# Patient Record
Sex: FEMALE | Race: WHITE | Employment: OTHER | URBAN - METROPOLITAN AREA
[De-identification: names, ages, dates, MRNs, and addresses within clinical notes are randomized per-mention and may not be internally consistent; named-entity substitution may affect disease eponyms.]

---

## 2018-08-03 PROBLEM — K92.2 UPPER GI BLEED: Status: ACTIVE | Noted: 2018-08-03

## 2018-08-04 ENCOUNTER — APPOINTMENT (OUTPATIENT)
Dept: ULTRASOUND IMAGING | Age: 83
DRG: 987 | End: 2018-08-04
Attending: INTERNAL MEDICINE
Payer: MEDICARE

## 2018-08-04 ENCOUNTER — HOSPITAL ENCOUNTER (INPATIENT)
Age: 83
LOS: 12 days | Discharge: NURSING, REHAB OR LONG-TERM CARE | DRG: 987 | End: 2018-08-16
Attending: INTERNAL MEDICINE | Admitting: INTERNAL MEDICINE
Payer: MEDICARE

## 2018-08-04 ENCOUNTER — APPOINTMENT (OUTPATIENT)
Dept: GENERAL RADIOLOGY | Age: 83
DRG: 987 | End: 2018-08-04
Attending: INTERNAL MEDICINE
Payer: MEDICARE
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
## 2018-08-04 PROBLEM — E66.9 OBESITY: Status: ACTIVE | Noted: 2018-08-04

## 2018-08-04 LAB
ABO/RH: NORMAL
AMORPHOUS: ABNORMAL /HPF
ANION GAP SERPL CALCULATED.3IONS-SCNC: 11 MMOL/L (ref 3–16)
ANION GAP SERPL CALCULATED.3IONS-SCNC: 11 MMOL/L (ref 3–16)
ANTIBODY SCREEN: NORMAL
BACTERIA: ABNORMAL /HPF
BASOPHILS ABSOLUTE: 0 K/UL (ref 0–0.2)
BASOPHILS RELATIVE PERCENT: 0.2 %
BILIRUBIN URINE: NEGATIVE
BLOOD, URINE: ABNORMAL
BUN BLDV-MCNC: 79 MG/DL (ref 7–20)
BUN BLDV-MCNC: 80 MG/DL (ref 7–20)
CALCIUM SERPL-MCNC: 9 MG/DL (ref 8.3–10.6)
CALCIUM SERPL-MCNC: 9.1 MG/DL (ref 8.3–10.6)
CHLORIDE BLD-SCNC: 102 MMOL/L (ref 99–110)
CHLORIDE BLD-SCNC: 103 MMOL/L (ref 99–110)
CLARITY: CLEAR
CO2: 29 MMOL/L (ref 21–32)
CO2: 29 MMOL/L (ref 21–32)
COLOR: YELLOW
CREAT SERPL-MCNC: 2.1 MG/DL (ref 0.6–1.2)
CREAT SERPL-MCNC: 2.1 MG/DL (ref 0.6–1.2)
CREATININE URINE: 93.3 MG/DL (ref 28–259)
EKG ATRIAL RATE: 51 BPM
EKG DIAGNOSIS: NORMAL
EKG Q-T INTERVAL: 468 MS
EKG QRS DURATION: 78 MS
EKG QTC CALCULATION (BAZETT): 431 MS
EKG R AXIS: 86 DEGREES
EKG T AXIS: 51 DEGREES
EKG VENTRICULAR RATE: 51 BPM
EOSINOPHILS ABSOLUTE: 0 K/UL (ref 0–0.6)
EOSINOPHILS RELATIVE PERCENT: 0.3 %
EPITHELIAL CELLS, UA: ABNORMAL /HPF
GFR AFRICAN AMERICAN: 27
GFR AFRICAN AMERICAN: 27
GFR NON-AFRICAN AMERICAN: 23
GFR NON-AFRICAN AMERICAN: 23
GLUCOSE BLD-MCNC: 105 MG/DL (ref 70–99)
GLUCOSE BLD-MCNC: 105 MG/DL (ref 70–99)
GLUCOSE BLD-MCNC: 110 MG/DL (ref 70–99)
GLUCOSE BLD-MCNC: 110 MG/DL (ref 70–99)
GLUCOSE BLD-MCNC: 115 MG/DL (ref 70–99)
GLUCOSE BLD-MCNC: 164 MG/DL (ref 70–99)
GLUCOSE BLD-MCNC: 172 MG/DL (ref 70–99)
GLUCOSE URINE: NEGATIVE MG/DL
HCT VFR BLD CALC: 31.8 % (ref 36–48)
HCT VFR BLD CALC: 33.2 % (ref 36–48)
HEMOGLOBIN: 10.7 G/DL (ref 12–16)
HEMOGLOBIN: 11 G/DL (ref 12–16)
KETONES, URINE: NEGATIVE MG/DL
LEUKOCYTE ESTERASE, URINE: ABNORMAL
LYMPHOCYTES ABSOLUTE: 0.7 K/UL (ref 1–5.1)
LYMPHOCYTES RELATIVE PERCENT: 6.7 %
MCH RBC QN AUTO: 30 PG (ref 26–34)
MCH RBC QN AUTO: 30.6 PG (ref 26–34)
MCHC RBC AUTO-ENTMCNC: 33.1 G/DL (ref 31–36)
MCHC RBC AUTO-ENTMCNC: 33.6 G/DL (ref 31–36)
MCV RBC AUTO: 90.7 FL (ref 80–100)
MCV RBC AUTO: 91 FL (ref 80–100)
MICROSCOPIC EXAMINATION: YES
MONOCYTES ABSOLUTE: 1.4 K/UL (ref 0–1.3)
MONOCYTES RELATIVE PERCENT: 14.3 %
NEUTROPHILS ABSOLUTE: 7.9 K/UL (ref 1.7–7.7)
NEUTROPHILS RELATIVE PERCENT: 78.5 %
NITRITE, URINE: NEGATIVE
PDW BLD-RTO: 14.6 % (ref 12.4–15.4)
PDW BLD-RTO: 15 % (ref 12.4–15.4)
PERFORMED ON: ABNORMAL
PH UA: 5.5
PLATELET # BLD: 197 K/UL (ref 135–450)
PLATELET # BLD: 226 K/UL (ref 135–450)
PMV BLD AUTO: 8.6 FL (ref 5–10.5)
PMV BLD AUTO: 8.6 FL (ref 5–10.5)
POTASSIUM REFLEX MAGNESIUM: 4.6 MMOL/L (ref 3.5–5.1)
POTASSIUM SERPL-SCNC: 5 MMOL/L (ref 3.5–5.1)
PROTEIN UA: NEGATIVE MG/DL
RBC # BLD: 3.49 M/UL (ref 4–5.2)
RBC # BLD: 3.66 M/UL (ref 4–5.2)
RBC UA: ABNORMAL /HPF (ref 0–2)
SODIUM BLD-SCNC: 142 MMOL/L (ref 136–145)
SODIUM BLD-SCNC: 143 MMOL/L (ref 136–145)
SODIUM URINE: <20 MMOL/L
SPECIFIC GRAVITY UA: 1.01
UROBILINOGEN, URINE: 0.2 E.U./DL
WBC # BLD: 10.1 K/UL (ref 4–11)
WBC # BLD: 10.4 K/UL (ref 4–11)
WBC UA: ABNORMAL /HPF (ref 0–5)

## 2018-08-04 PROCEDURE — 2709999900 HC NON-CHARGEABLE SUPPLY: Performed by: INTERNAL MEDICINE

## 2018-08-04 PROCEDURE — 99152 MOD SED SAME PHYS/QHP 5/>YRS: CPT | Performed by: INTERNAL MEDICINE

## 2018-08-04 PROCEDURE — 80048 BASIC METABOLIC PNL TOTAL CA: CPT

## 2018-08-04 PROCEDURE — 6370000000 HC RX 637 (ALT 250 FOR IP): Performed by: NURSE PRACTITIONER

## 2018-08-04 PROCEDURE — 6360000002 HC RX W HCPCS: Performed by: NURSE PRACTITIONER

## 2018-08-04 PROCEDURE — 0DJ08ZZ INSPECTION OF UPPER INTESTINAL TRACT, VIA NATURAL OR ARTIFICIAL OPENING ENDOSCOPIC: ICD-10-PCS | Performed by: INTERNAL MEDICINE

## 2018-08-04 PROCEDURE — C9113 INJ PANTOPRAZOLE SODIUM, VIA: HCPCS | Performed by: NURSE PRACTITIONER

## 2018-08-04 PROCEDURE — 86901 BLOOD TYPING SEROLOGIC RH(D): CPT

## 2018-08-04 PROCEDURE — 85025 COMPLETE CBC W/AUTO DIFF WBC: CPT

## 2018-08-04 PROCEDURE — 83036 HEMOGLOBIN GLYCOSYLATED A1C: CPT

## 2018-08-04 PROCEDURE — 86850 RBC ANTIBODY SCREEN: CPT

## 2018-08-04 PROCEDURE — 3609017100 HC EGD: Performed by: INTERNAL MEDICINE

## 2018-08-04 PROCEDURE — 86900 BLOOD TYPING SEROLOGIC ABO: CPT

## 2018-08-04 PROCEDURE — 2580000003 HC RX 258: Performed by: NURSE PRACTITIONER

## 2018-08-04 PROCEDURE — 36415 COLL VENOUS BLD VENIPUNCTURE: CPT

## 2018-08-04 PROCEDURE — 84300 ASSAY OF URINE SODIUM: CPT

## 2018-08-04 PROCEDURE — 93005 ELECTROCARDIOGRAM TRACING: CPT | Performed by: NURSE PRACTITIONER

## 2018-08-04 PROCEDURE — 6360000002 HC RX W HCPCS: Performed by: INTERNAL MEDICINE

## 2018-08-04 PROCEDURE — 81001 URINALYSIS AUTO W/SCOPE: CPT

## 2018-08-04 PROCEDURE — 2060000000 HC ICU INTERMEDIATE R&B

## 2018-08-04 PROCEDURE — 7100000011 HC PHASE II RECOVERY - ADDTL 15 MIN: Performed by: INTERNAL MEDICINE

## 2018-08-04 PROCEDURE — 76770 US EXAM ABDO BACK WALL COMP: CPT

## 2018-08-04 PROCEDURE — 93010 ELECTROCARDIOGRAM REPORT: CPT | Performed by: INTERNAL MEDICINE

## 2018-08-04 PROCEDURE — 94761 N-INVAS EAR/PLS OXIMETRY MLT: CPT

## 2018-08-04 PROCEDURE — 2700000000 HC OXYGEN THERAPY PER DAY

## 2018-08-04 PROCEDURE — 82570 ASSAY OF URINE CREATININE: CPT

## 2018-08-04 PROCEDURE — 2580000003 HC RX 258: Performed by: INTERNAL MEDICINE

## 2018-08-04 PROCEDURE — 7100000010 HC PHASE II RECOVERY - FIRST 15 MIN: Performed by: INTERNAL MEDICINE

## 2018-08-04 PROCEDURE — 71045 X-RAY EXAM CHEST 1 VIEW: CPT

## 2018-08-04 PROCEDURE — 85027 COMPLETE CBC AUTOMATED: CPT

## 2018-08-04 RX ORDER — MEMANTINE HYDROCHLORIDE 5 MG/1
10 TABLET ORAL 2 TIMES DAILY
Status: DISCONTINUED | OUTPATIENT
Start: 2018-08-04 | End: 2018-08-16 | Stop reason: HOSPADM

## 2018-08-04 RX ORDER — LEVOTHYROXINE SODIUM 0.1 MG/1
100 TABLET ORAL DAILY
Status: DISCONTINUED | OUTPATIENT
Start: 2018-08-04 | End: 2018-08-07

## 2018-08-04 RX ORDER — DIAPER,BRIEF,INFANT-TODD,DISP
EACH MISCELLANEOUS PRN
Status: ON HOLD | COMMUNITY
End: 2018-08-14 | Stop reason: HOSPADM

## 2018-08-04 RX ORDER — MEMANTINE HYDROCHLORIDE 28 MG/1
CAPSULE, EXTENDED RELEASE ORAL DAILY
COMMUNITY

## 2018-08-04 RX ORDER — ACETAMINOPHEN 500 MG
500 TABLET ORAL EVERY 6 HOURS PRN
COMMUNITY

## 2018-08-04 RX ORDER — OXCARBAZEPINE 150 MG/1
150 TABLET, FILM COATED ORAL 2 TIMES DAILY
COMMUNITY

## 2018-08-04 RX ORDER — SODIUM CHLORIDE 9 MG/ML
INJECTION, SOLUTION INTRAVENOUS CONTINUOUS
Status: DISCONTINUED | OUTPATIENT
Start: 2018-08-04 | End: 2018-08-04

## 2018-08-04 RX ORDER — SODIUM CHLORIDE 0.9 % (FLUSH) 0.9 %
10 SYRINGE (ML) INJECTION EVERY 12 HOURS SCHEDULED
Status: DISCONTINUED | OUTPATIENT
Start: 2018-08-04 | End: 2018-08-16 | Stop reason: HOSPADM

## 2018-08-04 RX ORDER — PANTOPRAZOLE SODIUM 40 MG/10ML
40 INJECTION, POWDER, LYOPHILIZED, FOR SOLUTION INTRAVENOUS 2 TIMES DAILY
Status: DISCONTINUED | OUTPATIENT
Start: 2018-08-05 | End: 2018-08-05

## 2018-08-04 RX ORDER — ACETAMINOPHEN 325 MG/1
650 TABLET ORAL EVERY 4 HOURS PRN
Status: DISCONTINUED | OUTPATIENT
Start: 2018-08-04 | End: 2018-08-16 | Stop reason: HOSPADM

## 2018-08-04 RX ORDER — M-VIT,TX,IRON,MINS/CALC/FOLIC 27MG-0.4MG
1 TABLET ORAL DAILY
COMMUNITY

## 2018-08-04 RX ORDER — DILTIAZEM HYDROCHLORIDE 240 MG/1
240 CAPSULE, EXTENDED RELEASE ORAL DAILY
Status: ON HOLD | COMMUNITY
End: 2018-08-16 | Stop reason: HOSPADM

## 2018-08-04 RX ORDER — SENNA PLUS 8.6 MG/1
2 TABLET ORAL DAILY
COMMUNITY

## 2018-08-04 RX ORDER — MONTELUKAST SODIUM 10 MG/1
10 TABLET ORAL DAILY
Status: DISCONTINUED | OUTPATIENT
Start: 2018-08-04 | End: 2018-08-16 | Stop reason: HOSPADM

## 2018-08-04 RX ORDER — SPIRONOLACTONE 25 MG/1
25 TABLET ORAL 2 TIMES DAILY
Status: ON HOLD | COMMUNITY
End: 2018-08-14 | Stop reason: HOSPADM

## 2018-08-04 RX ORDER — MIDAZOLAM HYDROCHLORIDE 5 MG/ML
INJECTION INTRAMUSCULAR; INTRAVENOUS PRN
Status: DISCONTINUED | OUTPATIENT
Start: 2018-08-04 | End: 2018-08-04 | Stop reason: HOSPADM

## 2018-08-04 RX ORDER — DILTIAZEM HYDROCHLORIDE EXTENDED-RELEASE TABLETS 240 MG/1
TABLET, EXTENDED RELEASE ORAL
Status: ON HOLD | COMMUNITY
End: 2018-08-14 | Stop reason: HOSPADM

## 2018-08-04 RX ORDER — SPIRONOLACTONE 25 MG/1
25 TABLET ORAL 2 TIMES DAILY
Status: DISCONTINUED | OUTPATIENT
Start: 2018-08-04 | End: 2018-08-04

## 2018-08-04 RX ORDER — OXYBUTYNIN CHLORIDE 5 MG/1
5 TABLET, EXTENDED RELEASE ORAL DAILY
Status: DISCONTINUED | OUTPATIENT
Start: 2018-08-04 | End: 2018-08-16 | Stop reason: HOSPADM

## 2018-08-04 RX ORDER — FAMOTIDINE 20 MG/1
20 TABLET, FILM COATED ORAL DAILY
COMMUNITY

## 2018-08-04 RX ORDER — SODIUM CHLORIDE 0.9 % (FLUSH) 0.9 %
10 SYRINGE (ML) INJECTION PRN
Status: DISCONTINUED | OUTPATIENT
Start: 2018-08-04 | End: 2018-08-16 | Stop reason: HOSPADM

## 2018-08-04 RX ORDER — ACETAMINOPHEN 160 MG
1 TABLET,DISINTEGRATING ORAL DAILY
COMMUNITY

## 2018-08-04 RX ORDER — FENTANYL CITRATE 50 UG/ML
INJECTION, SOLUTION INTRAMUSCULAR; INTRAVENOUS PRN
Status: DISCONTINUED | OUTPATIENT
Start: 2018-08-04 | End: 2018-08-04 | Stop reason: HOSPADM

## 2018-08-04 RX ORDER — GUAIFENESIN AND DEXTROMETHORPHAN HYDROBROMIDE 100; 10 MG/5ML; MG/5ML
5 SOLUTION ORAL EVERY 4 HOURS PRN
Status: ON HOLD | COMMUNITY
End: 2018-08-14 | Stop reason: HOSPADM

## 2018-08-04 RX ORDER — OXYBUTYNIN CHLORIDE 5 MG/1
5 TABLET, EXTENDED RELEASE ORAL DAILY
COMMUNITY

## 2018-08-04 RX ORDER — FUROSEMIDE 20 MG/1
20 TABLET ORAL 2 TIMES DAILY
Status: ON HOLD | COMMUNITY
End: 2018-08-16 | Stop reason: HOSPADM

## 2018-08-04 RX ORDER — ALBUTEROL SULFATE 2.5 MG/3ML
2.5 SOLUTION RESPIRATORY (INHALATION) EVERY 6 HOURS PRN
COMMUNITY

## 2018-08-04 RX ORDER — SIMVASTATIN 10 MG
10 TABLET ORAL NIGHTLY
COMMUNITY

## 2018-08-04 RX ORDER — ALBUTEROL SULFATE 2.5 MG/3ML
2.5 SOLUTION RESPIRATORY (INHALATION) EVERY 6 HOURS PRN
Status: DISCONTINUED | OUTPATIENT
Start: 2018-08-04 | End: 2018-08-06

## 2018-08-04 RX ORDER — DEXTROSE MONOHYDRATE 25 G/50ML
12.5 INJECTION, SOLUTION INTRAVENOUS PRN
Status: DISCONTINUED | OUTPATIENT
Start: 2018-08-04 | End: 2018-08-16 | Stop reason: HOSPADM

## 2018-08-04 RX ORDER — FUROSEMIDE 20 MG/1
20 TABLET ORAL 2 TIMES DAILY
Status: DISCONTINUED | OUTPATIENT
Start: 2018-08-04 | End: 2018-08-04

## 2018-08-04 RX ORDER — DEXTROSE MONOHYDRATE 50 MG/ML
100 INJECTION, SOLUTION INTRAVENOUS PRN
Status: DISCONTINUED | OUTPATIENT
Start: 2018-08-04 | End: 2018-08-16 | Stop reason: HOSPADM

## 2018-08-04 RX ORDER — OXCARBAZEPINE 150 MG/1
150 TABLET, FILM COATED ORAL 2 TIMES DAILY
Status: DISCONTINUED | OUTPATIENT
Start: 2018-08-04 | End: 2018-08-16 | Stop reason: HOSPADM

## 2018-08-04 RX ORDER — SIMVASTATIN 10 MG
10 TABLET ORAL NIGHTLY
Status: DISCONTINUED | OUTPATIENT
Start: 2018-08-04 | End: 2018-08-16 | Stop reason: HOSPADM

## 2018-08-04 RX ORDER — NICOTINE POLACRILEX 4 MG
15 LOZENGE BUCCAL PRN
Status: DISCONTINUED | OUTPATIENT
Start: 2018-08-04 | End: 2018-08-16 | Stop reason: HOSPADM

## 2018-08-04 RX ORDER — FAMOTIDINE 20 MG/1
20 TABLET, FILM COATED ORAL DAILY
Status: DISCONTINUED | OUTPATIENT
Start: 2018-08-04 | End: 2018-08-04

## 2018-08-04 RX ORDER — SODIUM CHLORIDE, SODIUM GLUCONATE, SODIUM ACETATE, POTASSIUM CHLORIDE AND MAGNESIUM CHLORIDE 526; 502; 368; 37; 30 MG/100ML; MG/100ML; MG/100ML; MG/100ML; MG/100ML
1000 INJECTION, SOLUTION INTRAVENOUS CONTINUOUS
Status: DISCONTINUED | OUTPATIENT
Start: 2018-08-04 | End: 2018-08-07

## 2018-08-04 RX ORDER — LEVOTHYROXINE SODIUM 0.1 MG/1
100 TABLET ORAL DAILY
COMMUNITY

## 2018-08-04 RX ORDER — ONDANSETRON 2 MG/ML
4 INJECTION INTRAMUSCULAR; INTRAVENOUS EVERY 6 HOURS PRN
Status: DISCONTINUED | OUTPATIENT
Start: 2018-08-04 | End: 2018-08-16 | Stop reason: HOSPADM

## 2018-08-04 RX ORDER — POTASSIUM CHLORIDE 20 MEQ/1
20 TABLET, EXTENDED RELEASE ORAL DAILY
Status: ON HOLD | COMMUNITY
End: 2018-08-14 | Stop reason: HOSPADM

## 2018-08-04 RX ORDER — DILTIAZEM HYDROCHLORIDE 240 MG/1
240 CAPSULE, COATED, EXTENDED RELEASE ORAL DAILY
Status: DISCONTINUED | OUTPATIENT
Start: 2018-08-04 | End: 2018-08-04

## 2018-08-04 RX ORDER — MAGNESIUM OXIDE 400 MG/1
400 TABLET ORAL 2 TIMES DAILY
COMMUNITY

## 2018-08-04 RX ORDER — M-VIT,TX,IRON,MINS/CALC/FOLIC 27MG-0.4MG
1 TABLET ORAL DAILY
Status: DISCONTINUED | OUTPATIENT
Start: 2018-08-04 | End: 2018-08-16 | Stop reason: HOSPADM

## 2018-08-04 RX ORDER — MONTELUKAST SODIUM 10 MG/1
10 TABLET ORAL DAILY
COMMUNITY

## 2018-08-04 RX ORDER — SENNA PLUS 8.6 MG/1
2 TABLET ORAL DAILY
Status: DISCONTINUED | OUTPATIENT
Start: 2018-08-04 | End: 2018-08-16 | Stop reason: HOSPADM

## 2018-08-04 RX ORDER — PANTOPRAZOLE SODIUM 40 MG/10ML
40 INJECTION, POWDER, LYOPHILIZED, FOR SOLUTION INTRAVENOUS DAILY
Status: DISCONTINUED | OUTPATIENT
Start: 2018-08-05 | End: 2018-08-04

## 2018-08-04 RX ADMIN — Medication 1 TABLET: at 12:05

## 2018-08-04 RX ADMIN — MEMANTINE 10 MG: 5 TABLET ORAL at 21:33

## 2018-08-04 RX ADMIN — Medication 400 MG: at 21:33

## 2018-08-04 RX ADMIN — SPIRONOLACTONE 25 MG: 25 TABLET ORAL at 12:05

## 2018-08-04 RX ADMIN — SODIUM CHLORIDE: 9 INJECTION, SOLUTION INTRAVENOUS at 03:16

## 2018-08-04 RX ADMIN — MEMANTINE 10 MG: 5 TABLET ORAL at 12:05

## 2018-08-04 RX ADMIN — INSULIN LISPRO 1 UNITS: 100 INJECTION, SOLUTION INTRAVENOUS; SUBCUTANEOUS at 21:34

## 2018-08-04 RX ADMIN — CEFTRIAXONE SODIUM 1 G: 1 INJECTION, POWDER, FOR SOLUTION INTRAMUSCULAR; INTRAVENOUS at 17:18

## 2018-08-04 RX ADMIN — OXYBUTYNIN CHLORIDE 5 MG: 5 TABLET, FILM COATED, EXTENDED RELEASE ORAL at 12:04

## 2018-08-04 RX ADMIN — OXCARBAZEPINE 150 MG: 150 TABLET ORAL at 12:05

## 2018-08-04 RX ADMIN — VITAMIN D, TAB 1000IU (100/BT) 2000 UNITS: 25 TAB at 12:06

## 2018-08-04 RX ADMIN — ACETAMINOPHEN 650 MG: 325 TABLET, FILM COATED ORAL at 17:13

## 2018-08-04 RX ADMIN — Medication 10 ML: at 21:35

## 2018-08-04 RX ADMIN — INSULIN LISPRO 1 UNITS: 100 INJECTION, SOLUTION INTRAVENOUS; SUBCUTANEOUS at 17:18

## 2018-08-04 RX ADMIN — Medication 400 MG: at 12:04

## 2018-08-04 RX ADMIN — LEVOTHYROXINE SODIUM 100 MCG: 100 TABLET ORAL at 12:07

## 2018-08-04 RX ADMIN — ONDANSETRON 4 MG: 2 INJECTION INTRAMUSCULAR; INTRAVENOUS at 19:03

## 2018-08-04 RX ADMIN — OXCARBAZEPINE 150 MG: 150 TABLET ORAL at 21:33

## 2018-08-04 RX ADMIN — SODIUM CHLORIDE, SODIUM GLUCONATE, SODIUM ACETATE, POTASSIUM CHLORIDE AND MAGNESIUM CHLORIDE 1000 ML: 526; 502; 368; 37; 30 INJECTION, SOLUTION INTRAVENOUS at 14:11

## 2018-08-04 RX ADMIN — MONTELUKAST SODIUM 10 MG: 10 TABLET, COATED ORAL at 12:05

## 2018-08-04 RX ADMIN — SODIUM CHLORIDE 80 MG: 9 INJECTION, SOLUTION INTRAVENOUS at 03:15

## 2018-08-04 RX ADMIN — SIMVASTATIN 10 MG: 10 TABLET, FILM COATED ORAL at 21:33

## 2018-08-04 RX ADMIN — SENNOSIDES 17.2 MG: 8.6 TABLET, FILM COATED ORAL at 12:06

## 2018-08-04 RX ADMIN — SODIUM CHLORIDE 8 MG/HR: 9 INJECTION, SOLUTION INTRAVENOUS at 04:45

## 2018-08-04 RX ADMIN — Medication 10 ML: at 12:06

## 2018-08-04 ASSESSMENT — PAIN SCALES - PAIN ASSESSMENT IN ADVANCED DEMENTIA (PAINAD)
NEGVOCALIZATION: 0
TOTALSCORE: 0
BREATHING: 0
CONSOLABILITY: 0
FACIALEXPRESSION: 0
BODYLANGUAGE: 0

## 2018-08-04 ASSESSMENT — PAIN SCALES - GENERAL
PAINLEVEL_OUTOF10: 6
PAINLEVEL_OUTOF10: 3

## 2018-08-04 NOTE — PROGRESS NOTES
Spoke with Dr. Rosy Reno, Nephrologist, read all lab results, No new order rec'd, per on call MD, Dr. Harjinder Zee will see Pt in am. [facility], [facility].

## 2018-08-04 NOTE — PROGRESS NOTES
Patient's EF (Ejection Fraction) is Unknown. Patient has a past medical history of Alzheimer's dementia; Asthma; Atrial fibrillation ([facility] [address] Blood clot in vein; CHF (congestive heart failure) ([facility] [address] Dementia; GERD (gastroesophageal reflux disease); Hypertension; Kidney failure; and Osteoporosis. Comorbidities reviewed and education provided. Patient and family's stated goal of care: increase activity tolerance prior to discharge    Patient's current functional capacity:  Slight limitation of physical activity. Comfortable at rest. Ordinary physical activity results in fatigue, palpitation, dyspnea. Pt resting in bed at this time on 2 L O2. Pt denies shortness of breath. Pt with nonpitting lower extremity edema. Patient's weights and intake/output reviewed:    Patient Vitals for the past 96 hrs (Last 3 readings):   Weight   08/04/18 0053 211 lb 10.3 oz (96 kg)       Intake/Output Summary (Last 24 hours) at 08/04/18 1437  Last data filed at 08/04/18 1422   Gross per 24 hour   Intake            307.5 ml   Output              700 ml   Net           -392.5 ml       Patient provided with education on CHF signs/symptoms, causes, discharge medications, daily weights, low sodium diet, activity, and follow-up. Notified patient to call the doctor post discharge if patient experiences shortness of breath, chest pain, swelling, cough, or weight gain of three pounds in a day/five pounds in a week. Also notified patient to call the doctor with dizziness, increased fatigue, decreased or difficulty urinating. Pt education needs reinforcement. No additional questions at this time.     Education Time: 12 Minutes

## 2018-08-04 NOTE — OP NOTE
Manuel Oneil   8/4/2018  Esophagogastroduodenoscopy  A pre-procedure re-evaluation was performed immediately prior to the procedure. Preprocedure Dx: hematemesis  Postprocedure Dx: mild gastritis, old blood in oropharynx and upper esophagus  Medications: Procedural sedation with Versed & Fentanyl  Complications: None  Estimated Blood Loss: <5cc  Specimens: were not obtained  Recommendation  1. Continue supportive care  2. Switch ppi to bid  3. Bleeding likely from oral or nasopharyngeal source  4. No source of bleeding in UGI tract  5. Monitor Hgb and observe for signs of bleeding  6.  Will follow    Magali Herrera
sedation. The cardiopulmonary status was continuously  monitored throughout the procedure. The patient was placed in left lateral  disposition. Once the patient was deemed to be adequately sedated, a  standard upper gastroscope was inserted in the mouth and advanced under  direct visualization to the second portion of the duodenum. Entire mucosa,  esophagus, stomach and (retroflexed and views), duodenum (bulb, sweep and  second portion) were examined carefully during withdrawal.  The patient  tolerated well without any difficulties. FINDINGS:  ESOPHAGUS:  There was old blood in the upper esophagus as well as old blood  was visualized in the oral and oropharyngeal region. This is highly  suspicious for a oral or nasopharyngeal bleed. There was no active  bleeding or ulcers in the entire esophagus. There was mildly irregular  Z-line likely short segment Fitzpatrick's. STOMACH:  The entire stomach appeared normal except for minimal gastropathy  without any evidence of bleeding or ulcers. DUODENUM:  Examined portion of the duodenum appeared normal.  No rectal  bleeding or ulcers identified. No source of bleeding identified in the  upper GI tract. SUMMARY:  1. No bleeding identified in the upper GI tract. 2.  Mild gastritis. 3.  Irregular Z-line likely possible Fitzpatrick's. 4.  Old blood visible in the oral and oropharyngeal region and the upper  esophagus likely was secondary to oral or nasopharyngeal bleed. RECOMMENDATIONS:  1. Return the patient to floor for continuous medical care. 2.  Monitor hemoglobin. 3.  Observe for any further signs of bleeding. 4.  Switch PPI drip to b.i.d. orally. 5.  We will follow the patient with you.         March Lesches, MD    D: 08/04/2018 18:46:21       T: 08/04/2018 18:48:57     [id number]  Job#: [id number]     Doc#: [id number]    CC:  MD Leighann Hernandez MD

## 2018-08-04 NOTE — PLAN OF CARE
Problem: Discharge Planning:  Goal: Discharged to appropriate level of care  Discharged to appropriate level of care   Outcome: Ongoing      Problem: HEMODYNAMIC STATUS  Goal: Patient has stable vital signs and fluid balance  Outcome: Met This Shift  Patient's EF , No recent or previous Echo on file    Patient has a past medical history of Alzheimer's dementia; Asthma; Atrial fibrillation ([facility] [address] Blood clot in vein; CHF (congestive heart failure) ([facility] [address] Dementia; GERD (gastroesophageal reflux disease); Hypertension; Kidney failure; and Osteoporosis. Comorbidities reviewed and education provided. Patient and family's stated goal of care: be more comfortable prior to discharge    Patient's current functional capacity:  Marked limitation of physical activity. Comfortable at rest. Less than ordinary activity causes fatigue, palpitation, or dyspnea. Pt resting in bed at this time on 2 L O2. Pt with complaints of shortness of breath. Pt with nonpitting lower extremity edema. Patient's weights and intake/output reviewed:    Patient Vitals for the past 96 hrs (Last 3 readings):   Weight   08/04/18 0053 211 lb 10.3 oz (96 kg)       Intake/Output Summary (Last 24 hours) at 08/04/18 0656  Last data filed at 08/04/18 2188   Gross per 24 hour   Intake            177.5 ml   Output              250 ml   Net            -72.5 ml       Patient provided with education on CHF signs/symptoms, causes, discharge medications, daily weights, low sodium diet, activity, and follow-up. Notified patient to call the doctor post discharge if patient experiences shortness of breath, chest pain, swelling, cough, or weight gain of three pounds in a day/five pounds in a week. Also notified patient to call the doctor with dizziness, increased fatigue, decreased or difficulty urinating. Pt education needs reinforcement. No additional questions at this time.     Education Time: 10 Minutes

## 2018-08-04 NOTE — PROGRESS NOTES
Pt arrived came from  [facility]. Pt appears sleepy, confused. MD paged Notified on Pt arrival to room, needs Admission orders.  GEORGINAN

## 2018-08-04 NOTE — PLAN OF CARE
Problem: Discharge Planning:  Goal: Discharged to appropriate level of care  Discharged to appropriate level of care  Outcome: Ongoing

## 2018-08-04 NOTE — PROGRESS NOTES
Vitals:    08/04/18 0755   BP: 135/73   Pulse: (!) 44   Resp: 18   Temp: 98.006 °F (36.7 °C)   SpO2: 100%     Patient resting quietly in bed, alert to voice and oriented to self. Patient with no s/s of distress at this time. Daughter at bedside. Bed locked in lowest position, call light within reach, bedside table within reach. SCDs in place bilaterally. Protonix gtt @ 10 ml/hr. Will continue to monitor.

## 2018-08-04 NOTE — CONSULTS
History Narrative    None       Physical Exam     Blood pressure (!) 96/29, pulse 58, temperature 98.006 °F (36.7 °C), temperature source Axillary, resp. rate 16, height 5' 2\" (1.575 m), weight 211 lb 10.3 oz (96 kg), SpO2 96 %. General:  NAD, ill-appearing  HEENT:  PERRL, EOMI  Neck:  Supple, normal range of movement  Chest:  CTAB, good respiratory effort, good air movement  CV:  Regular, no rub   Abdomen:  NTND, soft, +BS, no hepatosplenomegaly  Extremities:  No peripheral edema  Neurological:  Moving all four extremities, CN II-XII grossly intact  Lymphatics:  No palpable lymph nodes  Skin:  No rash, no jaundice  Psychiatric:  Somnolent, confused     Data     Recent Labs      08/04/18 0223 08/04/18   0448   WBC  10.4  10.1   HGB  11.0*  10.7*   HCT  33.2*  31.8*   MCV  90.7  91.0   PLT  226  197     Recent Labs      08/04/18 0223 08/04/18   0448   NA  142  143   K  5.0  4.6   CL  102  103   CO2  29  29   GLUCOSE  110*  115*   BUN  80*  79*   CREATININE  2.1*  2.1*   LABGLOM  23*  23*   GFRAA  27*  27*       Assessment:    Renal Failure:  Unknown baseline but daughter does not think she has had CKD in the past.  - Lua placed. Looks dry. Ultrasound normal echogenicity and no hydronephrosis. GI Bleed:  GI following. Hemodynamically stable     Dementia:      Anemia:  Due to acute blood loss.       Plan:  · Lua  · IV fluids  · Will review prior records for baseline Scr  · UA and urine indices  · Follow labs and monitor volume status with IV fluids    Thank you for asking us to participate in the management of your patient, please do not hesitate to contact me for any concerns regarding my recommendations as outlined above.    -----------------------------  Silvana Dalal M.D.   [facility]

## 2018-08-04 NOTE — PROGRESS NOTES
Ms. [facility] CNP @ bedside, assessing Pt aware about multiple bruising, and abrasions, Pt has pre existing DNR [place] DNR order form. After CNP verified with Pt daughter Rosy Silverio) \" Mom wants Chest compressions, Medications and Shock\" to be done. Per CNP verbal order with read back Stat Portable Chest x-ray.  NATANAELRN

## 2018-08-04 NOTE — PROGRESS NOTES
Dr. Reginald Otero notified of new low heart rate of 34 bpm.  HR continues to be bradycardic. He does not want to consult cardiology at this time. No new orders given at this time. Will monitor patient closely.

## 2018-08-04 NOTE — PLAN OF CARE
Problem: Nutrition  Goal: Optimal nutrition therapy  Outcome: Ongoing  Nutrition Problem: Inadequate oral intake  Intervention: Food and/or Nutrient Delivery: Start ONS, Continue current diet  Nutritional Goals: PO diet with intakes of 50% or more this admission

## 2018-08-04 NOTE — CONSULTS
History and Physical / Pre-Sedation Assessment    Patient: Hang Landry   :   1935     Intended Procedure:  EGD    HPI: 82year old female with history of HTN, CHF, GERD, A fib, asthma, Dementia, falls admitted with hematemesis    Nurses notes reviewed and agreed. Medications reviewed  Allergies: Allergies   Allergen Reactions    Codeine Itching    Sulfa Antibiotics Other (See Comments)     Per Pt daughter Gustavo Temple) \" Not know her reaction to medication\"       Physical Exam:  Vital Signs: BP (!) 127/50   Pulse 50   Temp 98.006 °F (36.7 °C) (Axillary)   Resp 16   Ht 5' 2\" (1.575 m)   Wt 211 lb 10.3 oz (96 kg)   SpO2 97%   BMI 38.71 kg/m²    Airway: Mallampati: II (soft palate, uvula, fauces visible)  Pulmonary:Normal  Cardiac:Normal  Abdomen:Normal    Pre-Procedure Assessment / Plan:  ASA: Class 3 - A patient with severe systemic disease that limits activity but is not incapacitating  Level of Sedation Plan: Moderate sedation  Post Procedure plan: Return to same level of care    I assessed the patient and find that the patient is in satisfactory condition to proceed with the planned procedure and sedation plan. I have explained the risk, benefits, and alternatives to the procedure; the patient understands and agrees to proceed.        Luis Moore  2018

## 2018-08-04 NOTE — PROGRESS NOTES
RESPIRATORY THERAPY ASSESSMENT    Name:  [facility] [address] Record Number:  [id number]  Age: 82 y.o. Gender: female  : 1935  Today's Date:  2018  Room:  [facility]8/0428-01    Assessment     Is the patient being admitted for a COPD or Asthma exacerbation? No   (If yes the patient will be seen every 4 hours for the first 24 hours and then reassessed)    Patient Admission Diagnosis      Allergies  Allergies   Allergen Reactions    Codeine Itching    Sulfa Antibiotics Other (See Comments)     Per Pt daughter Johanna Florez) \" Not know her reaction to medication\"       Minimum Predicted Vital Capacity:    750       Actual Vital Capacity:      Pt unable          Pulmonary History:No history  Home Oxygen Therapy:  room air  Home Respiratory Therapy: Albuterol HHN PRN   Current Respiratory Therapy:  Albuterol HHN PRN          Respiratory Severity Index(RSI)   Patients with orders for inhalation medications, oxygen, or any therapeutic treatment modality will be placed on Respiratory Protocol. They will be assessed with the first treatment and at least every 72 hours thereafter. The following severity scale will be used to determine frequency of treatment intervention. Smoking History: No Smoking History = 0    Social History  Social History   Substance Use Topics    Smoking status: Not on file    Smokeless tobacco: Not on file    Alcohol use Not on file       Recent Surgical History: None = 0  Past Surgical History  No past surgical history on file.     Level of Consciousness: Alert, Follows Commands but Disoriented = 1    Level of Activity: Mostly sedentary, minimal walking = 2    Respiratory Pattern: Regular Pattern; RR 8-20 = 0    Breath Sounds: Diminshed bilaterally and/or crackles = 2    Sputum   ,  ,    Cough: Strong, spontaneous, non-productive = 0    Vital Signs   BP (!) 148/90   Pulse 93   Temp 97.6 °F (36.4 °C) (Oral)   Resp 20   Ht 5' 2\" (1.575 m)   Wt 211 lb 10.3 oz (96 kg)   SpO2 5. Bronchodilators will be delivered via Metered Dose Inhaler (MDI), HHN, Aerogen to intubated patients on mechanical ventilation. 6. Inhalation medication orders will be delivered and/or substituted as outlined below. Aerosolized Medications Ordering and Administration Guidelines:    1. All Medications will be ordered by a physician, and their frequency and/or modality will be adjusted as defined by the patients Respiratory Severity Index (RSI) score. 2. If the patient does not have documented COPD, consider discontinuing anticholinergics when RSI is less than 9.  3. If the bronchospasm worsens (increased RSI), then the bronchodilator frequency can be increased to a maximum of every 4 hours. If greater than every 4 hours is required, the physician will be contacted. 4. If the bronchospasm improves, the frequency of the bronchodilator can be decreased, based on the patient's RSI, but not less than home treatment regimen frequency. 5. Bronchodilator(s) will be discontinued if patient has a RSI less than 9 and has received no scheduled or as needed treatment for 72  Hrs. Patients Ordered on a Mucolytic Agent:    1. Must always be administered with a bronchodilator. 2. Discontinue if patient experiences worsened bronchospasm, or secretions have lessened to the point that the patient is able to clear them with a cough. Anti-inflammatory and Combination Medications:    1. If the patient lacks prior history of lung disease, is not using inhaled anti-inflammatory medication at home, and lacks wheezing by examination or by history for at least 24 hours, contact physician for possible discontinuation.

## 2018-08-04 NOTE — CONSULTS
50% or more this admission    · Monitoring: Diet Progression, NPO Status, Meal Intake, Pertinent Labs, Weight    See Adult Nutrition Doc Flowsheet for more detail.      Electronically signed by Jacinto Mahmood RD, ANEL on 8/4/18 at 1:37 PM    Contact Number: [phone]

## 2018-08-04 NOTE — PROGRESS NOTES
MD paged regarding heart rate dipping into the 30s. Patient resting quietly, vitals signs stable otherwise.

## 2018-08-05 LAB
ALBUMIN SERPL-MCNC: 3.5 G/DL (ref 3.4–5)
ANION GAP SERPL CALCULATED.3IONS-SCNC: 10 MMOL/L (ref 3–16)
BASOPHILS ABSOLUTE: 0.1 K/UL (ref 0–0.2)
BASOPHILS RELATIVE PERCENT: 0.7 %
BUN BLDV-MCNC: 72 MG/DL (ref 7–20)
CALCIUM SERPL-MCNC: 8.9 MG/DL (ref 8.3–10.6)
CHLORIDE BLD-SCNC: 101 MMOL/L (ref 99–110)
CO2: 30 MMOL/L (ref 21–32)
CREAT SERPL-MCNC: 1.7 MG/DL (ref 0.6–1.2)
EOSINOPHILS ABSOLUTE: 0.3 K/UL (ref 0–0.6)
EOSINOPHILS RELATIVE PERCENT: 3.4 %
ESTIMATED AVERAGE GLUCOSE: 119.8 MG/DL
GFR AFRICAN AMERICAN: 35
GFR NON-AFRICAN AMERICAN: 29
GLUCOSE BLD-MCNC: 102 MG/DL (ref 70–99)
GLUCOSE BLD-MCNC: 104 MG/DL (ref 70–99)
GLUCOSE BLD-MCNC: 105 MG/DL (ref 70–99)
GLUCOSE BLD-MCNC: 126 MG/DL (ref 70–99)
GLUCOSE BLD-MCNC: 98 MG/DL (ref 70–99)
HBA1C MFR BLD: 5.8 %
HCT VFR BLD CALC: 32.7 % (ref 36–48)
HEMOGLOBIN: 10.6 G/DL (ref 12–16)
LYMPHOCYTES ABSOLUTE: 0.7 K/UL (ref 1–5.1)
LYMPHOCYTES RELATIVE PERCENT: 6.9 %
MAGNESIUM: 3.3 MG/DL (ref 1.8–2.4)
MCH RBC QN AUTO: 29.6 PG (ref 26–34)
MCHC RBC AUTO-ENTMCNC: 32.3 G/DL (ref 31–36)
MCV RBC AUTO: 91.6 FL (ref 80–100)
MONOCYTES ABSOLUTE: 1.3 K/UL (ref 0–1.3)
MONOCYTES RELATIVE PERCENT: 13 %
NEUTROPHILS ABSOLUTE: 7.6 K/UL (ref 1.7–7.7)
NEUTROPHILS RELATIVE PERCENT: 76 %
PDW BLD-RTO: 15.7 % (ref 12.4–15.4)
PERFORMED ON: ABNORMAL
PERFORMED ON: NORMAL
PHOSPHORUS: 3.6 MG/DL (ref 2.5–4.9)
PLATELET # BLD: 199 K/UL (ref 135–450)
PMV BLD AUTO: 8.8 FL (ref 5–10.5)
POTASSIUM SERPL-SCNC: 4 MMOL/L (ref 3.5–5.1)
RBC # BLD: 3.57 M/UL (ref 4–5.2)
SODIUM BLD-SCNC: 141 MMOL/L (ref 136–145)
TOTAL CK: 345 U/L (ref 26–192)
URIC ACID, SERUM: 10.4 MG/DL (ref 2.6–6)
WBC # BLD: 10 K/UL (ref 4–11)

## 2018-08-05 PROCEDURE — 1200000000 HC SEMI PRIVATE

## 2018-08-05 PROCEDURE — 2060000000 HC ICU INTERMEDIATE R&B

## 2018-08-05 PROCEDURE — 6360000002 HC RX W HCPCS: Performed by: INTERNAL MEDICINE

## 2018-08-05 PROCEDURE — 2580000003 HC RX 258: Performed by: INTERNAL MEDICINE

## 2018-08-05 PROCEDURE — C9113 INJ PANTOPRAZOLE SODIUM, VIA: HCPCS | Performed by: INTERNAL MEDICINE

## 2018-08-05 PROCEDURE — 2700000000 HC OXYGEN THERAPY PER DAY

## 2018-08-05 PROCEDURE — 94761 N-INVAS EAR/PLS OXIMETRY MLT: CPT

## 2018-08-05 PROCEDURE — 80069 RENAL FUNCTION PANEL: CPT

## 2018-08-05 PROCEDURE — 94640 AIRWAY INHALATION TREATMENT: CPT

## 2018-08-05 PROCEDURE — 36415 COLL VENOUS BLD VENIPUNCTURE: CPT

## 2018-08-05 PROCEDURE — 94664 DEMO&/EVAL PT USE INHALER: CPT

## 2018-08-05 PROCEDURE — 2580000003 HC RX 258: Performed by: NURSE PRACTITIONER

## 2018-08-05 PROCEDURE — 82550 ASSAY OF CK (CPK): CPT

## 2018-08-05 PROCEDURE — 6360000002 HC RX W HCPCS: Performed by: NURSE PRACTITIONER

## 2018-08-05 PROCEDURE — 85025 COMPLETE CBC W/AUTO DIFF WBC: CPT

## 2018-08-05 PROCEDURE — 83735 ASSAY OF MAGNESIUM: CPT

## 2018-08-05 PROCEDURE — 84550 ASSAY OF BLOOD/URIC ACID: CPT

## 2018-08-05 PROCEDURE — 6370000000 HC RX 637 (ALT 250 FOR IP): Performed by: NURSE PRACTITIONER

## 2018-08-05 RX ORDER — ATROPINE SULFATE 0.1 MG/ML
1 INJECTION INTRAVENOUS ONCE
Status: DISCONTINUED | OUTPATIENT
Start: 2018-08-05 | End: 2018-08-16 | Stop reason: HOSPADM

## 2018-08-05 RX ORDER — ATROPINE SULFATE 0.1 MG/ML
1 INJECTION INTRAVENOUS
Status: ACTIVE | OUTPATIENT
Start: 2018-08-05 | End: 2018-08-05

## 2018-08-05 RX ORDER — PANTOPRAZOLE SODIUM 40 MG/1
40 TABLET, DELAYED RELEASE ORAL
Status: DISCONTINUED | OUTPATIENT
Start: 2018-08-06 | End: 2018-08-16 | Stop reason: HOSPADM

## 2018-08-05 RX ADMIN — SODIUM CHLORIDE, SODIUM GLUCONATE, SODIUM ACETATE, POTASSIUM CHLORIDE AND MAGNESIUM CHLORIDE 1000 ML: 526; 502; 368; 37; 30 INJECTION, SOLUTION INTRAVENOUS at 14:54

## 2018-08-05 RX ADMIN — Medication 10 ML: at 21:40

## 2018-08-05 RX ADMIN — ACETAMINOPHEN 650 MG: 325 TABLET, FILM COATED ORAL at 04:19

## 2018-08-05 RX ADMIN — OXCARBAZEPINE 150 MG: 150 TABLET ORAL at 21:38

## 2018-08-05 RX ADMIN — VITAMIN D, TAB 1000IU (100/BT) 2000 UNITS: 25 TAB at 09:23

## 2018-08-05 RX ADMIN — LEVOTHYROXINE SODIUM 100 MCG: 100 TABLET ORAL at 06:41

## 2018-08-05 RX ADMIN — ACETAMINOPHEN 650 MG: 325 TABLET, FILM COATED ORAL at 14:27

## 2018-08-05 RX ADMIN — MEMANTINE 10 MG: 5 TABLET ORAL at 21:38

## 2018-08-05 RX ADMIN — MONTELUKAST SODIUM 10 MG: 10 TABLET, COATED ORAL at 09:23

## 2018-08-05 RX ADMIN — SENNOSIDES 17.2 MG: 8.6 TABLET, FILM COATED ORAL at 09:23

## 2018-08-05 RX ADMIN — OXCARBAZEPINE 150 MG: 150 TABLET ORAL at 09:23

## 2018-08-05 RX ADMIN — CEFTRIAXONE SODIUM 1 G: 1 INJECTION, POWDER, FOR SOLUTION INTRAMUSCULAR; INTRAVENOUS at 11:52

## 2018-08-05 RX ADMIN — SIMVASTATIN 10 MG: 10 TABLET, FILM COATED ORAL at 21:38

## 2018-08-05 RX ADMIN — Medication 10 ML: at 09:24

## 2018-08-05 RX ADMIN — SODIUM CHLORIDE, SODIUM GLUCONATE, SODIUM ACETATE, POTASSIUM CHLORIDE AND MAGNESIUM CHLORIDE 1000 ML: 526; 502; 368; 37; 30 INJECTION, SOLUTION INTRAVENOUS at 02:00

## 2018-08-05 RX ADMIN — PANTOPRAZOLE SODIUM 40 MG: 40 INJECTION, POWDER, FOR SOLUTION INTRAVENOUS at 09:23

## 2018-08-05 RX ADMIN — Medication 1 TABLET: at 09:23

## 2018-08-05 RX ADMIN — OXYBUTYNIN CHLORIDE 5 MG: 5 TABLET, FILM COATED, EXTENDED RELEASE ORAL at 09:23

## 2018-08-05 RX ADMIN — MEMANTINE 10 MG: 5 TABLET ORAL at 09:23

## 2018-08-05 RX ADMIN — Medication 400 MG: at 09:23

## 2018-08-05 RX ADMIN — ALBUTEROL SULFATE 2.5 MG: 2.5 SOLUTION RESPIRATORY (INHALATION) at 22:31

## 2018-08-05 ASSESSMENT — PAIN SCALES - PAIN ASSESSMENT IN ADVANCED DEMENTIA (PAINAD)
BODYLANGUAGE: 0
BODYLANGUAGE: 0
BREATHING: 0
FACIALEXPRESSION: 0
NEGVOCALIZATION: 0
BREATHING: 0
BREATHING: 0
NEGVOCALIZATION: 0
CONSOLABILITY: 0
BREATHING: 0
CONSOLABILITY: 0
CONSOLABILITY: 0
TOTALSCORE: 0
FACIALEXPRESSION: 0
NEGVOCALIZATION: 0
BODYLANGUAGE: 0
NEGVOCALIZATION: 0
NEGVOCALIZATION: 0
FACIALEXPRESSION: 0
TOTALSCORE: 0
CONSOLABILITY: 0
NEGVOCALIZATION: 0
CONSOLABILITY: 0
BODYLANGUAGE: 0
TOTALSCORE: 0
BODYLANGUAGE: 0
CONSOLABILITY: 0
CONSOLABILITY: 0
NEGVOCALIZATION: 0
FACIALEXPRESSION: 0
BREATHING: 0
TOTALSCORE: 0
TOTALSCORE: 0
FACIALEXPRESSION: 0
BODYLANGUAGE: 0
NEGVOCALIZATION: 0
BODYLANGUAGE: 0
CONSOLABILITY: 0
TOTALSCORE: 0
FACIALEXPRESSION: 0
BREATHING: 0
BREATHING: 0
FACIALEXPRESSION: 0
BODYLANGUAGE: 0
TOTALSCORE: 0
CONSOLABILITY: 0
NEGVOCALIZATION: 0
FACIALEXPRESSION: 0
TOTALSCORE: 0
CONSOLABILITY: 0
NEGVOCALIZATION: 0
FACIALEXPRESSION: 0
BREATHING: 0
BREATHING: 0
TOTALSCORE: 0
BREATHING: 0
FACIALEXPRESSION: 0
TOTALSCORE: 0

## 2018-08-05 ASSESSMENT — PAIN SCALES - WONG BAKER
WONGBAKER_NUMERICALRESPONSE: 4
WONGBAKER_NUMERICALRESPONSE: 4
WONGBAKER_NUMERICALRESPONSE: 0
WONGBAKER_NUMERICALRESPONSE: 0
WONGBAKER_NUMERICALRESPONSE: 4

## 2018-08-05 ASSESSMENT — PAIN DESCRIPTION - LOCATION: LOCATION: GENERALIZED

## 2018-08-05 ASSESSMENT — PAIN DESCRIPTION - DESCRIPTORS: DESCRIPTORS: HEADACHE

## 2018-08-05 ASSESSMENT — PAIN SCALES - GENERAL
PAINLEVEL_OUTOF10: 4
PAINLEVEL_OUTOF10: 4

## 2018-08-05 NOTE — PROGRESS NOTES
sounds. Musculoskeletal: No clubbing, cyanosis or edema bilaterally. Full range of motion without deformity. Skin: Skin color, texture, turgor normal.  No rashes or lesions. Neurologic:  Neurovascularly intact without any focal sensory/motor deficits. Cranial nerves: II-XII intact, grossly non-focal.  Psychiatric: Alert and oriented, thought content appropriate, normal insight  Capillary Refill: Brisk,< 3 seconds   Peripheral Pulses: +2 palpable, equal bilaterally       Labs:   Recent Labs      08/04/18 0223 08/04/18 0448   WBC  10.4  10.1   HGB  11.0*  10.7*   HCT  33.2*  31.8*   PLT  226  197     Recent Labs      08/04/18 0223 08/04/18 0448 08/05/18 0448   NA  142  143  141   K  5.0  4.6  4.0   CL  102  103  101   CO2  29  29  30   BUN  80*  79*  72*   CREATININE  2.1*  2.1*  1.7*   CALCIUM  9.1  9.0  8.9   PHOS   --    --   3.6     No results for input(s): AST, ALT, BILIDIR, BILITOT, ALKPHOS in the last 72 hours. No results for input(s): INR in the last 72 hours. Recent Labs      08/05/18 0448   CKTOTAL  345*       Urinalysis:    Lab Results   Component Value Date    NITRU Negative 08/04/2018    WBCUA  08/04/2018    BACTERIA 1+ 08/04/2018    RBCUA 0-2 08/04/2018    BLOODU SMALL 08/04/2018    SPECGRAV 1.015 08/04/2018    GLUCOSEU Negative 08/04/2018       Radiology:  US RENAL COMPLETE   Final Result   No hydronephrosis. XR CHEST PORTABLE   Final Result   Bilateral pneumonia. Assessment/Plan:    Active Hospital Problems    Diagnosis Date Noted    Obesity [E66.9] 08/04/2018    Upper GI bleed [K92.2] 08/03/2018       GI bleed - upper GI bleed clinically suspected on admission but likely Oropharyngeal source after EGD 4 August w/out GI souce identified, in the setting of chronic anticoagulation w/ Eliquis - held. Hematemesis in setting of suspected upper GI bleed. Protonix gtt initially, changed to PO PPI.      Anemia - acute oropharyngeal blood loss anemia w/out evidence of active bleeding/hemolysis. Asymptomatic w/out indication for transfusion. Will continue to follow serial labs. Reviewed and documented as above.      Atrial fibrillation - currently in NSR. Rate controlled on Cardizem - continued. Anticoagulated at baseline on Eliquis, held 2nd to above.     UTI - Acute cystitis present prior to admission. Rocephin initiated at [facility] and continued, changed to DAILY dosing. ARF - w/ elevated BUN/Cr ratio c/w pre-renal azotemia. Will give gentle IVF hydration and follow serial labs. Reviewed and documented as above. Nephrology consulted and appreciated. Renal U/S w/out evidence of hydronephrosis. HyperKalemia - 2nd to above. Will continue to follow serial labs - resolved w/ IVF. Reviewed and documented as above.     DM2 -  Controlled on home meds. Held. Follow FSBS/SSI.      CHF - chronic systolic failure by hx. Currently stable and controlled on home Lasix/Alcadtone - continued.      HyperLipidemia - controlled on home Statin. Continue, w/ f/u and med adjustment w/ PCP    HypoThyroid - clinically euthyroid on oral replacement therapy. Continue, w/ outpt monitoring as previously arranged. Obesity -  With Body mass index is 38.65 kg/m². Complicating assessment and treatment. Placing patient at risk for multiple co-morbidities as well as early death and contributing to the patient's presentation. Counseled on weight loss. Dementia - controlled on home Namenda - continued.       Multiple bruising in the setting of anticoagulation and frequent falls. Holding Eliquis for reasons above       DVT Prophylaxis: IPC  Diet: DIET GENERAL;  Dietary Nutrition Supplements: Frozen Oral Supplement  Code Status: Limited      PT/OT Eval Status: ordered and pending for Monday AM     Dispo - Likely Monday/Tues 6/7 August pending PT/OT eval and subspecialty recs.      Pancho Mccarty MD

## 2018-08-05 NOTE — PLAN OF CARE
Problem: Falls - Risk of:  Goal: Will remain free from falls  Will remain free from falls   Outcome: Met This Shift  Safety/fall prevention maintained. Bed locked on lowest position, bed alarm on, side rails up, avasys monitor in room for safety. Personal belongings and call light within reach. Pt free from falls. MKRN    Problem: Risk for Impaired Skin Integrity  Goal: Tissue integrity - skin and mucous membranes  Structural intactness and normal physiological function of skin and  mucous membranes. Outcome: Ongoing  Pt turned Q 2 hrs with wedge pillow support. Skin kept clean and dry. Mepilex dressing applied to sacral area for preventative measure, CDI. No nwe open/pressure areas noted. Will continue to monitor. MKRN    Problem: Discharge Planning:  Goal: Discharged to appropriate level of care  Discharged to appropriate level of care   Outcome: Ongoing      Problem: HEMODYNAMIC STATUS  Goal: Patient has stable vital signs and fluid balance  Outcome: Met This Shift  Patient's EF - No recent ECHO on file    Patient has a past medical history of Alzheimer's dementia; Asthma; Atrial fibrillation ([address] Blood clot in vein; CHF (congestive heart failure) ([address] Dementia; GERD (gastroesophageal reflux disease); Hypertension; Kidney failure; and Osteoporosis. Comorbidities reviewed and education provided. Patient and family's stated goal of care: be more comfortable prior to discharge    Patient's current functional capacity:  Marked limitation of physical activity. Comfortable at rest. Less than ordinary activity causes fatigue, palpitation, or dyspnea. Pt resting in bed at this time on 2 L O2. Pt denies shortness of breath. Pt with nonpitting lower extremity edema.  Patient's weights and intake/output reviewed:    Patient Vitals for the past 96 hrs (Last 3 readings):   Weight   08/05/18 0527 211 lb 4.8 oz (95.8 kg)   08/04/18 0053 211 lb 10.3 oz (96 kg)       Intake/Output Summary (Last 24 hours) at 08/05/18

## 2018-08-05 NOTE — PLAN OF CARE
Problem: Falls - Risk of:  Goal: Absence of physical injury  Absence of physical injury   Pt. Has bed alarm in place and family at side. Discussed calling for assistance to get patient out of bed.

## 2018-08-05 NOTE — PROGRESS NOTES
Progress Note    HISTORY     CC: GI bleed         Subjective/   HPI:  Remains fairly somnolent. On IV fluids. Kidney function is improving. She is not eating. She does wake up but then falls back to sleep.     ROS:  Constitutional:  No fevers, No Chills, very somnolent but does wake up  Cardiovascular:  No palpations  Respiratory:  No wheezing, no cough  Skin:  No rash, no itching  :  No hematuria, no dysuria     Social Hx:    - Family at bedside     Past Medical and Surgical History:  - Reviewed, no changes     EXAM       Objective/     Vitals:    08/05/18 0449 08/05/18 0527 08/05/18 0928 08/05/18 1139   BP:   137/66 134/82   Pulse:   61 53   Resp: 18 18 16   Temp:   97.4 °F (36.3 °C) 97.6 °F (36.4 °C)   TempSrc:   Oral Axillary   SpO2:   96% 98%   Weight:  211 lb 4.8 oz (95.8 kg)     Height:         24HR INTAKE/OUTPUT:    Intake/Output Summary (Last 24 hours) at 08/05/18 1236  Last data filed at 08/05/18 0640   Gross per 24 hour   Intake          1936.25 ml   Output             1100 ml   Net           836.25 ml     General:  NAD, ill-appearing  Neck:  Supple, no mass   Chest:  CTAB, good respiratory effort, good air movement  CV:  Regular, no rub   Abdomen:  NTND, soft, +BS, no hepatosplenomegaly  Extremities:  No peripheral edema  Neurological:  Moving all four extremities, CN II-XII grossly intact  Lymphatics:  No palpable lymph nodes  Skin:  No rash, no jaundice  Psychiatric:  Somnolent, confused  :  Lua in place        MEDICAL DECISION MAKING       Data/  Recent Labs      08/04/18 0223 08/04/18 0448 08/05/18 0448   WBC  10.4  10.1  10.0   HGB  11.0*  10.7*  10.6*   HCT  33.2*  31.8*  32.7*   MCV  90.7  91.0  91.6   PLT  226  197  199     Recent Labs      08/04/18 0223 08/04/18 0448 08/05/18 0448   NA  142  143  141   K  5.0  4.6  4.0   CL  102  103  101   CO2  29  29  30   GLUCOSE  110*  115*  102*   PHOS   --    --   3.6   MG   --    --   3.30*   BUN  80*  79*  72*

## 2018-08-05 NOTE — FLOWSHEET NOTE
Telemetry Audible Yes   Telemetry Alarms Set Yes   Telemetry Box Number 93   Gastrointestinal   Abdomen Inspection Non-distended;Rounded; Soft   Bowel Sounds (All Quadrants) Active   Tenderness Soft;Nontender   Passing Flatus Y   Peripheral Vascular   RUE Edema Trace   LUE Edema Trace   RLE Edema Trace   LLE Edema Trace   Facial Edema +1   Skin Color/Condition   Skin Color Ecchymosis   Skin Condition/Temp Dry;Poor turgor; Warm   Skin Integrity   Skin Integrity (WDL) (Abrasion, bruised areas top of head and back of head)   Skin Integrity Bruising   Location generalized   Preventative Dressing Yes   Dressing Site Sacrum  (skin pink, intact)   Skin Integrity Site 2   Skin Integrity Location 2 Abrasion;Bruising   Location 2 Forehead   Skin Integrity Site 3   Skin Integrity Location 3 Abrasion;Bruising   Location 3 Face, eyes , chest, sides, BLE   Musculoskeletal   RUE Weakness   LUE Weakness   RL Extremity Weakness   LL Extremity Weakness   Genitourinary   Genitourinary (WDL) (Pt has campoverde cath)   Flank Tenderness No   Suprapubic Tenderness No   Dysuria VLAD   [REMOVED] Urethral Catheter Straight-tip 16 fr   Removal Date/Time: 08/04/18 1449  Placement Date/Time: 08/03/18 1630   Urethral Catheter Timeout: Patient  Inserted by: (c)   Catheter Type: Straight-tip  Tube Size (fr): 16 fr  Catheter Balloon Size: 10 mL  Securement Method: Leg strap  Urine Returne. .. Catheter Indications Need for fluid management in critically ill patients in a critical care setting not able to be managed by other means such as BSC with hat, bedpan, urinal, condom catheter, or short term intermittent urethral catherization   Site Assessment No urethral drainage   Urine Color Yellow   Urine Appearance Hazy   Anus/Rectum   Anus/Rectum (WDL) WDL   Psychosocial   Patient Behaviors Withdrawn   Handoff   Communication Given Shift Handoff   Oncoming Nurse/Offgoing Nurse William/Bella Giordano.   Handoff Communication Face to Face; At bedside   Pt awake, with

## 2018-08-06 PROBLEM — I44.30 AV BLOCK: Status: ACTIVE | Noted: 2018-08-06

## 2018-08-06 PROBLEM — I48.19 PERSISTENT ATRIAL FIBRILLATION (HCC): Status: ACTIVE | Noted: 2018-08-06

## 2018-08-06 LAB
ALBUMIN SERPL-MCNC: 3.3 G/DL (ref 3.4–5)
ANION GAP SERPL CALCULATED.3IONS-SCNC: 11 MMOL/L (ref 3–16)
BUN BLDV-MCNC: 59 MG/DL (ref 7–20)
CALCIUM SERPL-MCNC: 8.5 MG/DL (ref 8.3–10.6)
CHLORIDE BLD-SCNC: 99 MMOL/L (ref 99–110)
CO2: 28 MMOL/L (ref 21–32)
CREAT SERPL-MCNC: 1.3 MG/DL (ref 0.6–1.2)
EKG ATRIAL RATE: 30 BPM
EKG DIAGNOSIS: NORMAL
EKG Q-T INTERVAL: 488 MS
EKG QRS DURATION: 78 MS
EKG QTC CALCULATION (BAZETT): 402 MS
EKG R AXIS: 90 DEGREES
EKG T AXIS: 45 DEGREES
EKG VENTRICULAR RATE: 41 BPM
GFR AFRICAN AMERICAN: 48
GFR NON-AFRICAN AMERICAN: 39
GLUCOSE BLD-MCNC: 100 MG/DL (ref 70–99)
GLUCOSE BLD-MCNC: 104 MG/DL (ref 70–99)
GLUCOSE BLD-MCNC: 105 MG/DL (ref 70–99)
GLUCOSE BLD-MCNC: 115 MG/DL (ref 70–99)
GLUCOSE BLD-MCNC: 120 MG/DL (ref 70–99)
HCT VFR BLD CALC: 31.5 % (ref 36–48)
HEMOGLOBIN: 10.4 G/DL (ref 12–16)
LV EF: 58 %
LVEF MODALITY: NORMAL
MCH RBC QN AUTO: 30.2 PG (ref 26–34)
MCHC RBC AUTO-ENTMCNC: 33 G/DL (ref 31–36)
MCV RBC AUTO: 91.6 FL (ref 80–100)
PDW BLD-RTO: 15.4 % (ref 12.4–15.4)
PERFORMED ON: ABNORMAL
PHOSPHORUS: 3.7 MG/DL (ref 2.5–4.9)
PLATELET # BLD: 196 K/UL (ref 135–450)
PMV BLD AUTO: 8.6 FL (ref 5–10.5)
POTASSIUM SERPL-SCNC: 3.9 MMOL/L (ref 3.5–5.1)
RBC # BLD: 3.44 M/UL (ref 4–5.2)
SODIUM BLD-SCNC: 138 MMOL/L (ref 136–145)
T4 FREE: 0.5 NG/DL (ref 0.9–1.8)
TSH REFLEX: 11.92 UIU/ML (ref 0.27–4.2)
WBC # BLD: 9.2 K/UL (ref 4–11)

## 2018-08-06 PROCEDURE — 93005 ELECTROCARDIOGRAM TRACING: CPT | Performed by: NURSE PRACTITIONER

## 2018-08-06 PROCEDURE — 84439 ASSAY OF FREE THYROXINE: CPT

## 2018-08-06 PROCEDURE — 85027 COMPLETE CBC AUTOMATED: CPT

## 2018-08-06 PROCEDURE — 36415 COLL VENOUS BLD VENIPUNCTURE: CPT

## 2018-08-06 PROCEDURE — 2580000003 HC RX 258: Performed by: INTERNAL MEDICINE

## 2018-08-06 PROCEDURE — 84443 ASSAY THYROID STIM HORMONE: CPT

## 2018-08-06 PROCEDURE — 94761 N-INVAS EAR/PLS OXIMETRY MLT: CPT

## 2018-08-06 PROCEDURE — 94150 VITAL CAPACITY TEST: CPT

## 2018-08-06 PROCEDURE — G8988 SELF CARE GOAL STATUS: HCPCS

## 2018-08-06 PROCEDURE — G8996 SWALLOW CURRENT STATUS: HCPCS

## 2018-08-06 PROCEDURE — G8978 MOBILITY CURRENT STATUS: HCPCS

## 2018-08-06 PROCEDURE — 93306 TTE W/DOPPLER COMPLETE: CPT | Performed by: INTERNAL MEDICINE

## 2018-08-06 PROCEDURE — G8997 SWALLOW GOAL STATUS: HCPCS

## 2018-08-06 PROCEDURE — 6360000002 HC RX W HCPCS: Performed by: NURSE PRACTITIONER

## 2018-08-06 PROCEDURE — 99223 1ST HOSP IP/OBS HIGH 75: CPT | Performed by: INTERNAL MEDICINE

## 2018-08-06 PROCEDURE — 1200000000 HC SEMI PRIVATE

## 2018-08-06 PROCEDURE — 6360000002 HC RX W HCPCS: Performed by: INTERNAL MEDICINE

## 2018-08-06 PROCEDURE — 97161 PT EVAL LOW COMPLEX 20 MIN: CPT

## 2018-08-06 PROCEDURE — 6370000000 HC RX 637 (ALT 250 FOR IP): Performed by: NURSE PRACTITIONER

## 2018-08-06 PROCEDURE — 94640 AIRWAY INHALATION TREATMENT: CPT

## 2018-08-06 PROCEDURE — 93010 ELECTROCARDIOGRAM REPORT: CPT | Performed by: INTERNAL MEDICINE

## 2018-08-06 PROCEDURE — 97166 OT EVAL MOD COMPLEX 45 MIN: CPT

## 2018-08-06 PROCEDURE — 2580000003 HC RX 258: Performed by: NURSE PRACTITIONER

## 2018-08-06 PROCEDURE — 92526 ORAL FUNCTION THERAPY: CPT

## 2018-08-06 PROCEDURE — 80069 RENAL FUNCTION PANEL: CPT

## 2018-08-06 PROCEDURE — 6370000000 HC RX 637 (ALT 250 FOR IP): Performed by: INTERNAL MEDICINE

## 2018-08-06 PROCEDURE — 97530 THERAPEUTIC ACTIVITIES: CPT

## 2018-08-06 PROCEDURE — G8979 MOBILITY GOAL STATUS: HCPCS

## 2018-08-06 PROCEDURE — 2700000000 HC OXYGEN THERAPY PER DAY

## 2018-08-06 PROCEDURE — G8987 SELF CARE CURRENT STATUS: HCPCS

## 2018-08-06 PROCEDURE — 92610 EVALUATE SWALLOWING FUNCTION: CPT

## 2018-08-06 PROCEDURE — 97110 THERAPEUTIC EXERCISES: CPT

## 2018-08-06 RX ORDER — ALBUTEROL SULFATE 2.5 MG/3ML
2.5 SOLUTION RESPIRATORY (INHALATION)
Status: DISCONTINUED | OUTPATIENT
Start: 2018-08-06 | End: 2018-08-16 | Stop reason: HOSPADM

## 2018-08-06 RX ADMIN — Medication 10 ML: at 22:14

## 2018-08-06 RX ADMIN — OXYBUTYNIN CHLORIDE 5 MG: 5 TABLET, FILM COATED, EXTENDED RELEASE ORAL at 08:06

## 2018-08-06 RX ADMIN — ACETAMINOPHEN 650 MG: 325 TABLET, FILM COATED ORAL at 17:26

## 2018-08-06 RX ADMIN — SIMVASTATIN 10 MG: 10 TABLET, FILM COATED ORAL at 22:12

## 2018-08-06 RX ADMIN — ALBUTEROL SULFATE 2.5 MG: 2.5 SOLUTION RESPIRATORY (INHALATION) at 05:38

## 2018-08-06 RX ADMIN — MONTELUKAST SODIUM 10 MG: 10 TABLET, COATED ORAL at 08:07

## 2018-08-06 RX ADMIN — ACETAMINOPHEN 650 MG: 325 TABLET, FILM COATED ORAL at 05:20

## 2018-08-06 RX ADMIN — Medication 400 MG: at 22:13

## 2018-08-06 RX ADMIN — SENNOSIDES 17.2 MG: 8.6 TABLET, FILM COATED ORAL at 08:07

## 2018-08-06 RX ADMIN — PIPERACILLIN SODIUM AND TAZOBACTAM SODIUM 3.38 G: 3; .375 INJECTION, POWDER, LYOPHILIZED, FOR SOLUTION INTRAVENOUS at 14:13

## 2018-08-06 RX ADMIN — PIPERACILLIN SODIUM AND TAZOBACTAM SODIUM 3.38 G: 3; .375 INJECTION, POWDER, LYOPHILIZED, FOR SOLUTION INTRAVENOUS at 22:31

## 2018-08-06 RX ADMIN — Medication 10 ML: at 08:14

## 2018-08-06 RX ADMIN — Medication 400 MG: at 08:07

## 2018-08-06 RX ADMIN — PANTOPRAZOLE SODIUM 40 MG: 40 TABLET, DELAYED RELEASE ORAL at 05:14

## 2018-08-06 RX ADMIN — ACETAMINOPHEN 650 MG: 325 TABLET, FILM COATED ORAL at 22:12

## 2018-08-06 RX ADMIN — MEMANTINE 10 MG: 5 TABLET ORAL at 22:13

## 2018-08-06 RX ADMIN — ALBUTEROL SULFATE 2.5 MG: 2.5 SOLUTION RESPIRATORY (INHALATION) at 11:49

## 2018-08-06 RX ADMIN — SODIUM CHLORIDE, SODIUM GLUCONATE, SODIUM ACETATE, POTASSIUM CHLORIDE AND MAGNESIUM CHLORIDE 1000 ML: 526; 502; 368; 37; 30 INJECTION, SOLUTION INTRAVENOUS at 05:14

## 2018-08-06 RX ADMIN — LEVOTHYROXINE SODIUM 100 MCG: 100 TABLET ORAL at 05:09

## 2018-08-06 RX ADMIN — ALBUTEROL SULFATE 2.5 MG: 2.5 SOLUTION RESPIRATORY (INHALATION) at 08:32

## 2018-08-06 RX ADMIN — Medication 1 TABLET: at 08:12

## 2018-08-06 RX ADMIN — MEMANTINE 10 MG: 5 TABLET ORAL at 08:07

## 2018-08-06 RX ADMIN — VITAMIN D, TAB 1000IU (100/BT) 2000 UNITS: 25 TAB at 08:06

## 2018-08-06 RX ADMIN — ALBUTEROL SULFATE 2.5 MG: 2.5 SOLUTION RESPIRATORY (INHALATION) at 19:39

## 2018-08-06 RX ADMIN — OXCARBAZEPINE 150 MG: 150 TABLET ORAL at 08:11

## 2018-08-06 RX ADMIN — CEFTRIAXONE SODIUM 1 G: 1 INJECTION, POWDER, FOR SOLUTION INTRAMUSCULAR; INTRAVENOUS at 08:06

## 2018-08-06 RX ADMIN — ALBUTEROL SULFATE 2.5 MG: 2.5 SOLUTION RESPIRATORY (INHALATION) at 16:11

## 2018-08-06 RX ADMIN — OXCARBAZEPINE 150 MG: 150 TABLET ORAL at 22:12

## 2018-08-06 ASSESSMENT — PAIN SCALES - PAIN ASSESSMENT IN ADVANCED DEMENTIA (PAINAD)
FACIALEXPRESSION: 0
BODYLANGUAGE: 0
NEGVOCALIZATION: 0
FACIALEXPRESSION: 0
BODYLANGUAGE: 0
BODYLANGUAGE: 0
TOTALSCORE: 0
CONSOLABILITY: 0
FACIALEXPRESSION: 0
BREATHING: 0
FACIALEXPRESSION: 0
TOTALSCORE: 0
TOTALSCORE: 0
NEGVOCALIZATION: 0
FACIALEXPRESSION: 0
BODYLANGUAGE: 0
CONSOLABILITY: 0
BREATHING: 0
TOTALSCORE: 0
FACIALEXPRESSION: 0
CONSOLABILITY: 0
FACIALEXPRESSION: 0
BREATHING: 0
NEGVOCALIZATION: 0
FACIALEXPRESSION: 0
BODYLANGUAGE: 0
FACIALEXPRESSION: 0
BREATHING: 0
CONSOLABILITY: 0
NEGVOCALIZATION: 0
CONSOLABILITY: 0
FACIALEXPRESSION: 0
BREATHING: 0
TOTALSCORE: 0
CONSOLABILITY: 0
FACIALEXPRESSION: 0
BODYLANGUAGE: 0
BREATHING: 0
TOTALSCORE: 0
BREATHING: 0
BREATHING: 0
TOTALSCORE: 0
BREATHING: 0
BREATHING: 0
NEGVOCALIZATION: 0
TOTALSCORE: 0
FACIALEXPRESSION: 0
BREATHING: 0
NEGVOCALIZATION: 0
CONSOLABILITY: 0
TOTALSCORE: 0
CONSOLABILITY: 0
BODYLANGUAGE: 0
CONSOLABILITY: 0
TOTALSCORE: 0
BODYLANGUAGE: 0
TOTALSCORE: 0
FACIALEXPRESSION: 0
BODYLANGUAGE: 0
FACIALEXPRESSION: 0
BREATHING: 0
NEGVOCALIZATION: 0
NEGVOCALIZATION: 0
BODYLANGUAGE: 0
NEGVOCALIZATION: 0
CONSOLABILITY: 0
BODYLANGUAGE: 0
BODYLANGUAGE: 0
BREATHING: 0
TOTALSCORE: 0
CONSOLABILITY: 0
TOTALSCORE: 0
BREATHING: 0
CONSOLABILITY: 0
NEGVOCALIZATION: 0
BODYLANGUAGE: 0
CONSOLABILITY: 0
NEGVOCALIZATION: 0
NEGVOCALIZATION: 0
CONSOLABILITY: 0
NEGVOCALIZATION: 0
NEGVOCALIZATION: 0
TOTALSCORE: 0
BODYLANGUAGE: 0

## 2018-08-06 ASSESSMENT — PAIN SCALES - WONG BAKER

## 2018-08-06 ASSESSMENT — PAIN SCALES - GENERAL
PAINLEVEL_OUTOF10: 0
PAINLEVEL_OUTOF10: 3
PAINLEVEL_OUTOF10: 5

## 2018-08-06 NOTE — PLAN OF CARE
Problem: Musculor/Skeletal Functional Status  Intervention: PT Evaluation/treatment  Restore functional mobility to baseline.

## 2018-08-06 NOTE — PROGRESS NOTES
Hospitalist Progress Note      PCP: No primary care provider on file. Date of Admission: 8/4/2018    Chief Complaint:  Bloody emesis    Subjective: Had 4-5 sec pauses on monitor overnight which was asymptomatic. 02 use increased from 2L to 5L today. Pt reported SOB with non productive cough        Medications:  Reviewed    Infusion Medications    dextrose      electrolyte-A 1,000 mL (08/06/18 0514)     Scheduled Medications    albuterol  2.5 mg Nebulization Q4H WA    cefTRIAXone (ROCEPHIN) IV  1 g Intravenous Daily    pantoprazole  40 mg Oral QAM AC    atropine  1 mg Intravenous Once    sodium chloride flush  10 mL Intravenous 2 times per day    vitamin D  2,000 Units Oral Daily    levothyroxine  100 mcg Oral Daily    magnesium oxide  400 mg Oral BID    memantine  10 mg Oral BID    montelukast  10 mg Oral Daily    therapeutic multivitamin-minerals  1 tablet Oral Daily    OXcarbazepine  150 mg Oral BID    oxybutynin  5 mg Oral Daily    senna  2 tablet Oral Daily    simvastatin  10 mg Oral Nightly    insulin lispro  0-6 Units Subcutaneous TID WC    insulin lispro  0-3 Units Subcutaneous Nightly     PRN Meds: sodium chloride flush, acetaminophen, ondansetron, glucose, dextrose, glucagon (rDNA), dextrose      Intake/Output Summary (Last 24 hours) at 08/06/18 1227  Last data filed at 08/06/18 0523   Gross per 24 hour   Intake           1430.4 ml   Output              925 ml   Net            505.4 ml       Physical Exam Performed:    BP (!) 102/46   Pulse 52   Temp 98 °F (36.7 °C) (Oral)   Resp 18   Ht 5' 2\" (1.575 m)   Wt 224 lb 3.3 oz (101.7 kg) Comment: 1 pillow, held up tele box, pump off bed, one blanket  SpO2 93%   BMI 41.01 kg/m²     General appearance: No apparent distress, appears stated age and cooperative. HEENT: Pupils equal, round. Conjunctivae/corneas clear. Neck: Supple, with full range of motion. No jugular venous distention. Trachea midline.   Respiratory:  Normal respiratory effort. Cardiovascular: Regular rate and rhythm with normal S1/S2 without murmurs, rubs or gallops. Abdomen: Soft, non-tender, non-distended with normal bowel sounds. Musculoskeletal: No clubbing, cyanosis or edema bilaterally. Full range of motion without deformity. Skin: scattered ecchymosis   Neurologic:  Neurovascularly intact without any focal sensory/motor deficits. Cranial nerves: II-XII intact, grossly non-focal.  Psychiatric: Alert and oriented, thought content in appropriate, limited insight due to dementia t  Capillary Refill: Brisk,< 3 seconds   Peripheral Pulses: +2 palpable, equal bilaterally       Labs:   Recent Labs      08/04/18 0448 08/05/18 0448 08/06/18   0513   WBC  10.1  10.0  9.2   HGB  10.7*  10.6*  10.4*   HCT  31.8*  32.7*  31.5*   PLT  197  199  196     Recent Labs      08/04/18 0448 08/05/18 0448 08/06/18   0513   NA  143  141  138   K  4.6  4.0  3.9   CL  103  101  99   CO2  29  30  28   BUN  79*  72*  59*   CREATININE  2.1*  1.7*  1.3*   CALCIUM  9.0  8.9  8.5   PHOS   --   3.6  3.7     No results for input(s): AST, ALT, BILIDIR, BILITOT, ALKPHOS in the last 72 hours. No results for input(s): INR in the last 72 hours. Recent Labs      08/05/18 0448   CKTOTAL  345*       Urinalysis:      Lab Results   Component Value Date    NITRU Negative 08/04/2018    WBCUA  08/04/2018    BACTERIA 1+ 08/04/2018    RBCUA 0-2 08/04/2018    BLOODU SMALL 08/04/2018    SPECGRAV 1.015 08/04/2018    GLUCOSEU Negative 08/04/2018       Radiology:  US RENAL COMPLETE   Final Result   No hydronephrosis. XR CHEST PORTABLE   Final Result   Bilateral pneumonia.                  Assessment/Plan:    Active Hospital Problems    Diagnosis Date Noted    Obesity [E66.9] 08/04/2018    Upper GI bleed [K92.2] 08/03/2018       GI bleed - upper GI bleed clinically suspected on admission but likely Oropharyngeal source after EGD 4 August w/out GI souce identified, in the setting setting of anticoagulation and frequent falls. Holding Eliquis for reasons above     No imaging report from outside hospital. CD in chart- will see if radiology can review. RN notified    DVT Prophylaxis: IPC  Diet: DIET DYSPHAGIA I PUREED; Carb Control: 5 carb choices (75 gms)/meal; Dysphagia I Pureed;  Honey Thick  Code Status: Limited      PT/OT Eval Status: ordered    Dispo -  2-3 days pending clinical course       Reina Agee MD

## 2018-08-06 NOTE — PROGRESS NOTES
(HHN) to patients when ANY of the following criteria are met  a. Incognizant or uncooperative          b. Patients treated with HHN at Home        c. Unable to demonstrate proper use of MDI with spacer     d. RR > 30 bpm   5. Bronchodilators will be delivered via Metered Dose Inhaler (MDI), HHN, Aerogen to intubated patients on mechanical ventilation. 6. Inhalation medication orders will be delivered and/or substituted as outlined below. Aerosolized Medications Ordering and Administration Guidelines:    1. All Medications will be ordered by a physician, and their frequency and/or modality will be adjusted as defined by the patients Respiratory Severity Index (RSI) score. 2. If the patient does not have documented COPD, consider discontinuing anticholinergics when RSI is less than 9.  3. If the bronchospasm worsens (increased RSI), then the bronchodilator frequency can be increased to a maximum of every 4 hours. If greater than every 4 hours is required, the physician will be contacted. 4. If the bronchospasm improves, the frequency of the bronchodilator can be decreased, based on the patient's RSI, but not less than home treatment regimen frequency. 5. Bronchodilator(s) will be discontinued if patient has a RSI less than 9 and has received no scheduled or as needed treatment for 72  Hrs. Patients Ordered on a Mucolytic Agent:    1. Must always be administered with a bronchodilator. 2. Discontinue if patient experiences worsened bronchospasm, or secretions have lessened to the point that the patient is able to clear them with a cough. Anti-inflammatory and Combination Medications:    1. If the patient lacks prior history of lung disease, is not using inhaled anti-inflammatory medication at home, and lacks wheezing by examination or by history for at least 24 hours, contact physician for possible discontinuation.

## 2018-08-06 NOTE — PLAN OF CARE
Problem: Falls - Risk of:  Goal: Will remain free from falls  Will remain free from falls   Safety alarm in place and active. Visual monitor in place and active. Family at bedside.  No falls, here, to date

## 2018-08-06 NOTE — PROGRESS NOTES
Pt. Lying in bed with eyes closed, easily aroused. Lungs are diminished throughout. VSS. A&Ox1 to person. Reoriented to place, time, and situation. Lua catheter in place. Pt. On 5l nc. Family present.

## 2018-08-06 NOTE — PLAN OF CARE
Problem: Tissue Perfusion - Cardiopulmonary, Altered:  Intervention: Assess signs and symptoms of altered tissue perfusion  Pt has been have pauses in her heart rhythm of atrial fibrillation, unsustained, but equal to or greater than 5 seconds. Electrophysiology/cardiology consulted. Pt is unaware. She was sleeping during her pauses this evening.  bp wnl

## 2018-08-06 NOTE — PROGRESS NOTES
History  Social/Functional History  Type of Home: Facility (LTC at [facility])  [employer]: Grab bars in shower, Grab bars around toilet  Home Equipment: Rolling walker  ADL Assistance: Needs assistance  Ambulation Assistance: Independent (Pt states she is indepent with ambulating from bed to bathroom. Daughter states she can ambulate short distances with furniture walking. Pt independent with w/c propulsion using BLE's)  Transfer Assistance: Independent  Active : No  Additional Comments: Usually 2L O2 prn, two unwitnessed falls in her room last week. [de-identified] of social/functional obtained from daughter as pt is disoriented     Objective  Observation/Palpation  Observation: Pt with extensive brusing on chest, L knee, and around eyes from falls last week. AROM RLE (degrees)  RLE AROM: WFL  AROM LLE (degrees)  LLE AROM : WFL  Strength RLE  Comment: Grossly 4-/5  Strength LLE  Comment: Grossly 4-/5        Bed mobility  Rolling to Left: Moderate assistance  Rolling to Right: Moderate assistance  Supine to Sit: Dependent/Total (To R with HOB elevated)  Sit to Supine: Dependent/Total  Scooting:  (Toward HOB and EOB with draw sheet)  Comment: mod-maxA x2 for bed mobility, HOB elevated, use of bed rail, max cues for encouragement  Pt sat EOB for ~ 12 minutes practicing dynamic sitting balance tasks including reaching, scooting, weight shifting, and seated LE therapeutic exercise    Transfers  Sit to Stand: Dependent/Total (From EOB to half-stand with RW for ~ 10s. Pt limited by anxiety, requiring encouragement from therapists and daughter)  Stand to sit: Minimal Assistance (To EOB)  Ambulation  Ambulation?: No     Balance  Posture: Fair  Sitting - Static: Fair;+  Sitting - Dynamic: Fair  Standing - Static: Poor      Assessment   Body structures, Functions, Activity limitations: Decreased functional mobility ; Decreased strength;Decreased balance  Assessment: The pt is an 82year old female admitted with upper GI bleed and acute renal failure. She resides at a long-term care facility and is independent with bed mobility, transfers, and modified independent with ambulation of short household distances at baseline, although she did have two unwitnessed falls last week. She currently requires total assist for bed mobility and transfers, and is unsafe/unwilling to participate in gait assessment/training this date. The pt will benefit from continued PT services for increased safety and independence with functional mobility and facilitation of return to baseline level of function. Treatment Diagnosis: Generalized weakness R53.1, difficulty walking R26.2  Prognosis: Good  Decision Making: Low Complexity  Patient Education: Role of PT, importance of mobility and upright positioning, bed mobility techniques, safety with transfers and standing, POC  Barriers to Learning: Disoriented, confusion  REQUIRES PT FOLLOW UP: Yes  Activity Tolerance  Activity Tolerance:  (Pt limited by anxiety)  Activity Tolerance: SpO2 90-92% on 4.5L O2 nc throughout treatment session     Plan   Plan  Times per week: 3-5x/wk  Times per day: Daily  Current Treatment Recommendations: Strengthening, Balance Training, Functional Mobility Training, Transfer Training, Endurance Training, Gait Training, Stair training, Home Exercise Program, Safety Education & Training, Patient/Caregiver Education & Training, Positioning  Safety Devices  Type of devices: Bed alarm in place, Call light within reach, Nurse notified, Gait belt, Patient at risk for falls, Left in bed    G-Code  PT G-Codes  Functional Assessment Tool Used: [facility] without stairs  Score: 9  Functional Limitation: Mobility: Walking and moving around  Mobility: Walking and Moving Around Current Status (): At least 60 percent but less than 80 percent impaired, limited or restricted  Mobility: Walking and Moving Around Goal Status ():  At least 40 percent but less than 60 percent impaired, limited or restricted    AM-PAC Score     AM-PAC Inpatient Mobility without Stair Climbing Raw Score : 9  AM-PAC Inpatient without Stair Climbing T-Scale Score : 32.44  Mobility Inpatient CMS 0-100% Score: 76.07  Mobility Inpatient without Stair CMS G-Code Modifier : CL     Goals  Short term goals  Time Frame for Short term goals: 8/13/18  Short term goal 1: Pt will complete bed mobility with min assist  Short term goal 2: Pt will safely transfer sit<>stand with RW and mod assist  Short term goal 3: Pt will safely transfer bed<>chair with min assist and RW  Short term goal 4: Pt will safely ambulate 20' with RW and min assist  Short term goal 5: Pt will be independent with supine and seated LE ther ex for LE strenghtening       Therapy Time   Individual Concurrent Group Co-treatment   Time In 1352         Time Out 1439         Minutes 47         Timed Code Treatment Minutes: [address]       Tu Harper, PT

## 2018-08-06 NOTE — PROGRESS NOTES
Daughter states she can ambulate short distances with furniture walking. Pt independent with w/c propulsion using BLE's)  Transfer Assistance: Independent  Active : No  Additional Comments: Usually 2L O2 prn, two unwitnessed falls in her room last week. [de-identified] of social/functional obtained from daughter as pt is disoriented       Objective   Hearing: Exceptions to [facility]  Hearing Exceptions: Hard of hearing/hearing concerns    Orientation  Overall Orientation Status: Impaired  Orientation Level: Oriented to place;Oriented to person;Disoriented to situation;Disoriented to time  Observation/Palpation  Observation: Pt with extensive brusing on chest, L knee, and around eyes from falls last week. Balance  Sitting Balance: Minimal assistance  Standing Balance: Dependent/Total (maxA x2)  Standing Balance  Time: 30 seconds x1  Activity: sit to stand from EOB  Sit to stand: Dependent/Total  Stand to sit: Dependent/Total  Comment: declines all further attempts  Functional Mobility  Functional Mobility Comments: unable to progress due to pain and anxiety  Toilet Transfers  Toilet Transfer: Unable to assess  ADL  Feeding: Setup; Beverage management  Toileting: Dependent/Total (campoverde)  Additional Comments: Per PCA report, just recieved bed level bath totalA. Bed mobility  Supine to Sit: Dependent/Total  Sit to Supine: Dependent/Total  Scooting: Dependent/Total  Comment: mod-maxA x2 for bed mobility, HOB elevated, use of bed rail, max cues for encouragement  Transfers  Sit to stand: Dependent/Total  Stand to sit: Dependent/Total  Transfer Comments: maxA x2 with RW and increased time, max cues to complete with minimal carryover     Cognition  Overall Cognitive Status: Exceptions  Arousal/Alertness: Delayed responses to stimuli  Following Commands: Follows one step commands with repetition; Follows one step commands with increased time  Attention Span: Attends with cues to redirect; Difficulty attending to directions  Memory: Decreased short term memory;Decreased long term memory;Decreased recall of precautions;Decreased recall of recent events  Safety Judgement: Decreased awareness of need for assistance  Problem Solving: Assistance required to generate solutions;Assistance required to implement solutions  Insights: Decreased awareness of deficits  Initiation: Requires cues for some  Sequencing: Requires cues for some  Type of ROM/Therapeutic Exercise  Type of ROM/Therapeutic Exercise: AROM  Exercises  Scapular Protraction: x10  Scapular Retraction: x10  Elbow Flexion: x10  Elbow Extension: x10  Supination: x10  Pronation: x10  Other: x10 chest press with hand over hand     LUE AROM (degrees)  LUE AROM : WFL  RUE AROM (degrees)  RUE AROM : WFL         Assessment   Performance deficits / Impairments: Decreased balance;Decreased safe awareness;Decreased functional mobility ; Decreased ADL status; Decreased endurance;Decreased strength;Decreased cognition  Assessment: Pt presents to hospital s/p fall and bloody emesis. Per patients dtr report pt was transferring to/from [facility] and self propeling in nursing home as well as completing toileting/transfers without assistance. Pt was needing assistance for all IADLs and most ADLs. Pt was attending dining room meals. Currently pt is total A for ADLs per PCA report, depA (modA-maxA x2) for bed mobility and sit to stand attempts. Pt is limited by anxiety/cognition, pain, and endurance at this time. Pt would benefit from going back to ECF with OT post DC. Pt would benefit from OT skilled services in the acute care setting to address above deficits and f/u with patient/caregiver education and progress towards goals.   Prognosis: Good  Decision Making: Medium Complexity  Patient Education: role of OT, OT POC, safety, body mechanics, sit to stand transfers  REQUIRES OT FOLLOW UP: Yes  Activity Tolerance  Activity Tolerance: Patient limited by fatigue;Treatment limited secondary to

## 2018-08-06 NOTE — PROGRESS NOTES
Pt's nurse, Ariane Ruiz, stated she spoke with Romana Bumps', nurse with [facility], @ 0046 8/6/18 re: cardiac consult. -[phone]

## 2018-08-06 NOTE — PROGRESS NOTES
cup sips Nectar water, mechanical soft trial. No overt s/s of aspiration noted with puree and honey thick liquids trials. Pt generally wears dentures to eat, however pt's daughter stated that these were left at the nursing home. Likely due to being edentulous during eval, pt demonstrated effortful and incomplete mastication of mechanical soft solid trials, and cough after the swallow. Coughing was congested, with baseline O2 sats dropping from 96 to 93% during trials. These returned to 96% after the eval was complete. Respiratory followed SLP into the room. Recommend Pureed food (dysphagia 1) /  Honey Thick liquids with meds crushed in puree. Pt may benefit from MBSS to correlate findings at the bedside. Treatment Plan  Requires SLP Intervention: Yes  Duration/Frequency of Treatment: 3-5x/wk for LOS  D/C Recommendations: 24 hour supervision/assistance     Recommended Diet and Intervention  Diet Solids Recommendation: Dysphagia I Pureed  Liquid Consistency Recommendation: Honey  Recommended Form of Meds: Crushed in puree as able  Therapeutic Interventions: Diet tolerance monitoring;Patient/Family education    Compensatory Swallowing Strategies  Compensatory Swallowing Strategies: Assist feed;Eat/Feed slowly; Small bites/sips;Upright as possible for all oral intake;Remain upright for 30-45 minutes after meals    Treatment/Goals  Short-term Goals  Timeframe for Short-term Goals: 3-5 sessions  Goal 1: Pt will tolerate recommended diet without observed clinical signs of aspiration in 10/10 trials. Goal 2: Pt/caregiver will demonstrate understanding of aspiration precautions and compensatory strategies 80% of time with min cues. Goal 3: Pt will undergo instrumental evaluation of the swallow if clinically indicated. Long-term Goals  Timeframe for Long-term Goals: 1 week  Goal 1: Pt will tolerate least restrictive diet w/o clinical s/s of aspiration.      General  Chart Reviewed: Yes  Comments: Pt with hx of dementia, hx of GERD with recs for PPI daily; recent bloody emesis with subsequent EGD indicating no GI bleed. Daughter reports hx of recurrent PNA, currently with bilateral PNA. Subjective  Subjective: lethargic but alert and interactive enough to safely accept PO, follow directions for oral motor exam  Behavior/Cognition: Alert;Confused; Lethargic  Respiratory Status: O2 via nasual cannula  O2 Device: Nasal cannula  Liters of Oxygen: 2 L  Communication Observation: Functional  Follows Directions: Simple  Dentition: Edentulous (Pt's dentures are at NH, regularly wears them to eat)  Patient Positioning: Upright in bed  Baseline Vocal Quality: Weak  Volitional Cough: Congested;Weak  Prior Dysphagia History: Hx x1 per daughter, with pt exhibiting coughing with food/liquid intake only noted over the past week. Consistencies Administered: Mechanical soft; Thin - teaspoon; Ice Chips; Nectar - teaspoon;Nectar - cup;Honey - cup;Honey - teaspoon    Vision/Hearing  Hearing  Hearing: Exceptions to [facility]  Hearing Exceptions: Hard of hearing/hearing concerns    Oral Motor Deficits  Oral/Motor  Oral Motor: Within functional limits    Oral Phase Dysfunction  Oral Phase  Oral Phase: Exceptions  Oral Phase Dysfunction  Impaired Mastication: Mechanical soft (incomplete mastication with mech soft solid trials)  Suspected Premature Bolus Loss: Mechanical soft  Lingual/Palatal Residue: Mechanical soft     Indicators of Pharyngeal Phase Dysfunction   Pharyngeal Phase  Pharyngeal Phase: Exceptions  Indicators of Pharyngeal Phase Dysfunction  Decreased Laryngeal Elevation: All  Cough - Immediate: Mechanical soft; Thin - teaspoon; Thin - cup;Nectar - teaspoon;Nectar - cup  Cough - Delayed: Mechanical soft; Nectar - cup;Nectar - teaspoon; Thin - teaspoon; Thin - cup  Change in Vital Signs: Thin - cup; Thin - teaspoon;Nectar - teaspoon;Nectar - cup;Mechanical soft (drop in 02 from 96 to 93%)    Prognosis  Prognosis  Prognosis for safe diet

## 2018-08-06 NOTE — PROGRESS NOTES
Date / Time   Requested Search   Contacted Search   Method Search   8/5 9:06 [facility] TextDetails   Source: [facility]  Department: C4 Telemetry    TimeToRetrieve: N/A  Number: [phone]    Requested: [facility]  Contacted: Carlos Blackburn    Method: Secure Text  Date / Time: 8/5 9:06 PM    Details: [address] Provider    Message: [phone] FROM: JOSE LEMONS A C4 TELEMETRY 5 SECOND PAUSE, PT SLEEPING, PT RUNNING IV ELECTROLYTES, ORDERED MAG OX, MAG LEVEL IS 3.3, PT CHF, LUNGS VERY DIMINISHED, PLEASE CALL RN, OR COME ASSESS PT. Harpal Deshpande, called back, she is aware, she is reviewing and to come and see patient  Junior Mccray assessed pt. See orders.

## 2018-08-07 ENCOUNTER — APPOINTMENT (OUTPATIENT)
Dept: GENERAL RADIOLOGY | Age: 83
DRG: 987 | End: 2018-08-07
Attending: INTERNAL MEDICINE
Payer: MEDICARE

## 2018-08-07 ENCOUNTER — APPOINTMENT (OUTPATIENT)
Dept: CT IMAGING | Age: 83
DRG: 987 | End: 2018-08-07
Attending: INTERNAL MEDICINE
Payer: MEDICARE

## 2018-08-07 PROBLEM — J98.11 ATELECTASIS: Status: ACTIVE | Noted: 2018-08-07

## 2018-08-07 PROBLEM — K92.0 HEMATEMESIS: Status: ACTIVE | Noted: 2018-08-03

## 2018-08-07 PROBLEM — J90 PLEURAL EFFUSION, BILATERAL: Status: ACTIVE | Noted: 2018-08-07

## 2018-08-07 PROBLEM — J96.01 ACUTE RESPIRATORY FAILURE WITH HYPOXIA (HCC): Status: ACTIVE | Noted: 2018-08-07
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
## 2018-08-07 PROBLEM — M62.89 MYONECROSIS (HCC): Status: ACTIVE | Noted: 2018-08-07

## 2018-08-07 PROBLEM — R91.8 PULMONARY INFILTRATES: Status: ACTIVE | Noted: 2018-08-07

## 2018-08-07 PROBLEM — E66.01 MORBID OBESITY WITH BMI OF 40.0-44.9, ADULT (HCC): Status: ACTIVE | Noted: 2018-08-07

## 2018-08-07 PROBLEM — R06.89 INEFFECTIVE AIRWAY CLEARANCE: Status: ACTIVE | Noted: 2018-08-07

## 2018-08-07 PROBLEM — I96 MYONECROSIS (HCC): Status: ACTIVE | Noted: 2018-08-07

## 2018-08-07 PROBLEM — I51.89 DIASTOLIC DYSFUNCTION: Status: ACTIVE | Noted: 2018-08-07

## 2018-08-07 PROBLEM — K92.2 UPPER GI BLEED: Status: RESOLVED | Noted: 2018-08-03 | Resolved: 2018-08-07

## 2018-08-07 LAB
ANION GAP SERPL CALCULATED.3IONS-SCNC: 11 MMOL/L (ref 3–16)
BASOPHILS ABSOLUTE: 0.1 K/UL (ref 0–0.2)
BASOPHILS RELATIVE PERCENT: 0.6 %
BUN BLDV-MCNC: 47 MG/DL (ref 7–20)
CALCIUM SERPL-MCNC: 8.3 MG/DL (ref 8.3–10.6)
CHLORIDE BLD-SCNC: 101 MMOL/L (ref 99–110)
CO2: 32 MMOL/L (ref 21–32)
CREAT SERPL-MCNC: 1.1 MG/DL (ref 0.6–1.2)
EOSINOPHILS ABSOLUTE: 0.3 K/UL (ref 0–0.6)
EOSINOPHILS RELATIVE PERCENT: 2.8 %
GFR AFRICAN AMERICAN: 58
GFR NON-AFRICAN AMERICAN: 48
GLUCOSE BLD-MCNC: 105 MG/DL (ref 70–99)
GLUCOSE BLD-MCNC: 111 MG/DL (ref 70–99)
GLUCOSE BLD-MCNC: 116 MG/DL (ref 70–99)
GLUCOSE BLD-MCNC: 119 MG/DL (ref 70–99)
GLUCOSE BLD-MCNC: 120 MG/DL (ref 70–99)
GLUCOSE BLD-MCNC: 122 MG/DL (ref 70–99)
HCT VFR BLD CALC: 31.8 % (ref 36–48)
HEMOGLOBIN: 10.4 G/DL (ref 12–16)
LYMPHOCYTES ABSOLUTE: 0.5 K/UL (ref 1–5.1)
LYMPHOCYTES RELATIVE PERCENT: 4.7 %
MCH RBC QN AUTO: 29.9 PG (ref 26–34)
MCHC RBC AUTO-ENTMCNC: 32.7 G/DL (ref 31–36)
MCV RBC AUTO: 91.5 FL (ref 80–100)
MONOCYTES ABSOLUTE: 0.8 K/UL (ref 0–1.3)
MONOCYTES RELATIVE PERCENT: 8.2 %
NEUTROPHILS ABSOLUTE: 8.4 K/UL (ref 1.7–7.7)
NEUTROPHILS RELATIVE PERCENT: 83.7 %
PDW BLD-RTO: 15.4 % (ref 12.4–15.4)
PERFORMED ON: ABNORMAL
PLATELET # BLD: 210 K/UL (ref 135–450)
PMV BLD AUTO: 8.2 FL (ref 5–10.5)
POTASSIUM REFLEX MAGNESIUM: 4.1 MMOL/L (ref 3.5–5.1)
RBC # BLD: 3.48 M/UL (ref 4–5.2)
SODIUM BLD-SCNC: 144 MMOL/L (ref 136–145)
WBC # BLD: 10.1 K/UL (ref 4–11)

## 2018-08-07 PROCEDURE — 2580000003 HC RX 258: Performed by: INTERNAL MEDICINE

## 2018-08-07 PROCEDURE — 92526 ORAL FUNCTION THERAPY: CPT

## 2018-08-07 PROCEDURE — 94640 AIRWAY INHALATION TREATMENT: CPT

## 2018-08-07 PROCEDURE — 1200000000 HC SEMI PRIVATE

## 2018-08-07 PROCEDURE — 70450 CT HEAD/BRAIN W/O DYE: CPT

## 2018-08-07 PROCEDURE — 6360000002 HC RX W HCPCS: Performed by: INTERNAL MEDICINE

## 2018-08-07 PROCEDURE — 94761 N-INVAS EAR/PLS OXIMETRY MLT: CPT

## 2018-08-07 PROCEDURE — 2700000000 HC OXYGEN THERAPY PER DAY

## 2018-08-07 PROCEDURE — 2580000003 HC RX 258

## 2018-08-07 PROCEDURE — 6360000002 HC RX W HCPCS: Performed by: NURSE PRACTITIONER

## 2018-08-07 PROCEDURE — 6370000000 HC RX 637 (ALT 250 FOR IP): Performed by: NURSE PRACTITIONER

## 2018-08-07 PROCEDURE — 85025 COMPLETE CBC W/AUTO DIFF WBC: CPT

## 2018-08-07 PROCEDURE — 6370000000 HC RX 637 (ALT 250 FOR IP): Performed by: INTERNAL MEDICINE

## 2018-08-07 PROCEDURE — 99223 1ST HOSP IP/OBS HIGH 75: CPT | Performed by: INTERNAL MEDICINE

## 2018-08-07 PROCEDURE — 2580000003 HC RX 258: Performed by: NURSE PRACTITIONER

## 2018-08-07 PROCEDURE — 36415 COLL VENOUS BLD VENIPUNCTURE: CPT

## 2018-08-07 PROCEDURE — 80048 BASIC METABOLIC PNL TOTAL CA: CPT

## 2018-08-07 PROCEDURE — 71046 X-RAY EXAM CHEST 2 VIEWS: CPT

## 2018-08-07 PROCEDURE — 97530 THERAPEUTIC ACTIVITIES: CPT

## 2018-08-07 PROCEDURE — 97535 SELF CARE MNGMENT TRAINING: CPT

## 2018-08-07 PROCEDURE — 99233 SBSQ HOSP IP/OBS HIGH 50: CPT | Performed by: NURSE PRACTITIONER

## 2018-08-07 RX ORDER — FUROSEMIDE 10 MG/ML
20 INJECTION INTRAMUSCULAR; INTRAVENOUS ONCE
Status: COMPLETED | OUTPATIENT
Start: 2018-08-07 | End: 2018-08-07

## 2018-08-07 RX ORDER — TRAMADOL HYDROCHLORIDE 50 MG/1
50 TABLET ORAL EVERY 6 HOURS PRN
Status: DISCONTINUED | OUTPATIENT
Start: 2018-08-07 | End: 2018-08-16 | Stop reason: HOSPADM

## 2018-08-07 RX ORDER — SODIUM CHLORIDE 9 MG/ML
INJECTION, SOLUTION INTRAVENOUS
Status: COMPLETED
Start: 2018-08-07 | End: 2018-08-07

## 2018-08-07 RX ORDER — LEVOTHYROXINE SODIUM 0.12 MG/1
125 TABLET ORAL DAILY
Status: DISCONTINUED | OUTPATIENT
Start: 2018-08-08 | End: 2018-08-16 | Stop reason: HOSPADM

## 2018-08-07 RX ADMIN — PIPERACILLIN SODIUM AND TAZOBACTAM SODIUM 3.38 G: 3; .375 INJECTION, POWDER, LYOPHILIZED, FOR SOLUTION INTRAVENOUS at 22:06

## 2018-08-07 RX ADMIN — MONTELUKAST SODIUM 10 MG: 10 TABLET, COATED ORAL at 12:07

## 2018-08-07 RX ADMIN — SENNOSIDES 17.2 MG: 8.6 TABLET, FILM COATED ORAL at 12:09

## 2018-08-07 RX ADMIN — ALBUTEROL SULFATE 2.5 MG: 2.5 SOLUTION RESPIRATORY (INHALATION) at 16:04

## 2018-08-07 RX ADMIN — SODIUM CHLORIDE, SODIUM GLUCONATE, SODIUM ACETATE, POTASSIUM CHLORIDE AND MAGNESIUM CHLORIDE 1000 ML: 526; 502; 368; 37; 30 INJECTION, SOLUTION INTRAVENOUS at 05:40

## 2018-08-07 RX ADMIN — OXCARBAZEPINE 150 MG: 150 TABLET ORAL at 12:09

## 2018-08-07 RX ADMIN — FUROSEMIDE 20 MG: 10 INJECTION, SOLUTION INTRAVENOUS at 02:44

## 2018-08-07 RX ADMIN — ALBUTEROL SULFATE 2.5 MG: 2.5 SOLUTION RESPIRATORY (INHALATION) at 02:40

## 2018-08-07 RX ADMIN — VITAMIN D, TAB 1000IU (100/BT) 2000 UNITS: 25 TAB at 12:09

## 2018-08-07 RX ADMIN — SIMVASTATIN 10 MG: 10 TABLET, FILM COATED ORAL at 22:07

## 2018-08-07 RX ADMIN — PIPERACILLIN SODIUM AND TAZOBACTAM SODIUM 3.38 G: 3; .375 INJECTION, POWDER, LYOPHILIZED, FOR SOLUTION INTRAVENOUS at 05:40

## 2018-08-07 RX ADMIN — MEMANTINE 10 MG: 5 TABLET ORAL at 12:07

## 2018-08-07 RX ADMIN — Medication 1 TABLET: at 12:09

## 2018-08-07 RX ADMIN — TRAMADOL HYDROCHLORIDE 50 MG: 50 TABLET, FILM COATED ORAL at 22:06

## 2018-08-07 RX ADMIN — TRAMADOL HYDROCHLORIDE 50 MG: 50 TABLET, FILM COATED ORAL at 12:08

## 2018-08-07 RX ADMIN — PANTOPRAZOLE SODIUM 40 MG: 40 TABLET, DELAYED RELEASE ORAL at 06:00

## 2018-08-07 RX ADMIN — SODIUM CHLORIDE 250 ML: 9 INJECTION, SOLUTION INTRAVENOUS at 22:08

## 2018-08-07 RX ADMIN — Medication 400 MG: at 22:07

## 2018-08-07 RX ADMIN — Medication 10 ML: at 22:07

## 2018-08-07 RX ADMIN — LEVOTHYROXINE SODIUM 100 MCG: 100 TABLET ORAL at 06:00

## 2018-08-07 RX ADMIN — ALBUTEROL SULFATE 2.5 MG: 2.5 SOLUTION RESPIRATORY (INHALATION) at 12:00

## 2018-08-07 RX ADMIN — OXYBUTYNIN CHLORIDE 5 MG: 5 TABLET, FILM COATED, EXTENDED RELEASE ORAL at 12:07

## 2018-08-07 RX ADMIN — MEMANTINE 10 MG: 5 TABLET ORAL at 22:06

## 2018-08-07 RX ADMIN — ALBUTEROL SULFATE 2.5 MG: 2.5 SOLUTION RESPIRATORY (INHALATION) at 08:21

## 2018-08-07 RX ADMIN — TRAMADOL HYDROCHLORIDE 50 MG: 50 TABLET, FILM COATED ORAL at 02:10

## 2018-08-07 RX ADMIN — OXCARBAZEPINE 150 MG: 150 TABLET ORAL at 22:06

## 2018-08-07 RX ADMIN — PIPERACILLIN SODIUM AND TAZOBACTAM SODIUM 3.38 G: 3; .375 INJECTION, POWDER, LYOPHILIZED, FOR SOLUTION INTRAVENOUS at 12:16

## 2018-08-07 RX ADMIN — ALBUTEROL SULFATE 2.5 MG: 2.5 SOLUTION RESPIRATORY (INHALATION) at 20:21

## 2018-08-07 RX ADMIN — Medication 400 MG: at 12:09

## 2018-08-07 ASSESSMENT — PAIN SCALES - WONG BAKER
WONGBAKER_NUMERICALRESPONSE: 0

## 2018-08-07 ASSESSMENT — PAIN DESCRIPTION - PAIN TYPE
TYPE: ACUTE PAIN
TYPE: ACUTE PAIN

## 2018-08-07 ASSESSMENT — PAIN SCALES - PAIN ASSESSMENT IN ADVANCED DEMENTIA (PAINAD)
CONSOLABILITY: 0
BREATHING: 0
NEGVOCALIZATION: 0
FACIALEXPRESSION: 0
FACIALEXPRESSION: 0
NEGVOCALIZATION: 0
BODYLANGUAGE: 0
TOTALSCORE: 0
FACIALEXPRESSION: 0
CONSOLABILITY: 0
CONSOLABILITY: 0
BODYLANGUAGE: 0
CONSOLABILITY: 0
BODYLANGUAGE: 0
TOTALSCORE: 0
TOTALSCORE: 0
BREATHING: 0
BREATHING: 0
BODYLANGUAGE: 0
NEGVOCALIZATION: 0
NEGVOCALIZATION: 0
CONSOLABILITY: 0
FACIALEXPRESSION: 0
TOTALSCORE: 0
BODYLANGUAGE: 0
TOTALSCORE: 0
FACIALEXPRESSION: 0
TOTALSCORE: 0
NEGVOCALIZATION: 0
BREATHING: 0
CONSOLABILITY: 0
FACIALEXPRESSION: 0
BREATHING: 0
NEGVOCALIZATION: 0
BREATHING: 0
BODYLANGUAGE: 0

## 2018-08-07 ASSESSMENT — PAIN DESCRIPTION - LOCATION
LOCATION: GENERALIZED
LOCATION: GENERALIZED

## 2018-08-07 ASSESSMENT — PAIN SCALES - GENERAL
PAINLEVEL_OUTOF10: 5
PAINLEVEL_OUTOF10: 0
PAINLEVEL_OUTOF10: 5

## 2018-08-07 NOTE — PROGRESS NOTES
left ventricular hypertrophy.   Evidence for type III diastolic dysfunction.   Mild mitral annular calcification.   Mild biatrial enlargement.   The right ventricle is mildly enlarged.   Mild mitral, aortic, and tricuspid regurgitation.   Systolic pulmonic artery pressure (SPAP) is normal estimated at 32mmHg   (Right atrial pressure of 3mmHg). Assessment/Plan:    Active Hospital Problems    Diagnosis Date Noted    Persistent atrial fibrillation ([address] [I48.1] 08/06/2018     Priority: High    AV block [I44.30] 08/06/2018     Priority: High    Obesity [E66.9] 08/04/2018    Upper GI bleed [K92.2] 08/03/2018       Hematemesis  - upper GI bleed clinically suspected on admission but likely Oropharyngeal source after EGD 4 August w/out GI souce identified, in the setting of chronic anticoagulation w/ Eliquis - held. Protonix gtt initially, changed to PO PPI. Anemia - acute oropharyngeal blood loss anemia w/out evidence of active bleeding/hemolysis. Asymptomatic w/out indication for transfusion. Will continue to follow serial labs. Reviewed and documented as above.      Atrial fibrillation - having pauses on monitor. cardizem has been held. Cardio consulted. Cardiac echo reviewed-  normal EF , GIIIDD. Anticoagulated at baseline on Eliquis, held 2nd to above. continue tele     Pneumonia: noted on CXR on 8/4 with increased 02 use  Possibly due to aspiration given hx of dysphagia. On rocephin for UTI- switch to zosyn on 8/6  , aspiration precautions. Repeat CXR with bilat effusion with worsening bibasilar disease. Had received a dose of lasix. pulm consulted. Continue 02 and wean as tolerated  . repeat swallow eval per family request     CHF - chronic diastolic  Failure, EF 76-51%. Having new bilat effusions. Stop IVF. May consider repeating IV lasix pending labs today to assess renal function. Continue home Lasix/Alcadtone - continued.            UTI - Acute cystitis present prior to admission. Rocephin initiated at [facility] which was continued. Switched to zosyn on 8/6 as stated above          ARF - w/ elevated BUN/Cr ratio c/w pre-renal azotemia. improving on gentle IVF. today's lab pending. Nephrology consulted and appreciated. Renal U/S w/out evidence of hydronephrosis. HyperKalemia - 2nd to above. Will continue to follow serial labs - resolved w/ IVF. Reviewed and documented as above.     DM2 -  Controlled on home meds. Held. Follow FSBS/SSI.      HyperLipidemia - controlled on home Statin. Continue, w/ f/u and med adjustment w/ PCP      Hypothyroidism : TSH 11.92, FT4 0.5. Increased synthroid from 100 to 125mcg daily. Obesity -  With Body mass index is 41.77 kg/m². Complicating assessment and treatment. Placing patient at risk for multiple co-morbidities as well as early death and contributing to the patient's presentation. Would benefit from weight loss. Acute encephalopathy/Dementia:  Worsened from baseline. Multifactorial - KAIT, UTI, peumonia. CT scan of head at outside hospital ( report reviewed)  did not show any acute intracranial abn. Will repeat CT head as more lethargic today. Dementia - controlled on home Namenda - continued.       Multiple bruising in the setting of anticoagulation and frequent falls. Holding Eliquis for reasons above     Left knee pain : s/p fall. X ray at outside hospital showed moderate bicompartmental osteoarthritis changes with no acute abn/ fracture. pain control as needed. DVT Prophylaxis: SCDs      Diet: DIET DYSPHAGIA I PUREED; Carb Control: 5 carb choices (75 gms)/meal; Dysphagia I Pureed;  Honey Thick  Code Status: Limited      PT/OT Eval Status: ordered    Dispo -  3-5 days  pending clinical course       Savanna Bar MD

## 2018-08-07 NOTE — PROGRESS NOTES
Occupational Therapy  Facility/Department: [address] [place] [address] PCU  Daily Treatment Note  NAME: Wandy Felix  : 1935  MRN: [id number]    Date of Service: 2018    Discharge Recommendations:  Subacute/Skilled Nursing Facility       Patient Diagnosis(es): There were no encounter diagnoses. has a past medical history of Alzheimer's dementia; Asthma; Atrial fibrillation ([facility] [address] Blood clot in vein; CHF (congestive heart failure) ([facility] [address] Dementia; GERD (gastroesophageal reflux disease); Hypertension; Kidney failure; and Osteoporosis. has a past surgical history that includes Cholecystectomy; eye surgery; Hysterectomy; Thyroidectomy; and pr esophagogastroduodenoscopy transoral diagnostic (N/A, 2018). Restrictions  Restrictions/Precautions  Restrictions/Precautions: Fall Risk, General Precautions  Position Activity Restriction  Other position/activity restrictions: Up as tolerated, 4.5L O2 nc, campoverde, IV  Subjective   General  Chart Reviewed: Yes  Patient assessed for rehabilitation services?: Yes  Family / Caregiver Present: Yes (daughter and son-in-law)  Referring Practitioner: Arleene Osler, MD  Diagnosis: Fall, Bloody Emesis  Subjective  Subjective: Pt. recieved in bed and required much encouragement to participate in OT/PT co-tx session. General Comment  Comments: Nsg cleared pt for therapy session. Orientation  Orientation  Overall Orientation Status: Impaired  Orientation Level: Oriented to place; Disoriented to situation;Disoriented to time;Disoriented to place  Objective    ADL  UE Dressing: Maximum assistance (for hospital gown)  Toileting: Dependent/Total (max assist x 2 using stedy and BSC)        Balance  Sitting Balance: Stand by assistance  Standing Balance: Dependent/Total (max assist x 2 using stedy)  Standing Balance  Sit to stand: Dependent/Total (max assist x 2 using Stedy)  Stand to sit: Dependent/Total (max assist x 2 using Stedy)  Functional Mobility  Functional - Mobility Device: (stedy)  Assist Level: Dependent/Total (max assist x 2)  Functional Mobility Comments: BSC to chair  Bed mobility  Supine to Sit: Dependent/Total (max assist x 2)  Scooting:  (max assist using draw sheet)  Transfers  Sit to stand: Dependent/Total (max assist x 2 using Stedy)  Stand to sit: Dependent/Total (max assist x 2 using Stedy)                       Cognition  Overall Cognitive Status: Exceptions  Arousal/Alertness: Delayed responses to stimuli  Following Commands: Follows one step commands with repetition; Follows one step commands with increased time  Attention Span: Attends with cues to redirect; Difficulty attending to directions  Memory: Decreased short term memory;Decreased long term memory;Decreased recall of precautions;Decreased recall of recent events  Safety Judgement: Decreased awareness of need for assistance  Problem Solving: Assistance required to generate solutions;Assistance required to implement solutions;Decreased awareness of errors;Assistance required to identify errors made;Assistance required to correct errors made;Good awareness of errors made  Insights: Decreased awareness of deficits  Initiation: Requires cues for all  Sequencing: Requires cues for all        Assessment   Performance deficits / Impairments: Decreased balance;Decreased safe awareness;Decreased functional mobility ; Decreased ADL status; Decreased endurance;Decreased strength;Decreased cognition  Assessment: Pt. participated in co-tx with OT/PT for bed mobility, tsfer training, and ADL retraining. Pt. required max assist x 2 using stedy for tsfers. Pt. limited by decreased cognition and fear of falling. Pt's daughter provided encouragement to patient with pt demo'ing better participation. Continue OT per POC.   Prognosis: Good  Patient Education: role of OT, bed mobility, safety, ADL retraining, functional transfers  REQUIRES OT FOLLOW UP: Yes  Activity Tolerance  Activity Tolerance: Patient limited by fatigue;Treatment

## 2018-08-07 NOTE — CONSULTS
INPATIENT PULMONARY CRITICAL CARE CONSULT NOTE      Chief Complaint/Referring Provider:  Patient is being seen at the request of Dr. Karissa Sandoval for a consultation for Pleural effusions with worsening bibasilar opacities      Presenting HPI: Patient brought to the hospital with bloody emesis     As per admitting physician -82 y.o. female, with PMH of atrial fibrillation, DM, obesity, CKD and HLD, who was a direct admit from [facility] to [facility] with bloody emesis. The patient is a poor historian d/t dementia, history obtained from the patients daughter and review of EMR. The patients daughter stated the patient fell on Monday and Tuesday at the nursing home acquiring a large amount of hematomas, contusions and areas of ecchymosis. She stated the patient is on eliquis for atrial fibrillation. She stated she got a call today from the nursing home stating the patient seemed more lethargic today and she had a couple episodes of bloody emesis so they were sending the patient to [facility]. The patients daughter stated when she arrived at the hospital, she was told the patient had a GI bleed and she was being transferred to [facility] for a GI consult. She stated the patient has felt nauseated a few times, but has not had any bloody emesis since she has been with her. The patient denied any other associated symptoms as well as any aggravating and/or alleviating factors. At the time of this assessment, the patient was resting comfortably in bed. She currently denies any chest pain, back pain, abdominal pain, shortness of breath, numbness, tingling, N/V/C/D, fever and/or chills. Patient.   Decompensated and was having increased shortness of breath last night and was given a dose of Lasix with some improvement but patient continues to have increased cough and chest congestion along with gurgling but is not able to expectorate anything significant, patient also has been having slight hypoxemia, patient was also more lethargic this morning when seen by the internist, patient was also refusing honey thick liquids    Patient and evaluated this morning was in the family members stated that they got a call from nursing home that patient fell down, patient has been black and blue in a lot of areas of the body, patient is having increased chest congestion when seen, patient was afebrile, patient was on 4 L of nasal cannula oxygen with saturation of 96% patient and maintain hemodynamics and glycemic control, which she was initial fibrillation with controlled ventricular rate, patient was not able to give any other objective review of system because of baseline dementia as per the family           Patient Active Problem List    Diagnosis Date Noted    Persistent atrial fibrillation ([address] 08/06/2018     Priority: High    AV block 08/06/2018     Priority: High    Acute respiratory failure with hypoxia ([address] 08/07/2018    Pulmonary infiltrates 01/42/3728    Diastolic dysfunction 81/02/3030    Pleural effusion, bilateral 08/07/2018    Morbid obesity with BMI of 40.0-44.9, adult ([address] 08/07/2018    Fall 08/07/2018    Myonecrosis 08/07/2018    Atelectasis 08/07/2018    Ineffective airway clearance 08/07/2018    Essential hypertension     Obesity 08/04/2018    Hematemesis 08/03/2018       Past Medical History:   Diagnosis Date    Alzheimer's dementia     Asthma     Atrial fibrillation ([address]     Blood clot in vein     Removal of Blood clot in Left Groin    CHF (congestive heart failure) ([address]     Dementia     GERD (gastroesophageal reflux disease)     Hypertension     Kidney failure     Osteoporosis         Past Surgical History:   Procedure Laterality Date    CHOLECYSTECTOMY      EYE SURGERY      HYSTERECTOMY      [place] ESOPHAGOGASTRODUODENOSCOPY TRANSORAL DIAGNOSTIC N/A 8/4/2018    EGD ESOPHAGOGASTRODUODENOSCOPY performed by Carlos Seals MD at [facility] THYROIDECTOMY          History reviewed. No pertinent family history. Social History   Substance Use Topics    Smoking status: Never Smoker    Smokeless tobacco: Never Used    Alcohol use Not on file        Allergies   Allergen Reactions    Codeine Itching    Sulfa Antibiotics Other (See Comments)     Per Pt daughter Daily Galicia) \" Not know her reaction to medication\"               Physical Exam:  Blood pressure (!) 154/86, pulse 74, temperature 98.5 °F (36.9 °C), temperature source Oral, resp. rate 20, height 5' 2\" (1.575 m), weight 228 lb 6.3 oz (103.6 kg), SpO2 96 %.'   Constitutional:  Mild asthma distress when seen with increased chest congestion and rattling  HENT:  Oropharynx is clear and moist. No thyromegaly. Eyes:  Conjunctivae are normal. Pupils equal, round, and reactive to light. No scleral icterus. Neck: . No tracheal deviation present. No obvious thyroid mass. Diameter is increased  Cardiovascular: S1-S2 irregularly irregular; questionable left lower sternal border murmur. No right ventricular heave. plus lower extremity edema. Pulmonary/Chest: B/l occ  wheezes. No diffuse rales. Increased chest congestion and rattling  Chest wall is not dull to percussion. No accessory muscle usage or stridor. Abdominal: Soft. Bowel sounds present. No distension or hernia. No tenderness. Morbidly obese   Musculoskeletal: No cyanosis. No clubbing. No obvious joint deformity. Lymphadenopathy: No cervical or supraclavicular adenopathy. Skin: Patient had extensive ecchymosis in the periorbital region, right anterior chest wall with hardening of the subcu tissue, also in the left side of the abdomen and also left knee area  Psychiatric: Has baseline dementia  Neurologic: Alert, awake and oriented.   No focal motor deficits        Results:  CBC:   Recent Labs      08/05/18   0448  08/06/18   0513  08/07/18   1148   WBC  10.0  9.2  10.1   HGB  10.6*  10.4*  10.4*   HCT  32.7*  31.5*  31.8*   MCV hydronephrosis or intrarenal stones. Bladder: Collapsed and not well visualized. No hydronephrosis. Xr Chest Portable    Result Date: 8/4/2018  EXAMINATION: SINGLE XRAY VIEW OF THE CHEST 8/4/2018 2:42 am COMPARISON: None. HISTORY: ORDERING SYSTEM PROVIDED HISTORY: Hx. Of Pnem[place] TECHNOLOGIST PROVIDED HISTORY: Reason for exam:->Hx. Of Pnemonia Ordering Physician Provided Reason for Exam: sob FINDINGS: Evaluation is limited by the patient's body habitus and the portable technique. There is airspace disease throughout the right lung, particularly in the right base. There is also lingular airspace disease. The heart size is mildly to moderately enlarged. There is no discernible pneumothorax. Bilateral pneumonia. Results for Darryl Stallings (MRN [id number]) as of 8/7/2018 23:00   Ref. Range 8/6/2018 05:13 8/6/2018 07:50 8/6/2018 11:36 8/6/2018 16:26 8/6/2018 20:18 8/7/2018 01:43 8/7/2018 05:31 8/7/2018 11:00 8/7/2018 11:48 8/7/2018 16:09 8/7/2018 19:28   Sodium Latest Ref Range: 136 - 145 mmol/L 138        144     Potassium Latest Ref Range: 3.5 - 5.1 mmol/L 3.9        4.1     Chloride Latest Ref Range: 99 - 110 mmol/L 99        101     CO2 Latest Ref Range: 21 - 32 mmol/L 28        32     BUN Latest Ref Range: 7 - 20 mg/dL 59 (H)        47 (H)     Creatinine Latest Ref Range: 0.6 - 1.2 mg/dL 1.3 (H)        1.1     Anion Gap Latest Ref Range: 3 - 16  11        11     GFR Non- Latest Ref Range: >60  39 (A)        48 (A)     GFR  Latest Ref Range: >60  48 (A)        58 (A)     Glucose Latest Ref Range: 70 - 99 mg/dL 105 (H)        120 (H)     POC Glucose Latest Ref Range: 70 - 99 mg/dl  104 (H) 100 (H) 115 (H) 120 (H) 111 (H) 105 (H) 119 (H)  116 (H) 122 (H)   Calcium Latest Ref Range: 8.3 - 10.6 mg/dL 8.5        8.3     Phosphorus Latest Ref Range: 2.5 - 4.9 mg/dL 3.7               Results for Darryl Stallings (MRN [id number]) as of 8/7/2018 23:00   Ref.  Range *          Plan:   · Oxygen supplementation to keep saturation between 55-24% only  · Public toilet  · Patient's clinical status as well as CT chest was discussed with patient's family in detail and patient has further course of decompensation and respiratory failure-needs more diuretics patient needs to be decided upon by nephrology and also patient needs to have a bronchoscopy both for diagnostic and therapeutic purposes  · Monitor input and output closely  · Patient has been started on Zosyn by the internal medicine team-to be titrated as per clinical status and cultures  · Bronchodilators  · Patient has some wheezing on auscultation but it may be because of fluid overload and not because of bronchospasm will hold off on any steroids  · Patient's hypothyroidism was not completely corrected and the dose has been increased her Synthroid to 125 mcg once a day  · Clinically patient has ARAMIS and patient's family were told about the pathophysiology along with sequelae but given patient's dementia she may not be able to wear the CPAP mask and will hold off on any evaluation and management for the same  · We will get an ABG in the morning to make sure that patient doesn't have any hypoventilation  · Avoid narcotics and sedatives  · Blood glucose monitoring with slight scale insulin as per IM  · Eliquis on hold  · PUD prophylaxis as per IM    Case discussed with family and nursing            Electronically signed by:  Hugo Morgan MD    8/7/2018    11:08 PM.

## 2018-08-07 NOTE — CONSULTS
several ecchymoses and hematomas. This was related presumably to a  fall from her wheelchair. The injuries appeared to be more extensive than  one might expect for a fall from a wheelchair. The patient has dementia  and is unable to provide much cohesive history about this event. Certainly, in the presence of pauses of 4 to 5 seconds, the possibility of  syncope is a consequence of transient ventricular asystole should be  entertained. She does have thyroid disease. We will obtain the TSH to  evaluate the status of thyroid disease. We will continue to monitor  cardiac rhythm without the diltiazem. If she has recurring episodes of AV  block and significant ventricular asystole in the absence of diltiazem and  with normal TSH, some consideration for ventricular pacing would be  reasonable. RECOMMENDATIONS:  1. Check TSH. 2.  Continue to monitor cardiac rhythm. 3.  We would consider permanent ventricular pacing if significant  ventricular pauses are noted in the presence of normal thyroid function. Thanks for the opportunity to assist in the care of the patient. Please  contact me if you have any questions regarding her evaluation.         Brandon Robison MD    D: 08/06/2018 17:59:36       T: 08/06/2018 20:08:37     [email or web address]  Job#: [id number]     Doc#: [id number]    CC:

## 2018-08-07 NOTE — PLAN OF CARE
Problem: OXYGENATION/RESPIRATORY FUNCTION  Goal: Patient will maintain patent airway  Had some sob about 0145 that improved after lasix and after coughing up some mucus after breathing tx.

## 2018-08-07 NOTE — PROGRESS NOTES
Speech Language Pathology  Facility/Department: [facility] C4 PCU  Dysphagia Daily Treatment Note    NAME: Harris Lua  : 1935  MRN: [id number]    Patient Diagnosis(es):   Patient Active Problem List    Diagnosis Date Noted    Persistent atrial fibrillation ([facility] [address] 2018     Priority: High    AV block 2018     Priority: High    Obesity 2018    Hematemesis 2018     Allergies: Allergies   Allergen Reactions    Codeine Itching    Sulfa Antibiotics Other (See Comments)     Per Pt daughter Kenyetta Lee) \" Not know her reaction to medication\"     Onset Date: Pt admitted to [facility] on 18  Current Diet Level: Dysphagia I (puree) diet with honey-thick liquids    Pain:  Pain Assessment  Patient Currently in Pain: Pt moaning when repositioned by SLP and PCA    Diet Tolerance:  Patient tolerating current diet level without signs/symptoms of penetration/aspiration, however, per pt, pt's family, and RN she has been adamantly refusing honey-thick liquids despite encouragement. P.O. Trials: Thin   x x4 cup sips Diet Sprite (assisted by SLP)   [place]    NT   Honey   x x1 cup sip HTL water (assisted by SLP); pt adamantly refused further thickened liquid trials   Puree   x x3 bites of applesauce   Solid   x Minimal trials accepted by pt; x2 bites of softened tom cracker in applesauce     Impressions:  Pt seen upright in bed, alert and agreeable to therapy, but pleasantly confused, RN OK'd SLP entry and therapy, pt's family members at bedside. Noted pt with worsening CXR (see findings below). Per pt's family and RN, pt has adamantly been refusing thickened liquids. Noted audible congested at baseline with unproductive, weak cough. Pt currently on 4.5 L O2 NC. Pt accepted minimal PO trials with SLP despite encouragement.   Pt had x1 trial of HTL via cup sip (assisted by SLP) with grossly timely swallow initiation observed, no overt s/s of aspiration/penetration -no coughing, throat swallow if clinically indicated. 08/07: ongoing. MBSS recommended this date, requested order from MD.  Long-term Goals  Timeframe for Long-term Goals: 1 week  Goal 1: Pt will tolerate least restrictive diet w/o clinical s/s of aspiration. 08/07: ongoing, not progressing. Pt accepted minimal PO trials with SLP this date; overt s/s of aspiration/penetration noted with TL trials (see above). MBSS recommended to instrumentally assess the swallow-- order requested. Patient/Family/Caregiver Education:  SLP re: diet tolerance monitoring, aspiration precautions, rationale for completion of MBSS. Pt's family verbalized understanding. Pt needs reinforcement. Compensatory Strategies:  Assist feed;Eat/Feed slowly; Small bites/sips;Upright as possible for all oral intake;Remain upright for 30-45 minutes after meals    Discharge Recommendations: TBD; pt will likely benefit from continued dysphagia tx after discharge    Plan:  Continued daily Dysphagia treatment with goals per established plan of care. If pt discharges from hospital prior to Speech/Swallowing discharge, this note serves as tx and discharge summary.      Treatment time:  7301-7402 (21 minutes); dysphagia tx    Sami Barnard M.S. [id number] [facility]  Speech-language pathologist  [phone]

## 2018-08-07 NOTE — PLAN OF CARE
Problem: FLUID AND ELECTROLYTE IMBALANCE  Goal: Fluid and electrolyte balance are achieved/maintained    Intervention: ASSESS/MONITOR RESPIRATORY STATUS  See resp assesssment

## 2018-08-07 NOTE — PROGRESS NOTES
[address]     Electrophysiology                                     Progress Note    Admission date:  2018    Reason for follow up visit: afib and bradycardia    HPI/CC: Junior Baptiste was admitted on 2018 from [facility] due to hematemesis and syncope versus fall. EGD showed no active bleeding and likely oropharyngeal source of bleeding. Cardiology was consulted for afib, bradycardia, and pauses up to 5.6 seconds in duration. Rhythm has been afib with an average HR of 75. Few pauses noted overnight, max duration was 4.3 seconds. Subjective: She does not answer most questions appropriately. States \"my leg hurts\" and \"I hurt all over\". Vitals:  Blood pressure (!) 141/75, pulse 78, temperature 97.6 °F (36.4 °C), temperature source Oral, resp. rate 24, height 5' 2\" (1.575 m), weight 228 lb 6.3 oz (103.6 kg), SpO2 94 %.   Temp  Av.5 °F (36.4 °C)  Min: 97.4 °F (36.3 °C)  Max: 97.6 °F (36.4 °C)  Pulse  Av.8  Min: 43  Max: 78  BP  Min: 121/52  Max: 161/68  SpO2  Av.8 %  Min: 94 %  Max: 100 %  FiO2   Av %  Min: 5 %  Max: 5 %    24 hour I/O    Intake/Output Summary (Last 24 hours) at 18 1320  Last data filed at 18 1254   Gross per 24 hour   Intake             1493 ml   Output             1375 ml   Net              118 ml     Current Facility-Administered Medications   Medication Dose Route Frequency Provider Last Rate Last Dose    traMADol (ULTRAM) tablet 50 mg  50 mg Oral Q6H PRN KANDI Stevenson - CNP   50 mg at 18 1208    [START ON 2018] levothyroxine (SYNTHROID) tablet 125 mcg  125 mcg Oral Daily Heladio Argueta MD        albuterol (PROVENTIL) nebulizer solution 2.5 mg  2.5 mg Nebulization Q4H ROBERTO Smith MD   2.5 mg at 18 1200    piperacillin-tazobactam (ZOSYN) 3.375 g in dextrose 5 % 50 mL IVPB extended infusion (mini-bag)  3.375 g Intravenous Q8H Heladio Argueta MD 12.5 mL/hr at 18 1216 3.375 g at 18 1216    pantoprazole (PROTONIX) tablet 40 mg  40 mg Oral QAM AC Lv Lagos MD   40 mg at 08/07/18 0600    atropine injection 1 mg  1 mg Intravenous Once KANDI Chapin CNP        sodium chloride flush 0.9 % injection 10 mL  10 mL Intravenous 2 times per day KANDI Chapin CNP   10 mL at 08/06/18 2214    sodium chloride flush 0.9 % injection 10 mL  10 mL Intravenous PRN KANDI Chapin CNP        acetaminophen (TYLENOL) tablet 650 mg  650 mg Oral Q4H PRN Jay Rogel APRN - CNP   650 mg at 08/06/18 2212    ondansetron (ZOFRAN) injection 4 mg  4 mg Intravenous Q6H PRN KANDI Chapin CNP   4 mg at 08/04/18 1903    vitamin D (CHOLECALCIFEROL) tablet 2,000 Units  2,000 Units Oral Daily KANDI Chapin CNP   2,000 Units at 08/07/18 1209    magnesium oxide (MAG-OX) tablet 400 mg  400 mg Oral BID KANDI Chapin CNP   400 mg at 08/07/18 1209    memantine (NAMENDA) tablet 10 mg  10 mg Oral BID KANDI Chapin - CNP   10 mg at 08/07/18 1207    montelukast (SINGULAIR) tablet 10 mg  10 mg Oral Daily KANDI Chapin - CNP   10 mg at 08/07/18 1207    therapeutic multivitamin-minerals 1 tablet  1 tablet Oral Daily KANDI Chapin CNP   1 tablet at 08/07/18 1209    OXcarbazepine (TRILEPTAL) tablet 150 mg  150 mg Oral BID KANDI Chapin - CNP   150 mg at 08/07/18 1209    oxybutynin (DITROPAN-XL) extended release tablet 5 mg  5 mg Oral Daily KANDI Chapin CNP   5 mg at 08/07/18 1207    senna (SENOKOT) tablet 17.2 mg  2 tablet Oral Daily KANDI Chapin - CNP   17.2 mg at 08/07/18 1209    simvastatin (ZOCOR) tablet 10 mg  10 mg Oral Nightly KANDI Chapin - CNP   10 mg at 08/06/18 2212    insulin lispro (HUMALOG) injection vial 0-6 Units  0-6 Units Subcutaneous TID WC KANDI Chapin CNP   1 Units at 08/04/18 1718    insulin lispro (HUMALOG) injection vial 0-3 Units  0-3 Units Subcutaneous Nightly KANDI Chapin CNP   1 Units at 08/04/18 7655   

## 2018-08-08 LAB
ALBUMIN SERPL-MCNC: 3.7 G/DL (ref 3.4–5)
ANION GAP SERPL CALCULATED.3IONS-SCNC: 9 MMOL/L (ref 3–16)
BASE EXCESS ARTERIAL: 9.8 MMOL/L (ref -3–3)
BUN BLDV-MCNC: 27 MG/DL (ref 7–20)
CALCIUM SERPL-MCNC: 8.7 MG/DL (ref 8.3–10.6)
CARBOXYHEMOGLOBIN ARTERIAL: 1.2 % (ref 0–1.5)
CHLORIDE BLD-SCNC: 102 MMOL/L (ref 99–110)
CO2: 35 MMOL/L (ref 21–32)
CREAT SERPL-MCNC: 0.8 MG/DL (ref 0.6–1.2)
GFR AFRICAN AMERICAN: >60
GFR NON-AFRICAN AMERICAN: >60
GLUCOSE BLD-MCNC: 104 MG/DL (ref 70–99)
GLUCOSE BLD-MCNC: 106 MG/DL (ref 70–99)
GLUCOSE BLD-MCNC: 107 MG/DL (ref 70–99)
GLUCOSE BLD-MCNC: 108 MG/DL (ref 70–99)
GLUCOSE BLD-MCNC: 129 MG/DL (ref 70–99)
HCO3 ARTERIAL: 37.5 MMOL/L (ref 21–29)
HEMOGLOBIN, ART, EXTENDED: 11.2 G/DL (ref 12–16)
METHEMOGLOBIN ARTERIAL: 0.4 %
O2 CONTENT ARTERIAL: 15 ML/DL
O2 SAT, ARTERIAL: 95.5 %
O2 THERAPY: ABNORMAL
PCO2 ARTERIAL: 69.4 MMHG (ref 35–45)
PERFORMED ON: ABNORMAL
PH ARTERIAL: 7.35 (ref 7.35–7.45)
PHOSPHORUS: 2.5 MG/DL (ref 2.5–4.9)
PO2 ARTERIAL: 80.4 MMHG (ref 75–108)
POTASSIUM SERPL-SCNC: 4.1 MMOL/L (ref 3.5–5.1)
SODIUM BLD-SCNC: 146 MMOL/L (ref 136–145)
TCO2 ARTERIAL: 39.7 MMOL/L

## 2018-08-08 PROCEDURE — 87205 SMEAR GRAM STAIN: CPT

## 2018-08-08 PROCEDURE — 6360000002 HC RX W HCPCS: Performed by: INTERNAL MEDICINE

## 2018-08-08 PROCEDURE — 99233 SBSQ HOSP IP/OBS HIGH 50: CPT | Performed by: NURSE PRACTITIONER

## 2018-08-08 PROCEDURE — 7100000011 HC PHASE II RECOVERY - ADDTL 15 MIN: Performed by: INTERNAL MEDICINE

## 2018-08-08 PROCEDURE — 87015 SPECIMEN INFECT AGNT CONCNTJ: CPT

## 2018-08-08 PROCEDURE — 6370000000 HC RX 637 (ALT 250 FOR IP): Performed by: INTERNAL MEDICINE

## 2018-08-08 PROCEDURE — 88112 CYTOPATH CELL ENHANCE TECH: CPT

## 2018-08-08 PROCEDURE — 2580000003 HC RX 258: Performed by: INTERNAL MEDICINE

## 2018-08-08 PROCEDURE — 2709999900 HC NON-CHARGEABLE SUPPLY: Performed by: INTERNAL MEDICINE

## 2018-08-08 PROCEDURE — 99233 SBSQ HOSP IP/OBS HIGH 50: CPT | Performed by: INTERNAL MEDICINE

## 2018-08-08 PROCEDURE — 87252 VIRUS INOCULATION TISSUE: CPT

## 2018-08-08 PROCEDURE — 94761 N-INVAS EAR/PLS OXIMETRY MLT: CPT

## 2018-08-08 PROCEDURE — 87102 FUNGUS ISOLATION CULTURE: CPT

## 2018-08-08 PROCEDURE — 87116 MYCOBACTERIA CULTURE: CPT

## 2018-08-08 PROCEDURE — 80069 RENAL FUNCTION PANEL: CPT

## 2018-08-08 PROCEDURE — 7100000010 HC PHASE II RECOVERY - FIRST 15 MIN: Performed by: INTERNAL MEDICINE

## 2018-08-08 PROCEDURE — 92526 ORAL FUNCTION THERAPY: CPT

## 2018-08-08 PROCEDURE — 87077 CULTURE AEROBIC IDENTIFY: CPT

## 2018-08-08 PROCEDURE — 2580000003 HC RX 258: Performed by: NURSE PRACTITIONER

## 2018-08-08 PROCEDURE — 94640 AIRWAY INHALATION TREATMENT: CPT

## 2018-08-08 PROCEDURE — 87070 CULTURE OTHR SPECIMN AEROBIC: CPT

## 2018-08-08 PROCEDURE — 6360000002 HC RX W HCPCS: Performed by: NURSE PRACTITIONER

## 2018-08-08 PROCEDURE — 31624 DX BRONCHOSCOPE/LAVAGE: CPT | Performed by: INTERNAL MEDICINE

## 2018-08-08 PROCEDURE — 88305 TISSUE EXAM BY PATHOLOGIST: CPT

## 2018-08-08 PROCEDURE — 97110 THERAPEUTIC EXERCISES: CPT

## 2018-08-08 PROCEDURE — 1200000000 HC SEMI PRIVATE

## 2018-08-08 PROCEDURE — 87206 SMEAR FLUORESCENT/ACID STAI: CPT

## 2018-08-08 PROCEDURE — 6370000000 HC RX 637 (ALT 250 FOR IP): Performed by: NURSE PRACTITIONER

## 2018-08-08 PROCEDURE — 97530 THERAPEUTIC ACTIVITIES: CPT

## 2018-08-08 PROCEDURE — 94664 DEMO&/EVAL PT USE INHALER: CPT

## 2018-08-08 PROCEDURE — 3609010800 HC BRONCHOSCOPY ALVEOLAR LAVAGE: Performed by: INTERNAL MEDICINE

## 2018-08-08 PROCEDURE — 0B9J8ZX DRAINAGE OF LEFT LOWER LUNG LOBE, VIA NATURAL OR ARTIFICIAL OPENING ENDOSCOPIC, DIAGNOSTIC: ICD-10-PCS | Performed by: INTERNAL MEDICINE

## 2018-08-08 PROCEDURE — 99152 MOD SED SAME PHYS/QHP 5/>YRS: CPT | Performed by: INTERNAL MEDICINE

## 2018-08-08 PROCEDURE — 36600 WITHDRAWAL OF ARTERIAL BLOOD: CPT

## 2018-08-08 PROCEDURE — 31645 BRNCHSC W/THER ASPIR 1ST: CPT | Performed by: INTERNAL MEDICINE

## 2018-08-08 PROCEDURE — 2500000003 HC RX 250 WO HCPCS: Performed by: INTERNAL MEDICINE

## 2018-08-08 PROCEDURE — 82803 BLOOD GASES ANY COMBINATION: CPT

## 2018-08-08 PROCEDURE — 86403 PARTICLE AGGLUT ANTBDY SCRN: CPT

## 2018-08-08 PROCEDURE — 36415 COLL VENOUS BLD VENIPUNCTURE: CPT

## 2018-08-08 PROCEDURE — 87186 SC STD MICRODIL/AGAR DIL: CPT

## 2018-08-08 PROCEDURE — 2700000000 HC OXYGEN THERAPY PER DAY

## 2018-08-08 RX ORDER — MAGNESIUM HYDROXIDE 1200 MG/15ML
LIQUID ORAL CONTINUOUS PRN
Status: COMPLETED | OUTPATIENT
Start: 2018-08-08 | End: 2018-08-08

## 2018-08-08 RX ORDER — LIDOCAINE HYDROCHLORIDE 20 MG/ML
INJECTION, SOLUTION INFILTRATION; PERINEURAL PRN
Status: DISCONTINUED | OUTPATIENT
Start: 2018-08-08 | End: 2018-08-08 | Stop reason: HOSPADM

## 2018-08-08 RX ORDER — FENTANYL CITRATE 50 UG/ML
INJECTION, SOLUTION INTRAMUSCULAR; INTRAVENOUS PRN
Status: DISCONTINUED | OUTPATIENT
Start: 2018-08-08 | End: 2018-08-08 | Stop reason: HOSPADM

## 2018-08-08 RX ORDER — FUROSEMIDE 10 MG/ML
20 INJECTION INTRAMUSCULAR; INTRAVENOUS ONCE
Status: COMPLETED | OUTPATIENT
Start: 2018-08-09 | End: 2018-08-09

## 2018-08-08 RX ORDER — MIDAZOLAM HYDROCHLORIDE 5 MG/ML
INJECTION INTRAMUSCULAR; INTRAVENOUS PRN
Status: DISCONTINUED | OUTPATIENT
Start: 2018-08-08 | End: 2018-08-08 | Stop reason: HOSPADM

## 2018-08-08 RX ORDER — ACETYLCYSTEINE 200 MG/ML
SOLUTION ORAL; RESPIRATORY (INHALATION) PRN
Status: DISCONTINUED | OUTPATIENT
Start: 2018-08-08 | End: 2018-08-08 | Stop reason: HOSPADM

## 2018-08-08 RX ADMIN — TRAMADOL HYDROCHLORIDE 50 MG: 50 TABLET, FILM COATED ORAL at 21:52

## 2018-08-08 RX ADMIN — PIPERACILLIN SODIUM AND TAZOBACTAM SODIUM 3.38 G: 3; .375 INJECTION, POWDER, LYOPHILIZED, FOR SOLUTION INTRAVENOUS at 14:06

## 2018-08-08 RX ADMIN — Medication 1 TABLET: at 14:10

## 2018-08-08 RX ADMIN — SIMVASTATIN 10 MG: 10 TABLET, FILM COATED ORAL at 20:40

## 2018-08-08 RX ADMIN — ENOXAPARIN SODIUM 100 MG: 100 INJECTION SUBCUTANEOUS at 20:39

## 2018-08-08 RX ADMIN — ALBUTEROL SULFATE 2.5 MG: 2.5 SOLUTION RESPIRATORY (INHALATION) at 15:55

## 2018-08-08 RX ADMIN — PIPERACILLIN SODIUM AND TAZOBACTAM SODIUM 3.38 G: 3; .375 INJECTION, POWDER, LYOPHILIZED, FOR SOLUTION INTRAVENOUS at 21:09

## 2018-08-08 RX ADMIN — ALBUTEROL SULFATE 2.5 MG: 2.5 SOLUTION RESPIRATORY (INHALATION) at 11:45

## 2018-08-08 RX ADMIN — MEMANTINE 10 MG: 5 TABLET ORAL at 20:40

## 2018-08-08 RX ADMIN — PIPERACILLIN SODIUM AND TAZOBACTAM SODIUM 3.38 G: 3; .375 INJECTION, POWDER, LYOPHILIZED, FOR SOLUTION INTRAVENOUS at 05:52

## 2018-08-08 RX ADMIN — OXCARBAZEPINE 150 MG: 150 TABLET ORAL at 20:40

## 2018-08-08 RX ADMIN — MONTELUKAST SODIUM 10 MG: 10 TABLET, COATED ORAL at 14:10

## 2018-08-08 RX ADMIN — Medication 400 MG: at 20:40

## 2018-08-08 RX ADMIN — OXYBUTYNIN CHLORIDE 5 MG: 5 TABLET, FILM COATED, EXTENDED RELEASE ORAL at 14:11

## 2018-08-08 RX ADMIN — Medication 10 ML: at 20:39

## 2018-08-08 RX ADMIN — VITAMIN D, TAB 1000IU (100/BT) 2000 UNITS: 25 TAB at 14:10

## 2018-08-08 RX ADMIN — ALBUTEROL SULFATE 2.5 MG: 2.5 SOLUTION RESPIRATORY (INHALATION) at 19:25

## 2018-08-08 RX ADMIN — ALBUTEROL SULFATE 2.5 MG: 2.5 SOLUTION RESPIRATORY (INHALATION) at 08:37

## 2018-08-08 RX ADMIN — SENNOSIDES 17.2 MG: 8.6 TABLET, FILM COATED ORAL at 14:11

## 2018-08-08 RX ADMIN — ENOXAPARIN SODIUM 100 MG: 100 INJECTION SUBCUTANEOUS at 14:12

## 2018-08-08 ASSESSMENT — PAIN SCALES - GENERAL
PAINLEVEL_OUTOF10: 7
PAINLEVEL_OUTOF10: 7
PAINLEVEL_OUTOF10: 10
PAINLEVEL_OUTOF10: 0

## 2018-08-08 ASSESSMENT — PAIN DESCRIPTION - LOCATION
LOCATION: GENERALIZED;BUTTOCKS
LOCATION: HIP;HEAD

## 2018-08-08 ASSESSMENT — PAIN DESCRIPTION - PAIN TYPE: TYPE: ACUTE PAIN

## 2018-08-08 NOTE — PROGRESS NOTES
Sprite. \" Pt able to follow basic commands for oral motor exam. Prior to any PO intake, pt appeared SOB and demonstrated congested cough prior to any PO trials. O2 sats were between 92-94% prior to any trials. Pt given 1 ice chip and 1 half tsp of thin liquids. Immediate, congested cough observed with ice chip and half tsp of thin liquids. O2 sats dropped from 93%-88% following half tsp of thin liquids. No further trials given as pt appeared SOB and is at a risk of aspiration. Discussed recommendation of continuing current diet recommended at prior SLP session and re-educated pt and pt's daughter on risk and s/s of aspiration. Both verbalized understanding, however pt continued to request water despite education. Pt endorsed repeated pneumonia. RN aware of recommendations. RECOMMEND CONTINUE DYSPHAGIA 1/HONEY THICK LIQUIDS (per previous SLP session). SLP to continue to follow. Dysphagia goals:  Short-term Goals  Timeframe for Short-term Goals: 3-5 sessions  Goal 1: Pt will tolerate recommended diet without observed clinical signs of aspiration in 10/10 trials. --Goal addressed. Minimal trial of thin liquid given with immediate s/s of aspiration. Goal 2: Pt/caregiver will demonstrate understanding of aspiration precautions and compensatory strategies 80% of time with min cues. --Goal addressed. Education provided re: risk of aspiration and current diet recommendation. Pt verbalized understanding, however continued to request SLP to give her water. Goal 3: Pt will undergo instrumental evaluation of the swallow if clinically indicated. --Goal not directly addressed. Per SLP note from 8/7, MBSS order had been requested. Long-term Goals  Timeframe for Long-term Goals: 1 week  Goal 1: Pt will tolerate least restrictive diet w/o clinical s/s of aspiration. --Goal addressed, see above. Recommendations: Continue current diet, MBSS recommended for further assessment.  Per previous SLP note, SLP had perfect served MD to request order. Patient/Family/Caregiver Education:  Pt and pt's family educated on diet recommendation and risks associated with aspiration. Pt verbalized understanding, but needs ongoing education. Compensatory Strategies:  · Assist feed  · Eat/Feed slowly  · Small bites/sips  · Upright as possible for all oral intake  · Remain upright for 30-45 minutes after meals    Discharge Recommendations: TBD; pt will likely benefit from continued dysphagia tx after discharge    Plan:  Continued daily Dysphagia treatment with goals per established plan of care. If pt discharges from hospital prior to Speech/Swallowing discharge, this note serves as tx and discharge summary.      Treatment time:  Dysphagia Tx 34\" (13:33-14:07)    Beulah Tyler, [address] [facility] [place], [address] [facility]  Speech-Language Pathologist

## 2018-08-08 NOTE — PROGRESS NOTES
hardening of the subcu tissue, also in the left side of the abdomen and also left knee area  Psychiatric: Has baseline dementia  Neurologic: Alert, awake and oriented. No focal motor deficits         Results:  CBC:   Recent Labs      08/06/18   0513  08/07/18   1148   WBC  9.2  10.1   HGB  10.4*  10.4*   HCT  31.5*  31.8*   MCV  91.6  91.5   PLT  196  210     BMP:   Recent Labs      08/06/18   0513  08/07/18   1148  08/08/18   0850   NA  138  144  146*   K  3.9  4.1  4.1   CL  99  101  102   CO2  28  32  35*   PHOS  3.7   --   2.5   BUN  59*  47*  27*   CREATININE  1.3*  1.1  0.8       Imaging:  I have reviewed radiology images personally. CT HEAD WO CONTRAST   Final Result   No acute intracranial abnormality. XR CHEST STANDARD (2 VW)   Final Result   Pleural effusions which appear new, worse on the left. Worsening bibasilar   opacities. US RENAL COMPLETE   Final Result   No hydronephrosis. XR CHEST PORTABLE   Final Result   Bilateral pneumonia. Xr Chest Standard (2 Vw)    Result Date: 8/7/2018  EXAMINATION: TWO VIEWS OF THE CHEST 8/7/2018 7:34 am COMPARISON: August 4, 2018 HISTORY: ORDERING SYSTEM PROVIDED HISTORY: pneumonia on CXR TECHNOLOGIST PROVIDED HISTORY: Reason for exam:->pneumonia on CXR FINDINGS: Cardiac silhouette is enlarged. No pneumothorax. Small bilateral pleural effusions, greater on the left, increased. Worsening bibasilar opacities. Pleural effusions which appear new, worse on the left. Worsening bibasilar opacities. Ct Head Wo Contrast    Result Date: 8/7/2018  EXAMINATION: CT OF THE HEAD WITHOUT CONTRAST  8/7/2018 1:32 pm TECHNIQUE: CT of the head was performed without the administration of intravenous contrast. Dose modulation, iterative reconstruction, and/or weight based adjustment of the mA/kV was utilized to reduce the radiation dose to as low as reasonably achievable.  COMPARISON: 07/27/2007 HISTORY: ORDERING SYSTEM PROVIDED HISTORY: Reason for Exam: sob FINDINGS: Evaluation is limited by the patient's body habitus and the portable technique. There is airspace disease throughout the right lung, particularly in the right base. There is also lingular airspace disease. The heart size is mildly to moderately enlarged. There is no discernible pneumothorax. Bilateral pneumonia. Results for Bear Cotter (MRN [id number]) as of 8/8/2018 19:57   Ref. Range 8/7/2018 11:48 8/7/2018 16:09 8/7/2018 19:28 8/8/2018 07:28 8/8/2018 08:50   Sodium Latest Ref Range: 136 - 145 mmol/L 144    146 (H)   Potassium Latest Ref Range: 3.5 - 5.1 mmol/L 4.1    4.1   Chloride Latest Ref Range: 99 - 110 mmol/L 101    102   CO2 Latest Ref Range: 21 - 32 mmol/L 32    35 (H)   BUN Latest Ref Range: 7 - 20 mg/dL 47 (H)    27 (H)   Creatinine Latest Ref Range: 0.6 - 1.2 mg/dL 1.1    0.8   Anion Gap Latest Ref Range: 3 - 16  11    9   GFR Non- Latest Ref Range: >60  48 (A)    >60   GFR  Latest Ref Range: >60  58 (A)    >60   Glucose Latest Ref Range: 70 - 99 mg/dL 120 (H)    107 (H)   POC Glucose Latest Ref Range: 70 - 99 mg/dl  116 (H) 122 (H) 104 (H)    Calcium Latest Ref Range: 8.3 - 10.6 mg/dL 8.3    8.7   Phosphorus Latest Ref Range: 2.5 - 4.9 mg/dL     2.5       Results for Bear Cotter (MRN [id number]) as of 8/8/2018 19:57   Ref.  Range 8/4/2018 04:48 8/5/2018 04:48 8/6/2018 05:13 8/7/2018 11:48   WBC Latest Ref Range: 4.0 - 11.0 K/uL 10.1 10.0 9.2 10.1   RBC Latest Ref Range: 4.00 - 5.20 M/uL 3.49 (L) 3.57 (L) 3.44 (L) 3.48 (L)   Hemoglobin Quant Latest Ref Range: 12.0 - 16.0 g/dL 10.7 (L) 10.6 (L) 10.4 (L) 10.4 (L)   Hematocrit Latest Ref Range: 36.0 - 48.0 % 31.8 (L) 32.7 (L) 31.5 (L) 31.8 (L)   MCV Latest Ref Range: 80.0 - 100.0 fL 91.0 91.6 91.6 91.5   MCH Latest Ref Range: 26.0 - 34.0 pg 30.6 29.6 30.2 29.9   MCHC Latest Ref Range: 31.0 - 36.0 g/dL 33.6 32.3 33.0 32.7   MPV Latest Ref Range: 5.0 - 10.5 fL 8.6 8.8 8.6 overload and not because of bronchospasm will hold off on any steroids  · Patient's hypothyroidism was not completely corrected and the dose has been increased her Synthroid to 125 mcg once a day  · Clinically patient has ARAMIS and patient's family were told about the pathophysiology along with sequelae but given patient's dementia she may not be able to wear the CPAP mask and will hold off on any evaluation and management for the same  · ABG reviewed   · Avoid narcotics and sedatives  · Blood glucose monitoring with slight scale insulin as per IM  · Eliquis on hold-cardiology contemplating starting lovenox -please monitor for bleeding   · If patient has falls -??  Anticoagulation   · Will give one dose of lasix in AM -to be reassessed by IM and cardiology   · PUD prophylaxis as per IM     Case discussed with family and nursing            Electronically signed by:  Jimbo Ybarra MD    8/8/2018    7:54 PM.

## 2018-08-08 NOTE — PROGRESS NOTES
Hospitalist Progress Note      PCP: No primary care provider on file. Date of Admission: 8/4/2018    Chief Complaint:  Bloody emesis    Subjective:    S/p bronch today. Feels SOB. Denied ahy fever or chills       Medications:  Reviewed    Infusion Medications    dextrose       Scheduled Medications    enoxaparin  1 mg/kg Subcutaneous BID    levothyroxine  125 mcg Oral Daily    albuterol  2.5 mg Nebulization Q4H WA    piperacillin-tazobactam  3.375 g Intravenous Q8H    pantoprazole  40 mg Oral QAM AC    atropine  1 mg Intravenous Once    sodium chloride flush  10 mL Intravenous 2 times per day    vitamin D  2,000 Units Oral Daily    magnesium oxide  400 mg Oral BID    memantine  10 mg Oral BID    montelukast  10 mg Oral Daily    therapeutic multivitamin-minerals  1 tablet Oral Daily    OXcarbazepine  150 mg Oral BID    oxybutynin  5 mg Oral Daily    senna  2 tablet Oral Daily    simvastatin  10 mg Oral Nightly    insulin lispro  0-6 Units Subcutaneous TID WC    insulin lispro  0-3 Units Subcutaneous Nightly     PRN Meds: traMADol, sodium chloride flush, acetaminophen, ondansetron, glucose, dextrose, glucagon (rDNA), dextrose      Intake/Output Summary (Last 24 hours) at 08/08/18 1735  Last data filed at 08/08/18 1730   Gross per 24 hour   Intake              510 ml   Output              700 ml   Net             -190 ml       Physical Exam Performed:    /72   Pulse 91   Temp 98.8 °F (37.1 °C) (Oral)   Resp 17   Ht 5' 2\" (1.575 m)   Wt 229 lb (103.9 kg)   SpO2 94%   BMI 41.88 kg/m²     General appearance: alert . No apparent distress, appears stated age and cooperative. HEENT: Pupils equal, round. Conjunctivae/corneas clear. Neck: Supple, with full range of motion. No jugular venous distention. Trachea midline. Respiratory:  Normal respiratory effort.  Bibasilar crackles and rales   Cardiovascular: Regular rate and rhythm with normal S1/S2 without murmurs, rubs or gallops. Abdomen: Soft, non-tender, non-distended with normal bowel sounds. Musculoskeletal: No clubbing, cyanosis  . Left knee edematous with limited ROM due to pain   Skin: scattered ecchymosis   Neurologic:  Neurovascularly intact without any focal sensory/motor deficits. Cranial nerves: II-XII intact, grossly non-focal.  Psychiatric: Alert and oriented, thought content inappropriate, limited insight due to dementia   Capillary Refill: Brisk,< 3 seconds   Peripheral Pulses: +2 palpable, equal bilaterally       Labs:   Recent Labs      08/06/18   0513  08/07/18   1148   WBC  9.2  10.1   HGB  10.4*  10.4*   HCT  31.5*  31.8*   PLT  196  210     Recent Labs      08/06/18   0513  08/07/18   1148  08/08/18   0850   NA  138  144  146*   K  3.9  4.1  4.1   CL  99  101  102   CO2  28  32  35*   BUN  59*  47*  27*   CREATININE  1.3*  1.1  0.8   CALCIUM  8.5  8.3  8.7   PHOS  3.7   --   2.5     No results for input(s): AST, ALT, BILIDIR, BILITOT, ALKPHOS in the last 72 hours. No results for input(s): INR in the last 72 hours. No results for input(s): Michelle Golds in the last 72 hours. Urinalysis:      Lab Results   Component Value Date    NITRU Negative 08/04/2018    WBCUA  08/04/2018    BACTERIA 1+ 08/04/2018    RBCUA 0-2 08/04/2018    BLOODU SMALL 08/04/2018    SPECGRAV 1.015 08/04/2018    GLUCOSEU Negative 08/04/2018       Radiology:  CT HEAD WO CONTRAST   Final Result   No acute intracranial abnormality. XR CHEST STANDARD (2 VW)   Final Result   Pleural effusions which appear new, worse on the left. Worsening bibasilar   opacities. US RENAL COMPLETE   Final Result   No hydronephrosis. XR CHEST PORTABLE   Final Result   Bilateral pneumonia. Pleural effusions which appear new, worse on the left.  Worsening bibasilar   opacities. Cardiac echo :   Normal LV systolic function with EF of 55-60%.    Asynchronous septal motion noted.   D-shaped septum consistent with RV for UTI- switch to zosyn on 8/6  , aspiration precautions. Pulm recs noted and appreciated. S/p bronch on 8/8- follow cultures. Acute on chronic diastolic CHF - was on IVF for KAIT. recieved a dose of lasix on 8/7. Monitoring vol status closely with diuresis as needed per nephro.                 UTI - Acute cystitis present prior to admission. Rocephin initiated at [facility] which was continued. Switched to zosyn on 8/6 as stated above          ARF - w/ elevated BUN/Cr ratio c/w pre-renal azotemia. Improved on gentle IVF. Nephrology assisting . Renal U/S w/out evidence of hydronephrosis. HyperKalemia - 2nd to above. Will continue to follow serial labs - resolved w/ IVF. Reviewed and documented as above.     DM2 -  Controlled on home meds. Held. Follow FSBS/SSI.      HyperLipidemia - controlled on home Statin. Continue, w/ f/u and med adjustment w/ PCP      Hypothyroidism : TSH 11.92, FT4 0.5. Increased synthroid from 100 to 125mcg daily. Obesity -  With Body mass index is 41.88 kg/m². Complicating assessment and treatment. Placing patient at risk for multiple co-morbidities as well as early death and contributing to the patient's presentation. Would benefit from weight loss. Acute encephalopathy/Dementia:  Worsened from baseline. Multifactorial - KAIT, UTI, pneumonia. CT scan of head at outside hospital ( report reviewed)  did not show any acute intracranial abn. Repeat CT head was non acute     Dementia - controlled on home Namenda - continued.       Multiple bruising in the setting of anticoagulation and frequent falls. Holding Eliquis for reasons above. Close monitoring with lovenox started     Left knee pain : s/p fall. X ray at outside hospital showed moderate bicompartmental osteoarthritis changes with no acute abn/ fracture. pain control as needed. DVT Prophylaxis: lovenox       Diet: DIET DYSPHAGIA I PUREED; Dysphagia I Pureed;  Honey Thick  Code Status: Limited      PT/OT Eval Status: rec SNF     Dispo -  2-5 days  pending clinical course       Ian Officer, MD

## 2018-08-08 NOTE — PROGRESS NOTES
assistance  Standing Balance: Dependent/Total (maxA of 2 with chirag nugent during toileting hygiene)    Standing Balance  Sit to stand: Dependent/Total (maxA of 2 from EOB to chirag stedy, Elmer of from [address] stedy seat to stand)  Stand to sit: Dependent/Total (maxA of 2 for controlled descent into chair)    Bed mobility  Supine to Sit: Dependent/Total (max A of 2 to R with HOB elevated and use of bed rail)  Sit to Supine: Unable to assess (up to chair at end of session)     Transfers  Sit to stand: Dependent/Total (maxA of 2 from EOB to chirag stedy, Elmer of from [address] stedy seat to stand)  Stand to sit: Dependent/Total (maxA of 2 for controlled descent into chair)  Transfer Comments: Bed to chair transfers Ax2 with chirag nugent      Cognition  Overall Cognitive Status: Exceptions  Arousal/Alertness: Delayed responses to stimuli  Following Commands: Follows one step commands with repetition; Follows one step commands with increased time  Attention Span: Attends with cues to redirect; Difficulty attending to directions  Memory: Decreased short term memory;Decreased long term memory;Decreased recall of precautions;Decreased recall of recent events  Safety Judgement: Decreased awareness of need for assistance  Problem Solving: Assistance required to generate solutions;Assistance required to implement solutions;Decreased awareness of errors;Assistance required to identify errors made;Assistance required to correct errors made;Good awareness of errors made  Insights: Decreased awareness of deficits  Initiation: Requires cues for all  Sequencing: Requires cues for all       Assessment   Performance deficits / Impairments: Decreased balance;Decreased safe awareness;Decreased functional mobility ; Decreased ADL status; Decreased endurance;Decreased strength;Decreased cognition  Assessment: Patient continues to require skilled assistance of 2 for bed mobility and standing up to [address] of 2 for transfer to chair.  Patient

## 2018-08-08 NOTE — PROGRESS NOTES
[address]     Electrophysiology                                     Progress Note    Admission date:  2018    Reason for follow up visit: afib and bradycardia    HPI/CC: Sarah Nguyễn was admitted on 2018 from [facility] due to hematemesis and syncope versus fall. EGD showed no active bleeding and likely oropharyngeal source of bleeding. Cardiology was consulted for afib, bradycardia, and pauses up to 5.6 seconds in duration. Rhythm has been afib with an average HR of 75. No significant pauses in over 24 hours. Subjective: She was seen after her bronchoscopy. Is disoriented. Denies chest pain. Vitals:  Blood pressure (!) 151/77, pulse 104, temperature 97.6 °F (36.4 °C), resp. rate 17, height 5' 2\" (1.575 m), weight 229 lb (103.9 kg), SpO2 94 %.   Temp  Av.9 °F (36.6 °C)  Min: 97.1 °F (36.2 °C)  Max: 98.7 °F (37.1 °C)  Pulse  Av.5  Min: 71  Max: 107  BP  Min: 136/71  Max: 165/72  SpO2  Av.5 %  Min: 94 %  Max: 97 %    24 hour I/O    Intake/Output Summary (Last 24 hours) at 18 1136  Last data filed at 18 1037   Gross per 24 hour   Intake              270 ml   Output             1025 ml   Net             -755 ml     Current Facility-Administered Medications   Medication Dose Route Frequency Provider Last Rate Last Dose    fentaNYL (SUBLIMAZE) injection    PRN Jaylin Pearson MD   25 mcg at 18 1024    midazolam (VERSED) injection    PRN Jaylin Pearson MD   1 mg at 18 1024    sodium chloride 0.9 % irrigation    Continuous PRN Jaylin Pearson MD   60 mL at 18 1034    acetylcysteine (MUCOMYST) 20 % solution    PRN Jaylin Pearson MD   4 mL at 18 1034    lidocaine 2 % injection    PRN Jaylin Pearson MD   5 mL at 18 1036    butamben-tetracaine-benzocaine (CETACAINE) spray    PRN Jaylin Pearson MD   1 spray at 18 1022    traMADol (ULTRAM) tablet 50 mg  50 mg Oral Q6H PRN KANDI Macdonald CNP   50 mg at 18 2484   

## 2018-08-08 NOTE — PROGRESS NOTES
Report bedside C-4 pt transferred to B-3 via bed, CMU notified. Pt daughter bedside all questions answered.

## 2018-08-08 NOTE — PROGRESS NOTES
Physical Therapy  Facility/Department: [facility] B3 - MED SURG  Daily Treatment Note  NAME: Stevie Knutson  : 1935  MRN: [id number]    Date of Service: 2018    Discharge Recommendations:  Subacute/Skilled Nursing Facility   PT Equipment Recommendations  Equipment Needed: No    Patient Diagnosis(es): There were no encounter diagnoses. has a past medical history of Alzheimer's dementia; Asthma; Atrial fibrillation ([facility] [address] Blood clot in vein; CHF (congestive heart failure) ([facility] [address] Dementia; GERD (gastroesophageal reflux disease); Hypertension; Kidney failure; and Osteoporosis. has a past surgical history that includes Cholecystectomy; eye surgery; Hysterectomy; Thyroidectomy; and pr esophagogastroduodenoscopy transoral diagnostic (N/A, 2018). Restrictions  Restrictions/Precautions  Restrictions/Precautions: Fall Risk, General Precautions  Position Activity Restriction  Other position/activity restrictions: Up as tolerated, 4.5L O2 nc, campoverde, IV     Subjective   General  Chart Reviewed: Yes  Response To Previous Treatment: Patient with no complaints from previous session. Family / Caregiver Present: Yes (2)  Referring Practitioner: Sirena Murray MD  Subjective  Subjective: Patient resting supine in bed upon therapy arrival.  Patient required moderate encouragement from therapists' and family to participate with therapy today. General Comment  Comments: RN cleared pt for therapy session. Pain Screening  Patient Currently in Pain: Yes  Pain Assessment  Pain Assessment: 0-10  Pain Level: 10  Pain Type: Acute pain  Pain Location: Generalized; Buttocks (generalized pain with the worst being 10/10 on her bottom)  Vital Signs  Patient Currently in Pain: Yes       Objective   Bed mobility  Supine to Sit: Dependent/Total (max of 2)  Sit to Supine: Unable to assess  Transfers  Sit to Stand: Dependent/Total (max of 2)  Stand to sit: Dependent/Total (grzegorz f 2)  Bed to Chair: Dependent/Total (SAIMA

## 2018-08-08 NOTE — PROGRESS NOTES
Several attempts by multiple RN's to gain IV access. ED does not have anyone available to try using ultrasound.

## 2018-08-09 ENCOUNTER — APPOINTMENT (OUTPATIENT)
Dept: GENERAL RADIOLOGY | Age: 83
DRG: 987 | End: 2018-08-09
Attending: INTERNAL MEDICINE
Payer: MEDICARE

## 2018-08-09 LAB
ANION GAP SERPL CALCULATED.3IONS-SCNC: 11 MMOL/L (ref 3–16)
BASOPHILS ABSOLUTE: 0 K/UL (ref 0–0.2)
BASOPHILS RELATIVE PERCENT: 0.3 %
BUN BLDV-MCNC: 16 MG/DL (ref 7–20)
CALCIUM SERPL-MCNC: 8.8 MG/DL (ref 8.3–10.6)
CHLORIDE BLD-SCNC: 97 MMOL/L (ref 99–110)
CO2: 36 MMOL/L (ref 21–32)
CREAT SERPL-MCNC: 0.7 MG/DL (ref 0.6–1.2)
EOSINOPHILS ABSOLUTE: 0.3 K/UL (ref 0–0.6)
EOSINOPHILS RELATIVE PERCENT: 2.9 %
GFR AFRICAN AMERICAN: >60
GFR NON-AFRICAN AMERICAN: >60
GLUCOSE BLD-MCNC: 100 MG/DL (ref 70–99)
GLUCOSE BLD-MCNC: 110 MG/DL (ref 70–99)
GLUCOSE BLD-MCNC: 117 MG/DL (ref 70–99)
GLUCOSE BLD-MCNC: 123 MG/DL (ref 70–99)
GLUCOSE BLD-MCNC: 128 MG/DL (ref 70–99)
HCT VFR BLD CALC: 32.9 % (ref 36–48)
HEMOGLOBIN: 10.8 G/DL (ref 12–16)
LYMPHOCYTES ABSOLUTE: 0.5 K/UL (ref 1–5.1)
LYMPHOCYTES RELATIVE PERCENT: 5.5 %
MCH RBC QN AUTO: 30.1 PG (ref 26–34)
MCHC RBC AUTO-ENTMCNC: 32.9 G/DL (ref 31–36)
MCV RBC AUTO: 91.4 FL (ref 80–100)
MONOCYTES ABSOLUTE: 0.9 K/UL (ref 0–1.3)
MONOCYTES RELATIVE PERCENT: 9.7 %
NEUTROPHILS ABSOLUTE: 8 K/UL (ref 1.7–7.7)
NEUTROPHILS RELATIVE PERCENT: 81.6 %
PDW BLD-RTO: 15.2 % (ref 12.4–15.4)
PERFORMED ON: ABNORMAL
PLATELET # BLD: 229 K/UL (ref 135–450)
PMV BLD AUTO: 8.2 FL (ref 5–10.5)
POTASSIUM SERPL-SCNC: 3.6 MMOL/L (ref 3.5–5.1)
RBC # BLD: 3.6 M/UL (ref 4–5.2)
SODIUM BLD-SCNC: 144 MMOL/L (ref 136–145)
WBC # BLD: 9.8 K/UL (ref 4–11)

## 2018-08-09 PROCEDURE — 92610 EVALUATE SWALLOWING FUNCTION: CPT

## 2018-08-09 PROCEDURE — 74230 X-RAY XM SWLNG FUNCJ C+: CPT

## 2018-08-09 PROCEDURE — 94640 AIRWAY INHALATION TREATMENT: CPT

## 2018-08-09 PROCEDURE — 6370000000 HC RX 637 (ALT 250 FOR IP): Performed by: INTERNAL MEDICINE

## 2018-08-09 PROCEDURE — 94761 N-INVAS EAR/PLS OXIMETRY MLT: CPT

## 2018-08-09 PROCEDURE — 92611 MOTION FLUOROSCOPY/SWALLOW: CPT

## 2018-08-09 PROCEDURE — 2700000000 HC OXYGEN THERAPY PER DAY

## 2018-08-09 PROCEDURE — 2580000003 HC RX 258: Performed by: INTERNAL MEDICINE

## 2018-08-09 PROCEDURE — 6360000002 HC RX W HCPCS: Performed by: INTERNAL MEDICINE

## 2018-08-09 PROCEDURE — 2580000003 HC RX 258: Performed by: NURSE PRACTITIONER

## 2018-08-09 PROCEDURE — 92526 ORAL FUNCTION THERAPY: CPT

## 2018-08-09 PROCEDURE — 97530 THERAPEUTIC ACTIVITIES: CPT

## 2018-08-09 PROCEDURE — 94664 DEMO&/EVAL PT USE INHALER: CPT

## 2018-08-09 PROCEDURE — 6370000000 HC RX 637 (ALT 250 FOR IP): Performed by: NURSE PRACTITIONER

## 2018-08-09 PROCEDURE — 36415 COLL VENOUS BLD VENIPUNCTURE: CPT

## 2018-08-09 PROCEDURE — 85025 COMPLETE CBC W/AUTO DIFF WBC: CPT

## 2018-08-09 PROCEDURE — 97110 THERAPEUTIC EXERCISES: CPT

## 2018-08-09 PROCEDURE — 6360000002 HC RX W HCPCS: Performed by: NURSE PRACTITIONER

## 2018-08-09 PROCEDURE — 80048 BASIC METABOLIC PNL TOTAL CA: CPT

## 2018-08-09 PROCEDURE — 99233 SBSQ HOSP IP/OBS HIGH 50: CPT | Performed by: NURSE PRACTITIONER

## 2018-08-09 PROCEDURE — 1200000000 HC SEMI PRIVATE

## 2018-08-09 RX ORDER — FUROSEMIDE 40 MG/1
20 TABLET ORAL 2 TIMES DAILY
Status: DISCONTINUED | OUTPATIENT
Start: 2018-08-09 | End: 2018-08-12

## 2018-08-09 RX ADMIN — Medication 1 TABLET: at 08:52

## 2018-08-09 RX ADMIN — OXYBUTYNIN CHLORIDE 5 MG: 5 TABLET, FILM COATED, EXTENDED RELEASE ORAL at 08:53

## 2018-08-09 RX ADMIN — MONTELUKAST SODIUM 10 MG: 10 TABLET, COATED ORAL at 08:52

## 2018-08-09 RX ADMIN — Medication 10 ML: at 20:42

## 2018-08-09 RX ADMIN — ALBUTEROL SULFATE 2.5 MG: 2.5 SOLUTION RESPIRATORY (INHALATION) at 15:35

## 2018-08-09 RX ADMIN — SIMVASTATIN 10 MG: 10 TABLET, FILM COATED ORAL at 20:42

## 2018-08-09 RX ADMIN — VITAMIN D, TAB 1000IU (100/BT) 2000 UNITS: 25 TAB at 08:51

## 2018-08-09 RX ADMIN — ALBUTEROL SULFATE 2.5 MG: 2.5 SOLUTION RESPIRATORY (INHALATION) at 12:25

## 2018-08-09 RX ADMIN — OXCARBAZEPINE 150 MG: 150 TABLET ORAL at 08:52

## 2018-08-09 RX ADMIN — Medication 400 MG: at 20:42

## 2018-08-09 RX ADMIN — OXCARBAZEPINE 150 MG: 150 TABLET ORAL at 20:42

## 2018-08-09 RX ADMIN — ENOXAPARIN SODIUM 100 MG: 100 INJECTION SUBCUTANEOUS at 20:42

## 2018-08-09 RX ADMIN — MEMANTINE 10 MG: 5 TABLET ORAL at 20:42

## 2018-08-09 RX ADMIN — LEVOTHYROXINE SODIUM 125 MCG: 125 TABLET ORAL at 05:21

## 2018-08-09 RX ADMIN — ACETAMINOPHEN 650 MG: 325 TABLET, FILM COATED ORAL at 14:11

## 2018-08-09 RX ADMIN — MEMANTINE 10 MG: 5 TABLET ORAL at 08:53

## 2018-08-09 RX ADMIN — FUROSEMIDE 20 MG: 40 TABLET ORAL at 17:00

## 2018-08-09 RX ADMIN — ALBUTEROL SULFATE 2.5 MG: 2.5 SOLUTION RESPIRATORY (INHALATION) at 07:32

## 2018-08-09 RX ADMIN — PIPERACILLIN SODIUM AND TAZOBACTAM SODIUM 3.38 G: 3; .375 INJECTION, POWDER, LYOPHILIZED, FOR SOLUTION INTRAVENOUS at 14:12

## 2018-08-09 RX ADMIN — ENOXAPARIN SODIUM 100 MG: 100 INJECTION SUBCUTANEOUS at 08:55

## 2018-08-09 RX ADMIN — PANTOPRAZOLE SODIUM 40 MG: 40 TABLET, DELAYED RELEASE ORAL at 05:21

## 2018-08-09 RX ADMIN — PIPERACILLIN SODIUM AND TAZOBACTAM SODIUM 3.38 G: 3; .375 INJECTION, POWDER, LYOPHILIZED, FOR SOLUTION INTRAVENOUS at 05:21

## 2018-08-09 RX ADMIN — PIPERACILLIN SODIUM AND TAZOBACTAM SODIUM 3.38 G: 3; .375 INJECTION, POWDER, LYOPHILIZED, FOR SOLUTION INTRAVENOUS at 20:41

## 2018-08-09 RX ADMIN — SENNOSIDES 17.2 MG: 8.6 TABLET, FILM COATED ORAL at 08:53

## 2018-08-09 RX ADMIN — Medication 400 MG: at 08:52

## 2018-08-09 RX ADMIN — FUROSEMIDE 20 MG: 10 INJECTION, SOLUTION INTRAMUSCULAR; INTRAVENOUS at 05:21

## 2018-08-09 ASSESSMENT — PAIN SCALES - GENERAL: PAINLEVEL_OUTOF10: 3

## 2018-08-09 NOTE — PROCEDURES
Bronchoscopy note    Patient with acute respiratory failure, hematemesis, pulmonary infiltrates, aspiration into the airways, atelectasis along with ineffective airway clearance was evaluated and patient's clinical status and radiology was discussed with patient and family, patient would benefit from a bronchoscopy for diagnostic and therapeutic purposes and patient's family wanted that to done, after informed consent, the patient was taken to the endoscopy suite, after timeout patient was given oropharyngeal analgesia with benzocaine, patient was given lidocaine for tracheal bronchial analgesia, patient was given conscious sedation in the form of 2 mg of Versed and 50 mg of fentanyl for the procedure, the bronchoscopy was includes the mouth with a bite block, patient was found to have thick mucous plugs in the posterior pharynx which were suctioned out, patient's vocal cords were normal without any anatomy defect or paralysis, patient's trachea was slightly tortuous, patient's entire tracheobronchial tree was full of thick mucous plugs, the bronchoscope was placed into the left lower lobe and BAL was done from that area which was sent for cultures and cytology, the rest of the tracheobronchial tree was therapeutically lavaged out/aspirated out using Mucomyst and saline; patient did not have any food particles in the tracheal bronchial tree  Patient tolerated the procedure well and did not have any apparent complications and patient was transferred to the recovery room without any incidence    Further management depending on patient's clinical status and endoscopy results  Patient's bronchoscopy findings were discussed with patient's family in detail    Jaison Núñez MD
manipulation and reduced bolus control with reduced A-P bolus transit and poor mastication with soft solid trials. Pt also demonstrated premature bolus loss to the pharynx with all consistencies trialed. · Pt's pharyngeal deficits characterized by delayed swallow initiation, reduced laryngeal elevation, and reduced airway / laryngeal closure with premature spillage to the pyriforms with thin liquid trials (tsp, cup) and episodes of deep and shallow penetration both before and during the swallow. No cough reflex noted. All episodes of penetration were completely self-clearing. With nectar-thick liquid trials via straw and cued consecutive cup sip, pt with episodes of deep (to the level of the vocal folds; pt with adequate cough reflex) and shallow penetration before the swallow. Again, all episodes of penetration were completely self-clearing. When cued to take small, individual cup sips with NTL, no penetration/aspiration observed. No penetration/aspiration noted with honey-thick liquid trials (tsp, cup). With thicker consistency trials (solid PO, honey, and nectar-thick liquid trials), pt had min residue along BOT and valleculae, which she successfully cleared with independent second swallow. · Pt will require cueing to take small, individual sips when drinking and to avoid straws. She would benefit from continued dysphagia tx after discharge. Oral Preparation / Oral Phase  Impaired  Oral Phase - Major Contributing Deficits  · Poor Mastication: Soft solid  · Weak Lingual Manipulation: All  · Reduced Posterior Propulsion: Soft solid  · Reduced Bolus Control: All  · Premature Bolus Loss to Pharynx: All    Pharyngeal Phase  Impaired  Pharyngeal Phase - Major Contributing Deficits  · Delayed Swallow Initiation: All  · Premature Spillage to Valleculae: All  · Premature Spillage to Pyriform: Nectar teaspoon; Thin cup; Thin teaspoon  · Reduced Laryngeal Elevation:  All  · Reduced Anterior Laryngeal Movement:

## 2018-08-09 NOTE — PROGRESS NOTES
zosyn on 8/6  , aspiration precautions. Pulm assisting. S/p bronch on 8/8- follow cultures. Speech eval noted- MBS ordered    Acute on chronic diastolic CHF - was on IVF for KAIT. recieved a dose of lasix on 8/7. Monitoring vol status closely with diuresis as needed per nephro.                 UTI - Acute cystitis present prior to admission. Rocephin initiated at [facility] which was continued. Switched to zosyn on 8/6 as stated above          ARF - w/ elevated BUN/Cr ratio c/w pre-renal azotemia. Improved on gentle IVF. Nephrology assisting . Renal U/S w/out evidence of hydronephrosis. HyperKalemia - 2nd to above. Will continue to follow serial labs - resolved w/ IVF. Reviewed and documented as above.     DM2 -  Controlled on home meds. Held. Follow FSBS/SSI.      HyperLipidemia - controlled on home Statin. Continue, w/ f/u and med adjustment w/ PCP      Hypothyroidism : TSH 11.92, FT4 0.5. Increased synthroid from 100 to 125mcg daily. Obesity -  With Body mass index is 41.81 kg/m². Complicating assessment and treatment. Placing patient at risk for multiple co-morbidities as well as early death and contributing to the patient's presentation. Would benefit from weight loss. Acute encephalopathy/Dementia:  Worsened from baseline. Multifactorial - KAIT, UTI, pneumonia. CT scan of head at outside hospital ( report reviewed)  did not show any acute intracranial abn. Repeat CT head was non acute     Dementia - controlled on home Namenda - continued.       Multiple bruising in the setting of anticoagulation and frequent falls. Holding Eliquis for reasons above. Close monitoring with lovenox started     Left knee pain : s/p fall. X ray at outside hospital showed moderate bicompartmental osteoarthritis changes with no acute abn/ fracture. pain control as needed. DVT Prophylaxis: lovenox       Diet: DIET DYSPHAGIA I PUREED; Dysphagia I Pureed;  Honey Thick  Code Status: Limited      PT/OT Eval

## 2018-08-09 NOTE — PROGRESS NOTES
Speech Language Pathology  MBSS    MBSS completed-- pt with episodes of shallow, largely self-clearing penetration with both thin and nectar-thick liquid trials. Recommend continuing pt's current Dysphagia I (puree) diet and upgrade to nectar-thick liquids / no straws, *small, single cup sips only. Full report to follow. RN notified of recs.     Thank you,  Brianne Pedro, M.S. [address] [facility]  Speech-language pathologist  [phone]

## 2018-08-09 NOTE — PROGRESS NOTES
Occupational Therapy  Facility/Department: [facility] B3 - MED SURG  Daily Treatment Note  NAME: Kerwin Lua  : 1935  MRN: [id number]    Date of Service: 2018    Discharge Recommendations:  [address]       Patient Diagnosis(es): There were no encounter diagnoses. has a past medical history of Alzheimer's dementia; Asthma; Atrial fibrillation ([facility] [address] Blood clot in vein; CHF (congestive heart failure) ([facility] [address] Dementia; GERD (gastroesophageal reflux disease); Hypertension; Kidney failure; and Osteoporosis. has a past surgical history that includes Cholecystectomy; eye surgery; Hysterectomy; Thyroidectomy; pr esophagogastroduodenoscopy transoral diagnostic (N/A, 2018); and pr brncc w/brncl alveolar lavage (N/A, 2018). Restrictions  Restrictions/Precautions  Restrictions/Precautions: Fall Risk, General Precautions  Position Activity Restriction  Other position/activity restrictions: Up as tolerated, 1.5L O2, Lua, IV lines, telemetry with continuous pulse ox  Subjective   General  Chart Reviewed: Yes  Patient assessed for rehabilitation services?: Yes  Family / Caregiver Present: Yes (daughter)  Referring Practitioner: Marylen Meissner, MD  Diagnosis: Fall, Bloody Emesis  Subjective  Subjective: Patient more chatty today, in good spirits. General Comment  Comments: Nsg cleared pt for therapy session. Pain Assessment  Patient Currently in Pain: Denies  Vital Signs  Patient Currently in Pain: Denies   Orientation  Orientation  Overall Orientation Status: Impaired  Orientation Level: Oriented to person;Disoriented to place; Disoriented to time;Disoriented to situation  Objective    ADL  Feeding: Setup; Beverage management; Thickened liquids  LE Dressing: Dependent/Total  Toileting: Dependent/Total        Balance  Sitting Balance: Stand by assistance  Standing Balance  Sit to stand: Dependent/Total (modA of 2 from EOB to RW, cues for safe hand placement)  Stand to sit: Dependent/Total (modA of 2 for safe descent into chair)     Bed mobility  Supine to Sit: Dependent/Total (modA of 2 progressing to modA of 1 )  Sit to Supine: Unable to assess (up to chair at end of session)     Transfers  Stand Step Transfers: Dependent/Total (modA of 2 to R with RW, cues for maintaining  on RW)  Sit to stand: Dependent/Total (modA of 2 from EOB to RW, cues for safe hand placement)  Stand to sit: Dependent/Total (modA of 2 for safe descent into chair)      Cognition  Overall Cognitive Status: Exceptions  Arousal/Alertness: Delayed responses to stimuli  Following Commands: Follows one step commands with repetition; Follows one step commands with increased time  Attention Span: Attends with cues to redirect; Difficulty attending to directions  Memory: Decreased short term memory;Decreased long term memory;Decreased recall of precautions;Decreased recall of recent events  Problem Solving: Assistance required to generate solutions;Assistance required to implement solutions;Decreased awareness of errors;Assistance required to identify errors made;Assistance required to correct errors made;Good awareness of errors made  Insights: Decreased awareness of deficits  Initiation: Requires cues for some  Sequencing: Requires cues for some      Type of ROM/Therapeutic Exercise  Type of ROM/Therapeutic Exercise: AAROM  Exercises  Shoulder Flexion: x 10 reps  Elbow Flexion: x 10 reps  Elbow Extension: x 10 reps  Supination: x 10 reps  Pronation: x 10 reps  Finger Flexion: x 10 reps  Finger Extension: x 10 reps      Assessment   Performance deficits / Impairments: Decreased balance;Decreased safe awareness;Decreased functional mobility ; Decreased ADL status; Decreased endurance;Decreased strength;Decreased cognition  Assessment: Patient progressing with transfers this date modA of 2 with RW bed to chair. Cues for pursed lip breathing when SOB.   Patient Education: role of OT, bed mobility, safety, ADL retraining, functional transfers  REQUIRES OT FOLLOW UP: Yes  Activity Tolerance  Activity Tolerance: Patient Tolerated treatment well;Patient limited by fatigue  Activity Tolerance: Vitals: O2 sat 94-95% at rest on 1.5L O2, decreasing to 88% after bed to chair transfer with RW. TORRES noted after bed to chair transfer, cues for pursed lip breathing. Safety Devices  Safety Devices in place: Yes  Type of devices: Left in chair;Nurse notified;Call light within reach; Chair alarm in place;Gait belt          Plan   Plan  Times per week: 3-5x a week  Times per day: Daily  Current Treatment Recommendations: Strengthening, Endurance Training, Patient/Caregiver Education & Training, Equipment Evaluation, Education, & procurement, Self-Care / ADL, Balance Training, Home Management Training, Safety Education & Training, Functional Mobility Training, Cognitive/Perceptual Training, Wheelchair Mobility Training, Pain Management    AM-PAC Score        AM-PAC Inpatient Daily Activity Raw Score: 10  AM-PAC Inpatient ADL T-Scale Score : 27.31  ADL Inpatient CMS 0-100% Score: 74.7  ADL Inpatient CMS G-Code Modifier : CL    Goals  Short term goals  Time Frame for Short term goals: 1 week  Short term goal 1: Pt will tolerate x15 minutes EOB to complete 1-2 grooming tasks with CGA by 8/9-not met continue till 8/13  Short term goal 2: Pt will tolerate BUE ex 10-15 reps to increase strength and endurance for ADLs and transfers-progressing can tolerate 10 reps  Short term goal 3: Pt will complete functional transfers maxA with LRAD-currently mod of 2 with RW as of 8/9/18       Therapy Time   Individual Concurrent Group Co-treatment   Time In [id number]   Time Out 1415     1445   Minutes 10     30   Timed Code Treatment Minutes: 40 Minutes       Golden Jo OTR/L

## 2018-08-09 NOTE — PROGRESS NOTES
BNP:  No results found for: BNP  Fasting Lipid Panel:  No results found for: CHOL, HDL, TRIG  Cardiac Enzymes:  CK/MbTroponin  Lab Results   Component Value Date    CKTOTAL 345 08/05/2018     PT/ INR No results found for: INR, PROTIME  PTT No results found for: PTT   Lab Results   Component Value Date    MG 3.30 08/05/2018    No results found for: TSH    Assessment:  1. Atrial fibrillation of unknown duration: HR controlled   -[id number] score 5 (age, gender, HTN, CHF)  2. Bradycardia and intermittent pauses: resolved off Cardizem  3. Fall versus syncope: reported on admission  4. Chronic diastolic CHF: Lasix restarted today   5. HTN: control is varied  6. KAIT: nephrology following   7. Hypothyroidism: TSH 11.9 on 8/6/2018  8. Hematemesis: s/p EGD (likely oropharyngeal source)  9. Pneumonia: antibiotics per pulmonary; s/p bronch on 8/8  10. UTI  11. Dementia and acute encephalopathy    Plan:   1. Bradycardia/pauses have resolved (last dose of Cardizem was presumably on 8/3/2018)  2. TSH is grossly abnormal and likely contributed to bradycardia. Synthroid was increased on 8/7/2018. Recommend close monitoring as outpatient. 3. Continue Lovenox 1mg/kg SQ BID and restart Eliquis 5mg po BID if no procedures are planned  4. Start amlodipine 5mg po QD if BP is persistently elevated    EP will sign off but remains available if needed. Patient to follow up in office on 9/27/2018.     KANDI Melgar-CNP  [facility], [place], operated [facility]  [phone]

## 2018-08-09 NOTE — PROGRESS NOTES
RESPIRATORY THERAPY ASSESSMENT    Name:  [facility] [address] Record Number:  [id number]  Age: 82 y.o. Gender: female  : 1935  Today's Date:  2018  Room:  2833/6212-05    Assessment     Is the patient being admitted for a COPD or Asthma exacerbation? No   (If yes the patient will be seen every 4 hours for the first 24 hours and then reassessed)    Patient Admission Diagnosis      Allergies  Allergies   Allergen Reactions    Codeine Itching    Sulfa Antibiotics Other (See Comments)     Per Pt daughter Víctor Santizo) \" Not know her reaction to medication\"       Minimum Predicted Vital Capacity:     480          Actual Vital Capacity:    Pt. sleeping        Pulmonary History:Asthma and CHF/Pulmonary Edema  Home Oxygen Therapy:  2 liters/min via nasal cannula  Home Respiratory Therapy:Albuterol hhn prn  Current Respiratory Therapy:  Albuterol HHN Q4 wa  Treatment Type: HHN  Medications: Albuterol    Respiratory Severity Index(RSI)   Patients with orders for inhalation medications, oxygen, or any therapeutic treatment modality will be placed on Respiratory Protocol. They will be assessed with the first treatment and at least every 72 hours thereafter. The following severity scale will be used to determine frequency of treatment intervention.     Smoking History: No Smoking History = 0    Social History  Social History   Substance Use Topics    Smoking status: Never Smoker    Smokeless tobacco: Never Used    Alcohol use Not on file       Recent Surgical History: None = 0  Past Surgical History  Past Surgical History:   Procedure Laterality Date    CHOLECYSTECTOMY      EYE SURGERY      HYSTERECTOMY      NH ESOPHAGOGASTRODUODENOSCOPY TRANSORAL DIAGNOSTIC N/A 2018    EGD ESOPHAGOGASTRODUODENOSCOPY performed by Severa Chamberlain, MD at [address] [place] [address]         Level of Consciousness: Alert, Oriented, and Cooperative = 0    Level of Activity: Non weight bearing-

## 2018-08-09 NOTE — PROGRESS NOTES
Nutrition Assessment    Type and Reason for Visit: Reassess    Nutrition Recommendations:   · Continue diet free of therapeutic restrictions to promote PO intakes  · Diet texture/liquid level per SLP recommendations  · Add Magic Cups with meals  · Monitor po intakes, nutrition adequacy, weights, pertinent labs, BMs    Malnutrition Assessment:  · Malnutrition Status: At risk for malnutrition  · Context: Acute illness or injury  · Findings of the 6 clinical characteristics of malnutrition (Minimum of 2 out of 6 clinical characteristics is required to make the diagnosis of moderate or severe Protein Calorie Malnutrition based on AND/ASPEN Guidelines):  1. Energy Intake-Less than or equal to 75%, greater than or equal to 5 days    2. Weight Loss-No significant weight loss,    3. Fat Loss-Unable to assess,    4. Muscle Loss-Unable to assess,    5. Fluid Accumulation-Unable to assess,    6.  Strength-Not measured    Nutrition Diagnosis:   · Problem: Inadequate oral intake  · Etiology: related to Acute injury/trauma     Signs and symptoms:  as evidenced by Diet history of poor intake    Nutrition Assessment:  · Subjective Assessment: Follow up: Pt currently on a pureed diet with HTL. Intakes 0-50% per EMR. Spoke with pt dtr who was at [facility] as pt unable to provide much Hx. Dtr reports that pt appetite has been reduced and she is not favorable to thickened liquids. Plans for MBSS today per dtr. Pt was favorable to Sapna will reorder. Pt and dtr had no questions.   · Nutrition-Focused Physical Findings: scattered bruising  · Wound Type: None  · Current Nutrition Therapies:  · Oral Diet Orders: Dysphagia 1 (Pureed), Honey Thick   · Oral Diet intake: 1-25%, 26-50%  · Oral Nutrition Supplement (ONS) Orders: None  · ONS intake: Unable to assess  · Anthropometric Measures:  · Ht: 5' 2\" (157.5 cm)   · Current Body Wt: 228 lb 9.6 oz (103.7 kg)  · Ideal Body Wt: 110 lb (49.9 kg)   · BMI Classification: BMI > or equal to 40.0 Obese Class III  · Comparative Standards (Estimated Nutrition Needs):  · Estimated Daily Total Kcal: 3563-5493  · Estimated Daily Protein (g): 50-60    Estimated Intake vs Estimated Needs: Intake Less Than Needs    Nutrition Risk Level: Moderate    Nutrition Interventions:   Start ONS, Continue current diet  Continued Inpatient Monitoring    Nutrition Evaluation:   · Evaluation: No progress toward goals   · Goals: PO diet with intakes of 50% or more this admission    · Monitoring: Meal Intake, Supplement Intake, Diet Tolerance, Weight, Chewing/Swallowing    See Adult Nutrition Doc Flowsheet for more detail.      Electronically signed by Mulugeta Jacob RD, ANEL on 8/9/18 at 10:56 AM    Contact Number: [phone]

## 2018-08-09 NOTE — PLAN OF CARE
Problem: Nutrition  Intervention: Swallowing evaluation  Bedside swallow evaluation completed this date. Angela Khoury M.S. [address] [facility]  Speech-language pathologist  [phone]        Intervention: Aspiration precautions  Bedside swallow evaluation completed this date.     Angela Khoury M.S. [address] [facility]  Speech-language pathologist  [phone]

## 2018-08-09 NOTE — PROGRESS NOTES
Equal chest rise and expansion bilaterally   Abdominal: Soft. Bowel sounds are normal. She exhibits no distension. There is no tenderness. Musculoskeletal: Normal range of motion. She exhibits no edema. Lymphadenopathy:     She has no cervical adenopathy. Neurological: She is alert. No cranial nerve deficit. CN 2-12 grossly intact   Skin: Skin is dry. She is not diaphoretic.   echymossis           Data Reviewed:   LABS:  CBC:   Recent Labs      08/07/18   1148  08/09/18   0948   WBC  10.1  9.8   HGB  10.4*  10.8*   HCT  31.8*  32.9*   MCV  91.5  91.4   PLT  210  229     BMP:   Recent Labs      08/07/18   1148  08/08/18   0850  08/09/18   0948   NA  144  146*  144   K  4.1  4.1  3.6   CL  101  102  97*   CO2  32  35*  36*   PHOS   --   2.5   --    BUN  47*  27*  16   CREATININE  1.1  0.8  0.7     LIVER PROFILE: No results for input(s): AST, ALT, LIPASE, BILIDIR, BILITOT, ALKPHOS in the last 72 hours. Invalid input(s): AMYLASE,  ALB  PT/INR: No results for input(s): PROTIME, INR in the last 72 hours. APTT: No results for input(s): APTT in the last 72 hours. UA:No results for input(s): NITRITE, COLORU, PHUR, LABCAST, WBCUA, RBCUA, MUCUS, TRICHOMONAS, YEAST, BACTERIA, CLARITYU, SPECGRAV, LEUKOCYTESUR, UROBILINOGEN, BILIRUBINUR, BLOODU, GLUCOSEU, AMORPHOUS in the last 72 hours. Invalid input(s): KETONESU  Recent Labs      08/08/18   0835   PHART  7.351   YEU2HUY  69.4*   PO2ART  80.4       Vent Information  FiO2 : 100 %     CXR personally reviewed today, left greater than right basilar infiltrates/atleectasis          Assessment:     1. Acute resp failure, hypoxic  2. Aspiration pneumonia  3. Acute diastolic CHF  4. KAIT  5. Fall with bruising  6. Acute encephalopathy, baseline dementia    Plan:      -Aspiration precautions, MBS findings noted  -Wean FIO2 as tolerated  -Atb for UTI and aspiration pneumonia.  Can discontinue tomorrow from a pulm standpoint if resp cultures remain negative  -Repeat CXR in Cal Baca MD

## 2018-08-09 NOTE — PRE SEDATION
240 MG extended release capsule Take 240 mg by mouth daily    Historical Provider, MD   magnesium oxide (MAG-OX) 400 MG tablet Take 400 mg by mouth 2 times daily    Historical Provider, MD   montelukast (SINGULAIR) 10 MG tablet Take 10 mg by mouth daily    Historical Provider, MD   OXcarbazepine (TRILEPTAL) 150 MG tablet Take 150 mg by mouth 2 times daily    Historical Provider, MD   Dextromethorphan-guaiFENesin (ROBAFEN DM)  MG/5ML SYRP Take 5 mLs by mouth every 4 hours as needed for Cough    Historical Provider, MD   simvastatin (ZOCOR) 10 MG tablet Take 10 mg by mouth nightly    Historical Provider, MD         Pre-Sedation Documentation and Exam:   Vital signs have been reviewed (see flow sheet for vitals).     Mallampati Airway Assessment:  Mallampati Class  IV - (soft palate not visible)      ASA Classification:  Class 4 - A patient with an incapacitating systemic disease that is a constant threat to life    Sedation/ Anesthesia Plan:   intravenous sedation    Medications Planned:   midazolam (Versed) intravenously and fentanyl intravenously    Patient is an appropriate candidate for plan of sedation: yes    Electronically signed by Garth Jennings MD on 8/8/2018 at 8:02 PM

## 2018-08-10 ENCOUNTER — APPOINTMENT (OUTPATIENT)
Dept: GENERAL RADIOLOGY | Age: 83
DRG: 987 | End: 2018-08-10
Attending: INTERNAL MEDICINE
Payer: MEDICARE

## 2018-08-10 LAB
ANION GAP SERPL CALCULATED.3IONS-SCNC: 11 MMOL/L (ref 3–16)
BUN BLDV-MCNC: 12 MG/DL (ref 7–20)
CALCIUM SERPL-MCNC: 8.9 MG/DL (ref 8.3–10.6)
CHLORIDE BLD-SCNC: 97 MMOL/L (ref 99–110)
CO2: 36 MMOL/L (ref 21–32)
CREAT SERPL-MCNC: 0.7 MG/DL (ref 0.6–1.2)
GFR AFRICAN AMERICAN: >60
GFR NON-AFRICAN AMERICAN: >60
GLUCOSE BLD-MCNC: 102 MG/DL (ref 70–99)
GLUCOSE BLD-MCNC: 107 MG/DL (ref 70–99)
GLUCOSE BLD-MCNC: 121 MG/DL (ref 70–99)
GLUCOSE BLD-MCNC: 135 MG/DL (ref 70–99)
GLUCOSE BLD-MCNC: 97 MG/DL (ref 70–99)
PERFORMED ON: ABNORMAL
PERFORMED ON: NORMAL
POTASSIUM SERPL-SCNC: 3.6 MMOL/L (ref 3.5–5.1)
SODIUM BLD-SCNC: 144 MMOL/L (ref 136–145)

## 2018-08-10 PROCEDURE — 6370000000 HC RX 637 (ALT 250 FOR IP): Performed by: INTERNAL MEDICINE

## 2018-08-10 PROCEDURE — 99233 SBSQ HOSP IP/OBS HIGH 50: CPT | Performed by: INTERNAL MEDICINE

## 2018-08-10 PROCEDURE — 2700000000 HC OXYGEN THERAPY PER DAY

## 2018-08-10 PROCEDURE — 1200000000 HC SEMI PRIVATE

## 2018-08-10 PROCEDURE — 94761 N-INVAS EAR/PLS OXIMETRY MLT: CPT

## 2018-08-10 PROCEDURE — 2580000003 HC RX 258: Performed by: NURSE PRACTITIONER

## 2018-08-10 PROCEDURE — 36415 COLL VENOUS BLD VENIPUNCTURE: CPT

## 2018-08-10 PROCEDURE — 80048 BASIC METABOLIC PNL TOTAL CA: CPT

## 2018-08-10 PROCEDURE — 6360000002 HC RX W HCPCS: Performed by: NURSE PRACTITIONER

## 2018-08-10 PROCEDURE — 6360000002 HC RX W HCPCS: Performed by: INTERNAL MEDICINE

## 2018-08-10 PROCEDURE — 94640 AIRWAY INHALATION TREATMENT: CPT

## 2018-08-10 PROCEDURE — 71045 X-RAY EXAM CHEST 1 VIEW: CPT

## 2018-08-10 PROCEDURE — 2580000003 HC RX 258

## 2018-08-10 PROCEDURE — 6370000000 HC RX 637 (ALT 250 FOR IP): Performed by: NURSE PRACTITIONER

## 2018-08-10 PROCEDURE — 2580000003 HC RX 258: Performed by: INTERNAL MEDICINE

## 2018-08-10 PROCEDURE — 94664 DEMO&/EVAL PT USE INHALER: CPT

## 2018-08-10 RX ORDER — SODIUM CHLORIDE 9 MG/ML
INJECTION, SOLUTION INTRAVENOUS
Status: COMPLETED
Start: 2018-08-10 | End: 2018-08-10

## 2018-08-10 RX ADMIN — VANCOMYCIN HYDROCHLORIDE 1000 MG: 1 INJECTION, POWDER, LYOPHILIZED, FOR SOLUTION INTRAVENOUS at 23:32

## 2018-08-10 RX ADMIN — OXYBUTYNIN CHLORIDE 5 MG: 5 TABLET, FILM COATED, EXTENDED RELEASE ORAL at 08:53

## 2018-08-10 RX ADMIN — Medication 10 ML: at 21:01

## 2018-08-10 RX ADMIN — MEMANTINE 10 MG: 5 TABLET ORAL at 19:52

## 2018-08-10 RX ADMIN — Medication 10 ML: at 08:54

## 2018-08-10 RX ADMIN — SODIUM CHLORIDE: 9 INJECTION, SOLUTION INTRAVENOUS at 17:20

## 2018-08-10 RX ADMIN — VITAMIN D, TAB 1000IU (100/BT) 2000 UNITS: 25 TAB at 08:53

## 2018-08-10 RX ADMIN — Medication 1 TABLET: at 08:53

## 2018-08-10 RX ADMIN — FUROSEMIDE 20 MG: 40 TABLET ORAL at 08:53

## 2018-08-10 RX ADMIN — ALBUTEROL SULFATE 2.5 MG: 2.5 SOLUTION RESPIRATORY (INHALATION) at 16:50

## 2018-08-10 RX ADMIN — ENOXAPARIN SODIUM 100 MG: 100 INJECTION SUBCUTANEOUS at 19:53

## 2018-08-10 RX ADMIN — MEMANTINE 10 MG: 5 TABLET ORAL at 08:53

## 2018-08-10 RX ADMIN — MONTELUKAST SODIUM 10 MG: 10 TABLET, COATED ORAL at 08:53

## 2018-08-10 RX ADMIN — TRAMADOL HYDROCHLORIDE 50 MG: 50 TABLET, FILM COATED ORAL at 19:51

## 2018-08-10 RX ADMIN — ACETAMINOPHEN 650 MG: 325 TABLET, FILM COATED ORAL at 00:38

## 2018-08-10 RX ADMIN — TRAMADOL HYDROCHLORIDE 50 MG: 50 TABLET, FILM COATED ORAL at 12:25

## 2018-08-10 RX ADMIN — FUROSEMIDE 20 MG: 40 TABLET ORAL at 17:54

## 2018-08-10 RX ADMIN — OXCARBAZEPINE 150 MG: 150 TABLET ORAL at 19:52

## 2018-08-10 RX ADMIN — SIMVASTATIN 10 MG: 10 TABLET, FILM COATED ORAL at 19:52

## 2018-08-10 RX ADMIN — ENOXAPARIN SODIUM 100 MG: 100 INJECTION SUBCUTANEOUS at 08:53

## 2018-08-10 RX ADMIN — ALBUTEROL SULFATE 2.5 MG: 2.5 SOLUTION RESPIRATORY (INHALATION) at 19:27

## 2018-08-10 RX ADMIN — ALBUTEROL SULFATE 2.5 MG: 2.5 SOLUTION RESPIRATORY (INHALATION) at 07:59

## 2018-08-10 RX ADMIN — VANCOMYCIN HYDROCHLORIDE 1000 MG: 1 INJECTION, POWDER, LYOPHILIZED, FOR SOLUTION INTRAVENOUS at 13:08

## 2018-08-10 RX ADMIN — PIPERACILLIN SODIUM AND TAZOBACTAM SODIUM 3.38 G: 3; .375 INJECTION, POWDER, LYOPHILIZED, FOR SOLUTION INTRAVENOUS at 05:20

## 2018-08-10 RX ADMIN — LEVOTHYROXINE SODIUM 125 MCG: 125 TABLET ORAL at 05:20

## 2018-08-10 RX ADMIN — ALBUTEROL SULFATE 2.5 MG: 2.5 SOLUTION RESPIRATORY (INHALATION) at 12:12

## 2018-08-10 RX ADMIN — Medication 400 MG: at 19:53

## 2018-08-10 ASSESSMENT — PAIN SCALES - GENERAL
PAINLEVEL_OUTOF10: 7
PAINLEVEL_OUTOF10: 7
PAINLEVEL_OUTOF10: 4
PAINLEVEL_OUTOF10: 10

## 2018-08-10 NOTE — CONSULTS
Pharmacy to Dose Vancomycin    Dx: MRSA Bronch  Goal trough =  15-20 mcg/mL  ABW = 71.7  Estimated Creatinine Clearance: 71 mL/min (based on SCr of 0.7 mg/dL). History of kidney failure.     Vancomycin 1g IV q12 start @ 1200  Vancomycin trough prior to 3rd dose 8/11 @1130    Marvin Wiley, PharmD, [address] [employer] [phone]  [place] [phone]

## 2018-08-10 NOTE — PROGRESS NOTES
Occupational Therapy  OT follow up attempted, pt adamantly refusing even bed level treatment. Will continue to follow.    Lanny Flowers, [address] [facility],[address]

## 2018-08-10 NOTE — PROGRESS NOTES
without murmurs, rubs or gallops. Abdomen: Soft, non-tender, non-distended with normal bowel sounds. Musculoskeletal: No clubbing, cyanosis. Left knee edematous with limited ROM due to pain   Skin: scattered ecchymosis   Neurologic:  Neurovascularly intact without any focal sensory/motor deficits. Cranial nerves: II-XII intact, grossly non-focal.  Psychiatric: Alert and oriented, thought content inappropriate, limited insight due to dementia   Capillary Refill: Brisk,< 3 seconds   Peripheral Pulses: +2 palpable, equal bilaterally       Labs:   Recent Labs      08/07/18   1148  08/09/18   0948   WBC  10.1  9.8   HGB  10.4*  10.8*   HCT  31.8*  32.9*   PLT  210  229     Recent Labs      08/08/18   0850  08/09/18   0948  08/10/18   0832   NA  146*  144  144   K  4.1  3.6  3.6   CL  102  97*  97*   CO2  35*  36*  36*   BUN  27*  16  12   CREATININE  0.8  0.7  0.7   CALCIUM  8.7  8.8  8.9   PHOS  2.5   --    --      No results for input(s): AST, ALT, BILIDIR, BILITOT, ALKPHOS in the last 72 hours. No results for input(s): INR in the last 72 hours. No results for input(s): Elberon Albertina in the last 72 hours. Urinalysis:      Lab Results   Component Value Date    NITRU Negative 08/04/2018    WBCUA  08/04/2018    BACTERIA 1+ 08/04/2018    RBCUA 0-2 08/04/2018    BLOODU SMALL 08/04/2018    SPECGRAV 1.015 08/04/2018    GLUCOSEU Negative 08/04/2018       Radiology:  XR CHEST PORTABLE   Final Result   Worsening ground-glass opacification in the right mid and lower lung zone   with stable opacities at the left lung base. FL MODIFIED BARIUM SWALLOW W VIDEO   Final Result   Transient laryngeal penetration with nectar thick and thin liquids      Please see separate speech pathology report for full discussion of findings   and recommendations. CT HEAD WO CONTRAST   Final Result   No acute intracranial abnormality.          XR CHEST STANDARD (2 VW)   Final Result   Pleural effusions which appear new, worse on the left. Worsening bibasilar   opacities. US RENAL COMPLETE   Final Result   No hydronephrosis. XR CHEST PORTABLE   Final Result   Bilateral pneumonia. Pleural effusions which appear new, worse on the left.  Worsening bibasilar   opacities. Cardiac echo :   Normal LV systolic function with EF of 55-60%.  Asynchronous septal motion noted.   D-shaped septum consistent with RV pressure and volume overload.   Mild concentric left ventricular hypertrophy.   Evidence for type III diastolic dysfunction.   Mild mitral annular calcification.   Mild biatrial enlargement.   The right ventricle is mildly enlarged.   Mild mitral, aortic, and tricuspid regurgitation.   Systolic pulmonic artery pressure (SPAP) is normal estimated at 32mmHg   (Right atrial pressure of 3mmHg). Assessment/Plan:    Active Hospital Problems    Diagnosis Date Noted    Persistent atrial fibrillation ([facility] [address] [I48.1] 08/06/2018     Priority: High    AV block [I44.30] 08/06/2018     Priority: High    Acute respiratory failure with hypoxia (HCC) [J96.01] 08/07/2018    Pulmonary infiltrates [R91.8] 70/03/0867    Diastolic dysfunction [I38.0] 08/07/2018    Pleural effusion, bilateral [J90] 08/07/2018    Morbid obesity with BMI of 40.0-44.9, adult ([facility] [address] [E66.01, Z68.41] 08/07/2018    Fall [W19. XXXA] 08/07/2018    Myonecrosis [M62.89] 08/07/2018    Atelectasis [J98.11] 08/07/2018    Ineffective airway clearance [R06.89] 08/07/2018    Essential hypertension [I10]     Hematemesis [K92.0] 08/03/2018       Hematemesis  - upper GI bleed clinically suspected on admission but likely Oropharyngeal source after EGD 4 August w/out GI souce identified, in the setting of chronic anticoagulation w/ Eliquis - held. Protonix gtt initially, changed to PO PPI. No repeat hematemesis for days. Resolved     Anemia - acute oropharyngeal blood loss anemia w/out evidence of active bleeding/hemolysis.  Asymptomatic w/out

## 2018-08-10 NOTE — PROGRESS NOTES
Progress Note    HISTORY     CC: GI bleed         Subjective/   HPI:  Mental status slowly improving. Kidney function is improving. Appetite improving. Sob better.     ROS:  Constitutional:  No fevers, No Chills  Cardiovascular:  No palpations  Respiratory:  No wheezing, no cough  Skin:  No rash, no itching  :  No hematuria, no dysuria     Social Hx:    - Family at bedside     Past Medical and Surgical History:  - Reviewed, no changes     EXAM       Objective/     Vitals:    08/09/18 2030 08/10/18 0038 08/10/18 0409 08/10/18 0515   BP: (!) 151/62 136/72  (!) 146/91   Pulse: 96 110  103   Resp: 18 18 18   Temp: 98.6 °F (37 °C)   98.4 °F (36.9 °C)   TempSrc: Axillary   Axillary   SpO2: 94% 94%  93%   Weight:   229 lb 4.8 oz (104 kg)    Height:         24HR INTAKE/OUTPUT:      Intake/Output Summary (Last 24 hours) at 08/10/18 0743  Last data filed at 08/10/18 0963   Gross per 24 hour   Intake              840 ml   Output             2250 ml   Net            -1410 ml     General:  NAD, ill-appearing  Neck:  Supple, no mass   Chest:  Bilateral rhonchi, good respiratory effort, good air movement  CV:  Regular, no rub   Abdomen:  NTND, soft, +BS, no hepatosplenomegaly  Extremities:  No peripheral edema  Neurological:  Moving all four extremities, CN II-XII grossly intact  Lymphatics:  No palpable lymph nodes  Skin:  No rash, no jaundice  Psychiatric:  Somnolent, confused  :  Lua in place        MEDICAL DECISION MAKING       Data/  Recent Labs      08/07/18   1148  08/09/18   0948   WBC  10.1  9.8   HGB  10.4*  10.8*   HCT  31.8*  32.9*   MCV  91.5  91.4   PLT  210  229     Recent Labs      08/07/18   1148  08/08/18   0850  08/09/18   0948   NA  144  146*  144   K  4.1  4.1  3.6   CL  101  102  97*   CO2  32  35*  36*   GLUCOSE  120*  107*  117*   PHOS   --   2.5   --    BUN  47*  27*  16   CREATININE  1.1  0.8  0.7   LABGLOM  48*  >60  >60   GFRAA  58*  >60  >60       Assessment/     Renal Failure: Unknown baseline but daughter does not think she has had CKD in the past.  - Lua placed. U Na < 20. Ultrasound normal echogenicity and no hydronephrosis. UA with UTI  - Clinical:  Improved with volume      GI Bleed:  EGD did not show any bleeding. Likely oropharyngeal in etiology. Had been on eliquis      Dementia:  Worsened from baseline. UTI ? Per report had CT scan of head at outside hospital but did have head injury while on Eliquis. Also could be metabolic with renal failure.       Anemia:  Due to acute blood loss.       Acute respiratory failure: PNA +/- Pulmonary edema    Plan/     Monitor off of IVF's, resumed home dose of lasix 20 mg po bid  Follow labs

## 2018-08-11 LAB
ANION GAP SERPL CALCULATED.3IONS-SCNC: 10 MMOL/L (ref 3–16)
BUN BLDV-MCNC: 14 MG/DL (ref 7–20)
CALCIUM SERPL-MCNC: 8.3 MG/DL (ref 8.3–10.6)
CHLORIDE BLD-SCNC: 97 MMOL/L (ref 99–110)
CO2: 35 MMOL/L (ref 21–32)
CREAT SERPL-MCNC: 0.8 MG/DL (ref 0.6–1.2)
CULTURE, RESPIRATORY: ABNORMAL
CULTURE, RESPIRATORY: ABNORMAL
GFR AFRICAN AMERICAN: >60
GFR NON-AFRICAN AMERICAN: >60
GLUCOSE BLD-MCNC: 105 MG/DL (ref 70–99)
GLUCOSE BLD-MCNC: 106 MG/DL (ref 70–99)
GLUCOSE BLD-MCNC: 115 MG/DL (ref 70–99)
GLUCOSE BLD-MCNC: 131 MG/DL (ref 70–99)
GLUCOSE BLD-MCNC: 87 MG/DL (ref 70–99)
GLUCOSE BLD-MCNC: 88 MG/DL (ref 70–99)
GLUCOSE BLD-MCNC: 91 MG/DL (ref 70–99)
GRAM STAIN RESULT: ABNORMAL
ORGANISM: ABNORMAL
PERFORMED ON: ABNORMAL
PERFORMED ON: NORMAL
PERFORMED ON: NORMAL
POTASSIUM SERPL-SCNC: 3.2 MMOL/L (ref 3.5–5.1)
SODIUM BLD-SCNC: 142 MMOL/L (ref 136–145)
VANCOMYCIN TROUGH: 15.7 UG/ML (ref 10–20)

## 2018-08-11 PROCEDURE — 2580000003 HC RX 258: Performed by: NURSE PRACTITIONER

## 2018-08-11 PROCEDURE — 1200000000 HC SEMI PRIVATE

## 2018-08-11 PROCEDURE — 80202 ASSAY OF VANCOMYCIN: CPT

## 2018-08-11 PROCEDURE — 2700000000 HC OXYGEN THERAPY PER DAY

## 2018-08-11 PROCEDURE — 94761 N-INVAS EAR/PLS OXIMETRY MLT: CPT

## 2018-08-11 PROCEDURE — 6370000000 HC RX 637 (ALT 250 FOR IP): Performed by: INTERNAL MEDICINE

## 2018-08-11 PROCEDURE — 92526 ORAL FUNCTION THERAPY: CPT

## 2018-08-11 PROCEDURE — 36415 COLL VENOUS BLD VENIPUNCTURE: CPT

## 2018-08-11 PROCEDURE — 94664 DEMO&/EVAL PT USE INHALER: CPT

## 2018-08-11 PROCEDURE — 94640 AIRWAY INHALATION TREATMENT: CPT

## 2018-08-11 PROCEDURE — 80048 BASIC METABOLIC PNL TOTAL CA: CPT

## 2018-08-11 PROCEDURE — 6360000002 HC RX W HCPCS: Performed by: INTERNAL MEDICINE

## 2018-08-11 PROCEDURE — 99232 SBSQ HOSP IP/OBS MODERATE 35: CPT | Performed by: INTERNAL MEDICINE

## 2018-08-11 PROCEDURE — 6370000000 HC RX 637 (ALT 250 FOR IP): Performed by: NURSE PRACTITIONER

## 2018-08-11 PROCEDURE — 6360000002 HC RX W HCPCS: Performed by: NURSE PRACTITIONER

## 2018-08-11 RX ORDER — DOXYCYCLINE HYCLATE 100 MG
100 TABLET ORAL EVERY 12 HOURS SCHEDULED
Status: DISCONTINUED | OUTPATIENT
Start: 2018-08-11 | End: 2018-08-16 | Stop reason: HOSPADM

## 2018-08-11 RX ORDER — SPIRONOLACTONE 25 MG/1
25 TABLET ORAL DAILY
Status: DISCONTINUED | OUTPATIENT
Start: 2018-08-11 | End: 2018-08-12

## 2018-08-11 RX ORDER — POTASSIUM CHLORIDE 20 MEQ/1
40 TABLET, EXTENDED RELEASE ORAL ONCE
Status: COMPLETED | OUTPATIENT
Start: 2018-08-11 | End: 2018-08-11

## 2018-08-11 RX ORDER — ALBUTEROL SULFATE 2.5 MG/3ML
2.5 SOLUTION RESPIRATORY (INHALATION) EVERY 4 HOURS PRN
Status: DISCONTINUED | OUTPATIENT
Start: 2018-08-11 | End: 2018-08-16 | Stop reason: HOSPADM

## 2018-08-11 RX ADMIN — SIMVASTATIN 10 MG: 10 TABLET, FILM COATED ORAL at 21:05

## 2018-08-11 RX ADMIN — POTASSIUM CHLORIDE 40 MEQ: 1500 TABLET, EXTENDED RELEASE ORAL at 10:52

## 2018-08-11 RX ADMIN — SENNOSIDES 17.2 MG: 8.6 TABLET, FILM COATED ORAL at 10:52

## 2018-08-11 RX ADMIN — ALBUTEROL SULFATE 2.5 MG: 2.5 SOLUTION RESPIRATORY (INHALATION) at 12:01

## 2018-08-11 RX ADMIN — OXYBUTYNIN CHLORIDE 5 MG: 5 TABLET, FILM COATED, EXTENDED RELEASE ORAL at 10:52

## 2018-08-11 RX ADMIN — MEMANTINE 10 MG: 5 TABLET ORAL at 10:51

## 2018-08-11 RX ADMIN — DOXYCYCLINE HYCLATE 100 MG: 100 TABLET, COATED ORAL at 21:05

## 2018-08-11 RX ADMIN — SPIRONOLACTONE 25 MG: 25 TABLET ORAL at 14:03

## 2018-08-11 RX ADMIN — Medication 10 ML: at 10:53

## 2018-08-11 RX ADMIN — FUROSEMIDE 20 MG: 40 TABLET ORAL at 21:05

## 2018-08-11 RX ADMIN — Medication 400 MG: at 10:52

## 2018-08-11 RX ADMIN — PANTOPRAZOLE SODIUM 40 MG: 40 TABLET, DELAYED RELEASE ORAL at 05:58

## 2018-08-11 RX ADMIN — ALBUTEROL SULFATE 2.5 MG: 2.5 SOLUTION RESPIRATORY (INHALATION) at 20:03

## 2018-08-11 RX ADMIN — ENOXAPARIN SODIUM 100 MG: 100 INJECTION SUBCUTANEOUS at 21:06

## 2018-08-11 RX ADMIN — FUROSEMIDE 20 MG: 40 TABLET ORAL at 10:52

## 2018-08-11 RX ADMIN — LEVOTHYROXINE SODIUM 125 MCG: 125 TABLET ORAL at 05:58

## 2018-08-11 RX ADMIN — Medication 1 TABLET: at 10:52

## 2018-08-11 RX ADMIN — TRAMADOL HYDROCHLORIDE 50 MG: 50 TABLET, FILM COATED ORAL at 22:24

## 2018-08-11 RX ADMIN — OXCARBAZEPINE 150 MG: 150 TABLET ORAL at 10:52

## 2018-08-11 RX ADMIN — ALBUTEROL SULFATE 2.5 MG: 2.5 SOLUTION RESPIRATORY (INHALATION) at 09:06

## 2018-08-11 RX ADMIN — Medication 10 ML: at 21:09

## 2018-08-11 RX ADMIN — MEMANTINE 10 MG: 5 TABLET ORAL at 21:05

## 2018-08-11 RX ADMIN — ACETAMINOPHEN 650 MG: 325 TABLET, FILM COATED ORAL at 14:03

## 2018-08-11 RX ADMIN — Medication 400 MG: at 21:05

## 2018-08-11 RX ADMIN — ENOXAPARIN SODIUM 100 MG: 100 INJECTION SUBCUTANEOUS at 10:53

## 2018-08-11 RX ADMIN — OXCARBAZEPINE 150 MG: 150 TABLET ORAL at 21:05

## 2018-08-11 RX ADMIN — VITAMIN D, TAB 1000IU (100/BT) 2000 UNITS: 25 TAB at 10:52

## 2018-08-11 RX ADMIN — DOXYCYCLINE HYCLATE 100 MG: 100 TABLET, COATED ORAL at 10:52

## 2018-08-11 RX ADMIN — ALBUTEROL SULFATE 2.5 MG: 2.5 SOLUTION RESPIRATORY (INHALATION) at 15:58

## 2018-08-11 RX ADMIN — MONTELUKAST SODIUM 10 MG: 10 TABLET, COATED ORAL at 10:52

## 2018-08-11 ASSESSMENT — PAIN SCALES - GENERAL
PAINLEVEL_OUTOF10: 0
PAINLEVEL_OUTOF10: 7
PAINLEVEL_OUTOF10: 0
PAINLEVEL_OUTOF10: 3
PAINLEVEL_OUTOF10: 0
PAINLEVEL_OUTOF10: 0

## 2018-08-11 NOTE — PROGRESS NOTES
Pulmonary & Critical Care Inpatient Progress Note   Josué Moss MD     REASON FOR TODAY'S VISIT:  Acute resp failure    SUBJECTIVE:   Remains on supplemental oxygen, had to increase to 3 LPM today  Very weak cough    Scheduled Meds:   doxycycline hyclate  100 mg Oral 2 times per day    spironolactone  25 mg Oral Daily    furosemide  20 mg Oral BID    enoxaparin  1 mg/kg Subcutaneous BID    levothyroxine  125 mcg Oral Daily    albuterol  2.5 mg Nebulization Q4H WA    pantoprazole  40 mg Oral QAM AC    atropine  1 mg Intravenous Once    sodium chloride flush  10 mL Intravenous 2 times per day    vitamin D  2,000 Units Oral Daily    magnesium oxide  400 mg Oral BID    memantine  10 mg Oral BID    montelukast  10 mg Oral Daily    therapeutic multivitamin-minerals  1 tablet Oral Daily    OXcarbazepine  150 mg Oral BID    oxybutynin  5 mg Oral Daily    senna  2 tablet Oral Daily    simvastatin  10 mg Oral Nightly    insulin lispro  0-6 Units Subcutaneous TID WC    insulin lispro  0-3 Units Subcutaneous Nightly       Continuous Infusions:   dextrose         PRN Meds:  albuterol, traMADol, sodium chloride flush, acetaminophen, ondansetron, glucose, dextrose, glucagon (rDNA), dextrose    ALLERGIES:  Patient is allergic to codeine and sulfa antibiotics. Objective:   PHYSICAL EXAM:  BP (!) 156/68   Pulse 100   Temp 98 °F (36.7 °C) (Oral)   Resp (!) 38   Ht 5' 2\" (1.575 m)   Wt 220 lb 10.9 oz (100.1 kg)   SpO2 90%   BMI 40.36 kg/m²    Physical Exam   Constitutional: She appears well-developed and well-nourished. No distress. HENT:   Head: Normocephalic and atraumatic. Mouth/Throat: Oropharynx is clear and moist. No oropharyngeal exudate. Eyes: EOM are normal. Pupils are equal, round, and reactive to light. Neck: Neck supple. No JVD present. Cardiovascular: Normal heart sounds. Exam reveals no gallop and no friction rub. No murmur heard.   Pulmonary/Chest: Effort normal. She has no continue supplemental oxygen moving forward with gradual wean as pneumonia improves.      Stefania Mabry MD

## 2018-08-11 NOTE — PLAN OF CARE
Problem: Falls - Risk of:  Goal: Will remain free from falls  Will remain free from falls   Patient remains free from falls and injuries. Patient is compliant with calling out for staff assistance but uses a hoyler lift to get out of bed.   Michelle Stone

## 2018-08-11 NOTE — PROGRESS NOTES
Hospitalist Progress Note      PCP: No primary care provider on file. Date of Admission: 8/4/2018    Chief Complaint:  Bloody emesis    Subjective:    No acute issues overnight . Pt reports SOB and cough improving. Denied any fever or chills       Medications:  Reviewed    Infusion Medications    dextrose       Scheduled Medications    potassium chloride  40 mEq Oral Once    vancomycin  1,000 mg Intravenous Q12H    furosemide  20 mg Oral BID    enoxaparin  1 mg/kg Subcutaneous BID    levothyroxine  125 mcg Oral Daily    albuterol  2.5 mg Nebulization Q4H WA    pantoprazole  40 mg Oral QAM AC    atropine  1 mg Intravenous Once    sodium chloride flush  10 mL Intravenous 2 times per day    vitamin D  2,000 Units Oral Daily    magnesium oxide  400 mg Oral BID    memantine  10 mg Oral BID    montelukast  10 mg Oral Daily    therapeutic multivitamin-minerals  1 tablet Oral Daily    OXcarbazepine  150 mg Oral BID    oxybutynin  5 mg Oral Daily    senna  2 tablet Oral Daily    simvastatin  10 mg Oral Nightly    insulin lispro  0-6 Units Subcutaneous TID WC    insulin lispro  0-3 Units Subcutaneous Nightly     PRN Meds: traMADol, sodium chloride flush, acetaminophen, ondansetron, glucose, dextrose, glucagon (rDNA), dextrose      Intake/Output Summary (Last 24 hours) at 08/11/18 1016  Last data filed at 08/11/18 1011   Gross per 24 hour   Intake              720 ml   Output              825 ml   Net             -105 ml       Physical Exam Performed:    /72   Pulse 93   Temp 97.5 °F (36.4 °C) (Oral)   Resp 20   Ht 5' 2\" (1.575 m)   Wt 220 lb 10.9 oz (100.1 kg)   SpO2 95%   BMI 40.36 kg/m²     General appearance: alert . No apparent distress, appears stated age and cooperative. HEENT: Pupils equal, round. Conjunctivae/corneas clear. Neck: Supple, with full range of motion. No jugular venous distention. Trachea midline. Respiratory:  Normal respiratory effort.  Bibasilar  rales on the left. Worsening bibasilar   opacities. US RENAL COMPLETE   Final Result   No hydronephrosis. XR CHEST PORTABLE   Final Result   Bilateral pneumonia. Pleural effusions which appear new, worse on the left.  Worsening bibasilar   opacities. Cardiac echo :   Normal LV systolic function with EF of 55-60%.  Asynchronous septal motion noted.   D-shaped septum consistent with RV pressure and volume overload.   Mild concentric left ventricular hypertrophy.   Evidence for type III diastolic dysfunction.   Mild mitral annular calcification.   Mild biatrial enlargement.   The right ventricle is mildly enlarged.   Mild mitral, aortic, and tricuspid regurgitation.   Systolic pulmonic artery pressure (SPAP) is normal estimated at 32mmHg   (Right atrial pressure of 3mmHg). Assessment/Plan:    Active Hospital Problems    Diagnosis Date Noted    Persistent atrial fibrillation ([facility] [address] [I48.1] 08/06/2018     Priority: High    AV block [I44.30] 08/06/2018     Priority: High    Acute respiratory failure with hypoxia (HCC) [J96.01] 08/07/2018    Pulmonary infiltrates [R91.8] 28/63/2752    Diastolic dysfunction [N04.4] 08/07/2018    Pleural effusion, bilateral [J90] 08/07/2018    Morbid obesity with BMI of 40.0-44.9, adult ([facility] [address] [E66.01, Z68.41] 08/07/2018    Fall [W19. XXXA] 08/07/2018    Myonecrosis [M62.89] 08/07/2018    Atelectasis [J98.11] 08/07/2018    Ineffective airway clearance [R06.89] 08/07/2018    Essential hypertension [I10]     Hematemesis [K92.0] 08/03/2018       Hematemesis  - upper GI bleed clinically suspected on admission but likely Oropharyngeal source after EGD 4 August w/out GI souce identified, in the setting of chronic anticoagulation w/ Eliquis - held. Protonix gtt initially, changed to PO PPI. No repeat hematemesis for days. Resolved     Anemia - acute oropharyngeal blood loss anemia w/out evidence of active bleeding/hemolysis.  Asymptomatic w/out indication pneumonia. CT head was non acute. Mentation has improved with supportive care and seems at baseline      Dementia - controlled on home Namenda - continued.       Multiple bruising in the setting of anticoagulation and frequent falls. Holding Eliquis for reasons above. Close monitoring with lovenox started     Left knee pain : s/p fall. X ray at outside hospital showed moderate bicompartmental osteoarthritis changes with no acute abn/ fracture. pain control as needed. DVT Prophylaxis: lovenox       Diet: Dietary Nutrition Supplements: Frozen Oral Supplement  DIET DYSPHAGIA I PUREED; Dysphagia I Pureed; Nectar Thick; No Drinking Straw  Code Status: Limited      PT/OT Eval Status: rec SNF .  SW assisting      Dispo -  Medically stable for d/c per specialists when SNF arrangement completed by TERE Arroyo MD

## 2018-08-11 NOTE — PROGRESS NOTES
Progress Note    HISTORY     CC: GI bleed         Subjective/   HPI:  Mental status slowly improving. Kidney function is improving. Appetite improving. Sob better. ROS:  Constitutional:  No fevers, No Chills  Cardiovascular:  No palpations  Respiratory:  No wheezing, no cough  Skin:  No rash, no itching  :  No hematuria, no dysuria     Social Hx:    - Family at bedside     Past Medical and Surgical History:  - Reviewed, no changes     EXAM       Objective/     Vitals:    08/11/18 0005 08/11/18 0621 08/11/18 0908 08/11/18 1024   BP: 135/72   (!) 159/79   Pulse: 93   87   Resp: 20      Temp: 97.5 °F (36.4 °C)   97.4 °F (36.3 °C)   TempSrc: Oral   Oral   SpO2: 93%  95% 95%   Weight:  220 lb 10.9 oz (100.1 kg)     Height:         24HR INTAKE/OUTPUT:      Intake/Output Summary (Last 24 hours) at 08/11/18 1225  Last data filed at 08/11/18 1011   Gross per 24 hour   Intake              720 ml   Output              825 ml   Net             -105 ml     General:  NAD, ill-appearing  Neck:  Supple, no mass   Chest:  Bilateral rhonchi, good respiratory effort, good air movement  CV:  Regular, no rub   Abdomen:  NTND, soft, +BS, no hepatosplenomegaly  Extremities:  No peripheral edema  Neurological:  Moving all four extremities, CN II-XII grossly intact  Lymphatics:  No palpable lymph nodes  Skin:  No rash, no jaundice  Psychiatric:  Somnolent, confused  :  Lua in place        MEDICAL DECISION MAKING       Data/  Recent Labs      08/09/18   0948   WBC  9.8   HGB  10.8*   HCT  32.9*   MCV  91.4   PLT  229     Recent Labs      08/09/18   0948  08/10/18   0832  08/11/18   0655   NA  144  144  142   K  3.6  3.6  3.2*   CL  97*  97*  97*   CO2  36*  36*  35*   GLUCOSE  117*  107*  88   BUN  16  12  14   CREATININE  0.7  0.7  0.8   LABGLOM  >60  >60  >60   GFRAA  >60  >60  >60       Assessment/     Renal Failure:  Unknown baseline but daughter does not think she has had CKD in the past.  - Lua placed.   U Na < 20.  Ultrasound normal echogenicity and no hydronephrosis. UA with UTI  - Clinical:  Improved with volume      GI Bleed:  EGD did not show any bleeding. Likely oropharyngeal in etiology. Had been on eliquis      Dementia:  Worsened from baseline. UTI ? Per report had CT scan of head at outside hospital but did have head injury while on Eliquis. Also could be metabolic with renal failure.       Anemia:  Due to acute blood loss.       Acute respiratory failure: PNA +/- Pulmonary edema    Plan/     Resumed home dose of lasix 20 mg po bid, resume aldactone 25 mg a day (half of home dose)  Follow labs, prn K replacement

## 2018-08-11 NOTE — PROGRESS NOTES
Patient's EF (Ejection Fraction) is greater than 40%    Patient has a past medical history of Alzheimer's dementia; Asthma; Atrial fibrillation ([facility] [address] Blood clot in vein; CHF (congestive heart failure) ([facility] [address] Dementia; GERD (gastroesophageal reflux disease); Hypertension; Kidney failure; and Osteoporosis. Comorbidities reviewed and education provided. Patient and family's stated goal of care: reduce shortness of breath prior to discharge    Patient's current functional capacity:  Marked limitation of physical activity. Comfortable at rest. Less than ordinary activity causes fatigue, palpitation, or dyspnea. Pt resting in bed at this time on 3 L O2. Pt with complaints of shortness of breath. Pt with nonpitting lower extremity edema. Patient's weights and intake/output reviewed:    Patient Vitals for the past 96 hrs (Last 3 readings):   Weight   08/11/18 0621 220 lb 10.9 oz (100.1 kg)   08/10/18 0409 229 lb 4.8 oz (104 kg)   08/09/18 0335 228 lb 9.6 oz (103.7 kg)       Intake/Output Summary (Last 24 hours) at 08/11/18 1906  Last data filed at 08/11/18 1703   Gross per 24 hour   Intake              720 ml   Output              350 ml   Net              370 ml       Patient provided with education on CHF signs/symptoms, causes, discharge medications, daily weights, low sodium diet, activity, and follow-up. Notified patient to call the doctor post discharge if patient experiences shortness of breath, chest pain, swelling, cough, or weight gain of three pounds in a day/five pounds in a week. Also notified patient to call the doctor with dizziness, increased fatigue, decreased or difficulty urinating. Pt education needs reinforcement. No additional questions at this time.     Education Time: 10 Minutes

## 2018-08-11 NOTE — PROGRESS NOTES
Patient's EF (Ejection Fraction) is greater than 40%    Patient has a past medical history of Alzheimer's dementia; Asthma; Atrial fibrillation ([facility] [address] Blood clot in vein; CHF (congestive heart failure) ([facility] [address] Dementia; GERD (gastroesophageal reflux disease); Hypertension; Kidney failure; and Osteoporosis. Comorbidities reviewed and education provided. Patient and family's stated goal of care: reduce shortness of breath prior to discharge    Patient's current functional capacity:  Unable to carry on any physical activity without discomfort. Symptoms of heart failure at rest.    Pt resting in bed at this time on 2 L O2 . Pt without lower extremity edema. Patient's weights and intake/output reviewed:    Patient Vitals for the past 96 hrs (Last 3 readings):   Weight   08/10/18 0409 229 lb 4.8 oz (104 kg)   08/09/18 0335 228 lb 9.6 oz (103.7 kg)   08/08/18 0317 229 lb (103.9 kg)       Intake/Output Summary (Last 24 hours) at 08/11/18 0252  Last data filed at 08/10/18 2200   Gross per 24 hour   Intake              660 ml   Output              925 ml   Net             -265 ml       Patient provided with education on CHF signs/symptoms, causes, discharge medications, daily weights, low sodium diet, activity, and follow-up. Notified patient to call the doctor post discharge if patient experiences shortness of breath, chest pain, swelling, cough, or weight gain of three pounds in a day/five pounds in a week. Also notified patient to call the doctor with dizziness, increased fatigue, decreased or difficulty urinating. Pt education needs reinforcement. No additional questions at this time.     Education Time: 10 Minutes

## 2018-08-12 LAB
ANION GAP SERPL CALCULATED.3IONS-SCNC: 9 MMOL/L (ref 3–16)
BUN BLDV-MCNC: 21 MG/DL (ref 7–20)
CALCIUM SERPL-MCNC: 8.5 MG/DL (ref 8.3–10.6)
CHLORIDE BLD-SCNC: 98 MMOL/L (ref 99–110)
CO2: 34 MMOL/L (ref 21–32)
CREAT SERPL-MCNC: 1.7 MG/DL (ref 0.6–1.2)
GFR AFRICAN AMERICAN: 35
GFR NON-AFRICAN AMERICAN: 29
GLUCOSE BLD-MCNC: 103 MG/DL (ref 70–99)
GLUCOSE BLD-MCNC: 111 MG/DL (ref 70–99)
GLUCOSE BLD-MCNC: 123 MG/DL (ref 70–99)
GLUCOSE BLD-MCNC: 143 MG/DL (ref 70–99)
GLUCOSE BLD-MCNC: 95 MG/DL (ref 70–99)
GLUCOSE BLD-MCNC: 98 MG/DL (ref 70–99)
PERFORMED ON: ABNORMAL
PERFORMED ON: NORMAL
PERFORMED ON: NORMAL
POTASSIUM SERPL-SCNC: 3.6 MMOL/L (ref 3.5–5.1)
SODIUM BLD-SCNC: 141 MMOL/L (ref 136–145)

## 2018-08-12 PROCEDURE — 6370000000 HC RX 637 (ALT 250 FOR IP): Performed by: NURSE PRACTITIONER

## 2018-08-12 PROCEDURE — 94640 AIRWAY INHALATION TREATMENT: CPT

## 2018-08-12 PROCEDURE — 80048 BASIC METABOLIC PNL TOTAL CA: CPT

## 2018-08-12 PROCEDURE — 2580000003 HC RX 258: Performed by: NURSE PRACTITIONER

## 2018-08-12 PROCEDURE — 6370000000 HC RX 637 (ALT 250 FOR IP): Performed by: INTERNAL MEDICINE

## 2018-08-12 PROCEDURE — 36415 COLL VENOUS BLD VENIPUNCTURE: CPT

## 2018-08-12 PROCEDURE — 6370000000 HC RX 637 (ALT 250 FOR IP)

## 2018-08-12 PROCEDURE — 94761 N-INVAS EAR/PLS OXIMETRY MLT: CPT

## 2018-08-12 PROCEDURE — 2580000003 HC RX 258: Performed by: INTERNAL MEDICINE

## 2018-08-12 PROCEDURE — 1200000000 HC SEMI PRIVATE

## 2018-08-12 PROCEDURE — 99232 SBSQ HOSP IP/OBS MODERATE 35: CPT | Performed by: INTERNAL MEDICINE

## 2018-08-12 PROCEDURE — 94669 MECHANICAL CHEST WALL OSCILL: CPT

## 2018-08-12 PROCEDURE — 2700000000 HC OXYGEN THERAPY PER DAY

## 2018-08-12 PROCEDURE — 6360000002 HC RX W HCPCS: Performed by: INTERNAL MEDICINE

## 2018-08-12 RX ORDER — SODIUM CHLORIDE 9 MG/ML
INJECTION, SOLUTION INTRAVENOUS CONTINUOUS
Status: DISPENSED | OUTPATIENT
Start: 2018-08-12 | End: 2018-08-12

## 2018-08-12 RX ADMIN — ACETAMINOPHEN 650 MG: 325 TABLET, FILM COATED ORAL at 10:29

## 2018-08-12 RX ADMIN — SODIUM CHLORIDE: 9 INJECTION, SOLUTION INTRAVENOUS at 17:25

## 2018-08-12 RX ADMIN — OXCARBAZEPINE 150 MG: 150 TABLET ORAL at 10:27

## 2018-08-12 RX ADMIN — SENNOSIDES 17.2 MG: 8.6 TABLET, FILM COATED ORAL at 10:29

## 2018-08-12 RX ADMIN — INSULIN LISPRO 1 UNITS: 100 INJECTION, SOLUTION INTRAVENOUS; SUBCUTANEOUS at 22:30

## 2018-08-12 RX ADMIN — VITAMIN D, TAB 1000IU (100/BT) 2000 UNITS: 25 TAB at 10:31

## 2018-08-12 RX ADMIN — FUROSEMIDE 20 MG: 40 TABLET ORAL at 10:31

## 2018-08-12 RX ADMIN — Medication 400 MG: at 10:28

## 2018-08-12 RX ADMIN — MEMANTINE 10 MG: 5 TABLET ORAL at 10:30

## 2018-08-12 RX ADMIN — DOXYCYCLINE HYCLATE 100 MG: 100 TABLET, COATED ORAL at 10:29

## 2018-08-12 RX ADMIN — SIMVASTATIN 10 MG: 10 TABLET, FILM COATED ORAL at 22:22

## 2018-08-12 RX ADMIN — ALBUTEROL SULFATE 2.5 MG: 2.5 SOLUTION RESPIRATORY (INHALATION) at 08:12

## 2018-08-12 RX ADMIN — SPIRONOLACTONE 25 MG: 25 TABLET ORAL at 10:28

## 2018-08-12 RX ADMIN — ALBUTEROL SULFATE 2.5 MG: 2.5 SOLUTION RESPIRATORY (INHALATION) at 11:59

## 2018-08-12 RX ADMIN — Medication 400 MG: at 22:23

## 2018-08-12 RX ADMIN — APIXABAN 2.5 MG: 5 TABLET, FILM COATED ORAL at 10:31

## 2018-08-12 RX ADMIN — TRAMADOL HYDROCHLORIDE 50 MG: 50 TABLET, FILM COATED ORAL at 22:23

## 2018-08-12 RX ADMIN — OXYBUTYNIN CHLORIDE 5 MG: 5 TABLET, FILM COATED, EXTENDED RELEASE ORAL at 10:28

## 2018-08-12 RX ADMIN — Medication 10 ML: at 10:30

## 2018-08-12 RX ADMIN — LEVOTHYROXINE SODIUM 125 MCG: 125 TABLET ORAL at 06:58

## 2018-08-12 RX ADMIN — MEMANTINE 10 MG: 5 TABLET ORAL at 22:23

## 2018-08-12 RX ADMIN — OXCARBAZEPINE 150 MG: 150 TABLET ORAL at 22:23

## 2018-08-12 RX ADMIN — ALBUTEROL SULFATE 2.5 MG: 2.5 SOLUTION RESPIRATORY (INHALATION) at 19:57

## 2018-08-12 RX ADMIN — APIXABAN 2.5 MG: 5 TABLET, FILM COATED ORAL at 22:23

## 2018-08-12 RX ADMIN — PANTOPRAZOLE SODIUM 40 MG: 40 TABLET, DELAYED RELEASE ORAL at 06:58

## 2018-08-12 RX ADMIN — DOXYCYCLINE HYCLATE 100 MG: 100 TABLET, COATED ORAL at 22:24

## 2018-08-12 RX ADMIN — MONTELUKAST SODIUM 10 MG: 10 TABLET, COATED ORAL at 10:32

## 2018-08-12 RX ADMIN — Medication 1 TABLET: at 10:30

## 2018-08-12 RX ADMIN — ALBUTEROL SULFATE 2.5 MG: 2.5 SOLUTION RESPIRATORY (INHALATION) at 16:01

## 2018-08-12 ASSESSMENT — PAIN SCALES - GENERAL
PAINLEVEL_OUTOF10: 0
PAINLEVEL_OUTOF10: 8
PAINLEVEL_OUTOF10: 9

## 2018-08-12 ASSESSMENT — PAIN DESCRIPTION - LOCATION: LOCATION: HEAD

## 2018-08-12 ASSESSMENT — PAIN DESCRIPTION - DESCRIPTORS: DESCRIPTORS: HEADACHE

## 2018-08-12 NOTE — PROGRESS NOTES
rales.   Equal chest rise and expansion bilaterally   Abdominal: Soft. Bowel sounds are normal. She exhibits no distension. There is no tenderness. Musculoskeletal: Normal range of motion. She exhibits no edema. Lymphadenopathy:     She has no cervical adenopathy. Neurological: She is alert. No cranial nerve deficit. CN 2-12 grossly intact   Skin: Skin is dry. She is not diaphoretic.   echymossis           Data Reviewed:   LABS:  CBC:   No results for input(s): WBC, HGB, HCT, MCV, PLT in the last 72 hours. BMP:   Recent Labs      08/10/18   0832  08/11/18   0655  08/12/18   0719   NA  144  142  141   K  3.6  3.2*  3.6   CL  97*  97*  98*   CO2  36*  35*  34*   BUN  12  14  21*   CREATININE  0.7  0.8  1.7*     LIVER PROFILE: No results for input(s): AST, ALT, LIPASE, BILIDIR, BILITOT, ALKPHOS in the last 72 hours. Invalid input(s): AMYLASE,  ALB  PT/INR: No results for input(s): PROTIME, INR in the last 72 hours. APTT: No results for input(s): APTT in the last 72 hours. UA:No results for input(s): NITRITE, COLORU, PHUR, LABCAST, WBCUA, RBCUA, MUCUS, TRICHOMONAS, YEAST, BACTERIA, CLARITYU, SPECGRAV, LEUKOCYTESUR, UROBILINOGEN, BILIRUBINUR, BLOODU, GLUCOSEU, AMORPHOUS in the last 72 hours. Invalid input(s): KETONESU  No results for input(s): PHART, FSJ4QPF, PO2ART in the last 72 hours. Vent Information  FiO2 : 100 %     CXR personally reviewed today, left greater than right basilar infiltrates/atleectasis with interval worsening      Assessment:     1. Acute resp failure, hypoxic  2. Aspiration pneumonia  3. Acute diastolic CHF  4. KAIT  5. Fall with bruising  6.  Acute encephalopathy, baseline dementia    Plan:      -Agree with doxycycline for a full course, minimum 7 days  -Continue bronchodilators  -pulm toileting and incentive nay device as tolerated, needs frequent redirection and re-education on this  -Ok to leave the hospital from a pulm standpoint, continue supplemental oxygen moving forward with gradual wean as pneumonia improves.      Will sign off but please contact with questions or concerns    Omero Perry MD

## 2018-08-12 NOTE — PROGRESS NOTES
RESPIRATORY THERAPY ASSESSMENT    Name:  [facility] [address] Record Number:  [id number]  Age: 82 y.o. Gender: female  : 1935  Today's Date:  2018  Room:  69/0969-94    Assessment     Is the patient being admitted for a COPD or Asthma exacerbation? No   (If yes the patient will be seen every 4 hours for the first 24 hours and then reassessed)    Patient Admission Diagnosis      Allergies  Allergies   Allergen Reactions    Codeine Itching    Sulfa Antibiotics Other (See Comments)     Per Pt daughter Elma Kahn) \" Not know her reaction to medication\"       Minimum Predicted Vital Capacity:     752          Actual Vital Capacity:      N/A          Pulmonary History:Asthma and CHF/Pulmonary Edema  Home Oxygen Therapy:  2 liters/min via nasal cannula  Home Respiratory Therapy:Albuterol   Current Respiratory Therapy:  HHN Albuterol Q4WA, HHN Albuterol PRN  Treatment Type: HHN, IPPB  Medications: Albuterol    Respiratory Severity Index(RSI)   Patients with orders for inhalation medications, oxygen, or any therapeutic treatment modality will be placed on Respiratory Protocol. They will be assessed with the first treatment and at least every 72 hours thereafter. The following severity scale will be used to determine frequency of treatment intervention.     Smoking History: Pulmonary Disease or Smoking History, Greater than 15 pack year = 2    Social History  Social History   Substance Use Topics    Smoking status: Never Smoker    Smokeless tobacco: Never Used    Alcohol use Not on file       Recent Surgical History: None = 0  Past Surgical History  Past Surgical History:   Procedure Laterality Date    CHOLECYSTECTOMY      EYE SURGERY      HYSTERECTOMY      [place] [address] W/BRNCL ALVEOLAR LAVAGE N/A 2018    BRONCHOSCOPY ALVEOLAR LAVAGE performed by Ivan Brown MD at [facility] ESOPHAGOGASTRODUODENOSCOPY TRANSORAL DIAGNOSTIC N/A 2018    EGD ESOPHAGOGASTRODUODENOSCOPY patient is able to clear them with a cough. Anti-inflammatory and Combination Medications:    1. If the patient lacks prior history of lung disease, is not using inhaled anti-inflammatory medication at home, and lacks wheezing by examination or by history for at least 24 hours, contact physician for possible discontinuation.

## 2018-08-12 NOTE — PROGRESS NOTES
with normal S1/S2 without murmurs, rubs or gallops. Abdomen: Soft, non-tender, non-distended with normal bowel sounds. Musculoskeletal: No clubbing, cyanosis. Left knee edematous with limited ROM due to pain   Skin: scattered ecchymosis   Neurologic:  Neurovascularly intact without any focal sensory/motor deficits. Cranial nerves: II-XII intact, grossly non-focal.  Psychiatric: Alert and oriented, thought content inappropriate, limited insight due to dementia   Capillary Refill: Brisk,< 3 seconds   Peripheral Pulses: +2 palpable, equal bilaterally       Labs:   Recent Labs      08/09/18   0948   WBC  9.8   HGB  10.8*   HCT  32.9*   PLT  229     Recent Labs      08/10/18   0832  08/11/18   0655  08/12/18   0719   NA  144  142  141   K  3.6  3.2*  3.6   CL  97*  97*  98*   CO2  36*  35*  34*   BUN  12  14  21*   CREATININE  0.7  0.8  1.7*   CALCIUM  8.9  8.3  8.5     No results for input(s): AST, ALT, BILIDIR, BILITOT, ALKPHOS in the last 72 hours. No results for input(s): INR in the last 72 hours. No results for input(s): Michelle Golds in the last 72 hours. Urinalysis:      Lab Results   Component Value Date    NITRU Negative 08/04/2018    WBCUA  08/04/2018    BACTERIA 1+ 08/04/2018    RBCUA 0-2 08/04/2018    BLOODU SMALL 08/04/2018    SPECGRAV 1.015 08/04/2018    GLUCOSEU Negative 08/04/2018       Radiology:  XR CHEST PORTABLE   Final Result   Worsening ground-glass opacification in the right mid and lower lung zone   with stable opacities at the left lung base. FL MODIFIED BARIUM SWALLOW W VIDEO   Final Result   Transient laryngeal penetration with nectar thick and thin liquids      Please see separate speech pathology report for full discussion of findings   and recommendations. CT HEAD WO CONTRAST   Final Result   No acute intracranial abnormality. XR CHEST STANDARD (2 VW)   Final Result   Pleural effusions which appear new, worse on the left.   Worsening bibasilar opacities. US RENAL COMPLETE   Final Result   No hydronephrosis. XR CHEST PORTABLE   Final Result   Bilateral pneumonia. Pleural effusions which appear new, worse on the left.  Worsening bibasilar   opacities. Cardiac echo :   Normal LV systolic function with EF of 55-60%.  Asynchronous septal motion noted.   D-shaped septum consistent with RV pressure and volume overload.   Mild concentric left ventricular hypertrophy.   Evidence for type III diastolic dysfunction.   Mild mitral annular calcification.   Mild biatrial enlargement.   The right ventricle is mildly enlarged.   Mild mitral, aortic, and tricuspid regurgitation.   Systolic pulmonic artery pressure (SPAP) is normal estimated at 32mmHg   (Right atrial pressure of 3mmHg). Assessment/Plan:    Active Hospital Problems    Diagnosis Date Noted    Persistent [facility] fibrillation ([facility] [I48.1] 08/06/2018     Priority: High    AV block [I44.30] 08/06/2018     Priority: High    Acute respiratory failure with hypoxia (HCC) [J96.01] 08/07/2018    Pulmonary infiltrates [R91.8] 93/94/3779    Diastolic dysfunction [F52.9] 08/07/2018    Pleural effusion, bilateral [J90] 08/07/2018    Morbid obesity with BMI of 40.0-44.9, adult ([facility] [E66.01, Z68.41] 08/07/2018    Fall [W19. XXXA] 08/07/2018    Myonecrosis [M62.89] 08/07/2018    Atelectasis [J98.11] 08/07/2018    Ineffective airway clearance [R06.89] 08/07/2018    Essential hypertension [I10]     Hematemesis [K92.0] 08/03/2018       Hematemesis  - upper GI bleed clinically suspected on admission but likely Oropharyngeal source after EGD 4 August w/out GI souce identified, in the setting of chronic anticoagulation w/ Eliquis - held. Protonix gtt initially, changed to PO PPI. No repeat hematemesis for days. Resolved     Anemia - acute oropharyngeal blood loss anemia w/out evidence of active bleeding/hemolysis. Asymptomatic w/out indication for transfusion.  Will continue to follow serial labs. Reviewed and documented as above.      Atrial fibrillation - had  pauses on monitor. Cardio consulted. cardizem has been held. Cardiac echo reviewed-  normal EF , GIIIDD. Anticoagulated at baseline on Eliquis, held 2nd to above. Full dose lovenox started on 8/8 per cardio - will switch to eliquis- renally dosed. Stop lovenox. Continue tele     Pneumonia:  Likely due to aspiration given hx of dysphagia. On rocephin for UTI- switch to zosyn on 8/6   Pulm assisting. S/p bronch on 8/8- bronch culture grew MRSA. D/c zosyn and started IV vancomycin on 8/9. D/c vancomycin and switch to PO doxycyline X7 days. Continue aspiration precautions       Acute on chronic diastolic CHF - was on IVF for KAIT. recieved a dose of lasix on 8/7. Monitoring vol status closely with diuresis as needed per nephro.           Hypokalemia : likely due to lasix. Replaced. Monitor and replace as needed       UTI - Acute cystitis present prior to admission. Rocephin initiated at [facility] which was continued. Switched to zosyn on 8/6 as stated above. Has completed treatment for UTI as had  7 days of abx. ARF - w/ elevated BUN/Cr ratio c/w pre-renal azotemia. Nephrology assisting. Improved on gentle IVF, lasix was later resumed. Renal U/S w/out evidence of hydronephrosis. Cr 1.7 today-  Lasix and aldactone stopped. IVF ordered    HyperKalemia - 2nd to above. Will continue to follow serial labs - resolved w/ IVF.      DM2 -  Controlled on home meds. Held. Follow FSBS/SSI.      HyperLipidemia - controlled on home Statin. Continue, w/ f/u and med adjustment w/ PCP      Hypothyroidism : TSH 11.92, FT4 0.5. Increased synthroid from 100 to 125mcg daily. Repeat TFTs in 4-6 weeks. Obesity -  With Body mass index is 41.77 kg/m². Complicating assessment and treatment. Placing patient at risk for multiple co-morbidities as well as early death and contributing to the patient's presentation.  Would benefit

## 2018-08-13 LAB
ANION GAP SERPL CALCULATED.3IONS-SCNC: 9 MMOL/L (ref 3–16)
BUN BLDV-MCNC: 26 MG/DL (ref 7–20)
CALCIUM SERPL-MCNC: 8.3 MG/DL (ref 8.3–10.6)
CHLORIDE BLD-SCNC: 99 MMOL/L (ref 99–110)
CO2: 33 MMOL/L (ref 21–32)
CREAT SERPL-MCNC: 2 MG/DL (ref 0.6–1.2)
GFR AFRICAN AMERICAN: 29
GFR NON-AFRICAN AMERICAN: 24
GLUCOSE BLD-MCNC: 126 MG/DL (ref 70–99)
GLUCOSE BLD-MCNC: 132 MG/DL (ref 70–99)
GLUCOSE BLD-MCNC: 90 MG/DL (ref 70–99)
GLUCOSE BLD-MCNC: 92 MG/DL (ref 70–99)
GLUCOSE BLD-MCNC: 92 MG/DL (ref 70–99)
PERFORMED ON: ABNORMAL
PERFORMED ON: ABNORMAL
PERFORMED ON: NORMAL
PERFORMED ON: NORMAL
POTASSIUM SERPL-SCNC: 3.7 MMOL/L (ref 3.5–5.1)
SODIUM BLD-SCNC: 141 MMOL/L (ref 136–145)

## 2018-08-13 PROCEDURE — G8987 SELF CARE CURRENT STATUS: HCPCS

## 2018-08-13 PROCEDURE — G8988 SELF CARE GOAL STATUS: HCPCS

## 2018-08-13 PROCEDURE — 6370000000 HC RX 637 (ALT 250 FOR IP): Performed by: INTERNAL MEDICINE

## 2018-08-13 PROCEDURE — 6360000002 HC RX W HCPCS: Performed by: INTERNAL MEDICINE

## 2018-08-13 PROCEDURE — 2580000003 HC RX 258: Performed by: NURSE PRACTITIONER

## 2018-08-13 PROCEDURE — 92526 ORAL FUNCTION THERAPY: CPT

## 2018-08-13 PROCEDURE — 97535 SELF CARE MNGMENT TRAINING: CPT

## 2018-08-13 PROCEDURE — 97530 THERAPEUTIC ACTIVITIES: CPT

## 2018-08-13 PROCEDURE — 36415 COLL VENOUS BLD VENIPUNCTURE: CPT

## 2018-08-13 PROCEDURE — 94640 AIRWAY INHALATION TREATMENT: CPT

## 2018-08-13 PROCEDURE — 94761 N-INVAS EAR/PLS OXIMETRY MLT: CPT

## 2018-08-13 PROCEDURE — 80048 BASIC METABOLIC PNL TOTAL CA: CPT

## 2018-08-13 PROCEDURE — 1200000000 HC SEMI PRIVATE

## 2018-08-13 PROCEDURE — 6370000000 HC RX 637 (ALT 250 FOR IP): Performed by: NURSE PRACTITIONER

## 2018-08-13 RX ORDER — SODIUM CHLORIDE 9 MG/ML
INJECTION, SOLUTION INTRAVENOUS CONTINUOUS
Status: DISCONTINUED | OUTPATIENT
Start: 2018-08-13 | End: 2018-08-14

## 2018-08-13 RX ADMIN — OXCARBAZEPINE 150 MG: 150 TABLET ORAL at 21:50

## 2018-08-13 RX ADMIN — OXCARBAZEPINE 150 MG: 150 TABLET ORAL at 09:40

## 2018-08-13 RX ADMIN — Medication 10 ML: at 21:51

## 2018-08-13 RX ADMIN — MEMANTINE 10 MG: 5 TABLET ORAL at 21:50

## 2018-08-13 RX ADMIN — MONTELUKAST SODIUM 10 MG: 10 TABLET, COATED ORAL at 09:40

## 2018-08-13 RX ADMIN — MEMANTINE 10 MG: 5 TABLET ORAL at 09:40

## 2018-08-13 RX ADMIN — Medication 1 TABLET: at 09:40

## 2018-08-13 RX ADMIN — LEVOTHYROXINE SODIUM 125 MCG: 125 TABLET ORAL at 06:36

## 2018-08-13 RX ADMIN — APIXABAN 2.5 MG: 5 TABLET, FILM COATED ORAL at 09:32

## 2018-08-13 RX ADMIN — Medication 400 MG: at 21:50

## 2018-08-13 RX ADMIN — DOXYCYCLINE HYCLATE 100 MG: 100 TABLET, COATED ORAL at 21:50

## 2018-08-13 RX ADMIN — Medication 10 ML: at 10:53

## 2018-08-13 RX ADMIN — Medication 400 MG: at 09:40

## 2018-08-13 RX ADMIN — APIXABAN 2.5 MG: 5 TABLET, FILM COATED ORAL at 21:51

## 2018-08-13 RX ADMIN — ALBUTEROL SULFATE 2.5 MG: 2.5 SOLUTION RESPIRATORY (INHALATION) at 12:30

## 2018-08-13 RX ADMIN — VITAMIN D, TAB 1000IU (100/BT) 2000 UNITS: 25 TAB at 09:32

## 2018-08-13 RX ADMIN — PANTOPRAZOLE SODIUM 40 MG: 40 TABLET, DELAYED RELEASE ORAL at 06:36

## 2018-08-13 RX ADMIN — DOXYCYCLINE HYCLATE 100 MG: 100 TABLET, COATED ORAL at 09:32

## 2018-08-13 RX ADMIN — SIMVASTATIN 10 MG: 10 TABLET, FILM COATED ORAL at 21:51

## 2018-08-13 RX ADMIN — OXYBUTYNIN CHLORIDE 5 MG: 5 TABLET, FILM COATED, EXTENDED RELEASE ORAL at 09:40

## 2018-08-13 ASSESSMENT — PAIN SCALES - WONG BAKER: WONGBAKER_NUMERICALRESPONSE: 0

## 2018-08-13 NOTE — PLAN OF CARE
Problem: Falls - Risk of:  Goal: Will remain free from falls  Will remain free from falls   Outcome: Ongoing  Pt has bed alarm on for safety. Pt educated to use call light for assistance. Pt free from falls this shift. Problem: HEMODYNAMIC STATUS  Goal: Patient has stable vital signs and fluid balance  Outcome: Ongoing  Patient's EF (Ejection Fraction) is greater than 40%    Patient has a past medical history of Alzheimer's dementia; Asthma; Atrial fibrillation ([facility] Blood clot in vein; CHF (congestive heart failure) ([facility] Dementia; GERD (gastroesophageal reflux disease); Hypertension; Kidney failure; MRSA (methicillin resistant staph aureus) culture positive; and Osteoporosis. Comorbidities reviewed and education provided. Patient and family's stated goal of care: be more comfortable prior to discharge    Patient's current functional capacity:  Slight limitation of physical activity. Comfortable at rest. Ordinary physical activity results in fatigue, palpitation, dyspnea. Pt resting in bed at this time on 1.5 L O2. Pt denies shortness of breath. Pt with nonpitting lower extremity edema. Patient's weights and intake/output reviewed:    Patient Vitals for the past 96 hrs (Last 3 readings):   Weight   08/12/18 0700 228 lb 6.3 oz (103.6 kg)   08/11/18 0621 220 lb 10.9 oz (100.1 kg)   08/10/18 0409 229 lb 4.8 oz (104 kg)       Intake/Output Summary (Last 24 hours) at 08/13/18 0616  Last data filed at 08/12/18 1850   Gross per 24 hour   Intake              600 ml   Output              550 ml   Net               50 ml       Patient provided with education on CHF signs/symptoms, causes, discharge medications, daily weights, low sodium diet, activity, and follow-up. Notified patient to call the doctor post discharge if patient experiences shortness of breath, chest pain, swelling, cough, or weight gain of three pounds in a day/five pounds in a week.  Also notified patient to call the doctor with dizziness,

## 2018-08-13 NOTE — PROGRESS NOTES
Occupational Therapy  Facility/Department: [facility] B3 - MED SURG  Daily Treatment Note    NAME: Asa Rangel  : 1935  MRN: [id number]    Date of Service: 2018    Discharge Recommendations:  [address]       Patient Diagnosis(es): There were no encounter diagnoses. has a past medical history of Alzheimer's dementia; Asthma; Atrial fibrillation ([facility] Blood clot in vein; CHF (congestive heart failure) ([facility] Dementia; GERD (gastroesophageal reflux disease); Hypertension; Kidney failure; MRSA (methicillin resistant staph aureus) culture positive; and Osteoporosis. has a past surgical history that includes Cholecystectomy; eye surgery; Hysterectomy; Thyroidectomy; pr esophagogastroduodenoscopy transoral diagnostic (N/A, 2018); and pr brnchsc w/brncl alveolar lavage (N/A, 2018). Restrictions  Restrictions/Precautions  Restrictions/Precautions: Fall Risk, General Precautions  Position Activity Restriction  Other position/activity restrictions: Up as tolerated, 1.5L O2, Lua, IV lines, telemetry with continuous pulse ox  Subjective   General  Chart Reviewed: Yes  Patient assessed for rehabilitation services?: Yes  Response to previous treatment: Patient with no complaints from previous session  Family / Caregiver Present: Yes (daughter )  Referring Practitioner: Pancho Mccarty MD  Diagnosis: Fall, Bloody Emesis  Subjective  Subjective: Patient more chatty today, in good spirits. General Comment  Comments: Nsg cleared pt for therapy session. Pain Assessment  Patient Currently in Pain: Denies  Benitez-Burns Pain Rating: No hurt  Vital Signs  Patient Currently in Pain: Denies   Orientation  Orientation  Overall Orientation Status: Impaired  Orientation Level: Oriented to person;Disoriented to place; Disoriented to time;Disoriented to situation  Objective    ADL  Feeding:  Thickened liquids;Setup  Grooming: Setup (pt put in dentures prior to eating)        Balance  Sitting Therapy Time   Individual Concurrent Group Co-treatment   Time In 4711         Time Out 0904         Minutes 30         Timed Code Treatment Minutes: 30 Minutes     This note to serve as d/c summary should pt d/c prior to next session.      TEZ Antunez, OTR/L

## 2018-08-13 NOTE — PROGRESS NOTES
safely ambulate 20' with RW and min assist  Short term goal 5: By 8/11/18: Pt will be independent with supine and seated LE ther ex x 12-15 reps for LE strengthening  Patient Goals   Patient goals : \"To get stronger\"    Plan    Plan  Times per week: 3-5x/week in acute care  Times per day: Daily  Specific instructions for Next Treatment: Progress ther ex and mobility as tolerated  Current Treatment Recommendations: Strengthening, Balance Training, Functional Mobility Training, Transfer Training, Endurance Training, Gait Training, Stair training, Home Exercise Program, Safety Education & Training, Patient/Caregiver Education & Training, Positioning  Safety Devices  Type of devices:  All fall risk precautions in place, Call light within reach, Chair alarm in place, Gait belt, Left in chair, Nurse notified, Patient at risk for falls     Therapy Time   Individual Concurrent Group Co-treatment   Time In [address]         Time Out 0904         Minutes 30         Timed Code Treatment Minutes: 30 Minutes       Su Banuelos, PT

## 2018-08-13 NOTE — PROGRESS NOTES
Hospitalist Progress Note      PCP: No primary care provider on file. Date of Admission: 8/4/2018    Chief Complaint:  Bloody emesis    Subjective:  Patient is up in bed, comfortable, not in distress. Denies any chest pain or shortness of breath. Tolerating diet well. No new event overnight noted. Medications:  Reviewed    Infusion Medications    sodium chloride 60 mL/hr at 08/13/18 1222    dextrose       Scheduled Medications    apixaban  2.5 mg Oral BID    doxycycline hyclate  100 mg Oral 2 times per day    levothyroxine  125 mcg Oral Daily    albuterol  2.5 mg Nebulization Q4H WA    pantoprazole  40 mg Oral QAM AC    atropine  1 mg Intravenous Once    sodium chloride flush  10 mL Intravenous 2 times per day    vitamin D  2,000 Units Oral Daily    magnesium oxide  400 mg Oral BID    memantine  10 mg Oral BID    montelukast  10 mg Oral Daily    therapeutic multivitamin-minerals  1 tablet Oral Daily    OXcarbazepine  150 mg Oral BID    oxybutynin  5 mg Oral Daily    senna  2 tablet Oral Daily    simvastatin  10 mg Oral Nightly    insulin lispro  0-6 Units Subcutaneous TID WC    insulin lispro  0-3 Units Subcutaneous Nightly     PRN Meds: albuterol, traMADol, sodium chloride flush, acetaminophen, ondansetron, glucose, dextrose, glucagon (rDNA), dextrose      Intake/Output Summary (Last 24 hours) at 08/13/18 1445  Last data filed at 08/13/18 1258   Gross per 24 hour   Intake             1020 ml   Output             1300 ml   Net             -280 ml       Physical Exam Performed:    /78   Pulse 83   Temp 98 °F (36.7 °C) (Oral)   Resp 20   Ht 5' 2\" (1.575 m)   Wt 231 lb 0.7 oz (104.8 kg)   SpO2 94%   BMI 42.26 kg/m²     General appearance: alert . No apparent distress, appears stated age and cooperative. HEENT: Pupils equal, round. Conjunctivae/corneas clear. Neck: Supple, with full range of motion. No jugular venous distention. Trachea midline.   Respiratory:  Normal respiratory effort. Bibasilar  rales   Cardiovascular: Regular rate and rhythm with normal S1/S2 without murmurs, rubs or gallops. Abdomen: Soft, non-tender, non-distended with normal bowel sounds. Musculoskeletal: No clubbing, cyanosis. Left knee edematous with limited ROM due to pain   Skin: scattered ecchymosis   Neurologic:  Neurovascularly intact without any focal sensory/motor deficits. Cranial nerves: II-XII intact, grossly non-focal.  Psychiatric: Alert and oriented, thought content inappropriate, limited insight due to dementia   Capillary Refill: Brisk,< 3 seconds   Peripheral Pulses: +2 palpable, equal bilaterally       Labs:   No results for input(s): WBC, HGB, HCT, PLT in the last 72 hours. Recent Labs      08/11/18   0655  08/12/18   0719  08/13/18   0753   NA  142  141  141   K  3.2*  3.6  3.7   CL  97*  98*  99   CO2  35*  34*  33*   BUN  14  21*  26*   CREATININE  0.8  1.7*  2.0*   CALCIUM  8.3  8.5  8.3       Urinalysis:      Lab Results   Component Value Date    NITRU Negative 08/04/2018    WBCUA  08/04/2018    BACTERIA 1+ 08/04/2018    RBCUA 0-2 08/04/2018    BLOODU SMALL 08/04/2018    SPECGRAV 1.015 08/04/2018    GLUCOSEU Negative 08/04/2018       Radiology:  XR CHEST PORTABLE   Final Result   Worsening ground-glass opacification in the right mid and lower lung zone   with stable opacities at the left lung base. FL MODIFIED BARIUM SWALLOW W VIDEO   Final Result   Transient laryngeal penetration with nectar thick and thin liquids      Please see separate speech pathology report for full discussion of findings   and recommendations. CT HEAD WO CONTRAST   Final Result   No acute intracranial abnormality. XR CHEST STANDARD (2 VW)   Final Result   Pleural effusions which appear new, worse on the left. Worsening bibasilar   opacities. US RENAL COMPLETE   Final Result   No hydronephrosis. XR CHEST PORTABLE   Final Result   Bilateral pneumonia.

## 2018-08-13 NOTE — CARE COORDINATION
Spoke with Dr. Megan Brito regarding patient status. Will discharge likely in one to two days depending on renal function. Spoke with Katlyn Blevins at [facility] and updated her on patient status and possible discharge in one to two days.

## 2018-08-13 NOTE — PROGRESS NOTES
Progress Note          Subjective/     The patient was seen and examined; she feels well today with no CP, SOB, nausea or vomiting. ROS: No fever or chills. Social: No family at bedside. Objective/     Vitals:    08/13/18 0630 08/13/18 0702 08/13/18 0927 08/13/18 1039   BP: 128/80  127/79 128/78   Pulse: 94  108 83   Resp: 18  20 20   Temp: 98 °F (36.7 °C)  98 °F (36.7 °C)    TempSrc: Oral  Oral    SpO2: 96%  93% 93%   Weight:  231 lb 0.7 oz (104.8 kg)     Height:         24HR INTAKE/OUTPUT:      Intake/Output Summary (Last 24 hours) at 08/13/18 1151  Last data filed at 08/13/18 1030   Gross per 24 hour   Intake              300 ml   Output             1050 ml   Net             -750 ml     General:  NAD, ill-appearing  Neck:  Supple, no mass   Chest:  Bilateral rhonchi, good respiratory effort, good air movement  CV:  Regular, no rub   Abdomen:  NTND, soft, +BS, no hepatosplenomegaly  Extremities:  No peripheral edema    Data/  No results for input(s): WBC, HGB, HCT, MCV, PLT in the last 72 hours. Recent Labs      08/11/18   0655  08/12/18   0719  08/13/18   0753   NA  142  141  141   K  3.2*  3.6  3.7   CL  97*  98*  99   CO2  35*  34*  33*   GLUCOSE  88  103*  90   BUN  14  21*  26*   CREATININE  0.8  1.7*  2.0*   LABGLOM  >60  29*  24*   GFRAA  >60  35*  29*       Assessment/     Renal Failure:  Unknown baseline but daughter does not think she has had CKD in the past.  - Lua placed. U Na < 20. Ultrasound normal echogenicity and no hydronephrosis. UA with UTI  - Clinical:  Improved with volume      GI Bleed:  EGD did not show any bleeding. Likely oropharyngeal in etiology. Had been on eliquis      Dementia:  Worsened from baseline. UTI ? Per report had CT scan of head at outside hospital but did have head injury while on Eliquis. Also could be metabolic with renal failure.       Anemia:  Due to acute blood loss.       Acute respiratory failure: PNA +/- Pulmonary edema    Plan/     -

## 2018-08-14 LAB
ANION GAP SERPL CALCULATED.3IONS-SCNC: 8 MMOL/L (ref 3–16)
BUN BLDV-MCNC: 29 MG/DL (ref 7–20)
CALCIUM SERPL-MCNC: 8.8 MG/DL (ref 8.3–10.6)
CHLORIDE BLD-SCNC: 100 MMOL/L (ref 99–110)
CO2: 35 MMOL/L (ref 21–32)
CREAT SERPL-MCNC: 2 MG/DL (ref 0.6–1.2)
GFR AFRICAN AMERICAN: 29
GFR NON-AFRICAN AMERICAN: 24
GLUCOSE BLD-MCNC: 118 MG/DL (ref 70–99)
GLUCOSE BLD-MCNC: 127 MG/DL (ref 70–99)
GLUCOSE BLD-MCNC: 129 MG/DL (ref 70–99)
GLUCOSE BLD-MCNC: 89 MG/DL (ref 70–99)
GLUCOSE BLD-MCNC: 94 MG/DL (ref 70–99)
PERFORMED ON: ABNORMAL
PERFORMED ON: NORMAL
POTASSIUM SERPL-SCNC: 3.8 MMOL/L (ref 3.5–5.1)
SODIUM BLD-SCNC: 143 MMOL/L (ref 136–145)

## 2018-08-14 PROCEDURE — 94640 AIRWAY INHALATION TREATMENT: CPT

## 2018-08-14 PROCEDURE — 97110 THERAPEUTIC EXERCISES: CPT

## 2018-08-14 PROCEDURE — 2700000000 HC OXYGEN THERAPY PER DAY

## 2018-08-14 PROCEDURE — 2580000003 HC RX 258: Performed by: NURSE PRACTITIONER

## 2018-08-14 PROCEDURE — G8988 SELF CARE GOAL STATUS: HCPCS

## 2018-08-14 PROCEDURE — 6370000000 HC RX 637 (ALT 250 FOR IP): Performed by: INTERNAL MEDICINE

## 2018-08-14 PROCEDURE — 6360000002 HC RX W HCPCS: Performed by: INTERNAL MEDICINE

## 2018-08-14 PROCEDURE — 94761 N-INVAS EAR/PLS OXIMETRY MLT: CPT

## 2018-08-14 PROCEDURE — 1200000000 HC SEMI PRIVATE

## 2018-08-14 PROCEDURE — 97530 THERAPEUTIC ACTIVITIES: CPT

## 2018-08-14 PROCEDURE — G8987 SELF CARE CURRENT STATUS: HCPCS

## 2018-08-14 PROCEDURE — 97535 SELF CARE MNGMENT TRAINING: CPT

## 2018-08-14 PROCEDURE — 80048 BASIC METABOLIC PNL TOTAL CA: CPT

## 2018-08-14 PROCEDURE — 6370000000 HC RX 637 (ALT 250 FOR IP): Performed by: NURSE PRACTITIONER

## 2018-08-14 PROCEDURE — 36415 COLL VENOUS BLD VENIPUNCTURE: CPT

## 2018-08-14 PROCEDURE — 2580000003 HC RX 258: Performed by: INTERNAL MEDICINE

## 2018-08-14 RX ORDER — SODIUM CHLORIDE 450 MG/100ML
INJECTION, SOLUTION INTRAVENOUS CONTINUOUS
Status: DISCONTINUED | OUTPATIENT
Start: 2018-08-14 | End: 2018-08-15

## 2018-08-14 RX ORDER — TRAMADOL HYDROCHLORIDE 50 MG/1
50 TABLET ORAL EVERY 8 HOURS PRN
Qty: 15 TABLET | Refills: 0 | Status: SHIPPED | OUTPATIENT
Start: 2018-08-14 | End: 2018-08-19

## 2018-08-14 RX ADMIN — OXYBUTYNIN CHLORIDE 5 MG: 5 TABLET, FILM COATED, EXTENDED RELEASE ORAL at 10:14

## 2018-08-14 RX ADMIN — MEMANTINE 10 MG: 5 TABLET ORAL at 10:14

## 2018-08-14 RX ADMIN — MEMANTINE 10 MG: 5 TABLET ORAL at 20:37

## 2018-08-14 RX ADMIN — VITAMIN D, TAB 1000IU (100/BT) 2000 UNITS: 25 TAB at 10:14

## 2018-08-14 RX ADMIN — APIXABAN 2.5 MG: 5 TABLET, FILM COATED ORAL at 20:36

## 2018-08-14 RX ADMIN — Medication 10 ML: at 10:15

## 2018-08-14 RX ADMIN — ACETAMINOPHEN 650 MG: 325 TABLET, FILM COATED ORAL at 10:25

## 2018-08-14 RX ADMIN — ALBUTEROL SULFATE 2.5 MG: 2.5 SOLUTION RESPIRATORY (INHALATION) at 11:53

## 2018-08-14 RX ADMIN — DOXYCYCLINE HYCLATE 100 MG: 100 TABLET, COATED ORAL at 10:14

## 2018-08-14 RX ADMIN — PANTOPRAZOLE SODIUM 40 MG: 40 TABLET, DELAYED RELEASE ORAL at 06:21

## 2018-08-14 RX ADMIN — MONTELUKAST SODIUM 10 MG: 10 TABLET, COATED ORAL at 10:14

## 2018-08-14 RX ADMIN — SODIUM CHLORIDE: 4.5 INJECTION, SOLUTION INTRAVENOUS at 12:53

## 2018-08-14 RX ADMIN — Medication 1 TABLET: at 10:15

## 2018-08-14 RX ADMIN — ALBUTEROL SULFATE 2.5 MG: 2.5 SOLUTION RESPIRATORY (INHALATION) at 20:58

## 2018-08-14 RX ADMIN — SIMVASTATIN 10 MG: 10 TABLET, FILM COATED ORAL at 20:37

## 2018-08-14 RX ADMIN — APIXABAN 2.5 MG: 5 TABLET, FILM COATED ORAL at 10:14

## 2018-08-14 RX ADMIN — OXCARBAZEPINE 150 MG: 150 TABLET ORAL at 10:14

## 2018-08-14 RX ADMIN — Medication 400 MG: at 10:14

## 2018-08-14 RX ADMIN — LEVOTHYROXINE SODIUM 125 MCG: 125 TABLET ORAL at 06:21

## 2018-08-14 RX ADMIN — OXCARBAZEPINE 150 MG: 150 TABLET ORAL at 20:36

## 2018-08-14 RX ADMIN — ALBUTEROL SULFATE 2.5 MG: 2.5 SOLUTION RESPIRATORY (INHALATION) at 15:39

## 2018-08-14 RX ADMIN — DOXYCYCLINE HYCLATE 100 MG: 100 TABLET, COATED ORAL at 20:36

## 2018-08-14 ASSESSMENT — PAIN SCALES - GENERAL
PAINLEVEL_OUTOF10: 0
PAINLEVEL_OUTOF10: 3
PAINLEVEL_OUTOF10: 0
PAINLEVEL_OUTOF10: 0

## 2018-08-14 NOTE — PROGRESS NOTES
Lua cath removed per Dr Gresham Most order, pt tolerated well, instructed to notify when she has to void, will monitor.

## 2018-08-14 NOTE — PLAN OF CARE
Problem: Risk for Impaired Skin Integrity  Goal: Tissue integrity - skin and mucous membranes  Structural intactness and normal physiological function of skin and  mucous membranes. Intervention: SKIN ASSESSMENT  Kerrie care provided using protective wipes, zinc ointment applied. Pt turned to side using pillow support,heels elvated from bed.

## 2018-08-14 NOTE — PROGRESS NOTES
Bilateral pneumonia. Pleural effusions which appear new, worse on the left.  Worsening bibasilar   opacities. Cardiac echo :   Normal LV systolic function with EF of 55-60%.  Asynchronous septal motion noted.   D-shaped septum consistent with RV pressure and volume overload.   Mild concentric left ventricular hypertrophy.   Evidence for type III diastolic dysfunction.   Mild mitral annular calcification.   Mild biatrial enlargement.   The right ventricle is mildly enlarged.   Mild mitral, aortic, and tricuspid regurgitation.   Systolic pulmonic artery pressure (SPAP) is normal estimated at 32mmHg   (Right atrial pressure of 3mmHg). Assessment/Plan:    Active Hospital Problems    Diagnosis Date Noted    Persistent atrial fibrillation ([facility] [I48.1] 08/06/2018     Priority: High    AV block [I44.30] 08/06/2018     Priority: High    Acute respiratory failure with hypoxia (HCC) [J96.01] 08/07/2018    Pulmonary infiltrates [R91.8] 77/82/2216    Diastolic dysfunction [P80.1] 08/07/2018    Pleural effusion, bilateral [J90] 08/07/2018    Morbid obesity with BMI of 40.0-44.9, adult ([facility] [E66.01, Z68.41] 08/07/2018    Fall [W19. XXXA] 08/07/2018    Myonecrosis [M62.89] 08/07/2018    Atelectasis [J98.11] 08/07/2018    Ineffective airway clearance [R06.89] 08/07/2018    Essential hypertension [I10]     Hematemesis [K92.0] 08/03/2018       Hematemesis  - upper GI bleed clinically suspected on admission but likely Oropharyngeal source after EGD 4 August w/out GI souce identified, in the setting of chronic anticoagulation w/ Eliquis - held. Protonix gtt initially, changed to PO PPI. No repeat hematemesis for days. Resolved     Anemia - acute oropharyngeal blood loss anemia w/out evidence of active bleeding/hemolysis. Asymptomatic w/out indication for transfusion. Will continue to follow serial labs. Reviewed and documented as above.      Atrial fibrillation - had  pauses on monitor.  Cardio

## 2018-08-14 NOTE — PROGRESS NOTES
Occupational Therapy  Facility/Department: [facility] B3 - MED SURG  Daily Treatment Note  NAME: Krala Harris  : 1935  MRN: [id number]    Date of Service: 2018    Discharge Recommendations:  Subacute/Skilled Nursing Facility      This note to serve as d/c summary should pt d/c prior to next session. Patient Diagnosis(es): There were no encounter diagnoses. has a past medical history of Alzheimer's dementia; Asthma; Atrial fibrillation ([facility] [address] Blood clot in vein; CHF (congestive heart failure) ([facility] [address] Dementia; GERD (gastroesophageal reflux disease); Hypertension; Kidney failure; MRSA (methicillin resistant staph aureus) culture positive; and Osteoporosis. has a past surgical history that includes Cholecystectomy; eye surgery; Hysterectomy; Thyroidectomy; pr esophagogastroduodenoscopy transoral diagnostic (N/A, 2018); and pr brnchsc w/brncl alveolar lavage (N/A, 2018). Restrictions  Restrictions/Precautions  Restrictions/Precautions: Fall Risk, General Precautions  Position Activity Restriction  Other position/activity restrictions: Up as tolerated, 1.5L O2, Lua, IV lines, telemetry with continuous pulse ox  Subjective   General  Chart Reviewed: Yes  Patient assessed for rehabilitation services?: Yes  Response to previous treatment: Patient with no complaints from previous session  Family / Caregiver Present: Yes (daughter)  Referring Practitioner: Rashmi Meade MD  Diagnosis: Fall, Bloody Emesis  Subjective  Subjective: Pt sitting EOB upon arrival. Pt agreeable to OT services at this time. General Comment  Comments: Pt cleared for therapy per RN. Pain Assessment  Patient Currently in Pain: Denies (Simultaneous filing. User may not have seen previous data.)  Vital Signs  Patient Currently in Pain: Denies (Simultaneous filing.  User may not have seen previous data.)   Orientation  Orientation  Overall Orientation Status: Impaired  Orientation Level: Oriented to person;Disoriented 8/9/18- goal met 8/14/18 Min A x1       Therapy Time   Individual Concurrent Group Co-treatment   Time In 0945         Time Out 1008         Minutes 23         Timed Code Treatment Minutes: [address], OTD, OTR/L

## 2018-08-15 LAB
ANION GAP SERPL CALCULATED.3IONS-SCNC: 10 MMOL/L (ref 3–16)
BUN BLDV-MCNC: 34 MG/DL (ref 7–20)
CALCIUM SERPL-MCNC: 8.8 MG/DL (ref 8.3–10.6)
CHLORIDE BLD-SCNC: 100 MMOL/L (ref 99–110)
CO2: 34 MMOL/L (ref 21–32)
CREAT SERPL-MCNC: 2 MG/DL (ref 0.6–1.2)
GFR AFRICAN AMERICAN: 29
GFR NON-AFRICAN AMERICAN: 24
GLUCOSE BLD-MCNC: 104 MG/DL (ref 70–99)
GLUCOSE BLD-MCNC: 105 MG/DL (ref 70–99)
GLUCOSE BLD-MCNC: 107 MG/DL (ref 70–99)
GLUCOSE BLD-MCNC: 150 MG/DL (ref 70–99)
GLUCOSE BLD-MCNC: 93 MG/DL (ref 70–99)
PERFORMED ON: ABNORMAL
PERFORMED ON: NORMAL
POTASSIUM SERPL-SCNC: 4.1 MMOL/L (ref 3.5–5.1)
SODIUM BLD-SCNC: 144 MMOL/L (ref 136–145)

## 2018-08-15 PROCEDURE — 1200000000 HC SEMI PRIVATE

## 2018-08-15 PROCEDURE — 36415 COLL VENOUS BLD VENIPUNCTURE: CPT

## 2018-08-15 PROCEDURE — 6370000000 HC RX 637 (ALT 250 FOR IP): Performed by: NURSE PRACTITIONER

## 2018-08-15 PROCEDURE — 6370000000 HC RX 637 (ALT 250 FOR IP): Performed by: INTERNAL MEDICINE

## 2018-08-15 PROCEDURE — 94640 AIRWAY INHALATION TREATMENT: CPT

## 2018-08-15 PROCEDURE — 2580000003 HC RX 258: Performed by: NURSE PRACTITIONER

## 2018-08-15 PROCEDURE — 6360000002 HC RX W HCPCS: Performed by: INTERNAL MEDICINE

## 2018-08-15 PROCEDURE — 80048 BASIC METABOLIC PNL TOTAL CA: CPT

## 2018-08-15 PROCEDURE — 2580000003 HC RX 258: Performed by: INTERNAL MEDICINE

## 2018-08-15 PROCEDURE — 92526 ORAL FUNCTION THERAPY: CPT

## 2018-08-15 PROCEDURE — 2700000000 HC OXYGEN THERAPY PER DAY

## 2018-08-15 PROCEDURE — 94761 N-INVAS EAR/PLS OXIMETRY MLT: CPT

## 2018-08-15 RX ADMIN — DOXYCYCLINE HYCLATE 100 MG: 100 TABLET, COATED ORAL at 21:54

## 2018-08-15 RX ADMIN — ALBUTEROL SULFATE 2.5 MG: 2.5 SOLUTION RESPIRATORY (INHALATION) at 20:54

## 2018-08-15 RX ADMIN — PANTOPRAZOLE SODIUM 40 MG: 40 TABLET, DELAYED RELEASE ORAL at 05:39

## 2018-08-15 RX ADMIN — VITAMIN D, TAB 1000IU (100/BT) 2000 UNITS: 25 TAB at 09:25

## 2018-08-15 RX ADMIN — DOXYCYCLINE HYCLATE 100 MG: 100 TABLET, COATED ORAL at 09:25

## 2018-08-15 RX ADMIN — MEMANTINE 10 MG: 5 TABLET ORAL at 09:25

## 2018-08-15 RX ADMIN — Medication 400 MG: at 09:25

## 2018-08-15 RX ADMIN — Medication 1 TABLET: at 09:25

## 2018-08-15 RX ADMIN — OXCARBAZEPINE 150 MG: 150 TABLET ORAL at 09:25

## 2018-08-15 RX ADMIN — ALBUTEROL SULFATE 2.5 MG: 2.5 SOLUTION RESPIRATORY (INHALATION) at 15:27

## 2018-08-15 RX ADMIN — SODIUM CHLORIDE: 4.5 INJECTION, SOLUTION INTRAVENOUS at 05:39

## 2018-08-15 RX ADMIN — APIXABAN 2.5 MG: 5 TABLET, FILM COATED ORAL at 09:25

## 2018-08-15 RX ADMIN — MEMANTINE 10 MG: 5 TABLET ORAL at 21:54

## 2018-08-15 RX ADMIN — LEVOTHYROXINE SODIUM 125 MCG: 125 TABLET ORAL at 05:39

## 2018-08-15 RX ADMIN — INSULIN LISPRO 1 UNITS: 100 INJECTION, SOLUTION INTRAVENOUS; SUBCUTANEOUS at 17:54

## 2018-08-15 RX ADMIN — OXYBUTYNIN CHLORIDE 5 MG: 5 TABLET, FILM COATED, EXTENDED RELEASE ORAL at 09:25

## 2018-08-15 RX ADMIN — ALBUTEROL SULFATE 2.5 MG: 2.5 SOLUTION RESPIRATORY (INHALATION) at 08:35

## 2018-08-15 RX ADMIN — SENNOSIDES 17.2 MG: 8.6 TABLET, FILM COATED ORAL at 09:25

## 2018-08-15 RX ADMIN — APIXABAN 2.5 MG: 5 TABLET, FILM COATED ORAL at 21:54

## 2018-08-15 RX ADMIN — Medication 10 ML: at 21:55

## 2018-08-15 RX ADMIN — MONTELUKAST SODIUM 10 MG: 10 TABLET, COATED ORAL at 09:26

## 2018-08-15 RX ADMIN — SIMVASTATIN 10 MG: 10 TABLET, FILM COATED ORAL at 21:54

## 2018-08-15 RX ADMIN — OXCARBAZEPINE 150 MG: 150 TABLET ORAL at 21:54

## 2018-08-15 ASSESSMENT — PAIN SCALES - GENERAL
PAINLEVEL_OUTOF10: 0

## 2018-08-15 NOTE — PROGRESS NOTES
Progress Note          Subjective/   The patient was seen and examined; she feels well today with no CP, SOB, nausea or vomiting. ROS: No fever or chills. Social: No family at bedside. Objective/     Vitals:    08/15/18 0344 08/15/18 0410 08/15/18 0745 08/15/18 0837   BP:  135/76 137/78    Pulse:  97 95    Resp:  20 22    Temp:  98.1 °F (36.7 °C) 98.2 °F (36.8 °C)    TempSrc:  Oral Oral    SpO2:  93% 95% 98%   Weight: 222 lb 4.8 oz (100.8 kg)      Height:         24HR INTAKE/OUTPUT:      Intake/Output Summary (Last 24 hours) at 08/15/18 0956  Last data filed at 08/15/18 2143   Gross per 24 hour   Intake              960 ml   Output              450 ml   Net              510 ml     General:  NAD, ill-appearing  Neck:  Supple, no mass   Chest:  Bilateral rhonchi, good respiratory effort, good air movement  CV:  Regular, no rub   Abdomen:  NTND, soft, +BS, no hepatosplenomegaly  Extremities:  No peripheral edema    Data/  No results for input(s): WBC, HGB, HCT, MCV, PLT in the last 72 hours. Recent Labs      08/13/18   0753  08/14/18   0740  08/15/18   0631   NA  141  143  144   K  3.7  3.8  4.1   CL  99  100  100   CO2  33*  35*  34*   GLUCOSE  90  94  104*   BUN  26*  29*  34*   CREATININE  2.0*  2.0*  2.0*   LABGLOM  24*  24*  24*   GFRAA  29*  29*  29*       Assessment/     Renal Failure:  Unknown baseline but daughter does not think she has had CKD in the past.  - Lua placed. U Na < 20. Ultrasound normal echogenicity and no hydronephrosis. UA with UTI  - Clinical:  Improved with volume      GI Bleed:  EGD did not show any bleeding. Likely oropharyngeal in etiology. Had been on eliquis      Dementia:  Worsened from baseline. UTI ? Per report had CT scan of head at outside hospital but did have head injury while on Eliquis. Also could be metabolic with renal failure.       Anemia:  Due to acute blood loss.       Acute respiratory failure: PNA +/- Pulmonary edema    Plan/     - Discontinue

## 2018-08-15 NOTE — PROGRESS NOTES
RESPIRATORY THERAPY ASSESSMENT    Name:  [facility] [address] Record Number:  [id number]  Age: 82 y.o. Gender: female  : 1935  Today's Date:  8/15/2018  Room:  [facility]6-    Assessment     Is the patient being admitted for a COPD or Asthma exacerbation? No   (If yes the patient will be seen every 4 hours for the first 24 hours and then reassessed)    Patient Admission Diagnosis      Allergies  Allergies   Allergen Reactions    Codeine Itching    Sulfa Antibiotics Other (See Comments)     Per Pt daughter Alem Malave) \" Not know her reaction to medication\"       Minimum Predicted Vital Capacity:    752          Actual Vital Capacity:      Confused N/A          Pulmonary History:CHF/Pulmonary Edema  Home Oxygen Therapy:  2 liters/min via nasal cannula  Home Respiratory Therapy:Albuterol   Current Respiratory Therapy:  Albuterol Q4WA HHN & PRN  Treatment Type: HHN  Medications: Albuterol    Respiratory Severity Index(RSI)   Patients with orders for inhalation medications, oxygen, or any therapeutic treatment modality will be placed on Respiratory Protocol. They will be assessed with the first treatment and at least every 72 hours thereafter. The following severity scale will be used to determine frequency of treatment intervention.     Smoking History: Pulmonary Disease or Smoking History, Greater than 15 pack year = 2    Social History  Social History   Substance Use Topics    Smoking status: Never Smoker    Smokeless tobacco: Never Used    Alcohol use Not on file       Recent Surgical History: None = 0  Past Surgical History  Past Surgical History:   Procedure Laterality Date    CHOLECYSTECTOMY      EYE SURGERY      HYSTERECTOMY      [place] [address] W/BRNCL ALVEOLAR LAVAGE N/A 2018    BRONCHOSCOPY ALVEOLAR LAVAGE performed by Veronica Mills MD at [address] [facility] ESOPHAGOGASTRODUODENOSCOPY TRANSORAL DIAGNOSTIC N/A 2018    EGD ESOPHAGOGASTRODUODENOSCOPY performed by Fatou Trujillo Grady Okeefe MD at [address] [place] [address]         Level of Consciousness: Disoriented and Uncooperative = 2    Level of Activity: Non weight bearing- transfers bed to chair only = 3    Respiratory Pattern: Regular Pattern; RR 8-20 = 0    Breath Sounds: Absent bilaterally and/or with wheezes = 3    Sputum  Sputum Color: Yellow, Tenacity: Thick, Sputum How Obtained: Cough on request  Cough: Strong, spontaneous, non-productive = 0    Vital Signs   BP (!) 146/78   Pulse 99   Temp 98 °F (36.7 °C) (Oral)   Resp 19   Ht 5' 2\" (1.575 m)   Wt 223 lb 5.2 oz (101.3 kg)   SpO2 96%   BMI 40.85 kg/m²   SPO2 (COPD values may differ): 90-91% on room air or greater than 92% on FiO2 24- 28% = 1    Peak Flow (asthma only): not applicable = 0    RSI: 56-61 = Q6H or QID and Q4HPRN for dyspnea        Plan       Goals: medication delivery, mobilize retained secretions, volume expansion and improve oxygenation    Patient/caregiver was educated on the proper method of use for Respiratory Care Devices:  No: confused      Level of patient/caregiver understanding able to:   [] Verbalize understanding   [] Demonstrate understanding       [] Teach back        [] Needs reinforcement       []  No available caregiver               []  Other:     Response to education:  Poor     Is patient being placed on Home Treatment Regimen? No     Does the patient have everything they need prior to discharge? NA     Comments: Patient's chart and home medications have been reviewed. Plan of Care: Continue as ordered. Electronically signed by Reji Cuellar RCP on 8/15/2018 at 12:56 AM    Respiratory Protocol Guidelines     1. Assessment and treatment by Respiratory Therapy will be initiated for medication and therapeutic interventions upon initiation of aerosolized medication. 2. Physician will be contacted for respiratory rate (RR) greater than 35 breaths per minute.  Therapy will be held for heart rate (HR) greater than 140 beats per minute, pending direction from physician. 3. Bronchodilators will be administered via Metered Dose Inhaler (MDI) with spacer when the following criteria are met:  a. Alert and cooperative     b. HR < 140 bpm  c. RR < 30 bpm                d. Can demonstrate a 23 second inspiratory hold  4. Bronchodilators will be administered via Hand Held Nebulizer ADAMARIS [facility]) to patients when ANY of the following criteria are met  a. Incognizant or uncooperative          b. Patients treated with HHN at Home        c. Unable to demonstrate proper use of MDI with spacer     d. RR > 30 bpm   5. Bronchodilators will be delivered via Metered Dose Inhaler (MDI), HHN, Aerogen to intubated patients on mechanical ventilation. 6. Inhalation medication orders will be delivered and/or substituted as outlined below. Aerosolized Medications Ordering and Administration Guidelines:    1. All Medications will be ordered by a physician, and their frequency and/or modality will be adjusted as defined by the patients Respiratory Severity Index (RSI) score. 2. If the patient does not have documented COPD, consider discontinuing anticholinergics when RSI is less than 9.  3. If the bronchospasm worsens (increased RSI), then the bronchodilator frequency can be increased to a maximum of every 4 hours. If greater than every 4 hours is required, the physician will be contacted. 4. If the bronchospasm improves, the frequency of the bronchodilator can be decreased, based on the patient's RSI, but not less than home treatment regimen frequency. 5. Bronchodilator(s) will be discontinued if patient has a RSI less than 9 and has received no scheduled or as needed treatment for 72  Hrs. Patients Ordered on a Mucolytic Agent:    1. Must always be administered with a bronchodilator.     2. Discontinue if patient experiences worsened bronchospasm, or secretions have lessened to the point that the patient is able to clear them with a cough.    Anti-inflammatory and Combination Medications:    1. If the patient lacks prior history of lung disease, is not using inhaled anti-inflammatory medication at home, and lacks wheezing by examination or by history for at least 24 hours, contact physician for possible discontinuation.

## 2018-08-15 NOTE — PROGRESS NOTES
Hospitalist Progress Note      PCP: No primary care provider on file. Date of Admission: 8/4/2018    Chief Complaint:  Bloody emesis    Subjective:  Patient is up in bed, comfortable, not in distress. Denies any chest pain or shortness of breath. Tolerating diet well. No new event overnight noted. Medications:  Reviewed    Infusion Medications    dextrose       Scheduled Medications    apixaban  2.5 mg Oral BID    doxycycline hyclate  100 mg Oral 2 times per day    levothyroxine  125 mcg Oral Daily    albuterol  2.5 mg Nebulization Q4H WA    pantoprazole  40 mg Oral QAM AC    atropine  1 mg Intravenous Once    sodium chloride flush  10 mL Intravenous 2 times per day    vitamin D  2,000 Units Oral Daily    memantine  10 mg Oral BID    montelukast  10 mg Oral Daily    therapeutic multivitamin-minerals  1 tablet Oral Daily    OXcarbazepine  150 mg Oral BID    oxybutynin  5 mg Oral Daily    senna  2 tablet Oral Daily    simvastatin  10 mg Oral Nightly    insulin lispro  0-6 Units Subcutaneous TID WC    insulin lispro  0-3 Units Subcutaneous Nightly     PRN Meds: albuterol, traMADol, sodium chloride flush, acetaminophen, ondansetron, glucose, dextrose, glucagon (rDNA), dextrose      Intake/Output Summary (Last 24 hours) at 08/15/18 1336  Last data filed at 08/15/18 0845   Gross per 24 hour   Intake                0 ml   Output              450 ml   Net             -450 ml       Physical Exam Performed:    /83   Pulse 105   Temp 98.5 °F (36.9 °C) (Oral)   Resp 18   Ht 5' 2\" (1.575 m)   Wt 222 lb 4.8 oz (100.8 kg)   SpO2 93%   BMI 40.66 kg/m²     General appearance: alert . No apparent distress, appears stated age and cooperative. HEENT: Pupils equal, round. Conjunctivae/corneas clear. Neck: Supple, with full range of motion. No jugular venous distention. Trachea midline. Respiratory:  Normal respiratory effort.  Bibasilar  rales   Cardiovascular: Regular rate and rhythm with on Eliquis, held 2nd to above. Full dose lovenox started on 8/8 per cardio - will switch to eliquis- renally dosed. Stop lovenox. Continue tele     Pneumonia:  Likely due to aspiration given hx of dysphagia. On rocephin for UTI- switch to zosyn on 8/6   Pulm assisting. S/p bronch on 8/8- bronch culture grew MRSA. D/c zosyn and started IV vancomycin on 8/9. D/c vancomycin and switch to PO doxycyline X7 days. Continue aspiration precautions . Acute on chronic diastolic CHF - was on IVF for KAIT. recieved a dose of lasix on 8/7. Monitoring vol status closely with diuresis as needed per nephro.        Hypokalemia : likely due to lasix. Replaced. Monitor and replace as needed       UTI - Acute cystitis present prior to admission. Rocephin initiated at [facility] which was continued. Switched to zosyn on 8/6 as stated above. Has completed treatment for UTI as had  7 days of abx. ARF - w/ elevated BUN/Cr ratio c/w pre-renal azotemia. Nephrology assisting. Improved on gentle IVF, lasix was later resumed. Renal U/S w/out evidence of hydronephrosis. Cr 1.7 today-  Lasix and aldactone stopped. IVF ordered. Creatinine is stable around 2.0, and was in normal range few days ago, nephrology planning to IV fluid and monitor. HyperKalemia - 2nd to above. Will continue to follow serial labs - resolved w/ IVF.      DM2 -  Controlled on home meds. Held. Follow FSBS/SSI.      HyperLipidemia - controlled on home Statin. Continue, w/ f/u and med adjustment w/ PCP      Hypothyroidism : TSH 11.92, FT4 0.5. Increased synthroid from 100 to 125mcg daily. Repeat TFTs in 4-6 weeks. Obesity -  With Body mass index is 40.66 kg/m². Complicating assessment and treatment. Placing patient at risk for multiple co-morbidities as well as early death and contributing to the patient's presentation. Would benefit from weight loss.        Acute encephalopathy/Dementia:  likely  Multifactorial - KAIT, UTI, pneumonia, worsening

## 2018-08-15 NOTE — PROGRESS NOTES
upright for 30-45 minutes after meals    Discharge Recommendations: TBD; pt will likely benefit from continued dysphagia tx after discharge    Plan:  Continued daily Dysphagia treatment with goals per established plan of care. If pt discharges from hospital prior to Speech/Swallowing discharge, this note serves as tx and discharge summary.      Treatment time:  Dysphagia Tx 17\" (15:07-15:24)    Bruno Quinteros, [address], [address] [facility]  Speech-Language Pathologist

## 2018-08-16 VITALS
BODY MASS INDEX: 41.2 KG/M2 | HEIGHT: 62 IN | RESPIRATION RATE: 18 BRPM | TEMPERATURE: 98.8 F | OXYGEN SATURATION: 98 % | WEIGHT: 223.9 LBS | SYSTOLIC BLOOD PRESSURE: 142 MMHG | HEART RATE: 91 BPM | DIASTOLIC BLOOD PRESSURE: 83 MMHG

## 2018-08-16 LAB
ANION GAP SERPL CALCULATED.3IONS-SCNC: 11 MMOL/L (ref 3–16)
BUN BLDV-MCNC: 36 MG/DL (ref 7–20)
CALCIUM SERPL-MCNC: 8.7 MG/DL (ref 8.3–10.6)
CHLORIDE BLD-SCNC: 99 MMOL/L (ref 99–110)
CO2: 33 MMOL/L (ref 21–32)
CREAT SERPL-MCNC: 2 MG/DL (ref 0.6–1.2)
GFR AFRICAN AMERICAN: 29
GFR NON-AFRICAN AMERICAN: 24
GLUCOSE BLD-MCNC: 88 MG/DL (ref 70–99)
GLUCOSE BLD-MCNC: 95 MG/DL (ref 70–99)
GLUCOSE BLD-MCNC: 99 MG/DL (ref 70–99)
PERFORMED ON: NORMAL
PERFORMED ON: NORMAL
POTASSIUM SERPL-SCNC: 3.9 MMOL/L (ref 3.5–5.1)
SODIUM BLD-SCNC: 143 MMOL/L (ref 136–145)

## 2018-08-16 PROCEDURE — 94640 AIRWAY INHALATION TREATMENT: CPT

## 2018-08-16 PROCEDURE — 2700000000 HC OXYGEN THERAPY PER DAY

## 2018-08-16 PROCEDURE — 80048 BASIC METABOLIC PNL TOTAL CA: CPT

## 2018-08-16 PROCEDURE — 6370000000 HC RX 637 (ALT 250 FOR IP): Performed by: INTERNAL MEDICINE

## 2018-08-16 PROCEDURE — 6360000002 HC RX W HCPCS: Performed by: INTERNAL MEDICINE

## 2018-08-16 PROCEDURE — 36415 COLL VENOUS BLD VENIPUNCTURE: CPT

## 2018-08-16 PROCEDURE — 94761 N-INVAS EAR/PLS OXIMETRY MLT: CPT

## 2018-08-16 PROCEDURE — 6370000000 HC RX 637 (ALT 250 FOR IP): Performed by: NURSE PRACTITIONER

## 2018-08-16 RX ADMIN — LEVOTHYROXINE SODIUM 125 MCG: 125 TABLET ORAL at 05:17

## 2018-08-16 RX ADMIN — Medication 1 TABLET: at 09:16

## 2018-08-16 RX ADMIN — PANTOPRAZOLE SODIUM 40 MG: 40 TABLET, DELAYED RELEASE ORAL at 05:17

## 2018-08-16 RX ADMIN — SENNOSIDES 17.2 MG: 8.6 TABLET, FILM COATED ORAL at 09:16

## 2018-08-16 RX ADMIN — MONTELUKAST SODIUM 10 MG: 10 TABLET, COATED ORAL at 09:16

## 2018-08-16 RX ADMIN — VITAMIN D, TAB 1000IU (100/BT) 2000 UNITS: 25 TAB at 09:16

## 2018-08-16 RX ADMIN — MEMANTINE 10 MG: 5 TABLET ORAL at 09:16

## 2018-08-16 RX ADMIN — TRAMADOL HYDROCHLORIDE 50 MG: 50 TABLET, FILM COATED ORAL at 01:22

## 2018-08-16 RX ADMIN — APIXABAN 2.5 MG: 5 TABLET, FILM COATED ORAL at 09:17

## 2018-08-16 RX ADMIN — DOXYCYCLINE HYCLATE 100 MG: 100 TABLET, COATED ORAL at 09:17

## 2018-08-16 RX ADMIN — ALBUTEROL SULFATE 2.5 MG: 2.5 SOLUTION RESPIRATORY (INHALATION) at 07:49

## 2018-08-16 RX ADMIN — OXYBUTYNIN CHLORIDE 5 MG: 5 TABLET, FILM COATED, EXTENDED RELEASE ORAL at 09:16

## 2018-08-16 RX ADMIN — OXCARBAZEPINE 150 MG: 150 TABLET ORAL at 09:16

## 2018-08-16 ASSESSMENT — PAIN SCALES - GENERAL
PAINLEVEL_OUTOF10: 7
PAINLEVEL_OUTOF10: 0
PAINLEVEL_OUTOF10: 0

## 2018-08-16 NOTE — DISCHARGE SUMMARY
Hospital Medicine Discharge Summary    Patient ID: Kerwin Lua      Patient's PCP: No primary care provider on file. Admit Date: 8/4/2018     Discharge Date:   8/16/18    Admitting Physician: Scar Singh MD     Discharge Physician: Jostin Solano MD     Discharge Diagnoses: Active Hospital Problems    Diagnosis Date Noted    Persistent atrial fibrillation ([facility] [address] [I48.1] 08/06/2018     Priority: High    AV block [I44.30] 08/06/2018     Priority: High    Acute respiratory failure with hypoxia (HCC) [J96.01] 08/07/2018    Pulmonary infiltrates [R91.8] 25/97/2192    Diastolic dysfunction [K29.4] 08/07/2018    Pleural effusion, bilateral [J90] 08/07/2018    Morbid obesity with BMI of 40.0-44.9, adult ([facility] [address] [E66.01, Z68.41] 08/07/2018    Fall [W19. XXXA] 08/07/2018    Myonecrosis [M62.89] 08/07/2018    Atelectasis [J98.11] 08/07/2018    Ineffective airway clearance [R06.89] 08/07/2018    Essential hypertension [I10]     Hematemesis [K92.0] 08/03/2018       The patient was seen and examined on day of discharge and this discharge summary is in conjunction with any daily progress note from day of discharge. Hospital Course:     Hematemesis  - upper GI bleed clinically suspected on admission but likely Oropharyngeal source after EGD 4 August w/out GI souce identified, in the setting of chronic anticoagulation w/ Eliquis - held. Protonix gtt initially, changed to PO PPI. No repeat hematemesis for days. Resolved      Anemia - acute oropharyngeal blood loss anemia w/out evidence of active bleeding/hemolysis. Asymptomatic w/out indication for transfusion. Will continue to follow serial labs. Reviewed and documented as above.      Atrial fibrillation - had  pauses on monitor. Cardio consulted. cardizem has been held. Cardiac echo reviewed-  normal EF , GIIIDD. Anticoagulated at baseline on Eliquis, held 2nd to above.  Full dose lovenox started on 8/8 per cardio - will switch to eliquis- renally dosed. Stop lovenox. Continue tele      Pneumonia:  Likely due to aspiration given hx of dysphagia. On rocephin for UTI- switch to zosyn on 8/6   Pulm assisting. S/p bronch on 8/8- bronch culture grew MRSA. D/c zosyn and started IV vancomycin on 8/9. D/c vancomycin and switch to PO doxycyline X7 days. Continue aspiration precautions .         Acute on chronic diastolic CHF - was on IVF for KAIT. recieved a dose of lasix on 8/7. Monitoring vol status closely with diuresis as needed per nephro.         Hypokalemia : likely due to lasix. Replaced. Monitor and replace as needed         UTI - Acute cystitis present prior to admission. Rocephin initiated at [facility] which was continued. Switched to zosyn on 8/6 as stated above. Has completed treatment for UTI as had  7 days of abx.          ARF - w/ elevated BUN/Cr ratio c/w pre-renal azotemia. Nephrology assisting. Improved on gentle IVF, lasix was later resumed. Renal U/S w/out evidence of hydronephrosis. Cr 1.7 today-  Lasix and aldactone stopped. IVF ordered. Creatinine is stable around 2.0, and was in normal range few days ago, nephrology planning to IV fluid and monitor.     HyperKalemia - 2nd to above. Will continue to follow serial labs - resolved w/ IVF.      DM2 -  Controlled on home meds. Held. Follow FSBS/SSI.      HyperLipidemia - controlled on home Statin. Continue, w/ f/u and med adjustment w/ PCP        Hypothyroidism : TSH 11.92, FT4 0.5. Increased synthroid from 100 to 125mcg daily. Repeat TFTs in 4-6 weeks.      Obesity -  With Body mass index is 40.66 kg/m². Complicating assessment and treatment. Placing patient at risk for multiple co-morbidities as well as early death and contributing to the patient's presentation. Would benefit from weight loss.         Acute encephalopathy/Dementia:  likely  Multifactorial - KAIT, UTI, pneumonia, worsening of baseline dementia. CT head was non acute.  Mentation has improved with supportive care and seems at baseline       Dementia - controlled on home Namenda - continued.       Multiple bruising in the setting of anticoagulation and frequent falls. Holding Eliquis for reasons above. Close monitoring with lovenox started     Left knee pain : s/p fall. X ray at outside hospital showed moderate bicompartmental osteoarthritis changes with no acute abn/ fracture. pain control as needed. Physical Exam Performed:     /77   Pulse 102   Temp 98 °F (36.7 °C) (Oral)   Resp 20   Ht 5' 2\" (1.575 m)   Wt 223 lb 14.4 oz (101.6 kg)   SpO2 94%   BMI 40.95 kg/m²       General appearance:  No apparent distress, appears stated age and cooperative. HEENT:  Normal cephalic, atraumatic without obvious deformity. Pupils equal, round, and reactive to light. Extra ocular muscles intact. Conjunctivae/corneas clear. Neck: Supple, with full range of motion. No jugular venous distention. Trachea midline. Respiratory:  Normal respiratory effort. Clear to auscultation, bilaterally without Rales/Wheezes/Rhonchi. Cardiovascular:  Regular rate and rhythm with normal S1/S2 without murmurs, rubs or gallops. Abdomen: Soft, non-tender, non-distended with normal bowel sounds. Musculoskeletal:  No clubbing, cyanosis or edema bilaterally. Full range of motion without deformity. Skin: Skin color, texture, turgor normal.  No rashes or lesions. Neurologic:  Neurovascularly intact without any focal sensory/motor deficits. Cranial nerves: II-XII intact, grossly non-focal.  Psychiatric:  Alert and oriented, thought content appropriate, normal insight  Capillary Refill: Brisk,< 3 seconds   Peripheral Pulses: +2 palpable, equal bilaterally       Labs:  For convenience and continuity at follow-up the following most recent labs are provided:      CBC:    Lab Results   Component Value Date    WBC 9.8 08/09/2018    HGB 10.8 08/09/2018    HCT 32.9 08/09/2018     08/09/2018       Renal:    Lab Results Component Value Date     08/16/2018    K 3.9 08/16/2018    K 4.1 08/07/2018    CL 99 08/16/2018    CO2 33 08/16/2018    BUN 36 08/16/2018    CREATININE 2.0 08/16/2018    CALCIUM 8.7 08/16/2018    PHOS 2.5 08/08/2018         Significant Diagnostic Studies    Radiology:   XR CHEST PORTABLE   Final Result   Worsening ground-glass opacification in the right mid and lower lung zone   with stable opacities at the left lung base. FL MODIFIED BARIUM SWALLOW W VIDEO   Final Result   Transient laryngeal penetration with nectar thick and thin liquids      Please see separate speech pathology report for full discussion of findings   and recommendations. CT HEAD WO CONTRAST   Final Result   No acute intracranial abnormality. XR CHEST STANDARD (2 VW)   Final Result   Pleural effusions which appear new, worse on the left. Worsening bibasilar   opacities. US RENAL COMPLETE   Final Result   No hydronephrosis. XR CHEST PORTABLE   Final Result   Bilateral pneumonia. Consults:     IP CONSULT TO DIETITIAN  IP CONSULT TO GI  IP CONSULT TO NEPHROLOGY  IP CONSULT TO CARDIOLOGY  IP CONSULT TO PULMONOLOGY  IP CONSULT TO CASE MANAGEMENT  IP CONSULT TO PHARMACY    Disposition:  SNF     Condition at Discharge: Stable    Discharge Instructions/Follow-up:  Nephrology as directed    Code Status:  Limited     Activity: activity as tolerated    Diet: cardiac diet and diabetic diet      Discharge Medications:     Current Discharge Medication List           Details   apixaban (ELIQUIS) 2.5 MG TABS tablet Take 1 tablet by mouth 2 times daily  Qty: 60 tablet, Refills: 2      traMADol (ULTRAM) 50 MG tablet Take 1 tablet by mouth every 8 hours as needed for Pain for up to 5 days. [place]: 15 tablet, Refills: 0    Associated Diagnoses: Fall, sequela              Details   famotidine (PEPCID) 20 MG tablet Take 20 mg by mouth Daily      levothyroxine (SYNTHROID) 100 MCG tablet Take 100 mcg by

## 2018-08-16 NOTE — CARE COORDINATION
CASE MANAGEMENT DISCHARGE SUMMARY      [facility]: [facility]    Precertification completed: [address] Notification (HENS) completed: yes    Transportation: [address] up time: 4pm   Ambulance form completed: Yes    Notified: patient aware of discharge plan   Family: family aware of discharge plan   Facility/Agency: [facility] faxed to [phone] also faxed PT rec's and MAR   RN: Merry Harris RN   Phone number for report to facility: [phone] call report to [facility]    Note: Discharging nurse to complete [facility], reconcile [facility]S, and place final copy with patient's discharge packet. RN to ensure that written prescriptions for  Level II medications are sent with patient to the facility as per protocol.

## 2018-08-16 NOTE — CARE COORDINATION
Placed call to [place] [facility] with [facility] and informed her that patient will not be discharged today but will keep them updated on patient status and when patient will be coming to them.

## 2018-08-20 LAB
FINAL REPORT: NORMAL
PRELIMINARY: NORMAL
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
## 2018-09-10 LAB
FUNGUS (MYCOLOGY) CULTURE: ABNORMAL
FUNGUS (MYCOLOGY) CULTURE: ABNORMAL
FUNGUS STAIN: ABNORMAL
ORGANISM: ABNORMAL

## 2018-09-25 ENCOUNTER — TELEPHONE (OUTPATIENT)
Dept: CARDIOLOGY CLINIC | Age: 83
End: 2018-09-25

## 2018-09-25 LAB
AFB CULTURE (MYCOBACTERIA): NORMAL
AFB SMEAR: NORMAL

## 2020-06-29 ENCOUNTER — HOSPITAL ENCOUNTER (INPATIENT)
Age: 85
LOS: 8 days | Discharge: NURSING, REHAB OR LONG-TERM CARE | DRG: 189 | End: 2020-07-07
Attending: INTERNAL MEDICINE | Admitting: INTERNAL MEDICINE
Payer: MEDICARE

## 2020-06-29 ENCOUNTER — APPOINTMENT (OUTPATIENT)
Dept: GENERAL RADIOLOGY | Age: 85
End: 2020-06-29
Payer: MEDICARE

## 2020-06-29 ENCOUNTER — HOSPITAL ENCOUNTER (EMERGENCY)
Age: 85
Discharge: ANOTHER HOSPITAL | End: 2020-06-29
Attending: STUDENT IN AN ORGANIZED HEALTH CARE EDUCATION/TRAINING PROGRAM
Payer: MEDICARE

## 2020-06-29 VITALS
RESPIRATION RATE: 25 BRPM | DIASTOLIC BLOOD PRESSURE: 61 MMHG | SYSTOLIC BLOOD PRESSURE: 137 MMHG | HEART RATE: 41 BPM | OXYGEN SATURATION: 93 %

## 2020-06-29 PROBLEM — R06.02 SOB (SHORTNESS OF BREATH): Status: ACTIVE | Noted: 2020-06-29

## 2020-06-29 PROBLEM — R06.02 SHORTNESS OF BREATH: Status: ACTIVE | Noted: 2020-06-29

## 2020-06-29 LAB
A/G RATIO: 1.3 (ref 1.1–2.2)
ALBUMIN SERPL-MCNC: 4 G/DL (ref 3.4–5)
ALP BLD-CCNC: 65 U/L (ref 40–129)
ALT SERPL-CCNC: 25 U/L (ref 10–40)
ANION GAP SERPL CALCULATED.3IONS-SCNC: 15 MMOL/L (ref 3–16)
AST SERPL-CCNC: 20 U/L (ref 15–37)
BASE EXCESS ARTERIAL: -1.9 MMOL/L (ref -3–3)
BASE EXCESS VENOUS: -3.9 MMOL/L (ref -3–3)
BASOPHILS ABSOLUTE: 0.2 K/UL (ref 0–0.2)
BASOPHILS RELATIVE PERCENT: 1.5 %
BILIRUB SERPL-MCNC: 0.3 MG/DL (ref 0–1)
BUN BLDV-MCNC: 45 MG/DL (ref 7–20)
CALCIUM SERPL-MCNC: 9 MG/DL (ref 8.3–10.6)
CARBOXYHEMOGLOBIN ARTERIAL: 0.2 % (ref 0–1.5)
CARBOXYHEMOGLOBIN: 1.8 % (ref 0–1.5)
CHLORIDE BLD-SCNC: 94 MMOL/L (ref 99–110)
CO2: 25 MMOL/L (ref 21–32)
CREAT SERPL-MCNC: 1.4 MG/DL (ref 0.6–1.2)
EKG ATRIAL RATE: 30 BPM
EKG DIAGNOSIS: NORMAL
EKG Q-T INTERVAL: 484 MS
EKG QRS DURATION: 74 MS
EKG QTC CALCULATION (BAZETT): 389 MS
EKG R AXIS: 92 DEGREES
EKG T AXIS: 11 DEGREES
EKG VENTRICULAR RATE: 39 BPM
EOSINOPHILS ABSOLUTE: 0.1 K/UL (ref 0–0.6)
EOSINOPHILS RELATIVE PERCENT: 0.9 %
GFR AFRICAN AMERICAN: 43
GFR NON-AFRICAN AMERICAN: 36
GLOBULIN: 3.2 G/DL
GLUCOSE BLD-MCNC: 154 MG/DL (ref 70–99)
HCO3 ARTERIAL: 23.5 MMOL/L (ref 21–29)
HCO3 VENOUS: 22 MMOL/L (ref 23–29)
HCT VFR BLD CALC: 39.3 % (ref 36–48)
HEMOGLOBIN, ART, EXTENDED: 13.5 G/DL (ref 12–16)
HEMOGLOBIN: 12.6 G/DL (ref 12–16)
LACTIC ACID: 1.2 MMOL/L (ref 0.4–2)
LYMPHOCYTES ABSOLUTE: 1 K/UL (ref 1–5.1)
LYMPHOCYTES RELATIVE PERCENT: 9.2 %
MAGNESIUM: 2.7 MG/DL (ref 1.8–2.4)
MCH RBC QN AUTO: 29.5 PG (ref 26–34)
MCHC RBC AUTO-ENTMCNC: 32.1 G/DL (ref 31–36)
MCV RBC AUTO: 91.9 FL (ref 80–100)
METHEMOGLOBIN ARTERIAL: 0.3 %
METHEMOGLOBIN VENOUS: 0.3 %
MONOCYTES ABSOLUTE: 0.9 K/UL (ref 0–1.3)
MONOCYTES RELATIVE PERCENT: 7.9 %
NEUTROPHILS ABSOLUTE: 9.1 K/UL (ref 1.7–7.7)
NEUTROPHILS RELATIVE PERCENT: 80.5 %
O2 CONTENT ARTERIAL: 18 ML/DL
O2 CONTENT, VEN: 16 VOL %
O2 SAT, ARTERIAL: 95.2 %
O2 SAT, VEN: 84 %
O2 THERAPY: ABNORMAL
O2 THERAPY: NORMAL
PCO2 ARTERIAL: 42.2 MMHG (ref 35–45)
PCO2, VEN: 43.3 MMHG (ref 40–50)
PDW BLD-RTO: 16.5 % (ref 12.4–15.4)
PH ARTERIAL: 7.36 (ref 7.35–7.45)
PH VENOUS: 7.32 (ref 7.35–7.45)
PLATELET # BLD: 253 K/UL (ref 135–450)
PMV BLD AUTO: 8.8 FL (ref 5–10.5)
PO2 ARTERIAL: 78.4 MMHG (ref 75–108)
PO2, VEN: 51.8 MMHG (ref 25–40)
POTASSIUM REFLEX MAGNESIUM: 3.5 MMOL/L (ref 3.5–5.1)
PRO-BNP: 1972 PG/ML (ref 0–449)
PROCALCITONIN: 0.12 NG/ML (ref 0–0.15)
RBC # BLD: 4.28 M/UL (ref 4–5.2)
SARS-COV-2, NAAT: NOT DETECTED
SODIUM BLD-SCNC: 134 MMOL/L (ref 136–145)
TCO2 ARTERIAL: 24.8 MMOL/L
TCO2 CALC VENOUS: 23 MMOL/L
TOTAL PROTEIN: 7.2 G/DL (ref 6.4–8.2)
TROPONIN: <0.01 NG/ML
TSH REFLEX: 3.96 UIU/ML (ref 0.27–4.2)
WBC # BLD: 11.3 K/UL (ref 4–11)

## 2020-06-29 PROCEDURE — 85025 COMPLETE CBC W/AUTO DIFF WBC: CPT

## 2020-06-29 PROCEDURE — 6370000000 HC RX 637 (ALT 250 FOR IP): Performed by: INTERNAL MEDICINE

## 2020-06-29 PROCEDURE — U0002 COVID-19 LAB TEST NON-CDC: HCPCS

## 2020-06-29 PROCEDURE — 2700000000 HC OXYGEN THERAPY PER DAY

## 2020-06-29 PROCEDURE — 6370000000 HC RX 637 (ALT 250 FOR IP): Performed by: STUDENT IN AN ORGANIZED HEALTH CARE EDUCATION/TRAINING PROGRAM

## 2020-06-29 PROCEDURE — 71045 X-RAY EXAM CHEST 1 VIEW: CPT

## 2020-06-29 PROCEDURE — 96375 TX/PRO/DX INJ NEW DRUG ADDON: CPT

## 2020-06-29 PROCEDURE — 84484 ASSAY OF TROPONIN QUANT: CPT

## 2020-06-29 PROCEDURE — 36600 WITHDRAWAL OF ARTERIAL BLOOD: CPT

## 2020-06-29 PROCEDURE — 94640 AIRWAY INHALATION TREATMENT: CPT

## 2020-06-29 PROCEDURE — 84443 ASSAY THYROID STIM HORMONE: CPT

## 2020-06-29 PROCEDURE — 99223 1ST HOSP IP/OBS HIGH 75: CPT | Performed by: INTERNAL MEDICINE

## 2020-06-29 PROCEDURE — 6360000002 HC RX W HCPCS: Performed by: STUDENT IN AN ORGANIZED HEALTH CARE EDUCATION/TRAINING PROGRAM

## 2020-06-29 PROCEDURE — 99285 EMERGENCY DEPT VISIT HI MDM: CPT

## 2020-06-29 PROCEDURE — 83605 ASSAY OF LACTIC ACID: CPT

## 2020-06-29 PROCEDURE — 94761 N-INVAS EAR/PLS OXIMETRY MLT: CPT

## 2020-06-29 PROCEDURE — 80053 COMPREHEN METABOLIC PANEL: CPT

## 2020-06-29 PROCEDURE — 84145 PROCALCITONIN (PCT): CPT

## 2020-06-29 PROCEDURE — 2580000003 HC RX 258: Performed by: INTERNAL MEDICINE

## 2020-06-29 PROCEDURE — 2060000000 HC ICU INTERMEDIATE R&B

## 2020-06-29 PROCEDURE — 83880 ASSAY OF NATRIURETIC PEPTIDE: CPT

## 2020-06-29 PROCEDURE — 93010 ELECTROCARDIOGRAM REPORT: CPT | Performed by: INTERNAL MEDICINE

## 2020-06-29 PROCEDURE — 6360000002 HC RX W HCPCS: Performed by: INTERNAL MEDICINE

## 2020-06-29 PROCEDURE — 82803 BLOOD GASES ANY COMBINATION: CPT

## 2020-06-29 PROCEDURE — 93005 ELECTROCARDIOGRAM TRACING: CPT | Performed by: STUDENT IN AN ORGANIZED HEALTH CARE EDUCATION/TRAINING PROGRAM

## 2020-06-29 PROCEDURE — 1200000000 HC SEMI PRIVATE

## 2020-06-29 PROCEDURE — 83735 ASSAY OF MAGNESIUM: CPT

## 2020-06-29 PROCEDURE — 36415 COLL VENOUS BLD VENIPUNCTURE: CPT

## 2020-06-29 PROCEDURE — 96365 THER/PROPH/DIAG IV INF INIT: CPT

## 2020-06-29 RX ORDER — SODIUM CHLORIDE 0.9 % (FLUSH) 0.9 %
10 SYRINGE (ML) INJECTION PRN
Status: DISCONTINUED | OUTPATIENT
Start: 2020-06-29 | End: 2020-07-07 | Stop reason: HOSPADM

## 2020-06-29 RX ORDER — PROMETHAZINE HYDROCHLORIDE 25 MG/1
12.5 TABLET ORAL EVERY 6 HOURS PRN
Status: DISCONTINUED | OUTPATIENT
Start: 2020-06-29 | End: 2020-06-29

## 2020-06-29 RX ORDER — POLYETHYLENE GLYCOL 3350 17 G/17G
17 POWDER, FOR SOLUTION ORAL DAILY PRN
Status: DISCONTINUED | OUTPATIENT
Start: 2020-06-29 | End: 2020-06-29

## 2020-06-29 RX ORDER — PREDNISONE 20 MG/1
40 TABLET ORAL DAILY
Status: DISCONTINUED | OUTPATIENT
Start: 2020-07-01 | End: 2020-07-02

## 2020-06-29 RX ORDER — IPRATROPIUM BROMIDE AND ALBUTEROL SULFATE 2.5; .5 MG/3ML; MG/3ML
1 SOLUTION RESPIRATORY (INHALATION) 4 TIMES DAILY
Status: DISCONTINUED | OUTPATIENT
Start: 2020-06-30 | End: 2020-06-29

## 2020-06-29 RX ORDER — IPRATROPIUM BROMIDE AND ALBUTEROL SULFATE 2.5; .5 MG/3ML; MG/3ML
1 SOLUTION RESPIRATORY (INHALATION)
Status: DISCONTINUED | OUTPATIENT
Start: 2020-06-29 | End: 2020-06-29

## 2020-06-29 RX ORDER — ATORVASTATIN CALCIUM 10 MG/1
20 TABLET, FILM COATED ORAL NIGHTLY
Status: DISCONTINUED | OUTPATIENT
Start: 2020-06-29 | End: 2020-07-07 | Stop reason: HOSPADM

## 2020-06-29 RX ORDER — IPRATROPIUM BROMIDE AND ALBUTEROL SULFATE 2.5; .5 MG/3ML; MG/3ML
1 SOLUTION RESPIRATORY (INHALATION)
Status: COMPLETED | OUTPATIENT
Start: 2020-06-29 | End: 2020-06-29

## 2020-06-29 RX ORDER — FUROSEMIDE 40 MG/1
40 TABLET ORAL 2 TIMES DAILY
Status: DISCONTINUED | OUTPATIENT
Start: 2020-06-29 | End: 2020-07-01 | Stop reason: DRUGHIGH

## 2020-06-29 RX ORDER — SODIUM CHLORIDE 0.9 % (FLUSH) 0.9 %
10 SYRINGE (ML) INJECTION EVERY 12 HOURS SCHEDULED
Status: DISCONTINUED | OUTPATIENT
Start: 2020-06-29 | End: 2020-07-07 | Stop reason: HOSPADM

## 2020-06-29 RX ORDER — OXCARBAZEPINE 150 MG/1
150 TABLET, FILM COATED ORAL 2 TIMES DAILY
Status: DISCONTINUED | OUTPATIENT
Start: 2020-06-29 | End: 2020-07-07 | Stop reason: HOSPADM

## 2020-06-29 RX ORDER — MAGNESIUM SULFATE 1 G/100ML
1 INJECTION INTRAVENOUS ONCE
Status: COMPLETED | OUTPATIENT
Start: 2020-06-29 | End: 2020-06-29

## 2020-06-29 RX ORDER — METHYLPREDNISOLONE SODIUM SUCCINATE 40 MG/ML
40 INJECTION, POWDER, LYOPHILIZED, FOR SOLUTION INTRAMUSCULAR; INTRAVENOUS EVERY 12 HOURS
Status: COMPLETED | OUTPATIENT
Start: 2020-06-29 | End: 2020-06-30

## 2020-06-29 RX ORDER — PROMETHAZINE HYDROCHLORIDE 25 MG/1
12.5 TABLET ORAL EVERY 6 HOURS PRN
Status: DISCONTINUED | OUTPATIENT
Start: 2020-06-29 | End: 2020-07-07 | Stop reason: HOSPADM

## 2020-06-29 RX ORDER — ACETAMINOPHEN 325 MG/1
650 TABLET ORAL EVERY 6 HOURS PRN
Status: DISCONTINUED | OUTPATIENT
Start: 2020-06-29 | End: 2020-07-07 | Stop reason: HOSPADM

## 2020-06-29 RX ORDER — ONDANSETRON 2 MG/ML
4 INJECTION INTRAMUSCULAR; INTRAVENOUS EVERY 6 HOURS PRN
Status: DISCONTINUED | OUTPATIENT
Start: 2020-06-29 | End: 2020-07-07 | Stop reason: HOSPADM

## 2020-06-29 RX ORDER — DEXAMETHASONE SODIUM PHOSPHATE 10 MG/ML
10 INJECTION, SOLUTION INTRAMUSCULAR; INTRAVENOUS ONCE
Status: COMPLETED | OUTPATIENT
Start: 2020-06-29 | End: 2020-06-29

## 2020-06-29 RX ORDER — SODIUM CHLORIDE 0.9 % (FLUSH) 0.9 %
10 SYRINGE (ML) INJECTION PRN
Status: DISCONTINUED | OUTPATIENT
Start: 2020-06-29 | End: 2020-06-29

## 2020-06-29 RX ORDER — POLYETHYLENE GLYCOL 3350 17 G/17G
17 POWDER, FOR SOLUTION ORAL DAILY PRN
Status: DISCONTINUED | OUTPATIENT
Start: 2020-06-29 | End: 2020-07-07 | Stop reason: HOSPADM

## 2020-06-29 RX ORDER — ACETAMINOPHEN 650 MG/1
650 SUPPOSITORY RECTAL EVERY 6 HOURS PRN
Status: DISCONTINUED | OUTPATIENT
Start: 2020-06-29 | End: 2020-06-29

## 2020-06-29 RX ORDER — LEVOTHYROXINE SODIUM 0.1 MG/1
100 TABLET ORAL DAILY
Status: DISCONTINUED | OUTPATIENT
Start: 2020-06-29 | End: 2020-07-07 | Stop reason: HOSPADM

## 2020-06-29 RX ORDER — IPRATROPIUM BROMIDE AND ALBUTEROL SULFATE 2.5; .5 MG/3ML; MG/3ML
1 SOLUTION RESPIRATORY (INHALATION) ONCE
Status: COMPLETED | OUTPATIENT
Start: 2020-06-29 | End: 2020-06-29

## 2020-06-29 RX ORDER — POTASSIUM CHLORIDE 20 MEQ/1
20 TABLET, EXTENDED RELEASE ORAL
Status: COMPLETED | OUTPATIENT
Start: 2020-06-29 | End: 2020-06-29

## 2020-06-29 RX ORDER — ALBUTEROL SULFATE 2.5 MG/3ML
2.5 SOLUTION RESPIRATORY (INHALATION) EVERY 6 HOURS PRN
Status: DISCONTINUED | OUTPATIENT
Start: 2020-06-29 | End: 2020-06-29

## 2020-06-29 RX ORDER — ACETAMINOPHEN 325 MG/1
650 TABLET ORAL EVERY 6 HOURS PRN
Status: DISCONTINUED | OUTPATIENT
Start: 2020-06-29 | End: 2020-06-29 | Stop reason: SDUPTHER

## 2020-06-29 RX ORDER — ONDANSETRON 2 MG/ML
4 INJECTION INTRAMUSCULAR; INTRAVENOUS EVERY 6 HOURS PRN
Status: DISCONTINUED | OUTPATIENT
Start: 2020-06-29 | End: 2020-06-29

## 2020-06-29 RX ORDER — FAMOTIDINE 20 MG/1
20 TABLET, FILM COATED ORAL DAILY
Status: DISCONTINUED | OUTPATIENT
Start: 2020-06-29 | End: 2020-07-07 | Stop reason: HOSPADM

## 2020-06-29 RX ADMIN — IPRATROPIUM BROMIDE AND ALBUTEROL SULFATE 1 AMPULE: .5; 3 SOLUTION RESPIRATORY (INHALATION) at 20:55

## 2020-06-29 RX ADMIN — LEVOTHYROXINE SODIUM 100 MCG: 0.1 TABLET ORAL at 11:43

## 2020-06-29 RX ADMIN — MAGNESIUM SULFATE 1 G: 1 INJECTION INTRAVENOUS at 03:30

## 2020-06-29 RX ADMIN — IPRATROPIUM BROMIDE AND ALBUTEROL SULFATE 1 AMPULE: .5; 3 SOLUTION RESPIRATORY (INHALATION) at 03:30

## 2020-06-29 RX ADMIN — METHYLPREDNISOLONE SODIUM SUCCINATE 40 MG: 40 INJECTION, POWDER, FOR SOLUTION INTRAMUSCULAR; INTRAVENOUS at 11:43

## 2020-06-29 RX ADMIN — POTASSIUM CHLORIDE 20 MEQ: 20 TABLET, EXTENDED RELEASE ORAL at 16:19

## 2020-06-29 RX ADMIN — Medication 10 ML: at 11:47

## 2020-06-29 RX ADMIN — FAMOTIDINE 20 MG: 20 TABLET, FILM COATED ORAL at 11:43

## 2020-06-29 RX ADMIN — ATORVASTATIN CALCIUM 20 MG: 10 TABLET, FILM COATED ORAL at 20:39

## 2020-06-29 RX ADMIN — Medication 10 ML: at 20:39

## 2020-06-29 RX ADMIN — IPRATROPIUM BROMIDE AND ALBUTEROL SULFATE 1 AMPULE: .5; 3 SOLUTION RESPIRATORY (INHALATION) at 11:22

## 2020-06-29 RX ADMIN — METHYLPREDNISOLONE SODIUM SUCCINATE 40 MG: 40 INJECTION, POWDER, FOR SOLUTION INTRAMUSCULAR; INTRAVENOUS at 23:41

## 2020-06-29 RX ADMIN — OXCARBAZEPINE 150 MG: 150 TABLET, FILM COATED ORAL at 20:39

## 2020-06-29 RX ADMIN — POTASSIUM CHLORIDE 20 MEQ: 20 TABLET, EXTENDED RELEASE ORAL at 18:17

## 2020-06-29 RX ADMIN — DEXAMETHASONE SODIUM PHOSPHATE 10 MG: 10 INJECTION, SOLUTION INTRAMUSCULAR; INTRAVENOUS at 03:31

## 2020-06-29 RX ADMIN — FUROSEMIDE 40 MG: 40 TABLET ORAL at 17:15

## 2020-06-29 ASSESSMENT — PAIN SCALES - GENERAL: PAINLEVEL_OUTOF10: 0

## 2020-06-29 NOTE — PROGRESS NOTES
Gregg sent at this time to Dr. Denise Dasilva requesting Pulmicort HHN BID to be added to the patient's therapy. Electronically signed by Mary Nunez RCP, RRT, RRT-ACCS on 6/29/2020 at 11:40 AM    [address] [facility] or Facility: [facility] From: Maude Muñiz RE: Eda Horton 1935 RM: 070 Patient just seen and evaluated. DuoNeb HHN administered and patient did state that her breathing felt better. Could we add in Pulmicort HHN BID, given her history of COPD? Thank you! Need Callback: NO CALLBACK REQ RESPIRATORY Jhonnyjerome Alexanderpaige"    [phone]: Dr. Denise Dasilva responded stating the patient is already on IV steroids.

## 2020-06-29 NOTE — ED NOTES
Pt grabbing at staff, threatening to punch staff, BP not done at this time due to pts agitation, other vitals remain unchanged.      [facility], RN  06/29/20 2281

## 2020-06-29 NOTE — CONSULTS
[address] [facility]  [phone]        Reason for Consultation/Chief Complaint: No specific complaint; note history from chart due to dementia    Consulted for hx tachy-katlyn syndrome    History of Present Illness: Sparkle Edmond is a 82 y.o. patient who presented to [facility] [place] due to hypoxia and concern for SOB. Resides at [facility]. She has PMH of Alzheimer's dementia, asthma, chronic afib on eliquis, hx bradycardia and pauses, chronic diastolic CHF, HTN, and GERD. Most recent ECHO 8/6/18 showed normal EF=55-60%; asynchronous septal motion; D-septum; LHV; type III DD; RV mild enlarged; mild MR/AI/TR. Note EP Dr. Ria Zazueta and NP Luis Mccormick saw in 8/18 for bradycardia and pauses. Taken off long-acting cardizem and followed clinically without need for PM. Note TSH=11.92 and needed to be addressed. Now presents with c/o SOB and hypoxia into 80's % per EMS and ER doc. Initially on 15L NC and NRB but now on 5L NC oxygen. Note she has advanced dementia and cannot reliably give history. Note CXR showed mild pulm vascular congestion, cardiomegaly, and trace pleural effusions. Admit EKG showed slow afib 39bpm with possible U waves vs higher grade AV block (prior EKG's 8/18 show afib with slow VR 41bpm and 51bpm). Tele shows junctional bradycardia in 40's vs slow afib. Note BUN/Cr=45/1.4; Mg+ 2.7; Jani < 0.01; XAU=4104; K+ 3.5. Started on IV solumedrol and duonebs. Patient with no complaints of chest pain, SOB, palpitations, dizziness, edema, or orthopnea/PND. I have been asked to provide consultation regarding further management and testing. Past Medical History:   has a past medical history of Alzheimer's dementia ([address] Asthma, Atrial fibrillation ([address] Blood clot in vein, CHF (congestive heart failure) ([address] Dementia ([address] GERD (gastroesophageal reflux disease), Hypertension, Kidney failure, MRSA (methicillin resistant staph aureus) culture positive, and Osteoporosis.     Surgical History:   has a past surgical history that includes Cholecystectomy; eye surgery; Hysterectomy; Thyroidectomy; pr esophagogastroduodenoscopy transoral diagnostic (N/A, 8/4/2018); and pr [facility] w/brncl alveolar lavage (N/A, 8/8/2018). Social History:   reports that she has never smoked. She has never used smokeless tobacco.     Family History:  family history is not on file. Home Medications:  Were reviewed and are listed in nursing record. and/or listed below  Prior to Admission medications    Medication Sig Start Date End Date Taking?  Authorizing Provider   apixaban (ELIQUIS) 2.5 MG TABS tablet Take 1 tablet by mouth 2 times daily 8/14/18  Yes Luis A Fairchild MD   famotidine (PEPCID) 20 MG tablet Take 20 mg by mouth Daily   Yes Historical Provider, MD   levothyroxine (SYNTHROID) 100 MCG tablet Take 100 mcg by mouth Daily   Yes Historical Provider, MD   magnesium oxide (MAG-OX) 400 MG tablet Take 400 mg by mouth 2 times daily   Yes Historical Provider, MD   Multiple Vitamins-Minerals (THERAPEUTIC MULTIVITAMIN-MINERALS) tablet Take 1 tablet by mouth daily   Yes Historical Provider, MD   memantine ER (NAMENDA XR) 28 MG CP24 extended release capsule Take by mouth daily   Yes Historical Provider, MD   OXcarbazepine (TRILEPTAL) 150 MG tablet Take 150 mg by mouth 2 times daily   Yes Historical Provider, MD   Cholecalciferol (VITAMIN D3) 2000 units CAPS Take 1 capsule by mouth daily   Yes Historical Provider, MD   acetaminophen (TYLENOL) 500 MG tablet Take 500 mg by mouth every 6 hours as needed for Pain    Historical Provider, MD   albuterol (PROVENTIL) (2.5 MG/3ML) 0.083% nebulizer solution Take 2.5 mg by nebulization every 6 hours as needed for Wheezing    Historical Provider, MD   montelukast (SINGULAIR) 10 MG tablet Take 10 mg by mouth daily    Historical Provider, MD   oxybutynin (DITROPAN-XL) 5 MG extended release tablet Take 5 mg by mouth daily    Historical Provider, MD   senna (SENOKOT) 8.6 MG tablet Take 2 tablets by mouth daily    Historical Provider, MD   simvastatin (ZOCOR) 10 MG tablet Take 10 mg by mouth nightly    Historical Provider, MD        Allergies:  Codeine and Sulfa antibiotics     Review of Systems:   12 point ROS negative in all areas as listed below except as in [place]  Constitutional, EENT, Cardiovascular, pulmonary, GI, , Musculoskeletal, skin, neurological, hematological, endocrine, Psychiatric    Physical Examination:    Vitals:    06/29/20 1244   BP: (!) 165/72   Pulse: 59   Resp: 22   Temp: 98 °F (36.7 °C)   SpO2: 97%              General Appearance:  Alert, confused and repeating same statements; no distress, appears stated age   Head:  Normocephalic, without obvious abnormality, atraumatic   Eyes:  PERRL, conjunctiva/corneas clear       Nose: Nares normal, no drainage or sinus tenderness   Throat: Lips, mucosa, and tongue normal   Neck: Supple, symmetrical, trachea midline, no adenopathy, thyroid: not enlarged, symmetric, no tenderness/mass/nodules, no carotid bruit or JVD       Lungs:   Soft breath sounds diffusely   Chest Wall:  No tenderness or deformity   Heart:  Bradycardic, S1, S2 normal, no murmur, rub or gallop   Abdomen:   Soft, non-tender, bowel sounds active all four quadrants,  no masses, no organomegaly           Extremities: Extremities normal, atraumatic, no cyanosis or edema   Pulses: 2+ and symmetric   Skin: Skin color, texture, turgor normal, no rashes or lesions   Pysch: Normal mood and affect   Neurologic: Normal gross motor and sensory exam.         Labs  CBC:   Lab Results   Component Value Date    WBC 11.3 06/29/2020    RBC 4.28 06/29/2020    HGB 12.6 06/29/2020    HCT 39.3 06/29/2020    MCV 91.9 06/29/2020    RDW 16.5 06/29/2020     06/29/2020     CMP:    Lab Results   Component Value Date     06/29/2020    K 3.5 06/29/2020    CL 94 06/29/2020    CO2 25 06/29/2020    BUN 45 06/29/2020    CREATININE 1.4 06/29/2020    GFRAA 43 06/29/2020    AGRATIO 1.3 06/29/2020    LABGLOM 36 06/29/2020    GLUCOSE 154 06/29/2020    PROT 7.2 06/29/2020    CALCIUM 9.0 06/29/2020    BILITOT 0.3 06/29/2020    ALKPHOS 65 06/29/2020    AST 20 06/29/2020    ALT 25 06/29/2020     PT/INR:  No results found for: PTINR  Lab Results   Component Value Date    HMHJFUU 051 (H) 08/05/2018    TROPONINI <0.01 06/29/2020       EKG:  I have reviewed EKG with the following interpretation:  Impression:  See HPI    CXR 6/29/20  Impression   Mild pulmonary vascular congestion.       Cardiomegaly.       Trace bilateral pleural effusions.         8/6/18 ECHO Summary   Normal LV systolic function with EF of 55-60%. Asynchronous septal motion noted. D-shaped septum consistent with RV pressure and volume overload. Mild concentric left ventricular hypertrophy. Evidence for type III diastolic dysfunction. Mild mitral annular calcification. Mild biatrial enlargement. The right ventricle is mildly enlarged. Mild mitral, aortic, and tricuspid regurgitation. Systolic pulmonic artery pressure (SPAP) is normal estimated at 32mmHg   (Right atrial pressure of 3mmHg). Assessment:  Carolee Nix is a 82 y.o. patient who presented to [facility] [place] due to hypoxia and concern for SOB. Resides at [facility]. She has PMH of Alzheimer's dementia, asthma, chronic afib on eliquis, hx bradycardia and pauses, chronic diastolic CHF, HTN, and GERD. Most recent ECHO 8/6/18 showed normal EF=55-60%; asynchronous septal motion; D-septum; LHV; type III DD; RV mild enlarged; mild MR/AI/TR. Note EP Dr. Armstrong Patient and NP Ludin Casey saw in 8/18 for bradycardia and pauses. Taken off long-acting cardizem and followed clinically without need for PM. Note TSH=11.92 and needed to be addressed. Now presents with c/o SOB and hypoxia into 80's % per EMS and ER doc. Initially on 15L NC and NRB but now on 5L NC oxygen. Note she has advanced dementia and cannot reliably give history.  Note CXR showed mild pulm vascular congestion, cardiomegaly, and trace pleural effusions. Admit EKG showed slow afib 39bpm with possible U waves vs higher grade AV block (prior EKG's 8/18 show afib with slow VR 41bpm and 51bpm). Tele shows junctional bradycardia in 40's vs slow afib. Note BUN/Cr=45/1.4; EIZ=0570; Mg+ 2.7; Jani < 0.01; KNU=5845; K+ 3.5. Started on IV solumedrol and duonebs. Diagnosis of SOB and marked bradycardia c/w afib with slow ventricular response vs higher grade AV block. Concern also for acute on chronic diastolic CHF given history and OHE=4748. Currently being treated for COPD exacerbation. She is hemodynamically stable currently and difficult to tell if symptoms due to dementia. Recs:  1. I will order ECHO to reevaluate LV function and size, wall motion, and valves for any structural abnormalities. 2. EP consult for bradycardia and hx slow afib. ? Need for  now that she is off AV albertina blocking agents and still significantly bradycardic. 3. Check TSH  4. Replete K+ 3.5 today  5. Give 1x dose lasix 40mg today    Patient Active Problem List   Diagnosis    Hematemesis    Obesity    Persistent atrial fibrillation    AV block    Essential hypertension    Acute respiratory failure with hypoxia (HCC)    Pulmonary infiltrates    Diastolic dysfunction    Pleural effusion, bilateral    Morbid obesity with BMI of 40.0-44.9, adult (HCC)    Myonecrosis    Atelectasis    Ineffective airway clearance    Shortness of breath    SOB (shortness of breath)      Thank you for allowing to us to participate in the care or Stephen Mahmood. Further evaluation will be based upon the patient's clinical course and testing results.

## 2020-06-29 NOTE — H&P
Hospital Medicine History & Physical      PCP: Kiko Byrne MD    Date of Admission: 6/29/2020    Date of Service: Pt seen/examined on 06/29/20  and Admitted to Inpatient with expected LOS greater than two midnights due to medical therapy. Chief Complaint:   SOB and directly admitted from [facility]     History Of Present Illness: Patient is a poor historian and most of the history obtained from daughter Veda Deleon ) at bedside and from chart review    82 y.o. female with PMH of [place]'s dementia, A. fib RVR on Eliquis for anticoagulation; asthma/COPD (uses oxygen as needed ); chronic diastolic CHF ,ECF  resident of [facility] who presented to [facility] ED with C/o shortness of breath. She was requiring up to 6 L/min oxygen by nasal cannula. During her [facility] ED stay she was noted to be bradycardic with heart rate in 30s. Cardiology was consulted and subsequently patient transferred to [facility] for admission for further evaluation and management. Patient awake, alert, oriented x2 , knows her date of birth, age. She denies fever, chills, nausea, vomiting, diarrhea . Complains of intermittent cough but denies any sputum production. [facility] ED course : Patient hemodynamically stable upon arrival to ED. Noted bradycardic with heart rate 41. Requiring up to 6 L/min oxygen by nasal cannula with SPO2 94%. This x-ray showed mild pulmonary vascular congestion, cardiomegaly, trace bilateral pleural effusions. Patient had pacer pads placed and transferred to [facility] for further cardiology evaluation. Upon evaluation by me at [facility], patient seen with daughter Veda Deleon - [address] ) . Patient appears to be SOBOE and unable to speak in full sentences. Ordered for nebulization treatments stat and will also start on IV steroids. Discussed plan of care with patient and daughter.   Will obtain pulmonology consultation along with EP regarding bradycardia (tachybradycardia syndrome)     Past Medical History:      Diagnosis Date    Alzheimer's dementia ([facility] [address]     Asthma     Atrial fibrillation ([facility] [address]     Blood clot in vein     Removal of Blood clot in Left Groin    CHF (congestive heart failure) (HCC)     Dementia (HCC)     GERD (gastroesophageal reflux disease)     Hypertension     Kidney failure     MRSA (methicillin resistant staph aureus) culture positive 08/08/2018    + resp cx    Osteoporosis        Past Surgical History:        Procedure Laterality Date    CHOLECYSTECTOMY      EYE SURGERY      HYSTERECTOMY      ND [address] W/BRNCL ALVEOLAR LAVAGE N/A 8/8/2018    BRONCHOSCOPY ALVEOLAR LAVAGE performed by Becka Islas MD at [address] [facility] ESOPHAGOGASTRODUODENOSCOPY TRANSORAL DIAGNOSTIC N/A 8/4/2018    EGD ESOPHAGOGASTRODUODENOSCOPY performed by Sergey Newton MD at [address] [facility]         Medications Prior to Admission:    Prior to Admission medications    Medication Sig Start Date End Date Taking?  Authorizing Provider   apixaban (ELIQUIS) 2.5 MG TABS tablet Take 1 tablet by mouth 2 times daily 8/14/18   Ankit Renee MD   acetaminophen (TYLENOL) 500 MG tablet Take 500 mg by mouth every 6 hours as needed for Pain    Historical Provider, MD   albuterol (PROVENTIL) (2.5 MG/3ML) 0.083% nebulizer solution Take 2.5 mg by nebulization every 6 hours as needed for Wheezing    Historical Provider, MD   famotidine (PEPCID) 20 MG tablet Take 20 mg by mouth Daily    Historical Provider, MD   levothyroxine (SYNTHROID) 100 MCG tablet Take 100 mcg by mouth Daily    Historical Provider, MD   magnesium oxide (MAG-OX) 400 MG tablet Take 400 mg by mouth 2 times daily    Historical Provider, MD   montelukast (SINGULAIR) 10 MG tablet Take 10 mg by mouth daily    Historical Provider, MD   Multiple Vitamins-Minerals (THERAPEUTIC MULTIVITAMIN-MINERALS) tablet Take 1 tablet by mouth daily    Historical Provider, MD   memantine ER (NAMENDA XR) 28 MG CP24 extended release capsule Take by mouth daily    Historical Provider, MD   OXcarbazepine (TRILEPTAL) 150 MG tablet Take 150 mg by mouth 2 times daily    Historical Provider, MD   oxybutynin (DITROPAN-XL) 5 MG extended release tablet Take 5 mg by mouth daily    Historical Provider, MD   senna (SENOKOT) 8.6 MG tablet Take 2 tablets by mouth daily    Historical Provider, MD   simvastatin (ZOCOR) 10 MG tablet Take 10 mg by mouth nightly    Historical Provider, MD   Cholecalciferol (VITAMIN D3) 2000 units CAPS Take 1 capsule by mouth daily    Historical Provider, MD       Allergies:  Codeine and Sulfa antibiotics    Social History:    The patient currently lives at [address] [place] [address] ([facility], [facility])     TOBACCO:   reports that she has never smoked. She has never used smokeless tobacco.  ETOH:   has no history on file for alcohol. Family History:     Reviewed in detail and negative for DM, CAD, Cancer, CVA. Positive as follows:    No family history on file. REVIEW OF SYSTEMS:   Pertinent positives as noted in the HPI. All other systems reviewed and negative. PHYSICAL EXAM PERFORMED:  BP (!) 143/57   Pulse (!) 41   Temp 98.1 °F (36.7 °C) (Oral)   Resp 22   SpO2 94%     General appearance:  No apparent distress, appears stated age and cooperative. HEENT:  Normal cephalic, atraumatic without obvious deformity. Pupils equal, round, and reactive to light. Extra ocular muscles intact. Conjunctivae/corneas clear. Neck: Supple, with full range of motion. No jugular venous distention. Trachea midline. Respiratory:  Normal respiratory effort. Clear to auscultation, bilaterally without Rales/Wheezes/Rhonchi. Cardiovascular:  Regular rate and rhythm with normal S1/S2 without murmurs, rubs or gallops. Abdomen: Soft, non-tender, non-distended with normal bowel sounds. Musculoskeletal:  No clubbing, cyanosis or edema bilaterally. Full range of motion without deformity.   Skin: Skin color, texture, turgor normal.  No rashes or with RV pressure and volume overload. Grade 3 diastolic dysfunction. Mild biatrial enlargement. - Defer further cardiac testing including repetition of echocardiogram to cardiology. 3.  HypoThyroidism -on Synthroid; continue    4. 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 5. Morbid Obesity -  With There is no height or weight on file to calculate BMI. Complicating assessment and treatment. Placing patient at risk for multiple co-morbidities as well as early death and contributing to the patient's presentation. Counseled on weight loss. DVT Prophylaxis: Lovenox (Eliquis held in anticipation of procedure)   Diet: DIET CARDIAC;  Code Status: Limited    PT/OT Eval Status: Not yet ordered     Dispo -anticipate more than 2 midnight stay in the hospital     Silvia Ramirez MD    The note was completed using Dragon -speech recognition software & EMR  . Every effort was made to ensure accuracy; however, inadvertent computerized transcription errors may be present. Thank you Edson Hines MD for the opportunity to be involved in this patient's care. If you have any questions or concerns please feel free to contact me at [phone].

## 2020-06-29 NOTE — ED PROVIDER NOTES
Primary Care Physician: Caleb Dowd MD   Attending Physician: Courtney Cowart MD     History   Chief Complaint   Patient presents with    Shortness of Breath     Pt here from [facility] for SOB, pt on NRB 15 lpm upon arrival, pt confused, pt has COPD and anxiety, had duoneb approx 30 min ago        HPI   Carolee Nix is a 82 y.o. female history of Alzheimer's, A. fib on Eliquis, congestive heart failure, hypertension, chronic kidney disease resident of a skilled nursing facility who presents this morning brought by EMS with concerns or complaint of shortness of breath. Patient is a bit confused and unable to provide history. EMS crew indicates that she has not been compliant with her oxygen. She was satting in the low 80s and was placed on nonrebreather. Patient is not a good historian and would not provide any additional information. Past Medical History:   Diagnosis Date    Alzheimer's dementia ([facility]     Asthma     Atrial fibrillation ([address]     Blood clot in vein     Removal of Blood clot in Left Groin    CHF (congestive heart failure) (HCC)     Dementia (HCC)     GERD (gastroesophageal reflux disease)     Hypertension     Kidney failure     MRSA (methicillin resistant staph aureus) culture positive 08/08/2018    + resp cx    Osteoporosis         Past Surgical History:   Procedure Laterality Date    CHOLECYSTECTOMY      EYE SURGERY      HYSTERECTOMY      [place] [address] W/BRNCL ALVEOLAR LAVAGE N/A 8/8/2018    BRONCHOSCOPY ALVEOLAR LAVAGE performed by Cynthia Barbosa MD at [address] [place] [address] Av ESOPHAGOGASTRODUODENOSCOPY TRANSORAL DIAGNOSTIC N/A 8/4/2018    EGD ESOPHAGOGASTRODUODENOSCOPY performed by Gladys Huang MD at [facility]          History reviewed. No pertinent family history. Social History     Socioeconomic History    Marital status:       Spouse name: None    Number of children: None    Years of education: None    Highest education level: None   Occupational History    None   Social Needs    Financial resource strain: None    Food insecurity     Worry: None     Inability: None    Transportation needs     Medical: None     Non-medical: None   Tobacco Use    Smoking status: Never Smoker    Smokeless tobacco: Never Used   Substance and Sexual Activity    Alcohol use: None    Drug use: None    Sexual activity: None   Lifestyle    Physical activity     Days per week: None     Minutes per session: None    Stress: None   Relationships    Social connections     Talks on phone: None     Gets together: None     Attends Hindu service: None     Active member of club or organization: None     Attends meetings of clubs or organizations: None     Relationship status: None    Intimate partner violence     Fear of current or ex partner: None     Emotionally abused: None     Physically abused: None     Forced sexual activity: None   Other Topics Concern    None   Social History Narrative    None        Review of Systems   10 total systems reviewed but unable to obtain secondary to mental status    Physical Exam   /70   Pulse (!) 37   Resp 22   SpO2 96%      CONSTITUTIONAL: Elderly looking woman who appears to be in respiratory distress  HEAD: atraumatic, normocephalic   EYES: PERRL, No injection, discharge or scleral icterus. ENT: Moist mucous membranes. NECK: Normal ROM, NO LAD   CARDIOVASCULAR: Regular rate and rhythm. No murmurs or gallop. PULMONARY/CHEST: Wheezing bilaterally and requiring oxygen but airway patent  ABDOMEN: Soft, Non-distended and non-tender, without guarding or rebound. SKIN: Acyanotic, warm, dry   MUSCULOSKELETAL: No swelling, tenderness or deformity   NEUROLOGICAL: Awake and alert but confused. Pulses intact. Grossly nonfocal   Nursing note and vitals reviewed.      ED Course & Medical Decision Making   Medications   dexamethasone (PF) (DECADRON) injection 10 mg (10 mg Intravenous Given 6/29/20 0331)   magnesium sulfate 1 g in dextrose 5% 100 mL IVPB (0 g Intravenous Stopped 6/29/20 0415)   ipratropium-albuterol (DUONEB) nebulizer solution 1 ampule (1 ampule Inhalation Given 6/29/20 0330)      Labs Reviewed   CBC WITH AUTO DIFFERENTIAL - Abnormal; Notable for the following components:       Result Value    WBC 11.3 (*)     RDW 16.5 (*)     Neutrophils Absolute 9.1 (*)     All other components within normal limits    Narrative:     Performed at:  [facility],  [facility]   Phone [phone]   BLOOD GAS, VENOUS - Abnormal; Notable for the following components:    pH, Ciro 7.324 (*)     pO2, Ciro 51.8 (*)     HCO3, Venous 22.0 (*)     Base Excess, Ciro -3.9 (*)     Carboxyhemoglobin 1.8 (*)     All other components within normal limits    Narrative:     Performed at:  [place] [address],  [facility]   Phone [phone]   COMPREHENSIVE METABOLIC PANEL W/ REFLEX TO MG FOR LOW K - Abnormal; Notable for the following components:    Sodium 134 (*)     Chloride 94 (*)     Glucose 154 (*)     BUN 45 (*)     CREATININE 1.4 (*)     GFR Non- 36 (*)     GFR  43 (*)     All other components within normal limits    Narrative:     Performed at:  [facility]) [place] [address],  [facility]   Phone [phone]   MAGNESIUM - Abnormal; Notable for the following components:    Magnesium 2.70 (*)     All other components within normal limits    Narrative:     Performed at:  [facility],  [facility]   Phone [phone]   LACTIC ACID, PLASMA    Narrative:     Performed at:  [place] [address],  [facility]   Phone [phone]   BLOOD GAS, ARTERIAL    Narrative:     Performed at:  [facility]  [facility],  [place], [facility]   Phone [phone]   PROCALCITONIN    Narrative:     Performed at:  [facility],  [place], [facility]   Phone [phone]    Narrative:     Performed at:  [facility],  [place], [facility]   Phone [phone]   TROPONIN    Narrative:     Performed at:  [facility]) [facility],  [place], [facility]   Phone [phone]   URINE RT REFLEX TO CULTURE      XR CHEST PORTABLE   Final Result   Mild pulmonary vascular congestion. Cardiomegaly. Trace bilateral pleural effusions. EKG INTERPRETATION:  EKG by my preliminary interpretation shows A. fib with bradycardia low heart rate with a rate of 39, normal axis, normal intervals, with no ST changes indicative of ischemia at this time. PROCEDURES:   Procedures    ASSESSMENT AND PLAN:  Elizabeth Robin is a 82 y.o. female who presents this morning brought by EMS with complaints of respiratory distress. On exam patient is an elderly looking woman, who appears to be in respiratory distress requiring oxygen, confused which is her baseline, wheezing bilaterally but hemodynamic stable. He was also bradycardic in the high 30s and review of her EKGs from the previous showed that she has been bradycardic in the 50s. Given her presentation I was concerned for cardiopulmonary and infections as the reason for her symptoms. Obtain labs including troponin, CBC, CMP, COVID-19, procalcitonin and ABG. Labs were all unremarkable. X-ray was also obtained and showed no signs of pneumonia or pneumothorax. I have a EKG shows sinus bradycardia with no third-degree heart block with rate ranging from high 30s to low 50s.   At this time, I believe that her shortness of breath is probably secondary to COPD exacerbation it could also be secondary to heart failure from a cardiac arrhythmias. He was given a dose of magnesium, steroids, and breathing treatments. At this time, I do not think that she needed antibiotic treatment. As of her low heart rate I did contact cardiology who recommended that patient be transferred to [facility] where she might have a permanent pacer placed. Patient is currently in the emergency room pending transferred to [address] [place] [address] Pl:  1. Paroxysmal atrial fibrillation (HCC)    2. COPD exacerbation ([address]    3. Hypoxia        DISPOSITION    Transfer to Olena Goodrich MD (electronically signed)  6/29/2020  _________________________________________________________________________________________  Amount and/or Complexity of Data Reviewed:  Clinical lab tests: ordered and reviewed   Tests in the radiology section of CPT®: ordered and reviewed   Tests in the medicine section of CPT®: ordered and reviewed   Decide to obtain previous medical records or to obtain history from someone other than the patient: yes  Obtain history from someone other than the patient: yes  Review and summarize past medical records:yes  I looked up the patient in our electronic medical record:yes  Discuss the patient with other providers:yes  Independent visualization of images, tracings, or specimens:yes  Risk of Complications, Morbidity, and/or Mortality:Moderate  Presenting problems: moderate Management options: moderate     Critical Care Attestation   Total critical care time: 35 minutes minimum   Critical care time does not include separately billable procedures and treating other patients. Critical care was necessary to treat or prevent imminent or life-threatening deterioration of the following conditions: CODP hypoxemia. Patient provided with supplemental oxygen.  Case discussed with consultants.   _____________________________________________________________________________________  This record is transcribed utilizing voice recognition technology. There are inherent limitations in this technology. In addition, there may be limitations in editing of this report. If there are any discrepancies, please contact me directly.         Pauline Lunsford MD  06/29/20 5828

## 2020-06-29 NOTE — ED NOTES
Odalys Karin, pts daughter, updated on plan to transfer to [facility] [place].       Joni Paige RN  06/29/20 1758

## 2020-06-29 NOTE — ED NOTES
HR continues to be in 30s, pads in place. Pt continues to remove monitoring wires, monitoring placed back on patient again. Will continue to monitor and assess.       Fela Holcomb RN  06/29/20 7418

## 2020-06-29 NOTE — ACP (ADVANCE CARE PLANNING)
Advanced Care Planning Note. Purpose of Encounter: Advanced care planning in light of Acute on chronic  Respiratory Failure with Hypoxia ; Chronic Diastolic CHF , Afib with Tachybrady Syndrome ; Alzheimer's Dementia   Parties in attendance:   Patient Jordan Huffman), Dr. Serg Mcdaniels    (myself) Daughter Lalo Members [facility] )   Decisional Capacity: No      Subjective/Patient Story: Patient/Family understands that her medical condition continues to deteriorate. She/he no longer wishes further curative interventions, including hospitalization, if there is no long term benefit : No   Patient/Family wishes to continue further interventions, patient will re-evaluate at a later time: Yes      Subjective and Objective Medical history :   Patient seen and examined  General appearance:  No apparent distress, appears stated age and cooperative. On oxygen by nasal cannula HF NC 6 L/min   HEENT:  Normal cephalic, atraumatic without obvious deformity. Pupils equal, round, and reactive to light. Extra ocular muscles intact. Conjunctivae/corneas clear. Neck: Supple, with full range of motion. No jugular venous distention. Trachea midline. Respiratory:   Increased respiratory effort. Diminished breath sounds at bases   cardiovascular:  Regular rate and rhythm with normal S1/S2 without murmurs, rubs or gallops. Abdomen: Soft, non-tender, non-distended with normal bowel sounds. Musculoskeletal:  No clubbing, cyanosis or edema bilaterally. Full range of motion without deformity. Skin: Skin color, texture, turgor normal.  No rashes or lesions. Neurologic:  Neurovascularly intact without any focal sensory/motor deficits.  Cranial nerves: II-XII intact, grossly non-focal.  Psychiatric:  Alert and oriented x 2, thought content appropriate, normal insight       Goals of Care Determinations: Patient/Family wishes to focus Limited x 1 (Meds Only) as Per henrique (POA) at

## 2020-06-29 NOTE — CONSULTS
INPATIENT PULMONARY CRITICAL CARE CONSULT NOTE      Chief Complaint/Referring Provider:  Patient is being seen at the request of Dr. Courtney Klein for a consultation for acute hypoxic respiratory failure     Presenting HPI: Arville Duane is a pleasant, obese 79-year-old female with dementia who resides at [facility]. She tells me that she has 5 children, multiple grandchildren and great-grandchildren. She is a . She is a lifelong non-smoker, does not drink alcohol. The patient was confused, was unable to give me detailed history. She felt that she lived here, was unable to name the place where she lives. History was essentially obtained from EMR and nursing. The patient was admitted through the ER at [facility] on 6/29/2020. She has a history of Alzheimer's disease, atrial fibrillation on chronic anticoagulation, CHF, hypertension, CKD. She was brought in by EMS for shortness of breath, was confused. Her saturation was in the low 80s and she was placed on nonrebreather. She had wheezing, was felt to have COPD exacerbation, hypoxia and PAF. She had profound bradycardia, therefore she was admitted to telemetry at [facility]. The patient says she cannot lay flat, she says her breathing has been rough \"on and off\". She denies fever, chills, sweats, productive cough. She denies prior asthma. She says she grew up in [place]. She denied cardiac history but reportedly has A. fib with RVR and diastolic CHF. We are consulted for acute hypoxemic respiratory failure. The patient was seen by our service in August 2018 when she presented with an upper GI bleed, developed hypoxemia due to presumed aspiration pneumonia and acute diastolic CHF. She had KAIT, falls with bruising as she was on anticoagulation for atrial fibrillation. She also had acute encephalopathy and baseline dementia. She was treated empirically with doxycycline, underwent bronchoscopy with MRSA and Candida albicans isolated.   No family was present at bedside. Review of systems was not reliable. She was evaluated by Dr. Elma Ortiz from cardiology who recommended EP consultation, TSH, repleting potassium. Patient Active Problem List    Diagnosis Date Noted    Persistent atrial fibrillation 08/06/2018     Priority: High    AV block 08/06/2018     Priority: High    Shortness of breath 06/29/2020    SOB (shortness of breath) 06/29/2020    Acute respiratory failure with hypoxia ([facility] 08/07/2018    Pulmonary infiltrates 68/68/2095    Diastolic dysfunction 44/26/9346    Pleural effusion, bilateral 08/07/2018    Morbid obesity with BMI of 40.0-44.9, adult ([address] 08/07/2018    Myonecrosis 08/07/2018    Atelectasis 08/07/2018    Ineffective airway clearance 08/07/2018    Essential hypertension     Obesity 08/04/2018    Hematemesis 08/03/2018       Past Medical History:   Diagnosis Date    Alzheimer's dementia ([facility]     Asthma     Atrial fibrillation ([facility]     Blood clot in vein     Removal of Blood clot in Left Groin    CHF (congestive heart failure) (HCC)     Dementia (HCC)     GERD (gastroesophageal reflux disease)     Hypertension     Kidney failure     MRSA (methicillin resistant staph aureus) culture positive 08/08/2018    + resp cx    Osteoporosis         Past Surgical History:   Procedure Laterality Date    CHOLECYSTECTOMY      EYE SURGERY      HYSTERECTOMY      [place] [address] W/BRNCL ALVEOLAR LAVAGE N/A 8/8/2018    BRONCHOSCOPY ALVEOLAR LAVAGE performed by Cynthia Barbosa MD at [address] [facility] ESOPHAGOGASTRODUODENOSCOPY TRANSORAL DIAGNOSTIC N/A 8/4/2018    EGD ESOPHAGOGASTRODUODENOSCOPY performed by Gladys Huang MD at [address] [facility]          No family history on file.      Social History     Tobacco Use    Smoking status: Never Smoker    Smokeless tobacco: Never Used   Substance Use Topics    Alcohol use: Not on file        Allergies   Allergen Reactions    Codeine Itching  Sulfa Antibiotics Other (See Comments)     Per Pt daughter Marcelino Hadley) \" Not know her reaction to medication\"       Physical Exam:  Blood pressure (!) 143/57, pulse (!) 46, temperature 98.1 °F (36.7 °C), temperature source Oral, resp. rate 22, SpO2 94 %.'   Constitutional: Pleasant, morbidly obese, visibly tachypneic with audible wheezing. Unable to speak in full sentences   HENT:  Oropharynx is clear and dry, she has upper and lower dentures. She has a class III/IV airway. Eyes:  Conjunctivae are normal. Pupils equal, round, and reactive to light. No scleral icterus. Neck: Short and large neck, no JVD. No tracheal deviation present. No obvious thyroid mass. Cardiovascular: Sinus bradycardia, regular rhythm, distant heart sounds. No right ventricular heave. No lower extremity edema. Pulmonary/Chest: Bilateral wheezes and rhonchi. No rales. Mild accessory muscle usage with moving in bed, no stridor. Abdominal: Soft, fatty, nontender. Bowel sounds present. No distension or tenderness. Musculoskeletal: No cyanosis. No clubbing. No obvious joint deformity. Lymphadenopathy: No cervical or supraclavicular adenopathy. Skin: Skin is warm and dry. No rash or nodules on the exposed extremities. Psychiatric: Normal mood and affect. Behavior is normal.  No anxiety. Neurologic: Alert, awake and not oriented. PERRL. Speech fluent. No obvious neurologic deficit, detailed exam not performed. Results:  CBC:   Recent Labs     06/29/20  0324   WBC 11.3*   HGB 12.6   HCT 39.3   MCV 91.9        BMP:   Recent Labs     06/29/20  0456   *   K 3.5   CL 94*   CO2 25   BUN 45*   CREATININE 1.4*     LIVER PROFILE:   Recent Labs     06/29/20  0456   AST 20   ALT 25   BILITOT 0.3   ALKPHOS 65       Imaging:  I have reviewed radiology images personally.     No orders to display     Xr Chest Portable    Result Date: 6/29/2020  EXAMINATION: ONE XRAY VIEW OF THE CHEST 6/29/2020 3:56 am COMPARISON: None. HISTORY: ORDERING SYSTEM PROVIDED HISTORY: R/O PNA TECHNOLOGIST PROVIDED HISTORY: Reason for exam:->R/O PNA Reason for Exam: sob Type of Exam: Unknown Additional signs and symptoms: Shortness of Breath (Pt here from [facility] for SOB, pt on NRB 15 lpm upon arrival, pt confused, pt has COPD and anxiety, had duoneb approx 30 min ago, pt unable to gi\ve hx FINDINGS: The cardiomediastinal silhouette is enlarged. There is mild pulmonary vascular congestion. There are trace bilateral pleural effusions. There is no pneumothorax. There is no acute osseous abnormality. Mild pulmonary vascular congestion. Cardiomegaly. Trace bilateral pleural effusions. Echocardiogram 8/4/2018:  Normal LV systolic function with EF of 55-60%. Asynchronous septal motion noted. D-shaped septum consistent with RV pressure and volume overload. Mild concentric left ventricular hypertrophy. Evidence for type III diastolic dysfunction. Mild mitral annular calcification. Mild biatrial enlargement. The right ventricle is mildly enlarged. Mild mitral, aortic, and tricuspid regurgitation. Systolic pulmonic artery pressure (SPAP) is normal estimated at 32mmHg (Right atrial pressure of 3mmHg). PFT:  None in EMR      Assessment and plan:  Acute respiratory failure, hypoxemic. Oxygen to keep SaO2 >90%. The patient is orthopneic, as per nursing appears cyanosed when attempted to lay flat, appears to struggle to breathe although saturation does not decrease significantly. CHF, pleural effusions. Suspect this is the etiology of her hypoxemic respiratory failure. Would diurese aggressively for 48 hours, keep a watch on clinical parameters, electrolytes, renal function. ? Obstructive airways disease. She has loud wheezing. She could have underlying obstructive disease, though she has been a non-smoker. For now I would agree with steroids and nebulized bronchodilator treatment. Leukocytosis. Mild. Afebrile.   Procalcitonin normal  CKD. Renal function stable. Atrial fibrillation. Profoundly bradycardic at present, EP consulted. Has been known to have bradycardia with sinus pauses in the past..  HTN, HLD, osteoporosis. Per IM  Obesity. Her body habitus could suggest underlying ARAMIS, she is unlikely to be compliant with PAP treatment. DVT prophylaxis. Lovenox    I have examined the patient, reviewed labs, images and medical records. Discussed with nursing and the patient, although the patient was unable to comprehend my recommendation. We will follow with you, thank you for the consultation. Called patient's daughter and SCOTT Ibarra Jaci at [phone]. She mentions that she thinks the patient has asthma. She uses oxygen as needed. Prior to COVID-19, fusing her mother regularly. She has had CHF in the past.  Vanesaluc Flores was updated about Ms. Elena's status, told to call back with any questions.         Electronically signed by:  Vu Haddad MD    6/29/2020    11:18 AM.

## 2020-06-29 NOTE — ED NOTES
Cardiology consult placed to [address] [place] [address] at 0356. Dr. Pagan to return call. Consult complete at 0358.      Yudy Edwards  06/29/20 0356       Yudy Edwards  06/29/20 0351 Fall Risk

## 2020-06-29 NOTE — PROGRESS NOTES
RESPIRATORY THERAPY ASSESSMENT    Name:  [address] [facility] [address] Record Number:  [id number]  Age: 82 y.o. Gender: female  : 1935  Today's Date:  2020  Room:  5779/2511-26    Assessment     Is the patient being admitted for a COPD or Asthma exacerbation? No   (If yes the patient will be seen every 4 hours for the first 24 hours and then reassessed)    Patient Admission Diagnosis      Allergies  Allergies   Allergen Reactions    Codeine Itching    Sulfa Antibiotics Other (See Comments)     Per Pt daughter Wang Carlin) \" Not know her reaction to medication\"       Minimum Predicted Vital Capacity:     N/A          Actual Vital Capacity:      N/A              Pulmonary History:COPD, Asthma and CHF/Pulmonary Edema  Home Oxygen Therapy:  2-3 liters/min via nasal cannula PRN  Home Respiratory Therapy:Albuterol HHN Q6H PRN   Current Respiratory Therapy:  DuoNeb HHN Q4H WA and Albuterol HHN Q6H PRN  Treatment Type: HHN  Medications: Albuterol/Ipratropium    Respiratory Severity Index(RSI)   Patients with orders for inhalation medications, oxygen, or any therapeutic treatment modality will be placed on Respiratory Protocol. They will be assessed with the first treatment and at least every 72 hours thereafter. The following severity scale will be used to determine frequency of treatment intervention.     Smoking History: Mild Exacerbation = 3    Social History  Social History     Tobacco Use    Smoking status: Never Smoker    Smokeless tobacco: Never Used   Substance Use Topics    Alcohol use: Not on file    Drug use: Not on file       Recent Surgical History: None = 0  Past Surgical History  Past Surgical History:   Procedure Laterality Date    CHOLECYSTECTOMY      EYE SURGERY      HYSTERECTOMY      [place] [address] W/BRNCL ALVEOLAR LAVAGE N/A 2018    BRONCHOSCOPY ALVEOLAR LAVAGE performed by Lucy Healy MD at [address] ESOPHAGOGASTRODUODENOSCOPY TRANSORAL DIAGNOSTIC N/A 2018 EGD ESOPHAGOGASTRODUODENOSCOPY performed by Krunal Crowe MD at [facility]         Level of Consciousness: Alert, Follows Commands but Disoriented = 1    Level of Activity: Mostly sedentary, minimal walking = 2    Respiratory Pattern: Use of accessory muscles;prolonged expiration; or RR greater than 30 = 3    Breath Sounds: Absent bilaterally and/or with wheezes = 3    Sputum   ,  ,    Cough: Strong, spontaneous, non-productive = 0    Vital Signs   BP (!) 143/57   Pulse (!) 46   Temp 98.1 °F (36.7 °C) (Oral)   Resp 22   SpO2 98%   SPO2 (COPD values may differ): 86-87% on room air or greater than 92% on FiO2 35- 50% = 3    Peak Flow (asthma only): not applicable = 0    RSI: 26-19 = Q4WA (every four hours while awake) and Q4hrs PRN        Plan       Goals: medication delivery and improve oxygenation    Patient/caregiver was educated on the proper method of use for Respiratory Care Devices:  Yes      Level of patient/caregiver understanding able to:   ? Verbalize understanding   ? Demonstrate understanding       ? Teach back        ? Needs reinforcement       ? No available caregiver               ? Other:     Response to education:  Excellent     Is patient being placed on Home Treatment Regimen? No     Does the patient have everything they need prior to discharge? NA     Comments: Patient assessed and chart reviewed. Discussed with patient pulmonary disease history, smoking history, surgical history, home oxygen usage, and home respiratory medications. Treatment plan discussed with patient and patient's daughter. Both are in agreement. Plan of Care: DuoNeb HHN Q4H WA with Albuterol HHN Q6H PRN. Add Pulmicort HHN BID with MD approval. Re-evaluate in three days. Electronically signed by Neeraj Diaz RCP, RRT, RRT-ACCS on 6/29/2020 at 11:32 AM    Respiratory Protocol Guidelines     1.  Assessment and treatment by Respiratory Therapy will be initiated for medication and therapeutic interventions upon initiation of aerosolized medication. 2. Physician will be contacted for respiratory rate (RR) greater than 35 breaths per minute. Therapy will be held for heart rate (HR) greater than 140 beats per minute, pending direction from physician. 3. Bronchodilators will be administered via Metered Dose Inhaler (MDI) with spacer when the following criteria are met:  a. Alert and cooperative     b. HR < 140 bpm  c. RR < 30 bpm                d. Can demonstrate a 2-3 second inspiratory hold  4. Bronchodilators will be administered via Hand Held Nebulizer [facility]) to patients when ANY of the following criteria are met  a. Incognizant or uncooperative          b. Patients treated with HHN at Home        c. Unable to demonstrate proper use of MDI with spacer     d. RR > 30 bpm   5. Bronchodilators will be delivered via Metered Dose Inhaler (MDI), HHN, Aerogen to intubated patients on mechanical ventilation. 6. Inhalation medication orders will be delivered and/or substituted as outlined below. Aerosolized Medications Ordering and Administration Guidelines:    1. All Medications will be ordered by a physician, and their frequency and/or modality will be adjusted as defined by the patients Respiratory Severity Index (RSI) score. 2. If the patient does not have documented COPD, consider discontinuing anticholinergics when RSI is less than 9.  3. If the bronchospasm worsens (increased RSI), then the bronchodilator frequency can be increased to a maximum of every 4 hours. If greater than every 4 hours is required, the physician will be contacted. 4. If the bronchospasm improves, the frequency of the bronchodilator can be decreased, based on the patient's RSI, but not less than home treatment regimen frequency. 5. Bronchodilator(s) will be discontinued if patient has a RSI less than 9 and has received no scheduled or as needed treatment for 72  Hrs.     Patients Ordered on a

## 2020-06-30 LAB
ANION GAP SERPL CALCULATED.3IONS-SCNC: 13 MMOL/L (ref 3–16)
BUN BLDV-MCNC: 40 MG/DL (ref 7–20)
CALCIUM SERPL-MCNC: 9.2 MG/DL (ref 8.3–10.6)
CHLORIDE BLD-SCNC: 97 MMOL/L (ref 99–110)
CO2: 26 MMOL/L (ref 21–32)
CREAT SERPL-MCNC: 1.1 MG/DL (ref 0.6–1.2)
GFR AFRICAN AMERICAN: 57
GFR NON-AFRICAN AMERICAN: 47
GLUCOSE BLD-MCNC: 187 MG/DL (ref 70–99)
HCT VFR BLD CALC: 39.6 % (ref 36–48)
HEMOGLOBIN: 12.9 G/DL (ref 12–16)
LV EF: 58 %
LVEF MODALITY: NORMAL
MCH RBC QN AUTO: 29.8 PG (ref 26–34)
MCHC RBC AUTO-ENTMCNC: 32.6 G/DL (ref 31–36)
MCV RBC AUTO: 91.2 FL (ref 80–100)
PDW BLD-RTO: 16.1 % (ref 12.4–15.4)
PLATELET # BLD: 248 K/UL (ref 135–450)
PMV BLD AUTO: 8.8 FL (ref 5–10.5)
POTASSIUM REFLEX MAGNESIUM: 4.2 MMOL/L (ref 3.5–5.1)
RBC # BLD: 4.34 M/UL (ref 4–5.2)
SODIUM BLD-SCNC: 136 MMOL/L (ref 136–145)
WBC # BLD: 10 K/UL (ref 4–11)

## 2020-06-30 PROCEDURE — 2580000003 HC RX 258: Performed by: INTERNAL MEDICINE

## 2020-06-30 PROCEDURE — 6370000000 HC RX 637 (ALT 250 FOR IP): Performed by: INTERNAL MEDICINE

## 2020-06-30 PROCEDURE — 97165 OT EVAL LOW COMPLEX 30 MIN: CPT

## 2020-06-30 PROCEDURE — 97110 THERAPEUTIC EXERCISES: CPT

## 2020-06-30 PROCEDURE — 2700000000 HC OXYGEN THERAPY PER DAY

## 2020-06-30 PROCEDURE — 80048 BASIC METABOLIC PNL TOTAL CA: CPT

## 2020-06-30 PROCEDURE — 97530 THERAPEUTIC ACTIVITIES: CPT

## 2020-06-30 PROCEDURE — 85027 COMPLETE CBC AUTOMATED: CPT

## 2020-06-30 PROCEDURE — 36415 COLL VENOUS BLD VENIPUNCTURE: CPT

## 2020-06-30 PROCEDURE — 2060000000 HC ICU INTERMEDIATE R&B

## 2020-06-30 PROCEDURE — 6360000002 HC RX W HCPCS: Performed by: INTERNAL MEDICINE

## 2020-06-30 PROCEDURE — 94640 AIRWAY INHALATION TREATMENT: CPT

## 2020-06-30 PROCEDURE — 97116 GAIT TRAINING THERAPY: CPT

## 2020-06-30 PROCEDURE — 99232 SBSQ HOSP IP/OBS MODERATE 35: CPT | Performed by: INTERNAL MEDICINE

## 2020-06-30 PROCEDURE — 94761 N-INVAS EAR/PLS OXIMETRY MLT: CPT

## 2020-06-30 PROCEDURE — 97535 SELF CARE MNGMENT TRAINING: CPT

## 2020-06-30 PROCEDURE — 93005 ELECTROCARDIOGRAM TRACING: CPT | Performed by: INTERNAL MEDICINE

## 2020-06-30 PROCEDURE — C8929 TTE W OR WO FOL WCON,DOPPLER: HCPCS

## 2020-06-30 PROCEDURE — 99233 SBSQ HOSP IP/OBS HIGH 50: CPT | Performed by: INTERNAL MEDICINE

## 2020-06-30 PROCEDURE — 6360000004 HC RX CONTRAST MEDICATION: Performed by: INTERNAL MEDICINE

## 2020-06-30 PROCEDURE — 97161 PT EVAL LOW COMPLEX 20 MIN: CPT

## 2020-06-30 PROCEDURE — 99223 1ST HOSP IP/OBS HIGH 75: CPT | Performed by: INTERNAL MEDICINE

## 2020-06-30 RX ORDER — IPRATROPIUM BROMIDE AND ALBUTEROL SULFATE 2.5; .5 MG/3ML; MG/3ML
1 SOLUTION RESPIRATORY (INHALATION) 4 TIMES DAILY
Status: DISCONTINUED | OUTPATIENT
Start: 2020-06-30 | End: 2020-07-03

## 2020-06-30 RX ADMIN — FAMOTIDINE 20 MG: 20 TABLET, FILM COATED ORAL at 05:01

## 2020-06-30 RX ADMIN — METHYLPREDNISOLONE SODIUM SUCCINATE 40 MG: 40 INJECTION, POWDER, FOR SOLUTION INTRAMUSCULAR; INTRAVENOUS at 11:12

## 2020-06-30 RX ADMIN — METHYLPREDNISOLONE SODIUM SUCCINATE 40 MG: 40 INJECTION, POWDER, FOR SOLUTION INTRAMUSCULAR; INTRAVENOUS at 21:15

## 2020-06-30 RX ADMIN — LEVOTHYROXINE SODIUM 100 MCG: 0.1 TABLET ORAL at 05:01

## 2020-06-30 RX ADMIN — Medication 10 ML: at 21:09

## 2020-06-30 RX ADMIN — PERFLUTREN 1.65 MG: 6.52 INJECTION, SUSPENSION INTRAVENOUS at 14:07

## 2020-06-30 RX ADMIN — ENOXAPARIN SODIUM 40 MG: 40 INJECTION SUBCUTANEOUS at 09:24

## 2020-06-30 RX ADMIN — FUROSEMIDE 40 MG: 40 TABLET ORAL at 17:21

## 2020-06-30 RX ADMIN — IPRATROPIUM BROMIDE AND ALBUTEROL SULFATE 1 AMPULE: .5; 3 SOLUTION RESPIRATORY (INHALATION) at 13:15

## 2020-06-30 RX ADMIN — OXCARBAZEPINE 150 MG: 150 TABLET, FILM COATED ORAL at 09:25

## 2020-06-30 RX ADMIN — Medication 10 ML: at 09:00

## 2020-06-30 RX ADMIN — ATORVASTATIN CALCIUM 20 MG: 10 TABLET, FILM COATED ORAL at 21:09

## 2020-06-30 RX ADMIN — OXCARBAZEPINE 150 MG: 150 TABLET, FILM COATED ORAL at 21:12

## 2020-06-30 RX ADMIN — IPRATROPIUM BROMIDE AND ALBUTEROL SULFATE 1 AMPULE: .5; 3 SOLUTION RESPIRATORY (INHALATION) at 20:11

## 2020-06-30 RX ADMIN — IPRATROPIUM BROMIDE AND ALBUTEROL SULFATE 1 AMPULE: .5; 3 SOLUTION RESPIRATORY (INHALATION) at 16:05

## 2020-06-30 RX ADMIN — FUROSEMIDE 40 MG: 40 TABLET ORAL at 07:56

## 2020-06-30 NOTE — PROGRESS NOTES
Occupational Therapy   Occupational Therapy Initial Assessment/Treatment  Date: 2020   Patient Name: Julio Herzog  MRN: [id number]     : 1935    Date of Service: 2020    Discharge Recommendations:  Subacute/Skilled Nursing Facility       Assessment   Performance deficits / Impairments: Decreased functional mobility ; Decreased ADL status; Decreased cognition;Decreased endurance;Decreased balance;Decreased strength;Decreased safe awareness  Patient admitted with SOB, noted modA of 2 for bed mobility and Elmer for transfers including toilet using RW for mobility. Pt is a LT resident of [facility]. After evaluation, pt found to be presenting with the above mentioned occupational performance deficits which are affecting participation in daily living skills. Pt would benefit from continued skilled occupational therapy to address ADLs, functional mobility, and safety while in acute care. Prognosis: Fair;Good  Decision Making: Medium Complexity  OT Education: OT Role;Plan of Care;ADL Adaptive Strategies;Transfer Training;Orientation  REQUIRES OT FOLLOW UP: Yes  Activity Tolerance  Activity Tolerance: Patient Tolerated treatment well  Activity Tolerance: SOB noted after mobility O2 96% on 4L  Safety Devices  Safety Devices in place: Yes  Type of devices: Left in chair;Call light within reach; Chair alarm in place;Nurse notified;Gait belt           Patient Diagnosis(es): There were no encounter diagnoses. has a past medical history of Alzheimer's dementia ([facility] [address] Asthma, Atrial fibrillation ([address] Blood clot in vein, CHF (congestive heart failure) ([address] Dementia ([address] GERD (gastroesophageal reflux disease), Hypertension, Kidney failure, MRSA (methicillin resistant staph aureus) culture positive, and Osteoporosis. has a past surgical history that includes Cholecystectomy; eye surgery; Hysterectomy;  Thyroidectomy; pr esophagogastroduodenoscopy transoral diagnostic (N/A, 2018); and pr [facility] Minimal assistance  Stand to sit: Minimal assistance     Cognition  Overall Cognitive Status: Exceptions  Following Commands: Follows one step commands with increased time; Follows one step commands with repetition  Memory: Decreased recall of recent events;Decreased short term memory  Safety Judgement: Decreased awareness of need for safety;Decreased awareness of need for assistance  Insights: Decreased awareness of deficits  Initiation: Requires cues for some  Sequencing: Requires cues for some           Type of ROM/Therapeutic Exercise  Type of ROM/Therapeutic Exercise: AROM  Exercises  Shoulder Elevation: x 10 reps  Shoulder Flexion: x 10 reps  Elbow Flexion: x 10 reps  Elbow Extension: x 10 reps  Supination: x 10 reps  Pronation: x 10 reps  Finger Flexion: x 10 reps  Finger Extension: x 10 reps      Plan   Plan  Times per week: 3-5x's a week while in acute care                          AM-PAC Score        AM-PAC Inpatient Daily Activity Raw Score: 16 (06/30/20 1717)  AM-PAC Inpatient ADL T-Scale Score : 35.96 (06/30/20 1717)  ADL Inpatient CMS 0-100% Score: 53.32 (06/30/20 1717)  ADL Inpatient CMS G-Code Modifier : CK (06/30/20 1717)    Goals  Short term goals  Time Frame for Short term goals: 1 week unless otherwise specified 7/7  Short term goal 1: Pt will complete toilet transfers with SBA bt 7/5  Short term goal 2: Pt will complete oral care at sink with CGA for balance  Short term goal 3: Pt will complete LE dressing with modA  Patient Goals   Patient goals : \"to find my glasses\"       Therapy Time   Individual Concurrent Group Co-treatment   Time In [address]         Time Out 1659         Minutes 37         Timed Code Treatment Minutes: 27 Minutes       Dejah Barron, OTR/L    If pt is unable to be seen after this session, please let this note serve as discharge summary. Please see case management note for discharge disposition. Thank you.

## 2020-06-30 NOTE — PROGRESS NOTES
INPATIENT PULMONARY CRITICAL CARE PROGRESS NOTE      Reason for visit: Acute hypoxemic respiratory failure, obesity, CHF, pleural effusions, possible obstructive airways disease, atrial fibrillation with profound bradycardia and pauses. SUBJECTIVE: The patient continues to remain confused, presently trying to swallow ice and was coughing significantly. He is afebrile and hemodynamically stable. On oxygen at 2 LPM with SaO2 98%. She says she \"wants to call her children to go home, she is scheduled to get  tomorrow\". She has been evaluated by EP for possible pacemaker placement. Physical Exam:  Blood pressure (!) 143/70, pulse 77, temperature 97.8 °F (36.6 °C), temperature source Oral, resp. rate 22, weight 226 lb 6.6 oz (102.7 kg), SpO2 98 %.'   Constitutional: Pleasant, morbidly obese, no tachypnea or wheezing. Now able to speak in full sentences   HENT:  Oropharynx is clear and  moist, she has upper and lower dentures. She has a class III/IV airway. Eyes:  Conjunctivae are normal. Pupils equal, round, and reactive to light. No scleral icterus. Neck: Short and large neck, no JVD. Cardiovascular NSR, distant heart sounds. No right ventricular heave. No lower extremity edema. Pulmonary/Chest:  To auscultation. No rales. No accessory muscle usage with moving in bed, no stridor. Abdominal: Soft, fatty, nontender. Bowel sounds present. No distension or tenderness. Musculoskeletal: No cyanosis. No clubbing. No obvious joint deformity. Lymphadenopathy: No cervical or supraclavicular adenopathy. Skin: Skin is warm and dry. No rash or nodules on the exposed extremities. Psychiatric: Normal mood and affect. Behavior is normal.  No anxiety. Neurologic: Alert, awake and not oriented. PERRL. Speech fluent.    No obvious neurologic deficit, detailed exam not performed.          Results:  CBC:   Recent Labs     06/29/20  0324 06/30/20  0519   WBC 11.3* 10.0   HGB 12.6 12.9   HCT 39.3 39.6   MCV 91.9 91.2    248     BMP:   Recent Labs     06/29/20  0456 06/30/20  0519   * 136   K 3.5 4.2   CL 94* 97*   CO2 25 26   BUN 45* 40*   CREATININE 1.4* 1.1     LIVER PROFILE:   Recent Labs     06/29/20  0456   AST 20   ALT 25   BILITOT 0.3   ALKPHOS 65     Imaging:  I have reviewed radiology images personally. No orders to display     Xr Chest Portable    Result Date: 6/29/2020  EXAMINATION: ONE XRAY VIEW OF THE CHEST 6/29/2020 3:56 am COMPARISON: None. HISTORY: ORDERING SYSTEM PROVIDED HISTORY: R/O PNA TECHNOLOGIST PROVIDED HISTORY: Reason for exam:->R/O PNA Reason for Exam: sob Type of Exam: Unknown Additional signs and symptoms: Shortness of Breath (Pt here from [facility] for SOB, pt on NRB 15 lpm upon arrival, pt confused, pt has COPD and anxiety, had duoneb approx 30 min ago, pt unable to gi\ve hx FINDINGS: The cardiomediastinal silhouette is enlarged. There is mild pulmonary vascular congestion. There are trace bilateral pleural effusions. There is no pneumothorax. There is no acute osseous abnormality. Mild pulmonary vascular congestion. Cardiomegaly. Trace bilateral pleural effusions. Assessment and plan:  Acute respiratory failure, hypoxemic. Oxygen to keep SaO2 >90%. Oxygen requirement has decreased significantly  CHF, pleural effusions. Suspect this is the etiology of her hypoxemic respiratory failure. Diuresis per cardiology, renal function stable  ? Obstructive airways disease. She had loud wheezing. She could have underlying obstructive disease, though she has been a non-smoker. For now I would agree with steroids and nebulized bronchodilator treatment. Agree with change to oral prednisone. Pulmonary hypertension. Given her dementia and age, I do not believe work-up for pulmonary hypertension would be appropriate. She is unlikely to wear BiPAP even if she is diagnosed with ARAMIS. Leukocytosis. Mild. Afebrile. Procalcitonin normal  CKD.   Renal

## 2020-06-30 NOTE — PROGRESS NOTES
[facility]   Progress Note  Cardiology      HPI: Seeing Ms. Viraj Lomax today for f/u bradycardia and CHF. Daughter, Bethany Medrano, at bedside. She denies any specific complaints at this time and appears comfortable. Physical Examination:    Vitals:    06/30/20 1317   BP:    Pulse:    Resp:    Temp:    SpO2: 95%          Constitutional and General Appearance: NAD   Respiratory:  · Normal excursion and expansion without use of accessory muscles  · Resp Auscultation: Soft breath sounds  Cardiovascular:  · The apical impulses not displaced  · Heart tones are crisp and normal  · Cervical veins are not engorged  · The carotid upstroke is normal in amplitude and contour without delay or bruit  · Normal S1S2, No S3, No Murmur  · Peripheral pulses are symmetrical and full  · There is no clubbing, cyanosis of the extremities. · No edema  · Pedal Pulses: 2+ and equal   Abdomen:  · No masses or tenderness  · Liver/Spleen: No Abnormalities Noted  Neurological/Psychiatric:  · Alert and oriented in all spheres  · Moves all extremities well  · No abnormalities of mood, affect, memory, mentation, or behavior are noted  Skin: warm and dry      Lab Results   Component Value Date    WBC 10.0 06/30/2020    HGB 12.9 06/30/2020    HCT 39.6 06/30/2020    MCV 91.2 06/30/2020     06/30/2020     Lab Results   Component Value Date    CREATININE 1.1 06/30/2020    BUN 40 (H) 06/30/2020     06/30/2020    K 4.2 06/30/2020    CL 97 (L) 06/30/2020    CO2 26 06/30/2020      CXR 6/29/20  Impression   Mild pulmonary vascular congestion.       Cardiomegaly.       Trace bilateral pleural effusions.          8/6/18 ECHO Summary   Normal LV systolic function with EF of 55-60%.    Asynchronous septal motion noted.   D-shaped septum consistent with RV pressure and volume overload.   Mild concentric left ventricular hypertrophy.   Evidence for type III diastolic dysfunction.   Mild mitral annular calcification.   Mild biatrial enlargement.   The right ventricle is mildly enlarged.   Mild mitral, aortic, and tricuspid regurgitation.   Systolic pulmonic artery pressure (SPAP) is normal estimated at 32mmHg   (Right atrial pressure of 3mmHg).      6/30/20 ECHO Summary   Normal LV systolic function with EF of 55-60%. D-shaped septum c/w RV pressure and volume overload. Mild-moderate concentric left ventricular hypertrophy. Left ventricular diastolic filling pressure is elevated. Mild mitral regurgitation. Moderate right-sided chamber enlargement. Right ventricular systolic function is moderately reduced. Moderate tricuspid regurgitation. Systolic pulmonic artery pressure (SPAP) is estimated at 94mmHg c/w severe pulm HTN (RAP of 8mmHg). Assessment:  Joseph Zaragoza is a 82 y.o. patient who presented to [facility] due to hypoxia and concern for SOB. Resides at [facility]. She has PMH of Alzheimer's dementia, asthma, chronic afib on eliquis, hx bradycardia and pauses, chronic diastolic CHF, HTN, and GERD. Most recent ECHO 8/6/18 showed normal EF=55-60%; asynchronous septal motion; D-septum; LHV; type III DD; RV mild enlarged; mild MR/AI/TR. Note EP Dr. Brandon Aggarwal and NP Lissy Savage saw in 8/18 for bradycardia and pauses. Taken off long-acting cardizem and followed clinically without need for PM. Note TSH=11.92 and needed to be addressed. Now presents with c/o SOB and hypoxia into 80's % per EMS and ER doc. Initially on 15L NC and NRB but now on 5L NC oxygen. Note she has advanced dementia and cannot reliably give history. Note CXR showed mild pulm vascular congestion, cardiomegaly, and trace pleural effusions. Admit EKG showed slow afib 39bpm with possible U waves vs higher grade AV block (prior EKG's 8/18 show afib with slow VR 41bpm and 51bpm). Tele shows junctional bradycardia in 40's vs slow afib. Note BUN/Cr=45/1.4; AAO=9207; Mg+ 2.7; Jani < 0.01; WVT=9996; K+ 3.5. Started on IV solumedrol and duonebs. Diagnosis of SOB and marked bradycardia c/w afib with slow ventricular response vs higher grade AV block. Concern also for acute on chronic diastolic CHF given history and CDO=7894. Currently being treated for COPD exacerbation. She is hemodynamically stable currently and difficult to tell if symptoms due to dementia.      Recs:  1. I personally reviewed ECHO from 6/30/20 showing EF=55-60%; D-septum; elevated diastolic filling pressure; moderate right chamber dilation with moderate RV systolic dysfunction; mod TR; severe pulm HTN est. 94mmHg. 2. EP consult for bradycardia and hx slow afib. ? Need for . Tele shows prolonged pauses approaching 5 seconds but overall HR improved today  3. TSH=3.96  4. ECHO findings concerning for possible cor pulmonale given severe pulm HTN and RV enlargement/dyfunction. Recommend pulm Dr. Keegan Mcconnell pursue w/u for pulm HTN and possible lung disease. 5. Would continue po lasix 40mg daily given she will need diuretic with diastolic and right CHF. 6. Steroids and duonebs per IM/pulm docs. 7. Eliquis on hold pending EP evaluation.      Patient Active Problem List   Diagnosis    Hematemesis    Obesity    Persistent atrial fibrillation    AV block    Essential hypertension    Acute respiratory failure with hypoxia (HCC)    Pulmonary infiltrates    Diastolic dysfunction    Pleural effusion, bilateral    Morbid obesity with BMI of 40.0-44.9, adult (HCC)    Myonecrosis    Atelectasis    Ineffective airway clearance    Shortness of breath    SOB (shortness of breath)    Bradycardia    Acute on chronic diastolic CHF (congestive heart failure) ([address]

## 2020-06-30 NOTE — CARE COORDINATION
CASE MANAGEMENT INITIAL ASSESSMENT      Reviewed chart and completed assessment with: daughter   Explained Case Management role/services. Primary contact information: Ally Jones [phone]    Admit date/status: 6/29/20 Inpatient   Diagnosis: 6/29/20 Inpatient   Is this a Readmission?: no    Insurance: Medicare, Medicaid  Precert required for SNF -  N        3 night stay required -  N (waived)    Living arrangements, Adls, care needs, prior to admission: long term at [employer]: through facility                                     [address] [place] [address] at home: through facility     Services in the home and/or outpatient, prior to admission: through facility     Dialysis Facility (if applicable)   · Name:  · Address:  · Dialysis Schedule:  · Phone:  · Fax:    PT/OT recs: none, ordered     Hospital Exemption Notification (HEN): not initiated     Barriers to discharge: none    Plan/comments: Patient is long term at [facility] and the plan is for her to return per daughter. Daughter would like to see if we can get patient back skilled so that she can get some therapy. Therapy ordered and will follow up with them after they assess. COVID done and negative.                                                                                                                                                                                         [facility] on chart for MD signature

## 2020-06-30 NOTE — PROGRESS NOTES
RESPIRATORY THERAPY ASSESSMENT    Name:  [address] [facility] Record Number:  [id number]  Age: 82 y.o. Gender: female  : 1935  Today's Date:  2020  Room:  9430/1825-25    Assessment     Is the patient being admitted for a COPD or Asthma exacerbation? No   (If yes the patient will be seen every 4 hours for the first 24 hours and then reassessed)    Patient Admission Diagnosis      Allergies  Allergies   Allergen Reactions    Codeine Itching    Sulfa Antibiotics Other (See Comments)     Per Pt daughter Ewelina Dozier) \" Not know her reaction to medication\"       Minimum Predicted Vital Capacity:     NA          Actual Vital Capacity:      NA              Pulmonary History:COPD and CHF/Pulmonary Edema  Home Oxygen Therapy:  2-3 liters/min via nasal cannula prn  Home Respiratory Therapy:Albuterol   Current Respiratory Therapy:  Duoneb txs QID  Treatment Type: HHN  Medications: Albuterol/Ipratropium    Respiratory Severity Index(RSI)   Patients with orders for inhalation medications, oxygen, or any therapeutic treatment modality will be placed on Respiratory Protocol. They will be assessed with the first treatment and at least every 72 hours thereafter. The following severity scale will be used to determine frequency of treatment intervention.     Smoking History: Pulmonary Disease or Smoking History, Greater than 15 pack year = 2    Social History  Social History     Tobacco Use    Smoking status: Never Smoker    Smokeless tobacco: Never Used   Substance Use Topics    Alcohol use: Not on file    Drug use: Not on file       Recent Surgical History: None = 0  Past Surgical History  Past Surgical History:   Procedure Laterality Date    CHOLECYSTECTOMY      EYE SURGERY      HYSTERECTOMY      [place] [address] W/BRNCL ALVEOLAR LAVAGE N/A 2018    BRONCHOSCOPY ALVEOLAR LAVAGE performed by Scot Olszewski, MD at [facility] ESOPHAGOGASTRODUODENOSCOPY TRANSORAL DIAGNOSTIC N/A 2018 EGD ESOPHAGOGASTRODUODENOSCOPY performed by Sneha Escobar MD at [facility]         Level of Consciousness: Alert, Oriented, and Cooperative = 0    Level of Activity: Mostly sedentary, minimal walking = 2    Respiratory Pattern: Regular Pattern; RR 8-20 = 0    Breath Sounds: Absent bilaterally and/or with wheezes = 3    Sputum   ,  , Sputum How Obtained: Spontaneous cough  Cough: Strong, spontaneous, non-productive = 0    Vital Signs   BP (!) 159/76   Pulse 73   Temp 97.6 °F (36.4 °C) (Oral)   Resp 22   Wt 226 lb 6.6 oz (102.7 kg)   SpO2 92%   BMI 41.41 kg/m²   SPO2 (COPD values may differ): 88-89% on room air or greater than 92% on FiO2 28- 35% = 2    Peak Flow (asthma only): not applicable = 0    RSI: 9-45 = TID (three times daily) and Q4hr PRN for dyspnea        Plan       Goals: medication delivery and improve oxygenation    Patient/caregiver was educated on the proper method of use for Respiratory Care Devices:  Yes      Level of patient/caregiver understanding able to:   ? Verbalize understanding   ? Demonstrate understanding       ? Teach back        ? Needs reinforcement       ? No available caregiver               ? Other:     Response to education:  Good     Is patient being placed on Home Treatment Regimen? Yes     Does the patient have everything they need prior to discharge? NA     Comments: chart reviewed and pt assessed    Plan of Care: home regimen    Electronically signed by Donis Batista RCP on 6/30/2020 at 11:12 AM    Respiratory Protocol Guidelines     1. Assessment and treatment by Respiratory Therapy will be initiated for medication and therapeutic interventions upon initiation of aerosolized medication. 2. Physician will be contacted for respiratory rate (RR) greater than 35 breaths per minute. Therapy will be held for heart rate (HR) greater than 140 beats per minute, pending direction from physician.   3. Bronchodilators will be administered via Metered Dose Inhaler (MDI) with spacer when the following criteria are met:  a. Alert and cooperative     b. HR < 140 bpm  c. RR < 30 bpm                d. Can demonstrate a 2-3 second inspiratory hold  4. Bronchodilators will be administered via Hand Held Nebulizer [facility]) to patients when ANY of the following criteria are met  a. Incognizant or uncooperative          b. Patients treated with HHN at Home        c. Unable to demonstrate proper use of MDI with spacer     d. RR > 30 bpm   5. Bronchodilators will be delivered via Metered Dose Inhaler (MDI), HHN, Aerogen to intubated patients on mechanical ventilation. 6. Inhalation medication orders will be delivered and/or substituted as outlined below. Aerosolized Medications Ordering and Administration Guidelines:    1. All Medications will be ordered by a physician, and their frequency and/or modality will be adjusted as defined by the patients Respiratory Severity Index (RSI) score. 2. If the patient does not have documented COPD, consider discontinuing anticholinergics when RSI is less than 9.  3. If the bronchospasm worsens (increased RSI), then the bronchodilator frequency can be increased to a maximum of every 4 hours. If greater than every 4 hours is required, the physician will be contacted. 4. If the bronchospasm improves, the frequency of the bronchodilator can be decreased, based on the patient's RSI, but not less than home treatment regimen frequency. 5. Bronchodilator(s) will be discontinued if patient has a RSI less than 9 and has received no scheduled or as needed treatment for 72  Hrs. Patients Ordered on a Mucolytic Agent:    1. Must always be administered with a bronchodilator. 2. Discontinue if patient experiences worsened bronchospasm, or secretions have lessened to the point that the patient is able to clear them with a cough. Anti-inflammatory and Combination Medications:    1.  If the patient lacks prior history of lung disease, is not using inhaled anti-inflammatory medication at home, and lacks wheezing by examination or by history for at least 24 hours, contact physician for possible discontinuation.

## 2020-06-30 NOTE — PROGRESS NOTES
Hospitalist Progress Note      PCP: Erin Vides MD    Date of Admission: 6/29/2020    Chief Complaint: SOB and directly admitted from [facility] Course: Reviewed H&P. Subjective: Noted pulmonology and cardiology recommendations. Patient currently on 6 L/min oxygen by nasal cannula. Discussed with RN no acute events reported overnight. Continue current care plan. Medications:  Reviewed    Scheduled Medications    ipratropium-albuterol  1 ampule Inhalation 4x daily    sodium chloride flush  10 mL Intravenous 2 times per day    enoxaparin  40 mg Subcutaneous Daily    famotidine  20 mg Oral Daily    levothyroxine  100 mcg Oral Daily    atorvastatin  20 mg Oral Nightly    sodium chloride flush  10 mL Intravenous 2 times per day    methylPREDNISolone  40 mg Intravenous Q12H    Followed by   Kajal Shah ON 7/1/2020] predniSONE  40 mg Oral Daily    OXcarbazepine  150 mg Oral BID    furosemide  40 mg Oral BID     PRN Meds: sodium chloride flush, polyethylene glycol, promethazine **OR** ondansetron, acetaminophen **OR** [DISCONTINUED] acetaminophen, perflutren lipid microspheres      Intake/Output Summary (Last 24 hours) at 6/30/2020 1405  Last data filed at 6/30/2020 1053  Gross per 24 hour   Intake 840 ml   Output 1550 ml   Net -710 ml       Physical Exam Performed:  /85   Pulse 65   Temp 97.8 °F (36.6 °C) (Oral)   Resp 22   Wt 226 lb 6.6 oz (102.7 kg)   SpO2 95%   BMI 41.41 kg/m²     General appearance:  No apparent distress, appears stated age and cooperative. Oxygen by HF NC 6 L/min  HEENT:  Normal cephalic, atraumatic without obvious deformity. Pupils equal, round, and reactive to light. Extra ocular muscles intact. Conjunctivae/corneas clear. Neck: Supple, with full range of motion. No jugular venous distention. Trachea midline. Respiratory:  Normal respiratory effort. Diminished breath sounds at bases with fine crackles.   Cardiovascular:  Regular rate and rhythm with normal S1/S2 without murmurs, rubs or gallops. Abdomen: Soft, non-tender, non-distended with normal bowel sounds. Musculoskeletal:  No clubbing, cyanosis or edema bilaterally. Full range of motion without deformity. Skin: Skin color, texture, turgor normal.  No rashes or lesions. Neurologic:  Neurovascularly intact without any focal sensory/motor deficits. Cranial nerves: II-XII intact, grossly non-focal.  Psychiatric:  Alert and oriented, thought content appropriate, normal insight  Capillary Refill: Brisk,< 3 seconds   Peripheral Pulses: +2 palpable, equal bilaterally       Labs:   Recent Labs     06/29/20  0324 06/30/20  0519   WBC 11.3* 10.0   HGB 12.6 12.9   HCT 39.3 39.6    248     Recent Labs     06/29/20  0456 06/30/20  0519   * 136   K 3.5 4.2   CL 94* 97*   CO2 25 26   BUN 45* 40*   CREATININE 1.4* 1.1   CALCIUM 9.0 9.2     Recent Labs     06/29/20  0456   AST 20   ALT 25   BILITOT 0.3   ALKPHOS 65     No results for input(s): INR in the last 72 hours. Recent Labs     06/29/20  0456   TROPONINI <0.01       Active Hospital Problems    Diagnosis Date Noted    Shortness of breath [R06.02] 06/29/2020    SOB (shortness of breath) [R06.02] 06/29/2020    Bradycardia [R00.1]     Acute on chronic diastolic CHF (congestive heart failure) ([facility]) [I50.33]      Assessment/Plan:  1. SOBOE with acute on chronic hypoxic respiratory failure due to COPD exacerbation   -Currently requiring up to 6 L/min high flow nasal cannula oxygen. Started on IV Solu-Medrol 40 mg every 8 for 2 days and transition to p.o. prednisone 40 mg daily thereafter.  -DuoNeb inhaler scheduled and PRN . Patient had a SARS-CoV-2 NAAT  Testing at [facility] ED and was negative   -Chest x-ray on admission negative for pneumonia. -Pulmonology following and assisting with management  -Wean oxygen as able as patient improves.     2.  Tachybrady Syndrome in the setting of Afib -patient noted to be bradycardic and subsequently transferred to [facility] for EP evaluation.  -Patient not on any rate limiting medication at this time. On Eliquis for anticoagulation. Held in anticipation for procedure/pacemaker.  -Monitor on telemetry.  -Reviewed last echocardiogram from 8/ 2018 showed normal LVEF 55 to 60%; asynchronous septal motion noted. D-shaped septum consistent with RV pressure and volume overload. Grade 3 diastolic dysfunction. Mild biatrial enlargement.  -Noted cardiology recommendations -ordered for echocardiogram; TSH normal and EP to evaluate.     3. HypoThyroidism -on Synthroid; continue     4. 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      5. Morbid Obesity -  With There is no height or weight on file to calculate BMI. Complicating assessment and treatment. Placing patient at risk for multiple co-morbidities as well as early death and contributing to the patient's presentation. Counseled on weight loss. 6.  Acute on chronic diastolic CHF with bilateral pleural effusions POA -patient started on IV Lasix and subsequently transitioned home dose; monitor I's/O and daily weights closely. DVT Prophylaxis: Lovenox (Eliquis held in anticipation of procedure)   Diet: DIET CARDIAC;  Code Status: Limited    PT/OT Eval Status: Not yet ordered    Dispo -at least 2 to 3 days pending clinical improvement. The note was completed using Dragon -speech recognition software & EMR  . Every effort was made to ensure accuracy; however, inadvertent computerized transcription errors may be present.     Silvia Ramirez MD

## 2020-06-30 NOTE — PROGRESS NOTES
Physical Therapy    Facility/Department: [facility] C4 PCU  Initial Assessment/Treatment    NAME: Truong Espinosa  : 1935  MRN: [id number]    Date of Service: 2020    Discharge Recommendations:  Subacute/Skilled Nursing Facility   PT Equipment Recommendations  Equipment Needed: No  Other: Defer to next level of care    Assessment   Body structures, Functions, Activity limitations: Decreased functional mobility ; Decreased cognition;Decreased endurance;Decreased strength;Decreased balance;Decreased safe awareness  Assessment: Pt is an 82 y.o. female admitted to [facility] secondary to acute on chronic respiratory failure, KAIT and tachybrady syndrome. Pt is in LTC at [facility] and reports she is typically a x 1 person assist (likely SBA to CGA) for functional mobility and gait with RW at baseline. Pt is currently funcitoning below her baseline requiring modA x2 for bed mobility, Elmer for t/f and Elmer for gait x 25' with RW. Pt is limited by cognition, decreased strength, decrease activity tolerance and decreased awareness of deficits. Pt will benefit from continued skilled PT in acute care setting to address above defcitis. Recommend SNF at d/c to improve strength and endurance and decrease burden of care for caregivers. Treatment Diagnosis: Impaired functional mobility and gait  Specific instructions for Next Treatment: Progress mobility as tolerated  Prognosis: Good  Decision Making: Low Complexity  PT Education: Goals; Energy Conservation; Injury Prevention;PT Role;General Safety;Plan of Care;Orientation;Gait Training;Disease Specific Education;Precautions; Home Exercise Program;Functional Mobility Training;Transfer Training  Patient Education: Pt verbalized understanding, will benefit from further reinforcement secondary to cognition  Barriers to Learning: None  REQUIRES PT FOLLOW UP: Yes  Activity Tolerance  Activity Tolerance: Patient Tolerated treatment well;Patient limited by fatigue;Patient limited by endurance  Activity Tolerance: Pt on 4L O2 NC, SpO2 96%, HR 90. SOB and faituge noted with mobility and gait activities       Patient Diagnosis(es): There were no encounter diagnoses. has a past medical history of Alzheimer's dementia ([facility] [address] Asthma, Atrial fibrillation ([facility] [address] Blood clot in vein, CHF (congestive heart failure) ([facility] [address] Dementia ([facility] [address] GERD (gastroesophageal reflux disease), Hypertension, Kidney failure, MRSA (methicillin resistant staph aureus) culture positive, and Osteoporosis. has a past surgical history that includes Cholecystectomy; eye surgery; Hysterectomy; Thyroidectomy; pr esophagogastroduodenoscopy transoral diagnostic (N/A, 8/4/2018); [facility] (N/A, 8/8/2018). Restrictions  Restrictions/Precautions  Restrictions/Precautions: Fall Risk  Position Activity Restriction  Other position/activity restrictions: 4L O2, purwick, telemetry, up with assist  Vision/Hearing  Vision: Impaired  Vision Exceptions: Wears glasses for distance  Hearing Exceptions: Hard of hearing/hearing concerns     Subjective  General  Chart Reviewed: Yes  Patient assessed for rehabilitation services?: Yes  Additional Pertinent Hx: A-fib, Alzheimers  Response To Previous Treatment: Not applicable  Family / Caregiver Present: No  Referring Practitioner: Valentino Eastern, MD  Referral Date : 06/30/20  Diagnosis: Acute on chronic respiratory failure, COPD exacerbation, tachybrady syndrome  Follows Commands: Within Functional Limits  General Comment  Comments: RN cleared pt for session  Subjective  Subjective: Pt resting in bed on approach, pleasantly confused, agreeable to PT/OT evaluation, reports she needs to go to commode  Pain Screening  Patient Currently in Pain: Denies  Vital Signs  Patient Currently in Pain: Denies       Orientation  Orientation  Overall Orientation Status: Impaired  Orientation Level: Oriented to person;Disoriented to place; Disoriented to time;Disoriented to situation  Social/Functional History  Social/Functional History  Type of Home: Facility([facility]  ADL Assistance: Needs assistance  Bath: Moderate assistance  Dressing: Moderate assistance  Grooming: Supervision  Feeding: Independent  Toileting: Independent  Ambulation Assistance: Needs assistance(Ax1 with RW)  Transfer Assistance: Needs assistance  Active : No  Occupation: Retired  Type of occupation: [employer]  Cognition   Cognition  Overall Cognitive Status: Exceptions  Arousal/Alertness: Appropriate responses to stimuli  Following Commands: Follows one step commands with increased time; Follows one step commands with repetition  Attention Span: Attends with cues to redirect  Memory: Decreased recall of recent events;Decreased short term memory  Safety Judgement: Decreased awareness of need for safety;Decreased awareness of need for assistance  Insights: Decreased awareness of deficits  Initiation: Requires cues for some  Sequencing: Requires cues for some    Objective     Observation/Palpation  Posture: Fair  Edema: Slight edema to BLE    AROM RLE (degrees)  RLE AROM: WFL  AROM LLE (degrees)  LLE AROM : WFL  Strength RLE  Comment: Grossly 3+/5  Strength LLE  Comment: Grossly 3+/5     Tone RLE  RLE Tone: Normotonic  Motor Control  Gross Motor?: WFL     Sensation  Overall Sensation Status: WFL     Bed mobility  Supine to Sit: 2 Person assistance; Moderate assistance(To L, HOB elevated, use of BR)  Sit to Supine: Unable to assess(Pt seated in chair at end of session)     Transfers  Sit to Stand: Minimal Assistance  Stand to sit: Minimal Assistance  Comment: Vishal with RW, cues for hand placement and safety     Ambulation  Ambulation?: Yes  Ambulation 1  Surface: level tile  Device: Rolling Walker  Other Apparatus: O2(4L O2 NC)  Assistance: Minimal assistance  Quality of Gait: B minimal toe clearance, b decreased heel strike, significantly forward flexed trunk and downward gaze.  Pt mildly unsteady, no overt LOB.  Gait Deviations: Slow Maye; Increased NAFISA; Decreased step length;Decreased step height;Shuffles  Distance: 13' + 25'  Comments: Distances limited secondary to fatigue and pt reporting on second bout that knees feeling weak  Stairs/Curb  Stairs?: No        Balance  Posture: Fair  Sitting - Static: Fair;+  Sitting - Dynamic: Fair;+  Standing - Static: Fair  Standing - Dynamic: Fair;-  Comments: SBA to CGA sitting EOB, Elmer to CGA standing balance with RW     Exercises  Hip Flexion: Seated marches BLE x 10  Knee Long Arc Quad: Seated BLE x 10  Ankle Pumps: Seated BLE x 10  Comments: Cues for sequence and technique     Plan   Plan  Times per week: 3-5x/wk  Times per day: Daily  Specific instructions for Next Treatment: Progress mobility as tolerated  Current Treatment Recommendations: Strengthening, Gait Training, Equipment Evaluation, Education, & procurement, Balance Training, Neuromuscular Re-education, Functional Mobility Training, Endurance Training, Home Exercise Program, Transfer Training, Safety Education & Training  Safety Devices  Type of devices:  All fall risk precautions in place, Left in chair, Call light within reach, Chair alarm in place, Nurse notified, Gait belt, Patient at risk for falls  Restraints  Initially in place: No       AM-PAC Score     AM-PAC Inpatient Mobility without Stair Climbing Raw Score : 14 (06/30/20 1833)  AM-PAC Inpatient without Stair Climbing T-Scale Score : 40.85 (06/30/20 1833)  Mobility Inpatient CMS 0-100% Score: 53.33 (06/30/20 1833)  Mobility Inpatient without Stair CMS G-Code Modifier : CK (06/30/20 1833)       Goals  Short term goals  Time Frame for Short term goals: 1 week 7/07/20  Short term goal 1: Pt will complete supine to/from sit with CGA  Short term goal 2: Pt will complete sit to/from stand with SBA  Short term goal 3: Pt will ambulate >20' with RW with SBA without LOB  Short term goal 4: 7/04/20: Pt will participate in 12-15 reps BLE exercises to increase strength and increase I with functional mobility  Patient Goals   Patient goals : \"To walk into the bathroom with my walker\"       Therapy Time   Individual Concurrent Group Co-treatment   Time In 1622         Time Out 1658         Minutes 36         Timed Code Treatment Minutes: 26 Minutes(10 minutes for eval)     If pt d/c prior to next tx session, this note to serve as d/c summary.     Jose Williamson, PT, DPT

## 2020-06-30 NOTE — PLAN OF CARE
Problem: Falls - Risk of:  Goal: Will remain free from falls  Description: Will remain free from falls  6/30/2020 1030 by Rafy Holcomb RN  Outcome: Ongoing  6/30/2020 0010 by Christopher Alejandro RN  Outcome: Ongoing  Goal: Absence of physical injury  Description: Absence of physical injury  6/30/2020 0010 by Christopher Alejandro RN  Outcome: Ongoing     Problem: Confusion - Acute:  Goal: Absence of continued neurological deterioration signs and symptoms  Description: Absence of continued neurological deterioration signs and symptoms  Outcome: Ongoing     Problem: Injury - Risk of, Physical Injury:  Goal: Will remain free from falls  Description: Will remain free from falls  6/30/2020 1030 by Rafy Holcomb RN  Outcome: Ongoing  6/30/2020 0010 by Christopher Alejandro RN  Outcome: Ongoing  Goal: Absence of physical injury  Description: Absence of physical injury  6/30/2020 0010 by Christopher Alejandro RN  Outcome: Ongoing     Problem: Skin Integrity:  Goal: Will show no infection signs and symptoms  Description: Will show no infection signs and symptoms  6/30/2020 0010 by Christopher Alejandro RN  Outcome: Ongoing

## 2020-06-30 NOTE — PLAN OF CARE
Problem: Falls - Risk of:  Goal: Will remain free from falls  Description: Will remain free from falls  Outcome: Ongoing     Problem: Falls - Risk of:  Goal: Absence of physical injury  Description: Absence of physical injury  Outcome: Ongoing     Problem: Skin Integrity:  Goal: Will show no infection signs and symptoms  Description: Will show no infection signs and symptoms  Outcome: Ongoing

## 2020-06-30 NOTE — PROGRESS NOTES
Spoke with Dr. Ankit Gilbert who gave orders to continue previous nebulizer treatments. RT is aware.

## 2020-07-01 PROBLEM — E66.01 MORBID OBESITY (HCC): Status: ACTIVE | Noted: 2018-08-04

## 2020-07-01 LAB
ANION GAP SERPL CALCULATED.3IONS-SCNC: 12 MMOL/L (ref 3–16)
BUN BLDV-MCNC: 34 MG/DL (ref 7–20)
CALCIUM SERPL-MCNC: 8.9 MG/DL (ref 8.3–10.6)
CHLORIDE BLD-SCNC: 96 MMOL/L (ref 99–110)
CO2: 31 MMOL/L (ref 21–32)
CREAT SERPL-MCNC: 0.9 MG/DL (ref 0.6–1.2)
EKG ATRIAL RATE: 34 BPM
EKG DIAGNOSIS: NORMAL
EKG Q-T INTERVAL: 436 MS
EKG QRS DURATION: 82 MS
EKG QTC CALCULATION (BAZETT): 453 MS
EKG R AXIS: 86 DEGREES
EKG T AXIS: -75 DEGREES
EKG VENTRICULAR RATE: 65 BPM
GFR AFRICAN AMERICAN: >60
GFR NON-AFRICAN AMERICAN: 60
GLUCOSE BLD-MCNC: 158 MG/DL (ref 70–99)
HCT VFR BLD CALC: 38.6 % (ref 36–48)
HEMOGLOBIN: 12.6 G/DL (ref 12–16)
MCH RBC QN AUTO: 29.9 PG (ref 26–34)
MCHC RBC AUTO-ENTMCNC: 32.7 G/DL (ref 31–36)
MCV RBC AUTO: 91.6 FL (ref 80–100)
PDW BLD-RTO: 16 % (ref 12.4–15.4)
PLATELET # BLD: 242 K/UL (ref 135–450)
PMV BLD AUTO: 8.6 FL (ref 5–10.5)
POTASSIUM REFLEX MAGNESIUM: 3.6 MMOL/L (ref 3.5–5.1)
RBC # BLD: 4.22 M/UL (ref 4–5.2)
SODIUM BLD-SCNC: 139 MMOL/L (ref 136–145)
WBC # BLD: 11.3 K/UL (ref 4–11)

## 2020-07-01 PROCEDURE — 6370000000 HC RX 637 (ALT 250 FOR IP): Performed by: INTERNAL MEDICINE

## 2020-07-01 PROCEDURE — 99232 SBSQ HOSP IP/OBS MODERATE 35: CPT | Performed by: INTERNAL MEDICINE

## 2020-07-01 PROCEDURE — 94640 AIRWAY INHALATION TREATMENT: CPT

## 2020-07-01 PROCEDURE — 94761 N-INVAS EAR/PLS OXIMETRY MLT: CPT

## 2020-07-01 PROCEDURE — 80048 BASIC METABOLIC PNL TOTAL CA: CPT

## 2020-07-01 PROCEDURE — 2580000003 HC RX 258: Performed by: INTERNAL MEDICINE

## 2020-07-01 PROCEDURE — 2060000000 HC ICU INTERMEDIATE R&B

## 2020-07-01 PROCEDURE — 93010 ELECTROCARDIOGRAM REPORT: CPT | Performed by: INTERNAL MEDICINE

## 2020-07-01 PROCEDURE — 99232 SBSQ HOSP IP/OBS MODERATE 35: CPT | Performed by: NURSE PRACTITIONER

## 2020-07-01 PROCEDURE — 85027 COMPLETE CBC AUTOMATED: CPT

## 2020-07-01 PROCEDURE — 99233 SBSQ HOSP IP/OBS HIGH 50: CPT | Performed by: INTERNAL MEDICINE

## 2020-07-01 PROCEDURE — 36415 COLL VENOUS BLD VENIPUNCTURE: CPT

## 2020-07-01 PROCEDURE — 2700000000 HC OXYGEN THERAPY PER DAY

## 2020-07-01 RX ORDER — FUROSEMIDE 20 MG/1
20 TABLET ORAL DAILY
Status: DISCONTINUED | OUTPATIENT
Start: 2020-07-01 | End: 2020-07-07 | Stop reason: HOSPADM

## 2020-07-01 RX ORDER — POTASSIUM CHLORIDE 20 MEQ/1
20 TABLET, EXTENDED RELEASE ORAL
Status: COMPLETED | OUTPATIENT
Start: 2020-07-01 | End: 2020-07-01

## 2020-07-01 RX ORDER — AMLODIPINE BESYLATE 5 MG/1
5 TABLET ORAL DAILY
Status: DISCONTINUED | OUTPATIENT
Start: 2020-07-01 | End: 2020-07-07 | Stop reason: HOSPADM

## 2020-07-01 RX ADMIN — IPRATROPIUM BROMIDE AND ALBUTEROL SULFATE 1 AMPULE: .5; 3 SOLUTION RESPIRATORY (INHALATION) at 12:08

## 2020-07-01 RX ADMIN — OXCARBAZEPINE 150 MG: 150 TABLET, FILM COATED ORAL at 09:10

## 2020-07-01 RX ADMIN — OXCARBAZEPINE 150 MG: 150 TABLET, FILM COATED ORAL at 21:39

## 2020-07-01 RX ADMIN — ACETAMINOPHEN 650 MG: 325 TABLET ORAL at 02:22

## 2020-07-01 RX ADMIN — PREDNISONE 40 MG: 20 TABLET ORAL at 11:59

## 2020-07-01 RX ADMIN — AMLODIPINE BESYLATE 5 MG: 5 TABLET ORAL at 09:11

## 2020-07-01 RX ADMIN — ATORVASTATIN CALCIUM 20 MG: 10 TABLET, FILM COATED ORAL at 21:39

## 2020-07-01 RX ADMIN — Medication 10 ML: at 09:11

## 2020-07-01 RX ADMIN — POTASSIUM CHLORIDE 20 MEQ: 1500 TABLET, EXTENDED RELEASE ORAL at 10:25

## 2020-07-01 RX ADMIN — POTASSIUM CHLORIDE 20 MEQ: 1500 TABLET, EXTENDED RELEASE ORAL at 09:14

## 2020-07-01 RX ADMIN — IPRATROPIUM BROMIDE AND ALBUTEROL SULFATE 1 AMPULE: .5; 3 SOLUTION RESPIRATORY (INHALATION) at 08:11

## 2020-07-01 RX ADMIN — APIXABAN 2.5 MG: 2.5 TABLET, FILM COATED ORAL at 21:39

## 2020-07-01 RX ADMIN — FAMOTIDINE 20 MG: 20 TABLET, FILM COATED ORAL at 05:10

## 2020-07-01 RX ADMIN — FUROSEMIDE 20 MG: 20 TABLET ORAL at 09:11

## 2020-07-01 RX ADMIN — LEVOTHYROXINE SODIUM 100 MCG: 0.1 TABLET ORAL at 05:10

## 2020-07-01 RX ADMIN — Medication 10 ML: at 21:39

## 2020-07-01 RX ADMIN — IPRATROPIUM BROMIDE AND ALBUTEROL SULFATE 1 AMPULE: .5; 3 SOLUTION RESPIRATORY (INHALATION) at 16:32

## 2020-07-01 RX ADMIN — APIXABAN 2.5 MG: 2.5 TABLET, FILM COATED ORAL at 09:11

## 2020-07-01 RX ADMIN — IPRATROPIUM BROMIDE AND ALBUTEROL SULFATE 1 AMPULE: .5; 3 SOLUTION RESPIRATORY (INHALATION) at 19:59

## 2020-07-01 ASSESSMENT — PAIN SCALES - GENERAL
PAINLEVEL_OUTOF10: 0
PAINLEVEL_OUTOF10: 3

## 2020-07-01 NOTE — PROGRESS NOTES
[facility]     Electrophysiology                                     Progress Note    Admission date:  2020    Reason for follow up visit: afib, bradycardia     HPI/CC: Monika Rogers was admitted on 2020 with SOB. Has been treated for CHF and resp failure. EP following for persistent afib with pauses. Rhythm has been afib with an average HR of 85. No significant pauses noted. Subjective: She complains of SOB. Denies chest pain, dizziness, and palpitations. Vitals:  Blood pressure 137/73, pulse 84, temperature 98 °F (36.7 °C), resp. rate 18, height 5' 2\" (1.575 m), weight 211 lb 12.8 oz (96.1 kg), SpO2 96 %.   Temp  Av.7 °F (36.5 °C)  Min: 97.4 °F (36.3 °C)  Max: 98 °F (36.7 °C)  Pulse  Av.6  Min: 69  Max: 89  BP  Min: 137/73  Max: 174/69  SpO2  Av.6 %  Min: 94 %  Max: 98 %    24 hour I/O    Intake/Output Summary (Last 24 hours) at 2020 1345  Last data filed at 2020 8835  Gross per 24 hour   Intake 970 ml   Output 2450 ml   Net -1480 ml     Current Facility-Administered Medications   Medication Dose Route Frequency Provider Last Rate Last Dose    amLODIPine (NORVASC) tablet 5 mg  5 mg Oral Daily Zoila Velazquez MD   5 mg at 20 0911    furosemide (LASIX) tablet 20 mg  20 mg Oral Daily Zoila Velazquez MD   20 mg at 20 0911    apixaban (ELIQUIS) tablet 2.5 mg  2.5 mg Oral BID Zoila Velazquez MD   2.5 mg at 20 0911    ipratropium-albuterol (DUONEB) nebulizer solution 1 ampule  1 ampule Inhalation 4x daily Aravind Benitez MD   1 ampule at 20 1208    sodium chloride flush 0.9 % injection 10 mL  10 mL Intravenous 2 times per day Luis Schrader MD   10 mL at 20 210    sodium chloride flush 0.9 % injection 10 mL  10 mL Intravenous PRN Luis Schrader MD        polyethylene glycol (GLYCOLAX) packet 17 g  17 g Oral Daily PRN Luis Schrader MD        promethazine (PHENERGAN) tablet 12.5 mg  12.5 mg Oral Q6H PRN Dhruv Durant MD        Or    [facility]ansetron [facility]) injection 4 mg  4 mg Intravenous Q6H PRN Dhruv Durant MD        famotidine (PEPCID) tablet 20 mg  20 mg Oral Daily Dhruv Durant MD   20 mg at 07/01/20 0510    levothyroxine (SYNTHROID) tablet 100 mcg  100 mcg Oral Daily Dhruv Durant MD   100 mcg at 07/01/20 0510    atorvastatin (LIPITOR) tablet 20 mg  20 mg Oral Nightly Dhruv Durant MD   20 mg at 06/30/20 2109    sodium chloride flush 0.9 % injection 10 mL  10 mL Intravenous 2 times per day Dhruv Durant MD   10 mL at 07/01/20 0911    acetaminophen (TYLENOL) tablet 650 mg  650 mg Oral Q6H PRN Dhruv Durant MD   650 mg at 07/01/20 0222    predniSONE (DELTASONE) tablet 40 mg  40 mg Oral Daily Dhruv Durant MD   40 mg at 07/01/20 1159    OXcarbazepine (TRILEPTAL) tablet 150 mg  150 mg Oral BID Dhruv Durant MD   150 mg at 07/01/20 0910       Objective:     Telemetry monitor: afib    Physical Exam:  Constitutional and general appearance: alert, cooperative, no distress and appears stated age; + [place]  HEENT: PERRL, no cervical lymphadenopathy. No masses palpable.  Normal oral mucosa  Respiratory:  · Normal excursion and expansion without use of accessory muscles  · Resp auscultation: Normal breath sounds without wheezing, rhonchi, and rales  Cardiovascular:  · The apical impulse is not displaced  · Heart tones are crisp and normal. irregular S1 and S2.  · Jugular venous pulsation Normal  · The carotid upstroke is normal in amplitude and contour without delay or bruit  · Peripheral pulses are symmetrical and full   Abdomen:  · No masses or tenderness  · Bowel sounds present  Extremities:  ·  No cyanosis or clubbing  ·  No lower extremity edema  ·  Skin: warm and dry  Neurological:  · Alert but disoriented to time and situation  · Moves all extremities well  · + forgetful    Data  EKG 6/30/2020:   Atrial fibrillation HR 65    Echo 6/30/2020:  Normal LV systolic function with EF of 55-60%. D-shaped septum c/w RV pressure and volume overload. Mild-moderate concentric left ventricular hypertrophy. Left ventricular diastolic filling pressure is elevated. Mild mitral regurgitation. Moderate right-sided chamber enlargement. Right ventricular systolic function is moderately reduced. Moderate tricuspid regurgitation. Systolic pulmonic artery pressure (SPAP) is estimated at 94mmHg consistent   with severe pulmonary hypertension (RAP of 8mmHg). Echo 8/6/2018:  Normal LV systolic function with EF of 55-60%.  Asynchronous septal motion noted.   D-shaped septum consistent with RV pressure and volume overload.   Mild concentric left ventricular hypertrophy.   Evidence for type III diastolic dysfunction.   Mild mitral annular calcification.   Mild biatrial enlargement.   The right ventricle is mildly enlarged.   Mild mitral, aortic, and tricuspid regurgitation.   Systolic pulmonic artery pressure (SPAP) is normal estimated at 32mmHg   (Right atrial pressure of 3mmHg). All labs and testing reviewed.   Lab Review     Renal Profile:   Lab Results   Component Value Date    CREATININE 0.9 07/01/2020    BUN 34 07/01/2020     07/01/2020    K 3.6 07/01/2020    CL 96 07/01/2020    CO2 31 07/01/2020     CBC:    Lab Results   Component Value Date    WBC 11.3 07/01/2020    RBC 4.22 07/01/2020    HGB 12.6 07/01/2020    HCT 38.6 07/01/2020    MCV 91.6 07/01/2020    RDW 16.0 07/01/2020     07/01/2020     BNP:  No results found for: BNP  Fasting Lipid Panel:  No results found for: CHOL, HDL, TRIG  Cardiac Enzymes:  CK/MbTroponin  Lab Results   Component Value Date    CKTOTAL 345 08/05/2018    TROPONINI <0.01 06/29/2020     PT/ INR No results found for: INR, PROTIME  PTT No results found for: PTT   Lab Results   Component Value Date    MG 2.70 06/29/2020    No results found for: TSH    Assessment:  Persistent atrial fibrillation: stable   -known asymptomatic pauses              -[id number] score 5 (age, gender, HTN, CHF)  Paroxysmal AV block: stable   -seems asymptomatic   Acute on chronic diastolic CHF: compensated    -cardiology following  Pulmonary HTN  HTN: control is varied   Hypothyroidism: stable   -TSH normal 6/2020  Hematemesis: s/p EGD 2018 (likely oropharyngeal source)  Dementia   CKD  Acute respiratory failure    Plan:   1. Continue Eliquis, Lasix, and amlodipine   2. Patient is on low dose Eliquis due to fluctuating creatinine and history of hematemesis  3. Pacemaker not currently indicated. Could reconsider if patient has a syncopal or near syncopal event. EP will sign off but remains available if needed. Follow up is scheduled at [facility] on 7/27/2020.     Sherren Angelica, APRN-CNP  [facility]  [phone]

## 2020-07-01 NOTE — PROGRESS NOTES
[address]   Progress Note  Cardiology      HPI: Seeing Ms. Esther Bo today for f/u bradycardia and CHF. She is sitting up in chair. Denies any specific complaints currently. Tele currently afib 80's bpm.    Physical Examination:    Vitals:    07/01/20 0025   BP: (!) 141/68   Pulse: 79   Resp: 20   Temp: 97.4 °F (36.3 °C)   SpO2: 97%          Constitutional and General Appearance: NAD   Respiratory:  · Normal excursion and expansion without use of accessory muscles  · Resp Auscultation: Soft breath sounds  Cardiovascular:  · The apical impulses not displaced  · Heart tones are crisp and normal; +irregularly irregular   · Cervical veins are not engorged  · The carotid upstroke is normal in amplitude and contour without delay or bruit  · Normal S1S2, No S3, No Murmur  · Peripheral pulses are symmetrical and full  · There is no clubbing, cyanosis of the extremities. · No edema  · Pedal Pulses: 2+ and equal   Abdomen:  · No masses or tenderness  · Liver/Spleen: No Abnormalities Noted  Neurological/Psychiatric:  · Alert and oriented in all spheres  · Moves all extremities well  · No abnormalities of mood, affect, memory, mentation, or behavior are noted  Skin: warm and dry      Lab Results   Component Value Date    WBC 11.3 (H) 07/01/2020    HGB 12.6 07/01/2020    HCT 38.6 07/01/2020    MCV 91.6 07/01/2020     07/01/2020     Lab Results   Component Value Date    CREATININE 0.9 07/01/2020    BUN 34 (H) 07/01/2020     07/01/2020    K 3.6 07/01/2020    CL 96 (L) 07/01/2020    CO2 31 07/01/2020      CXR 6/29/20  Impression   Mild pulmonary vascular congestion.       Cardiomegaly.       Trace bilateral pleural effusions.          8/6/18 ECHO Summary   Normal LV systolic function with EF of 55-60%.    Asynchronous septal motion noted.   D-shaped septum consistent with RV pressure and volume overload.   Mild concentric left ventricular hypertrophy.   Evidence for type III diastolic dysfunction.  Ame Dalton mitral annular calcification.   Mild biatrial enlargement.   The right ventricle is mildly enlarged.   Mild mitral, aortic, and tricuspid regurgitation.   Systolic pulmonic artery pressure (SPAP) is normal estimated at 32mmHg   (Right atrial pressure of 3mmHg).      6/30/20 ECHO Summary   Normal LV systolic function with EF of 55-60%. D-shaped septum c/w RV pressure and volume overload. Mild-moderate concentric left ventricular hypertrophy. Left ventricular diastolic filling pressure is elevated. Mild mitral regurgitation. Moderate right-sided chamber enlargement. Right ventricular systolic function is moderately reduced. Moderate tricuspid regurgitation. Systolic pulmonic artery pressure (SPAP) is estimated at 94mmHg c/w severe pulm HTN (RAP of 8mmHg). Assessment:  Jo Aguirre is a 82 y.o. patient who presented to [facility] due to hypoxia and concern for SOB. Resides at [facility]. She has PMH of Alzheimer's dementia, asthma, chronic afib on eliquis, hx bradycardia and pauses, chronic diastolic CHF, HTN, and GERD. Most recent ECHO 8/6/18 showed normal EF=55-60%; asynchronous septal motion; D-septum; LHV; type III DD; RV mild enlarged; mild MR/AI/TR. Note EP Dr. Laisha Solorio and NP Joelle Hall saw in 8/18 for bradycardia and pauses. Taken off long-acting cardizem and followed clinically without need for PM. Note TSH=11.92 and needed to be addressed. Now presents with c/o SOB and hypoxia into 80's % per EMS and ER doc. Initially on 15L NC and NRB but now on 5L NC oxygen. Note she has advanced dementia and cannot reliably give history. Note CXR showed mild pulm vascular congestion, cardiomegaly, and trace pleural effusions. Admit EKG showed slow afib 39bpm with possible U waves vs higher grade AV block (prior EKG's 8/18 show afib with slow VR 41bpm and 51bpm). Tele shows junctional bradycardia in 40's vs slow afib. Note BUN/Cr=45/1.4; OWM=3134; Mg+ 2.7; Jani < 0.01; DPN=4942; K+ 3.5. Started on IV solumedrol and duonebs. Diagnosis of SOB and marked bradycardia c/w afib with slow ventricular response vs higher grade AV block. Concern also for acute on chronic diastolic CHF given history and OSM=4279. Currently being treated for COPD exacerbation. She is hemodynamically stable currently and difficult to tell if symptoms due to dementia.      Recs:  1. I personally reviewed ECHO from 6/30/20 showing EF=55-60%; D-septum; elevated diastolic filling pressure; moderate right chamber dilation with moderate RV systolic dysfunction; mod TR; severe pulm HTN est. 94mmHg. 2.  EP Dr. Christian Hernandez does NOT recommend pacemaker unless improved functional status and/or definitely symptomatic. Tele now afib in 80's bpm. Note EKG 6/30/20 afib 65bpm; nonspecific ST change. 3. TSH=3.96  4. ECHO findings concerning for possible cor pulmonale given severe pulm HTN and RV enlargement/dyfunction. Pulm Dr. Jaxon Gómez does NOT recommend pulm HTN w/u given age, dementia, and debility. 5. Start lasix 20mg daily given she will need diuretic with diastolic and right CHF. 6. BP suboptimal and will add norvasc 5mg daily. 7. Restart eliquis 2.5mg BID due to age > [de-identified] yo and CRI with creatinine 2 in past.  8. Steroids and duonebs per IM/pulm docs. OK for d/c from cardiology standpoint when IM and pulm docs feel appropriate. Will make f/u OV. Signing off.        Patient Active Problem List   Diagnosis    Hematemesis    Obesity    Persistent atrial fibrillation    AV block    Essential hypertension    Acute respiratory failure with hypoxia (HCC)    Pulmonary infiltrates    Diastolic dysfunction    Pleural effusion, bilateral    Morbid obesity with BMI of 40.0-44.9, adult (HCC)    Myonecrosis    Atelectasis    Ineffective airway clearance    Shortness of breath    SOB (shortness of breath)    Bradycardia    Acute on chronic diastolic CHF (congestive heart failure) ([address]    Pulmonary hypertension ([address]

## 2020-07-01 NOTE — PROGRESS NOTES
INPATIENT PULMONARY CRITICAL CARE PROGRESS NOTE      Reason for visit: Acute hypoxemic respiratory failure, obesity, CHF, pleural effusions, possible obstructive airways disease, atrial fibrillation with profound bradycardia and pauses. SUBJECTIVE: The patient continues to remain mildly confused, sitting up in a chair. She is afebrile and hemodynamically stable. On oxygen at 2 LPM with SaO2 96%. Denies complaints, has a minimal cough. Physical Exam:  Blood pressure (!) 142/71, pulse 76, temperature 98.1 °F (36.7 °C), temperature source Oral, resp. rate 22, height 5' 2\" (1.575 m), weight 211 lb 12.8 oz (96.1 kg), SpO2 94 %.'   Constitutional: Pleasant, morbidly obese, no tachypnea or wheezing. Now able to speak in full sentences   HENT:  Oropharynx is clear and  moist, she has upper and lower dentures. She has a class III/IV airway. Eyes:  Conjunctivae are normal. No scleral icterus. Neck: Short and large neck, no JVD. Cardiovascular NSR, distant heart sounds. No lower extremity edema. Pulmonary/Chest:  To auscultation. No rales. No accessory muscle usage with moving in bed, no stridor. Abdominal:  Deferred. Musculoskeletal: No cyanosis. No clubbing. No obvious joint deformity. Lymphadenopathy: Deferred. Skin: Skin is warm and dry. No rash or nodules on the exposed extremities. Psychiatric: Normal mood and affect. Behavior is normal.  No anxiety. Neurologic: Alert, awake and not oriented. PERRL. Speech fluent.    No obvious neurologic deficit, detailed exam not performed.          Results:  CBC:   Recent Labs     06/29/20  0324 06/30/20  0519 07/01/20  0552   WBC 11.3* 10.0 11.3*   HGB 12.6 12.9 12.6   HCT 39.3 39.6 38.6   MCV 91.9 91.2 91.6    248 242     BMP:   Recent Labs     06/29/20  0456 06/30/20  0519 07/01/20  0552   * 136 139   K 3.5 4.2 3.6   CL 94* 97* 96*   CO2 25 26 31   BUN 45* 40* 34*   CREATININE 1.4* 1.1 0.9     LIVER PROFILE:   Recent Labs 06/29/20  0456   AST 20   ALT 25   BILITOT 0.3   ALKPHOS 65     Imaging:  I have reviewed radiology images personally. No orders to display     Xr Chest Portable    Result Date: 6/29/2020  EXAMINATION: ONE XRAY VIEW OF THE CHEST 6/29/2020 3:56 am COMPARISON: None. HISTORY: ORDERING SYSTEM PROVIDED HISTORY: R/O PNA TECHNOLOGIST PROVIDED HISTORY: Reason for exam:->R/O PNA Reason for Exam: sob Type of Exam: Unknown Additional signs and symptoms: Shortness of Breath (Pt here from [facility] for SOB, pt on NRB 15 lpm upon arrival, pt confused, pt has COPD and anxiety, had duoneb approx 30 min ago, pt unable to gi\ve hx FINDINGS: The cardiomediastinal silhouette is enlarged. There is mild pulmonary vascular congestion. There are trace bilateral pleural effusions. There is no pneumothorax. There is no acute osseous abnormality. Mild pulmonary vascular congestion. Cardiomegaly. Trace bilateral pleural effusions. Assessment and plan:  Acute respiratory failure, hypoxemic. Oxygen to keep SaO2 >90%. Oxygen requirement has decreased significantly, can possibly be weaned further  CHF, pleural effusions. Suspect this is the etiology of her hypoxemic respiratory failure. Diuresis per cardiology, renal function stable. I/O -1630 mL  ? Obstructive airways disease. She had loud wheezing. She could have underlying obstructive disease, though she has been a non-smoker. Would discontinue prednisone at 5 days   Pulmonary hypertension. Given her dementia and age, I do not believe work-up for pulmonary hypertension would be appropriate. She is unlikely to wear BiPAP even if she is diagnosed with ARAMIS. Leukocytosis. Mild. Afebrile. Procalcitonin normal  CKD. Renal function proved. Atrial fibrillation. EP does not feel pacemaker indicated. HTN, HLD, osteoporosis. Per IM  Obesity. Her body habitus could suggest underlying ARAMIS, she is unlikely to be compliant with PAP treatment. DVT prophylaxis. Lovenox    The patient has significantly improved, oxygen can probably be weaned off. We will sign off, please call with questions, thank you for the consultation.       Electronically signed by:  Barb Jacob MD    7/1/2020    7:34 PM. Fall

## 2020-07-01 NOTE — PLAN OF CARE
Problem: Falls - Risk of:  Goal: Will remain free from falls  Description: Will remain free from falls  Outcome: Ongoing  Note: Pt at risk for falls. Educated on fall precautions. Pt alert and oriented, calls out appropriately. Bed alarm active and audible. Non-skid socks on. Pt remains free from falls, will continue to monitor. Goal: Absence of physical injury  Description: Absence of physical injury  Outcome: Ongoing  Note: Pt at risk for accidental injury. Pt will remain free from injury during admission. Pt educated on the use of the call light and the need to have an active bed alarm. Pt will call out prior to ambulation. Pt's pathway will remain free from tripping hazards. Pt's bed is in lowest position, call light in reach, and active and audible bed alarm. Pt has no further needs at this time. Will continue to monitor. Problem: Confusion - Acute:  Goal: Absence of continued neurological deterioration signs and symptoms  Description: Absence of continued neurological deterioration signs and symptoms  Outcome: Ongoing     Problem: Injury - Risk of, Physical Injury:  Goal: Will remain free from falls  Description: Will remain free from falls  Outcome: Ongoing  Note: Pt at risk for falls. Educated on fall precautions. Pt alert and oriented, calls out appropriately. Bed alarm active and audible. Non-skid socks on. Pt remains free from falls, will continue to monitor. Goal: Absence of physical injury  Description: Absence of physical injury  Outcome: Ongoing  Note: Pt at risk for accidental injury. Pt will remain free from injury during admission. Pt educated on the use of the call light and the need to have an active bed alarm. Pt will call out prior to ambulation. Pt's pathway will remain free from tripping hazards. Pt's bed is in lowest position, call light in reach, and active and audible bed alarm. Pt has no further needs at this time. Will continue to monitor. Problem: Skin Integrity:  Goal: Will show no infection signs and symptoms  Description: Will show no infection signs and symptoms  Outcome: Ongoing  Note: Pt at risk for impaired skin integrity due to bed rest and pt's impaired mobility. Skin will be assessed every shift. Pt will be turned every 2 hours as needed to prevent potential pressure injuries. Pt has no evidence of pressure injuries and no further needs at this crystal.

## 2020-07-01 NOTE — CONSULTS
daily, Synthroid 100 mcg p.o. daily,  magnesium oxide 400 mg p.o. b.i.d., Namenda XR 28 mg daily, Singulair 10  mg p.o. nightly, Trileptal 150 mg p.o. b.i.d., Ditropan XL 5 mg p.o.  daily, and Zocor 10 mg p.o. daily. ALLERGIES:  Include CODEINE and SULFA ANTIBIOTICS. SOCIAL HISTORY:  The patient does not smoke cigarettes. She does not  consume alcohol at this time. FAMILY HISTORY:  Unremarkable for cardiac rhythm disturbance, syncope,  or sudden cardiac death. REVIEW OF SYSTEMS:  Difficult to obtain based on the dementia. The  patient does not describe any episodes of chest pain, palpitations, near  syncope, or syncope. All other components of the 14-system review from  the patient's family and chart are negative. PHYSICAL EXAMINATION:  VITAL SIGNS:  Blood pressure is 134/85, heart rate is 65 beats per  minute and irregular. GENERAL:  The patient is awake and responsive. She is unable to respond  appropriately to most questions. HEENT:  Exam demonstrates normocephalic and atraumatic head. There is  no scleral icterus. Pupils are round and reactive. NECK:  Supple without thyromegaly. LUNGS:  Demonstrate expiratory wheezes bilaterally. There is no  consolidation. CARDIOVASCULAR:  Exam reveals an irregular rhythm. The apical impulse  is discrete. S1 and S2 are normal.  There is no audible murmur or  gallop. The jugular venous pressure is difficult to determine. ABDOMEN:  Obese, soft, and nontender. EXTREMITIES:  Demonstrate no pitting pretibial dependent edema. There  is no cyanosis. There is no evidence for chronic venous stasis. SKIN:  Otherwise warm and dry without skin rash. DIAGNOSTIC DATA:  A 12-lead ECG from 06/29/2020 demonstrate atrial  fibrillation with slow ventricular rate. Heart rate is 39 beats per  minute. There are nonspecific ST-segment and T-wave abnormalities,  which are similar to digoxin exposure, but this is not on her medication  list.    IMPRESSION:  1. Paroxysmal AV block. 2.  Persistent atrial fibrillation. 3.  Dementia. 4.  Pulmonary hypertension. 5.  COPD. The patient is admitted with shortness of breath. She is undergoing  diuresis for her pulmonary vascular congestion and pulmonary  hypertension. She is also undergoing treatment with steroids for COPD  exacerbation. Her average heart rate in atrial fibrillation is about 60  beats per minute, though she does have some episodes of bradycardia  related to paroxysmal AV block. The duration of the pauses appears to be  slightly less than in 2018. At that time, however, she was taking  diltiazem and was also hypothyroid. Neither of these conditions now  exist.  In view of her limited functional status, it is not clear how  much permanent ventricular pacing would provide benefit. Though her  rate is low, she does have a narrow QRS. In the presence of pulmonary  hypertension and assumed poor ventricular compliance, \"uncoupling\" of  the interventricular relationship by pacing the right ventricle may  induce hemodynamic abnormalities and symptoms on that basis. If her  functional status improved or if she developed symptoms suggestive of   bradycardia such as lightheadedness, near syncope, or  syncope, consideration for permanent pacing would be reasonable. In the  absence of these conditions, it is not clear whether pacing would  provide benefit or may in fact result in deleterious consequences. RECOMMENDATIONS:  1.  Observation of cardiac rhythm for now. 2.  Could consider long-term ambulatory ECG monitoring. 3.  If her functional status improves or if symptoms such as  near-syncope or syncope evolve, pacing could be reconsidered. Thank you for the opportunity to assist in the care of the patient. Please contact me if you have any questions regarding her evaluation.         Jessi Diaz MD    D: 06/30/2020 16:00:57       T: 06/30/2020 19:05:01     [email or web address]  Job#: [id number]     Doc#:

## 2020-07-01 NOTE — PROGRESS NOTES
Hospitalist Progress Note      PCP: Malathi Blue MD    Date of Admission: 6/29/2020    Chief Complaint: SOB and directly admitted from [facility] Course: Reviewed H&P. Subjective: Noted pulmonology and cardiology recommendations. Patient currently on 6 L/min oxygen by nasal cannula. no acute events noted . Continue current care plan. Medications:  Reviewed    Scheduled Medications    amLODIPine  5 mg Oral Daily    furosemide  20 mg Oral Daily    apixaban  2.5 mg Oral BID    ipratropium-albuterol  1 ampule Inhalation 4x daily    sodium chloride flush  10 mL Intravenous 2 times per day    famotidine  20 mg Oral Daily    levothyroxine  100 mcg Oral Daily    atorvastatin  20 mg Oral Nightly    sodium chloride flush  10 mL Intravenous 2 times per day    predniSONE  40 mg Oral Daily    OXcarbazepine  150 mg Oral BID     PRN Meds: sodium chloride flush, polyethylene glycol, promethazine **OR** ondansetron, acetaminophen **OR** [DISCONTINUED] acetaminophen      Intake/Output Summary (Last 24 hours) at 7/1/2020 1152  Last data filed at 7/1/2020 4994  Gross per 24 hour   Intake 970 ml   Output 2450 ml   Net -1480 ml       Physical Exam Performed:  BP (!) 174/69   Pulse 89   Temp 97.7 °F (36.5 °C) (Oral)   Resp 18 Comment: Simultaneous filing. User may not have seen previous data. Ht 5' 2\" (1.575 m)   Wt 211 lb 12.8 oz (96.1 kg)   SpO2 96%   BMI 38.74 kg/m²     General appearance:  No apparent distress, appears stated age and cooperative. Oxygen by HF NC 6 L/min  HEENT:  Normal cephalic, atraumatic without obvious deformity. Pupils equal, round, and reactive to light. Extra ocular muscles intact. Conjunctivae/corneas clear. Neck: Supple, with full range of motion. No jugular venous distention. Trachea midline. Respiratory:  Normal respiratory effort. Diminished breath sounds at bases with fine crackles.   Cardiovascular:  Regular rate and rhythm with normal S1/S2 without murmurs, rubs or gallops. Abdomen: Soft, non-tender, non-distended with normal bowel sounds. Musculoskeletal:  No clubbing, cyanosis or edema bilaterally. Full range of motion without deformity. Skin: Skin color, texture, turgor normal.  No rashes or lesions. Neurologic:  Neurovascularly intact without any focal sensory/motor deficits. Cranial nerves: II-XII intact, grossly non-focal.  Psychiatric:  Alert and oriented, thought content appropriate, normal insight  Capillary Refill: Brisk,< 3 seconds   Peripheral Pulses: +2 palpable, equal bilaterally       Labs:   Recent Labs     06/29/20  0324 06/30/20  0519 07/01/20  0552   WBC 11.3* 10.0 11.3*   HGB 12.6 12.9 12.6   HCT 39.3 39.6 38.6    248 242     Recent Labs     06/29/20  0456 06/30/20  0519 07/01/20  0552   * 136 139   K 3.5 4.2 3.6   CL 94* 97* 96*   CO2 25 26 31   BUN 45* 40* 34*   CREATININE 1.4* 1.1 0.9   CALCIUM 9.0 9.2 8.9     Recent Labs     06/29/20  0456   AST 20   ALT 25   BILITOT 0.3   ALKPHOS 65     No results for input(s): INR in the last 72 hours. Recent Labs     06/29/20 0456   TROPONINI <0.01       Active Hospital Problems    Diagnosis Date Noted    Persistent atrial fibrillation [I48.19] 08/06/2018     Priority: High    AV block [I44.30] 08/06/2018     Priority: High    Pulmonary hypertension (HCC) [I27.20]     Shortness of breath [R06.02] 06/29/2020    SOB (shortness of breath) [R06.02] 06/29/2020    Bradycardia [R00.1]     Acute on chronic diastolic CHF (congestive heart failure) (HCC) [I50.33]      Assessment/Plan:  1. SOBOE with acute on chronic hypoxic respiratory failure due to COPD exacerbation   -Currently requiring up to 6 L/min high flow nasal cannula oxygen. Started on IV Solu-Medrol 40 mg every 8 for 2 days and transition to p.o. prednisone 40 mg daily thereafter.  -DuoNeb inhaler scheduled and PRN .   Patient had a SARS-CoV-2 NAAT  Testing at [facility] ED and was negative   -Chest x-ray on admission negative for pneumonia. -Pulmonology following and assisting with management  -Wean oxygen as able as patient improves.     2. Tachybrady Syndrome in the setting of Afib -patient noted to be bradycardic and subsequently transferred to [facility] for EP evaluation.  -Patient not on any rate limiting medication at this time. Resumed Eliquis for anticoagulation on 7/1/2 (  Held in anticipation for procedure/pacemaker on admission) . -Monitor on telemetry.  -Noted cardiology recommendations -repeated echo on 6/30/2020 showed normal LVEF 55 to 60%; moderate right-sided chamber enlargement. RV systolic function is moderately reduced and severe pulmonary artery pressure estimated at 94 mmHg consistent with severe pulmonary hypertension. -TSH 3.96  -EP evaluated and recommended no intervention for now. Suggested to consider long-term ambulatory EKG monitoring and stated could consider pacemaker if her functional status improves or if symptoms such as near syncope or syncope evolve.     3. HypoThyroidism -on Synthroid; continue     4. 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      5. Morbid Obesity -  With There is no height or weight on file to calculate BMI. Complicating assessment and treatment. Placing patient at risk for multiple co-morbidities as well as early death and contributing to the patient's presentation. Counseled on weight loss. 6.  Acute on chronic diastolic CHF with bilateral pleural effusions POA -patient started on IV Lasix and subsequently transitioned home dose; monitor I's/O and daily weights closely. DVT Prophylaxis:Eliquis held in anticipation of procedure   Diet: DIET CARDIAC;  Code Status: Limited    PT/OT Eval Status:  ordered    Dispo -at least 2 to 3 days pending clinical improvement. The note was completed using Dragon -speech recognition software & EMR  .  Every effort was made to

## 2020-07-02 ENCOUNTER — APPOINTMENT (OUTPATIENT)
Dept: GENERAL RADIOLOGY | Age: 85
DRG: 189 | End: 2020-07-02
Attending: INTERNAL MEDICINE
Payer: MEDICARE

## 2020-07-02 LAB
ANION GAP SERPL CALCULATED.3IONS-SCNC: 9 MMOL/L (ref 3–16)
BASE EXCESS ARTERIAL: 8.8 MMOL/L (ref -3–3)
BUN BLDV-MCNC: 26 MG/DL (ref 7–20)
CALCIUM SERPL-MCNC: 8.9 MG/DL (ref 8.3–10.6)
CARBOXYHEMOGLOBIN ARTERIAL: 1 % (ref 0–1.5)
CHLORIDE BLD-SCNC: 98 MMOL/L (ref 99–110)
CO2: 33 MMOL/L (ref 21–32)
CREAT SERPL-MCNC: 0.8 MG/DL (ref 0.6–1.2)
GFR AFRICAN AMERICAN: >60
GFR NON-AFRICAN AMERICAN: >60
GLUCOSE BLD-MCNC: 118 MG/DL (ref 70–99)
HCO3 ARTERIAL: 35.6 MMOL/L (ref 21–29)
HCT VFR BLD CALC: 38.9 % (ref 36–48)
HEMOGLOBIN, ART, EXTENDED: 13.7 G/DL (ref 12–16)
HEMOGLOBIN: 12.7 G/DL (ref 12–16)
MAGNESIUM: 2.5 MG/DL (ref 1.8–2.4)
MCH RBC QN AUTO: 29.9 PG (ref 26–34)
MCHC RBC AUTO-ENTMCNC: 32.7 G/DL (ref 31–36)
MCV RBC AUTO: 91.4 FL (ref 80–100)
METHEMOGLOBIN ARTERIAL: 0.5 %
O2 CONTENT ARTERIAL: 18 ML/DL
O2 SAT, ARTERIAL: 93.1 %
O2 THERAPY: ABNORMAL
PCO2 ARTERIAL: 58.7 MMHG (ref 35–45)
PDW BLD-RTO: 15.9 % (ref 12.4–15.4)
PH ARTERIAL: 7.4 (ref 7.35–7.45)
PLATELET # BLD: 231 K/UL (ref 135–450)
PMV BLD AUTO: 8.1 FL (ref 5–10.5)
PO2 ARTERIAL: 66 MMHG (ref 75–108)
POTASSIUM REFLEX MAGNESIUM: 3.3 MMOL/L (ref 3.5–5.1)
RBC # BLD: 4.25 M/UL (ref 4–5.2)
SODIUM BLD-SCNC: 140 MMOL/L (ref 136–145)
TCO2 ARTERIAL: 37.4 MMOL/L
WBC # BLD: 10.7 K/UL (ref 4–11)

## 2020-07-02 PROCEDURE — 80048 BASIC METABOLIC PNL TOTAL CA: CPT

## 2020-07-02 PROCEDURE — 82803 BLOOD GASES ANY COMBINATION: CPT

## 2020-07-02 PROCEDURE — 36600 WITHDRAWAL OF ARTERIAL BLOOD: CPT

## 2020-07-02 PROCEDURE — 97535 SELF CARE MNGMENT TRAINING: CPT

## 2020-07-02 PROCEDURE — 6370000000 HC RX 637 (ALT 250 FOR IP): Performed by: INTERNAL MEDICINE

## 2020-07-02 PROCEDURE — 2580000003 HC RX 258: Performed by: INTERNAL MEDICINE

## 2020-07-02 PROCEDURE — 36415 COLL VENOUS BLD VENIPUNCTURE: CPT

## 2020-07-02 PROCEDURE — 6360000002 HC RX W HCPCS: Performed by: INTERNAL MEDICINE

## 2020-07-02 PROCEDURE — 2700000000 HC OXYGEN THERAPY PER DAY

## 2020-07-02 PROCEDURE — 85027 COMPLETE CBC AUTOMATED: CPT

## 2020-07-02 PROCEDURE — 94640 AIRWAY INHALATION TREATMENT: CPT

## 2020-07-02 PROCEDURE — 94761 N-INVAS EAR/PLS OXIMETRY MLT: CPT

## 2020-07-02 PROCEDURE — 71045 X-RAY EXAM CHEST 1 VIEW: CPT

## 2020-07-02 PROCEDURE — 83735 ASSAY OF MAGNESIUM: CPT

## 2020-07-02 PROCEDURE — 2060000000 HC ICU INTERMEDIATE R&B

## 2020-07-02 PROCEDURE — 97530 THERAPEUTIC ACTIVITIES: CPT

## 2020-07-02 PROCEDURE — 99232 SBSQ HOSP IP/OBS MODERATE 35: CPT | Performed by: INTERNAL MEDICINE

## 2020-07-02 RX ORDER — METHYLPREDNISOLONE SODIUM SUCCINATE 40 MG/ML
40 INJECTION, POWDER, LYOPHILIZED, FOR SOLUTION INTRAMUSCULAR; INTRAVENOUS EVERY 8 HOURS
Status: DISCONTINUED | OUTPATIENT
Start: 2020-07-02 | End: 2020-07-04

## 2020-07-02 RX ORDER — MEMANTINE HYDROCHLORIDE 5 MG/1
10 TABLET ORAL 2 TIMES DAILY
Status: DISCONTINUED | OUTPATIENT
Start: 2020-07-02 | End: 2020-07-07 | Stop reason: HOSPADM

## 2020-07-02 RX ORDER — POTASSIUM CHLORIDE 20 MEQ/1
20 TABLET, EXTENDED RELEASE ORAL
Status: DISPENSED | OUTPATIENT
Start: 2020-07-02 | End: 2020-07-02

## 2020-07-02 RX ADMIN — POTASSIUM CHLORIDE 20 MEQ: 1500 TABLET, EXTENDED RELEASE ORAL at 09:55

## 2020-07-02 RX ADMIN — LEVOTHYROXINE SODIUM 100 MCG: 0.1 TABLET ORAL at 05:17

## 2020-07-02 RX ADMIN — FAMOTIDINE 20 MG: 20 TABLET, FILM COATED ORAL at 05:17

## 2020-07-02 RX ADMIN — Medication 10 ML: at 21:20

## 2020-07-02 RX ADMIN — OXCARBAZEPINE 150 MG: 150 TABLET, FILM COATED ORAL at 09:55

## 2020-07-02 RX ADMIN — IPRATROPIUM BROMIDE AND ALBUTEROL SULFATE 1 AMPULE: .5; 3 SOLUTION RESPIRATORY (INHALATION) at 20:31

## 2020-07-02 RX ADMIN — IPRATROPIUM BROMIDE AND ALBUTEROL SULFATE 1 AMPULE: .5; 3 SOLUTION RESPIRATORY (INHALATION) at 08:19

## 2020-07-02 RX ADMIN — APIXABAN 2.5 MG: 2.5 TABLET, FILM COATED ORAL at 09:55

## 2020-07-02 RX ADMIN — Medication 10 ML: at 09:55

## 2020-07-02 RX ADMIN — MEMANTINE 10 MG: 5 TABLET ORAL at 09:55

## 2020-07-02 RX ADMIN — IPRATROPIUM BROMIDE AND ALBUTEROL SULFATE 1 AMPULE: .5; 3 SOLUTION RESPIRATORY (INHALATION) at 11:10

## 2020-07-02 RX ADMIN — METHYLPREDNISOLONE SODIUM SUCCINATE 40 MG: 40 INJECTION, POWDER, FOR SOLUTION INTRAMUSCULAR; INTRAVENOUS at 10:48

## 2020-07-02 RX ADMIN — FUROSEMIDE 20 MG: 20 TABLET ORAL at 09:54

## 2020-07-02 RX ADMIN — POTASSIUM CHLORIDE 20 MEQ: 1500 TABLET, EXTENDED RELEASE ORAL at 12:48

## 2020-07-02 RX ADMIN — IPRATROPIUM BROMIDE AND ALBUTEROL SULFATE 1 AMPULE: .5; 3 SOLUTION RESPIRATORY (INHALATION) at 16:33

## 2020-07-02 RX ADMIN — PREDNISONE 40 MG: 20 TABLET ORAL at 09:55

## 2020-07-02 RX ADMIN — AMLODIPINE BESYLATE 5 MG: 5 TABLET ORAL at 09:55

## 2020-07-02 RX ADMIN — METHYLPREDNISOLONE SODIUM SUCCINATE 40 MG: 40 INJECTION, POWDER, FOR SOLUTION INTRAMUSCULAR; INTRAVENOUS at 21:19

## 2020-07-02 ASSESSMENT — PAIN SCALES - GENERAL: PAINLEVEL_OUTOF10: 0

## 2020-07-02 NOTE — PLAN OF CARE
Problem: Falls - Risk of:  Goal: Will remain free from falls  Description: Will remain free from falls  Outcome: Ongoing  Note: Pt aware of need to call before attempting to get out of bed for assistance, pt demonstrates understanding, call light within reach bed alarm on, bed in lowest position, will continue to assess. Goal: Absence of physical injury  Description: Absence of physical injury  Outcome: Ongoing     Problem: Injury - Risk of, Physical Injury:  Goal: Will remain free from falls  Description: Will remain free from falls  Outcome: Ongoing  Note: Pt aware of need to call before attempting to get out of bed for assistance, pt demonstrates understanding, call light within reach bed alarm on, bed in lowest position, will continue to assess. Goal: Absence of physical injury  Description: Absence of physical injury  Outcome: Ongoing     Problem: Skin Integrity:  Goal: Will show no infection signs and symptoms  Description: Will show no infection signs and symptoms  Outcome: Ongoing  Note: Pt encouraged to turn/change positions every two hours to prevent skin breakdown. Staff assisting with repositioning as needed.

## 2020-07-02 NOTE — PROGRESS NOTES
Hospitalist Progress Note      PCP: Omari Bedoya MD    Date of Admission: 6/29/2020    Chief Complaint: SOB and directly admitted from [facility] Course: Reviewed H&P. Subjective: Patient with audible wheezing during my exam this morning ; continue IV steroids at this time ;  patient currently on 1-2 L/min oxygen by nasal cannula. no acute events noted . NNB of 1380 cc thus far . On Lasix 20 mg daily . K-3.3 being replaced . Noted EP recs - No plan for PPM at this time. Echo findings noted . WIll get PT/OT eval      Medications:  Reviewed    Scheduled Medications    potassium chloride  20 mEq Oral Q2H    memantine  10 mg Oral BID    methylPREDNISolone  40 mg Intravenous Q8H    amLODIPine  5 mg Oral Daily    furosemide  20 mg Oral Daily    apixaban  2.5 mg Oral BID    ipratropium-albuterol  1 ampule Inhalation 4x daily    sodium chloride flush  10 mL Intravenous 2 times per day    famotidine  20 mg Oral Daily    levothyroxine  100 mcg Oral Daily    atorvastatin  20 mg Oral Nightly    sodium chloride flush  10 mL Intravenous 2 times per day    OXcarbazepine  150 mg Oral BID     PRN Meds: sodium chloride flush, polyethylene glycol, promethazine **OR** ondansetron, acetaminophen **OR** [DISCONTINUED] acetaminophen      Intake/Output Summary (Last 24 hours) at 7/2/2020 1213  Last data filed at 7/2/2020 0601  Gross per 24 hour   Intake 1560 ml   Output 1100 ml   Net 460 ml       Physical Exam Performed:  BP (!) 163/74   Pulse 118   Temp 98.3 °F (36.8 °C) (Oral)   Resp 22   Ht 5' 2\" (1.575 m)   Wt 218 lb 7.6 oz (99.1 kg)   SpO2 94%   BMI 39.96 kg/m²     General appearance:  No apparent distress, appears stated age and cooperative. Oxygen by NC 1-2  L/min  HEENT:  Normal cephalic, atraumatic without obvious deformity. Pupils equal, round, and reactive to light. Extra ocular muscles intact. Conjunctivae/corneas clear. Neck: Supple, with full range of motion.  No jugular venous distention. Trachea midline. Respiratory:  Normal respiratory effort. Diminished breath sounds at bases with fine crackles. Audible Wheezes noted   Cardiovascular:  Regular rate and rhythm with normal S1/S2 without murmurs, rubs or gallops. Abdomen: Soft, non-tender, non-distended with normal bowel sounds. Musculoskeletal:  No clubbing, cyanosis or edema bilaterally. Full range of motion without deformity. Skin: Skin color, texture, turgor normal.  No rashes or lesions. Neurologic:  Neurovascularly intact without any focal sensory/motor deficits. Cranial nerves: II-XII intact, grossly non-focal. Confused at baseline   Psychiatric:  Alert and demented, thought content inappropriate, Lacks insight  Capillary Refill: Brisk,< 3 seconds   Peripheral Pulses: +2 palpable, equal bilaterally       Labs:   Recent Labs     06/30/20 0519 07/01/20  0552 07/02/20  0528   WBC 10.0 11.3* 10.7   HGB 12.9 12.6 12.7   HCT 39.6 38.6 38.9    242 231     Recent Labs     06/30/20 0519 07/01/20  0552 07/02/20  0528    139 140   K 4.2 3.6 3.3*   CL 97* 96* 98*   CO2 26 31 33*   BUN 40* 34* 26*   CREATININE 1.1 0.9 0.8   CALCIUM 9.2 8.9 8.9       Active Hospital Problems    Diagnosis Date Noted    Persistent atrial fibrillation [I48.19] 08/06/2018     Priority: High    AV block [I44.30] 08/06/2018     Priority: High    Bradycardia [R00.1]     Intermittent asthma without complication [G11.42]     Late onset Alzheimer's disease without behavioral disturbance (HCC) [G30.1, F02.80]     Pulmonary hypertension (HCC) [I27.20]     Shortness of breath [R06.02] 06/29/2020    SOB (shortness of breath) [R06.02] 06/29/2020    Acute on chronic diastolic CHF (congestive heart failure) (HCC) [I50.33]     Acute hypoxemic respiratory failure ([facility] [J96.01] 08/07/2018    Essential hypertension [I10]     Morbid obesity ([facility] [E66.01] 08/04/2018     Assessment/Plan:  1.  SOBOE with acute on chronic hypoxic respiratory failure due to COPD exacerbation   -Currently requiring up to 6 L/min high flow nasal cannula oxygen. Started on IV Solu-Medrol 40 mg every 8  and transition to p.o. prednisone 40 mg daily thereafter.  -DuoNeb inhaler scheduled and PRN . Patient had a SARS-CoV-2 NAAT  Testing at [facility] ED and was negative   -Chest x-ray on admission negative for pneumonia. -Pulmonology following and assisting with management  -Wean oxygen as able as patient improves.     2. Tachybrady Syndrome in the setting of Afib -patient noted to be bradycardic and subsequently transferred to [facility] for EP evaluation.  -Patient not on any rate limiting medication at this time. Resumed Eliquis for anticoagulation on 7/1/2 (  Held in anticipation for procedure/pacemaker on admission) . -Monitor on telemetry.  -Noted cardiology recommendations -repeated echo on 6/30/2020 showed normal LVEF 55 to 60%; moderate right-sided chamber enlargement. RV systolic function is moderately reduced and severe pulmonary artery pressure estimated at 94 mmHg consistent with severe pulmonary hypertension. -TSH 3.96  -EP evaluated and recommended no intervention for now. Suggested to consider long-term ambulatory EKG monitoring and stated could consider pacemaker if her functional status improves or if symptoms such as near syncope or syncope evolve.     3. HypoThyroidism -on Synthroid; continue     4. 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      5. Morbid Obesity -  With There is no height or weight on file to calculate BMI. Complicating assessment and treatment. Placing patient at risk for multiple co-morbidities as well as early death and contributing to the patient's presentation. Counseled on weight loss.      6.  Acute on chronic diastolic CHF with bilateral pleural effusions POA -patient started on IV Lasix and subsequently transitioned home dose; monitor I's/O and daily weights closely. NNB of 1.3 L thus far     DVT Prophylaxis:Eliquis held in anticipation of procedure   Diet: DIET CARDIAC;  Code Status: Limited    PT/OT Eval Status:  ordered    Dispo -at least 2 to 3 days pending clinical improvement. The note was completed using Dragon -speech recognition software & EMR  . Every effort was made to ensure accuracy; however, inadvertent computerized transcription errors may be present.     Farrah Blackwell MD

## 2020-07-02 NOTE — PROGRESS NOTES
Per Dr Sofi Moreno decrease pts O2 until pt is sating 92%. Decreased pts O2 level to 1.5L, will continue to monitor and decrease O2 as tolerated.

## 2020-07-02 NOTE — PROGRESS NOTES
Physical Therapy  RN requested to hold PT activities this afternoon as pt was SOB.      Beth Alcocer, PT, DPT

## 2020-07-02 NOTE — PROGRESS NOTES
Pt c/o sob. Keeps repeating \"Honey, I can't breathe\" PS message sent to Dr. Nghia Cuello \"pt keeps saying \"I can't breathe\". She has audible wheezes. She did get her scheduled duoneb this morning. Her SpO2 is mid-90's on 1 liter. Her sats dropped to 79% on room air with activity this morning. Would you want to order something PRN for her wheezing/sob?  Thanks, Jeanne\"

## 2020-07-02 NOTE — PROGRESS NOTES
Patient's EF (Ejection Fraction) is greater than 40%    Heart Failure Medications:   Diuretics[de-identified] Lasix     (One of the following REQUIRED for EF <40%/SYSTOLIC FAILURE but MAY be used in EF% >40%/DIASTOLIC FAILURE)        ACE[de-identified] None        ARB[de-identified] None         ARNI[de-identified] None    (Beta Blockers)   NON- Evidenced Based Beta Blocker (for EF% >40%/DIASTOLIC FAILURE): None     Evidenced Based Beta Blocker::(REQUIRED for EF% <40%/SYSTOLIC FAILURE) None  . .................................................................................................................................................. Patient's weights and intake/output reviewed: Yes    Patient's Last Weight: 211 lbs obtained by standing scale. Difference of 15 lbs less than last documented weight. Intake/Output Summary (Last 24 hours) at 7/2/2020 0257  Last data filed at 7/1/2020 2358  Gross per 24 hour   Intake 1440 ml   Output 1550 ml   Net -110 ml       Comorbidities Reviewed Yes    Patient has a past medical history of Alzheimer's dementia ([address] Asthma, Atrial fibrillation ([address] Blood clot in vein, CHF (congestive heart failure) ([address] Dementia ([address] GERD (gastroesophageal reflux disease), Hypertension, Kidney failure, MRSA (methicillin resistant staph aureus) culture positive, and Osteoporosis. >>For CHF and Comorbidity documentation on Education Time and Topics, please see Education Tab    Progressive Mobility Assessment:  What is this patient's Current Level of Mobility?: Ambulatory- with Assistance  How was this patient Mobilized today?: Up to Chair                 With Whom? Nurse and PCA                 Level of Difficulty/Assistance: 1x Assist     Pt resting in bed at this time on 1 L O2. Pt denies shortness of breath. Pt without lower extremity edema.      Patient and/or Family's stated Goal of Care this Admission: better understand heart failure and disease management prior to discharge        :

## 2020-07-02 NOTE — PROGRESS NOTES
Speech Language Pathology  Attempted Evaluation    Acknowledged order for SLP dysphagia evaluation, chart reviewed, and evaluation attempted. Spoke with RN prior to entering pt's room - there is reported concern for aspiration, however, pt is too lethargic to safely participate in PO trials at this time. Will attempt again at a later date. Of note:  Per chart review, pt does have a hx of dysphagia from 2018. Per records, a previous MBSS was completed in with episodes of penetration observed and altered diet consistencies recommended. See SLP note from August 2018 in EMR for details. ST to follow and attempt evaluation again as schedule permits and as pt is appropriate.      Peggy Messer  #[id number]  Speech-Language Pathologist

## 2020-07-02 NOTE — PROGRESS NOTES
Pt lethargic, opens eyes briefly when RN says her name, then goes right back to sleep. RN called to update Pulmonologist. Reviewed CXR. New order received for ABG. Hospitalist updated.

## 2020-07-02 NOTE — PROGRESS NOTES
Occupational Therapy  Facility/Department: [facility] C4 PCU  Daily Treatment Note  NAME: Rissa Edward  : 1935  MRN: [id number]    Date of Service: 2020    Discharge Recommendations:  Subacute/Skilled Nursing Facility       Assessment   Performance deficits / Impairments: Decreased functional mobility ; Decreased ADL status; Decreased cognition;Decreased endurance;Decreased balance;Decreased strength;Decreased safe awareness  Assessment: Pt CGA this date for functional mobility and transfers. Pt required max A for LE dressing and pericare/bowel hygiene. Pt sat in chair and had complaint of SOB. Pt O2 was taken at 74% after about a minute of education on PLB pt raised to 77%. RN was notified. RN donned O2 to 2L of O2 and pt recovered to 92%. Pt was set up with breakfast tray this date. Pt would benefit from further skilled therapy at a SNF upon d/c. Prognosis: Fair    REQUIRES OT FOLLOW UP: Yes  Activity Tolerance  Activity Tolerance: Patient Tolerated treatment well  Safety Devices  Safety Devices in place: Yes  Type of devices: Bed alarm in place; Left in bed;Nurse notified;Gait belt         Patient Diagnosis(es): There were no encounter diagnoses. has a past medical history of Alzheimer's dementia ([address] Asthma, Atrial fibrillation ([address] Blood clot in vein, CHF (congestive heart failure) ([address] Dementia ([address] GERD (gastroesophageal reflux disease), Hypertension, Kidney failure, MRSA (methicillin resistant staph aureus) culture positive, and Osteoporosis. has a past surgical history that includes Cholecystectomy; eye surgery; Hysterectomy; Thyroidectomy; pr esophagogastroduodenoscopy transoral diagnostic (N/A, 2018); [facility]/A[facility]).     Restrictions  Restrictions/Precautions  Restrictions/Precautions: Fall Risk  Position Activity Restriction  Other position/activity restrictions: 4L O2, purwick, telemetry, up with assist     Subjective   General  Chart Reviewed: Yes  Patient assessed for rehabilitation services?: Yes  Response to previous treatment: Patient with no complaints from previous session  Family / Caregiver Present: No  Referring Practitioner: Tyrell Jacobo MD  Diagnosis: SOB  Subjective  Subjective: Pt agreeable to follow up  General Comment  Comments: RN cleared    Vital Signs  Patient Currently in Pain: Denies     Orientation  Orientation  Overall Orientation Status: Impaired  Orientation Level: Oriented to person;Disoriented to situation;Disoriented to place; Disoriented to time     Objective    ADL  Feeding: Setup(of breakfast tray)  LE Dressing: Maximum assistance(brief)  Toileting: Maximum assistance(pericare and bowel hygiene)     Balance  Sitting Balance: Supervision  Standing Balance: Contact guard assistance(RW)    Functional Mobility  Functional - Mobility Device: Rolling Walker  Activity: To/from bathroom  Assist Level: Contact guard assistance    Toilet Transfers  Toilet - Technique: Ambulating(RW)  Equipment Used: Grab bars  Toilet Transfer: Contact guard assistance    Bed mobility  Supine to Sit: Stand by assistance  Sit to Supine: Minimal assistance     Transfers  Sit to stand: Contact guard assistance  Stand to sit: Contact guard assistance     Cognition  Overall Cognitive Status: Exceptions  Arousal/Alertness: Appropriate responses to stimuli  Following Commands: Follows one step commands with increased time; Follows one step commands with repetition  Attention Span: Attends with cues to redirect  Memory: Decreased recall of recent events;Decreased short term memory  Safety Judgement: Decreased awareness of need for safety;Decreased awareness of need for assistance  Insights: Decreased awareness of deficits  Initiation: Requires cues for some  Sequencing: Requires cues for some     Plan   Plan  Times per week: 3-5x's a week while in acute care     AM-PAC Score  AM-PAC Inpatient Daily Activity Raw Score: 16 (07/02/20 8130)  AM-PAC Inpatient ADL T-Scale Score : 35.96 (07/02/20 0930)  ADL Inpatient CMS 0-100% Score: 53.32 (07/02/20 0930)  ADL Inpatient CMS G-Code Modifier : CK (07/02/20 0930)    Goals  Short term goals  Time Frame for Short term goals: 1 week unless otherwise specified 7/7  Short term goal 1: Pt will complete toilet transfers with SBA bt 7/5 -7/02 CGA  Short term goal 2: Pt will complete oral care at sink with CGA for balance- 7/02 ongoing  Short term goal 3: Pt will complete LE dressing with modA -7/02 max A  Patient Goals   Patient goals : \"to find my glasses\"       Therapy Time   Individual Concurrent Group Co-treatment   Time In 0857         Time Out 0938         Minutes 41         Timed Code Treatment Minutes: [address], [place]

## 2020-07-02 NOTE — PROGRESS NOTES
Pt made NPO due to signs of aspiration. Swallow evaluation to be completed by speech therapy when pt is more responsive.

## 2020-07-02 NOTE — FLOWSHEET NOTE
06/29/20 0928   Vital Signs   Temp 98.1 °F (36.7 °C)   Temp Source Oral   Pulse (!) 41   Heart Rate Source Monitor   Resp 22   BP (!) 143/57   BP Location Right lower arm   Level of Consciousness 0   MEWS Score 3   Patient Currently in Pain Denies   Oxygen Therapy   SpO2 94 %   O2 Device Nasal cannula   O2 Flow Rate (L/min) 6 L/min   DA from [facility]. Pt alert and confused (Doreen). Avasys in place. Pt oriented to room. Bed in low position and call bell within reach. Awaiting admission orders.
06/30/20 1321   Encounter Summary   Services provided to: Patient not available   Referral/Consult From: [address]   ([employer])   Continue Visiting   (room phone busy)   Spiritual/Yarsani   Intervention Prayer
Genitourinary (WDL) WDL   Flank Tenderness No   Suprapubic Tenderness No   Dysuria No   Urine Assessment   Incontinence Yes  (purwick in place)   Urine Color Yellow/straw   Urine Appearance Clear   Urine Odor No odor   Anus/Rectum   Anus/Rectum (WDL) WDL   Psychosocial   Psychosocial (WDL) X   Patient Behaviors Calm; Cooperative  (alzheimers)

## 2020-07-03 ENCOUNTER — APPOINTMENT (OUTPATIENT)
Dept: GENERAL RADIOLOGY | Age: 85
DRG: 189 | End: 2020-07-03
Attending: INTERNAL MEDICINE
Payer: MEDICARE

## 2020-07-03 LAB
ANION GAP SERPL CALCULATED.3IONS-SCNC: 9 MMOL/L (ref 3–16)
BUN BLDV-MCNC: 22 MG/DL (ref 7–20)
CALCIUM SERPL-MCNC: 8.8 MG/DL (ref 8.3–10.6)
CHLORIDE BLD-SCNC: 99 MMOL/L (ref 99–110)
CO2: 34 MMOL/L (ref 21–32)
CREAT SERPL-MCNC: 0.8 MG/DL (ref 0.6–1.2)
GFR AFRICAN AMERICAN: >60
GFR NON-AFRICAN AMERICAN: >60
GLUCOSE BLD-MCNC: 170 MG/DL (ref 70–99)
HCT VFR BLD CALC: 37.6 % (ref 36–48)
HEMOGLOBIN: 12.4 G/DL (ref 12–16)
MCH RBC QN AUTO: 30 PG (ref 26–34)
MCHC RBC AUTO-ENTMCNC: 33 G/DL (ref 31–36)
MCV RBC AUTO: 91 FL (ref 80–100)
PDW BLD-RTO: 16.2 % (ref 12.4–15.4)
PLATELET # BLD: 212 K/UL (ref 135–450)
PMV BLD AUTO: 8.5 FL (ref 5–10.5)
POTASSIUM REFLEX MAGNESIUM: 4 MMOL/L (ref 3.5–5.1)
RBC # BLD: 4.13 M/UL (ref 4–5.2)
SODIUM BLD-SCNC: 142 MMOL/L (ref 136–145)
WBC # BLD: 8.4 K/UL (ref 4–11)

## 2020-07-03 PROCEDURE — 85027 COMPLETE CBC AUTOMATED: CPT

## 2020-07-03 PROCEDURE — 2700000000 HC OXYGEN THERAPY PER DAY

## 2020-07-03 PROCEDURE — 6370000000 HC RX 637 (ALT 250 FOR IP): Performed by: INTERNAL MEDICINE

## 2020-07-03 PROCEDURE — 92526 ORAL FUNCTION THERAPY: CPT

## 2020-07-03 PROCEDURE — 92610 EVALUATE SWALLOWING FUNCTION: CPT

## 2020-07-03 PROCEDURE — 2060000000 HC ICU INTERMEDIATE R&B

## 2020-07-03 PROCEDURE — 94640 AIRWAY INHALATION TREATMENT: CPT

## 2020-07-03 PROCEDURE — 92611 MOTION FLUOROSCOPY/SWALLOW: CPT

## 2020-07-03 PROCEDURE — 2580000003 HC RX 258: Performed by: INTERNAL MEDICINE

## 2020-07-03 PROCEDURE — 6360000002 HC RX W HCPCS: Performed by: INTERNAL MEDICINE

## 2020-07-03 PROCEDURE — 94761 N-INVAS EAR/PLS OXIMETRY MLT: CPT

## 2020-07-03 PROCEDURE — 74230 X-RAY XM SWLNG FUNCJ C+: CPT

## 2020-07-03 PROCEDURE — 80048 BASIC METABOLIC PNL TOTAL CA: CPT

## 2020-07-03 PROCEDURE — 97530 THERAPEUTIC ACTIVITIES: CPT

## 2020-07-03 PROCEDURE — 36415 COLL VENOUS BLD VENIPUNCTURE: CPT

## 2020-07-03 RX ORDER — IPRATROPIUM BROMIDE AND ALBUTEROL SULFATE 2.5; .5 MG/3ML; MG/3ML
1 SOLUTION RESPIRATORY (INHALATION)
Status: DISCONTINUED | OUTPATIENT
Start: 2020-07-03 | End: 2020-07-07 | Stop reason: HOSPADM

## 2020-07-03 RX ADMIN — ATORVASTATIN CALCIUM 20 MG: 10 TABLET, FILM COATED ORAL at 21:25

## 2020-07-03 RX ADMIN — METHYLPREDNISOLONE SODIUM SUCCINATE 40 MG: 40 INJECTION, POWDER, FOR SOLUTION INTRAMUSCULAR; INTRAVENOUS at 21:26

## 2020-07-03 RX ADMIN — FUROSEMIDE 20 MG: 20 TABLET ORAL at 14:12

## 2020-07-03 RX ADMIN — APIXABAN 2.5 MG: 2.5 TABLET, FILM COATED ORAL at 14:12

## 2020-07-03 RX ADMIN — Medication 10 ML: at 21:27

## 2020-07-03 RX ADMIN — APIXABAN 2.5 MG: 2.5 TABLET, FILM COATED ORAL at 21:25

## 2020-07-03 RX ADMIN — MEMANTINE 10 MG: 5 TABLET ORAL at 14:13

## 2020-07-03 RX ADMIN — OXCARBAZEPINE 150 MG: 150 TABLET, FILM COATED ORAL at 21:25

## 2020-07-03 RX ADMIN — IPRATROPIUM BROMIDE AND ALBUTEROL SULFATE 1 AMPULE: .5; 3 SOLUTION RESPIRATORY (INHALATION) at 11:55

## 2020-07-03 RX ADMIN — Medication 10 ML: at 08:50

## 2020-07-03 RX ADMIN — OXCARBAZEPINE 150 MG: 150 TABLET, FILM COATED ORAL at 14:12

## 2020-07-03 RX ADMIN — IPRATROPIUM BROMIDE AND ALBUTEROL SULFATE 1 AMPULE: .5; 3 SOLUTION RESPIRATORY (INHALATION) at 20:59

## 2020-07-03 RX ADMIN — AMLODIPINE BESYLATE 5 MG: 5 TABLET ORAL at 14:13

## 2020-07-03 RX ADMIN — Medication 10 ML: at 11:35

## 2020-07-03 RX ADMIN — MEMANTINE 10 MG: 5 TABLET ORAL at 21:25

## 2020-07-03 RX ADMIN — METHYLPREDNISOLONE SODIUM SUCCINATE 40 MG: 40 INJECTION, POWDER, FOR SOLUTION INTRAMUSCULAR; INTRAVENOUS at 04:07

## 2020-07-03 RX ADMIN — IPRATROPIUM BROMIDE AND ALBUTEROL SULFATE 1 AMPULE: .5; 3 SOLUTION RESPIRATORY (INHALATION) at 16:04

## 2020-07-03 RX ADMIN — IPRATROPIUM BROMIDE AND ALBUTEROL SULFATE 1 AMPULE: .5; 3 SOLUTION RESPIRATORY (INHALATION) at 08:40

## 2020-07-03 RX ADMIN — METHYLPREDNISOLONE SODIUM SUCCINATE 40 MG: 40 INJECTION, POWDER, FOR SOLUTION INTRAMUSCULAR; INTRAVENOUS at 11:09

## 2020-07-03 RX ADMIN — Medication 10 ML: at 11:09

## 2020-07-03 ASSESSMENT — PAIN SCALES - GENERAL
PAINLEVEL_OUTOF10: 0

## 2020-07-03 ASSESSMENT — PAIN SCALES - WONG BAKER: WONGBAKER_NUMERICALRESPONSE: 2

## 2020-07-03 NOTE — PLAN OF CARE
Problem: Falls - Risk of:  Goal: Will remain free from falls  Description: Will remain free from falls  7/3/2020 0037 by Taylor Holliday RN  Outcome: Ongoing  7/2/2020 1744 by Roney Dai RN  Outcome: Ongoing

## 2020-07-03 NOTE — PROGRESS NOTES
Speech Language Pathology  Facility/Department: [facility] [facility] [address] [facility]   CLINICAL BEDSIDE SWALLOW EVALUATION    NAME: Wilda Cardenas  : 1935  MRN: [id number]    ADMISSION DATE: 2020  ADMITTING DIAGNOSIS: has Hematemesis; Morbid obesity ([address] Persistent atrial fibrillation; AV block; Essential hypertension; Acute hypoxemic respiratory failure ([address] Pulmonary infiltrates; Diastolic dysfunction; Pleural effusion, bilateral; Morbid obesity with BMI of 40.0-44.9, adult ([address] Myonecrosis; Atelectasis; Ineffective airway clearance; Shortness of breath; SOB (shortness of breath); Acute on chronic diastolic CHF (congestive heart failure) ([address] Pulmonary hypertension ([address] Bradycardia; Intermittent asthma without complication; and Late onset Alzheimer's disease without behavioral disturbance ([address] on their problem list.  ONSET DATE: 20    Recent Chest Xray/CT of Chest: (20)  Impression   Cardiomegaly and chronic pulmonary change with right basilar infiltrate. Date of Eval: 7/3/2020  Evaluating Therapist: Hailey Landin    Current Diet level:  Current Diet : NPO  Current Liquid Diet : NPO    Primary Complaint  Patient Complaint: Desire to drink water and eat    Pain:  Pain Assessment  Pain Assessment: 0-10  Pain Level: 0    Reason for Referral  Wilda Cardenas was referred for a bedside swallow evaluation to assess the efficiency of her swallow function, identify signs and symptoms of aspiration and make recommendations regarding safe dietary consistencies, effective compensatory strategies, and safe eating environment. Impression  Dysphagia Diagnosis: Severe pharyngeal stage dysphagia; Severe oral stage dysphagia; Suspected needs further assessment  Dysphagia Outcome Severity Scale: Level 1: Severe dysphagia- NPO. Unable to tolerate any PO safely  The pt was seen this AM for a clinical bedside swallowing evaluation.  The pt's RN approved the therapist seeing the pt for the eval. The pt was confused throughout often repeating the same question multiple times, not recalling the previous answer. Limited trials of pureed solids and moderately-thick (honey) liquids were given. The pt demonstrate s/s of aspiration when trials were initiated in the form of watery eyes. With trials of the thickened liquid the pt presented with wet vocal quality and eventual prolonged coughing episodes. RR raised from around 20 to around 32 after the trials of moderately-thick liquids via cup. Likewise, her O2 sats dropped from 96 to 91%. The pt has a history of oropharyngeal dysphagia per previous MBS in 2018. Highly question PO tolerance at this time due to severity of dysphagia symptoms. Recommend NPO status with MBS later today. The MBS is currently scheduled for 1 PM. See the rest of this report for more details. Treatment Plan  Requires SLP Intervention: Yes  Duration/Frequency of Treatment: 3-5 x wk for LOS  D/C Recommendations: SNF     Recommended Diet and Intervention  Diet Solids Recommendation: NPO  Liquid Consistency Recommendation: NPO  Recommended Form of Meds: Via alternative means of nutrition  Recommendations: NPO;Modified barium swallow study; Dysphagia treatment  Therapeutic Interventions: Patient/Family education; Therapeutic PO trials with SLP;Oral care    Compensatory Swallowing Strategies  Compensatory Swallowing Strategies: Other (comments)(Keep head of bed elevated; Good oral care at least 3 x day)    Treatment/Goals  Short-term Goals  Timeframe for Short-term Goals: 8 days (7/11/20)  Goal 1: The pt will tolerate puree solids without s/s of aspiration or significant dysphagia 5/5  Goal 2: The pt will tolerate Moderately-thick (honey) without s/s of aspiration or significant dysphagia 5/5  Long-term Goals  Timeframe for Long-term Goals: 10 days (7/13/20)  Goal 1: The pt was consistently tolerate a safest and least restrictive diet    General  Chart Reviewed: Yes  Subjective  Subjective:  The pt has Dementia and significant cognitive deficits. Tends to ask the same question multiple times not recalling that she has already been told the answer multiple time. Stated \"I feel like I can't breath\" when the therapist arrived despite O2 sats being 96%. Then she began repeatedly asking for a drink of water  Behavior/Cognition: Confused; Impulsive;Distractible;Requires cueing  Respiratory Status: O2 via nasual cannula  O2 Device: Nasal cannula  Liters of Oxygen: 1 L  Communication Observation: Functional  Follows Directions: Simple(Simple with cueing)  Dentition: Dentures top;Dentures bottom(Placed by SLP)  Patient Positioning: Upright in bed  Baseline Vocal Quality: Hoarse(Mildly hoarse)  Prior Dysphagia History: Yes; The pt was seen by Speech Therapy during a previous hospitalization in August of 2008. An MBS on 8/9/18 revleaed deep and shallow penetration of thin liquids and deep penetration of nectar-thick liquids via straw. No cough reflex. Pureed diet and Nectar-thick liquids recommended via cup  Consistencies Administered: Honey - teaspoon;Honey - cup;Dysphagia Pureed (Dysphagia I)    Vision/Hearing  Vision  Vision: Impaired  Vision Exceptions: Wears glasses for distance  Hearing  Hearing: Exceptions to [facility]  Hearing Exceptions: Hard of hearing/hearing concerns    Oral Motor Deficits  Oral/Motor  Oral Motor: Within functional limits    Oral Phase Dysfunction  Oral Phase  Oral Phase: Exceptions  Oral Phase Dysfunction  Decreased Anterior to Posterior Transit: Puree; Honey - teaspoon;Honey - cup  Suspected Premature Bolus Loss: Honey - cup;Honey - teaspoon     Indicators of Pharyngeal Phase Dysfunction   Pharyngeal Phase  Pharyngeal Phase: Exceptions  Indicators of Pharyngeal Phase Dysfunction  Delayed Swallow: Puree; Honey - cup;Honey - teaspoon  Decreased Laryngeal Elevation: Puree; Honey - cup;Honey - teaspoon  Wet Vocal Quality: Honey - cup;Honey - teaspoon  Cough - Delayed: Honey - cup  Change in Vital Signs:

## 2020-07-03 NOTE — PROGRESS NOTES
anxiety. Neurologic: Alert, awake and not oriented. PERRL. Speech fluent. No obvious neurologic deficit, detailed exam not performed.          Results:  CBC:   Recent Labs     06/30/20 0519 07/01/20 0552 07/02/20 0528   WBC 10.0 11.3* 10.7   HGB 12.9 12.6 12.7   HCT 39.6 38.6 38.9   MCV 91.2 91.6 91.4    242 231     BMP:   Recent Labs     06/30/20 0519 07/01/20 0552 07/02/20 0528    139 140   K 4.2 3.6 3.3*   CL 97* 96* 98*   CO2 26 31 33*   BUN 40* 34* 26*   CREATININE 1.1 0.9 0.8     Imaging:  I have reviewed radiology images personally. XR CHEST 1 VW   Final Result   Cardiomegaly and chronic pulmonary change with right basilar infiltrate. Xr Chest Portable    Result Date: 6/29/2020  EXAMINATION: ONE XRAY VIEW OF THE CHEST 6/29/2020 3:56 am COMPARISON: None. HISTORY: ORDERING SYSTEM PROVIDED HISTORY: R/O PNA TECHNOLOGIST PROVIDED HISTORY: Reason for exam:->R/O PNA Reason for Exam: sob Type of Exam: Unknown Additional signs and symptoms: Shortness of Breath (Pt here from [facility] for SOB, pt on NRB 15 lpm upon arrival, pt confused, pt has COPD and anxiety, had duoneb approx 30 min ago, pt unable to gi\ve hx FINDINGS: The cardiomediastinal silhouette is enlarged. There is mild pulmonary vascular congestion. There are trace bilateral pleural effusions. There is no pneumothorax. There is no acute osseous abnormality. Mild pulmonary vascular congestion. Cardiomegaly. Trace bilateral pleural effusions. Assessment and plan:  Acute respiratory failure, hypoxemic. Oxygen to keep SaO2 >90%. On minimal flow rate, can possibly be weaned off in the next few days  CHF, pleural effusions. Suspect this is the etiology of her hypoxemic respiratory failure. Diuresis per cardiology, renal function stable. I/O -1380 mL  ? Obstructive airways disease. She had loud wheezing. She could have underlying obstructive disease, though she has been a non-smoker.     Presently lungs are fairly clear, minimal wheezes   ? Aspiration. Further evaluation by speech and swallow. Pulmonary hypertension. Given her dementia and age, I do not believe work-up for pulmonary hypertension would be appropriate. She is unlikely to wear BiPAP even if she is diagnosed with ARAMIS. Leukocytosis. Resolved. Procalcitonin normal  CKD. Renal function proved. Atrial fibrillation. EP does not feel pacemaker indicated. Replace potassium. HTN, HLD, osteoporosis. Per IM  Obesity. Her body habitus could suggest underlying ARAMIS, she is unlikely to be compliant with PAP treatment. DVT prophylaxis. Lovenox    Discussed with patient's daughter, nursing, Dr. Sheila Luo.       Electronically signed by:  Jennifer Gan MD    7/2/2020    8:22 PM.

## 2020-07-03 NOTE — PLAN OF CARE
SLP Evaluation Completed.  All note are found in [place], [place], [place]  Speech-Language Pathologist  Phone: [phone]  Speech Desk: [phone]

## 2020-07-03 NOTE — PROGRESS NOTES
Assessment complete. VSS. Denies pain. NPO for SLP today. Pt yelling out for water often this morning. [employer] monitor in place.

## 2020-07-03 NOTE — CARE COORDINATION
Chart reviewed, discharge plan remains for pt to return to [facility] skilled under Medicare, Covid negative. Per Dr Juan Muñoz, discharge in 2days.   JULIUS Negrete-RN

## 2020-07-03 NOTE — PROCEDURES
distance  Hearing: Exceptions to [facility]  Hearing Exceptions: Hard of hearing/hearing concerns    Impressions: The pt demonstrates moderate oropharyngeal dysphagia. The oral phase is characterized by reduced mastication, swallowing trials of banana largely whole. There was also noted reduced bolus cohesion and partially reduced A-P bolus propulsion. The pharyngeal phase is marked by poor bolus control with premature pharyngeal pooling to the pharynx pre-swallow. As a result there was deep and silent laryngeal penetration of thin liquids and with mildly-thick (nectar) liquids via straw. The pt was able to tolerate all other consistencies, including mildly-thick liquid via cup and spoon without penetration. There was no actual aspiration viewed. However, deep penetration in the absence of a reflexive cough reflex places the pt at a high risk for eventual silent aspiration with thins liquids and with nectar-thick liquids via straw. See the rest of this report for more details    Treatment Dx and ICD 10: Dysphagia (R13.10)   Patient Position: Lateral and Patient Degrees: 90    Consistencies Administered: Dysphagia Pureed (Dysphagia I); Dysphagia Soft and Bite-Sized (Dysphagia III); Honey cup;Honey teaspoon;Nectar  teaspoon;Nectar straw; Thin cup; Thin teaspoon    Compensatory Swallowing Strategies Attempted: Small bites/sips;Assist feed;Eat/Feed slowly; No straws;Upright as possible for all oral intake;Remain upright for 30-45 minutes after meals  Postural Changes and/or Swallow Maneuvers Trialed: Upright 30 min after meal;Upright 90 degrees    Dysphagia Outcome Severity Scale: Level 3: Moderate dysphagia- Total assisstance, supervision or strategies.  Two or more diet consistencies restricted  Penetration-Aspiration Scale (PAS): 2 - Material enters the airway, remains above the vocal folds, and is ejected from the airway    Recommended Diet:  Solid consistency: Dysphagia Pureed (Dysphagia I)  Liquid consistency: Mildly Thick (Nectar)  Liquid administration via: Cup;Spoon    Medication administration: Meds in puree    Safe Swallow Protocol:  Supervision: Close  Compensatory Swallowing Strategies: Alternate solids and liquids;Small bites/sips;Assist feed;Eat/Feed slowly; No straws;Upright as possible for all oral intake;Remain upright for 30-45 minutes after meals    Recommendations/Treatment  Requires SLP Intervention: Yes     D/C Recommendations: SNF  Postural Changes and/or Swallow Maneuvers: Upright 30 min after meal;Upright 90 degrees    Recommended Exercises:    Therapeutic Interventions: Patient/Family education; Therapeutic PO trials with SLP;Oral care;Diet tolerance monitoring    Education:    Patient Education: Education was given to the pt and her RN re: results and recommendations, including compensatory strateiges. Patient Education Response: No evidence of learning    Prognosis  Prognosis for safe diet advancement: guarded  Barriers to reach goals: age;severity of dysphagia;behavior  Duration/Frequency of Treatment  Duration/Frequency of Treatment: 3-5 x wk for LOS  Safety Devices  Safety Devices in place: Not Applicable  Type of devices: All fall risk precautions in place; Left in bed;Nurse notified;Call light within reach    Goals:    Long Term:   Timeframe for Long-term Goals: 10 days (7/13/20)  Goal 1: The pt was consistently tolerate a safest and least restrictive diet      Short Term:  Dysphagia Goals: The patient will tolerate recommended diet without observed clinical signs of aspiration; The patient/caregiver will demonstrate understanding of compensatory strategies for improved swallowing safety. ;The patient will tolerate mechanical soft foods 10/10. Oral Preparation / Oral Phase  Oral Phase: Impaired  Oral Phase - Major Contributing Deficits  Poor Mastication: Soft solid  Weak Lingual Manipulation: Soft solid;Puree  Reduced Posterior Propulsion: All  Reduced Bolus Control:  All  Decreased Bolus Cohesion: Soft solid  Premature Bolus Loss to Pharynx: All    Pharyngeal Phase  Pharyngeal Phase: Impaired  Pharyngeal Phase - Major Contributing Deficits  Delayed Swallow Initiation: All  Premature Spillage to Valleculae: All  Reduced Anterior Laryngeal Movement: All  Pooling Valleculae: All  Deep Penetration During: Nectar straw; Thin teaspoon; Thin cup  Partial Retrieval (deep): Nectar straw; Thin teaspoon; Thin cup  No Cough Reflex: Nectar straw; Thin cup; Thin teaspoon    Esophageal Phase  Esophageal Screen: WNL    Pain   Patient Currently in Pain: No  Pain Level: 0    Therapy Time:   Individual Concurrent Group Co-treatment   Time In Juan [address]one 94         Time Out 1309         Minutes [place], [place], Lino Okeefe  [id number]  Speech-Language Pathologist  Phone: [phone]  Speech Desk: [phone]    7/3/2020, 1:28 PM

## 2020-07-03 NOTE — PROGRESS NOTES
New diet orders acknowledged. Call placed to have tray sent up. Pt given thickened drink. Tolerating fine.

## 2020-07-03 NOTE — PROGRESS NOTES
Hospitalist Progress Note      PCP: David Ford MD    Date of Admission: 6/29/2020    Chief Complaint: SOB and directly admitted from [facility] Course: Reviewed H&P. Subjective: Patient seen and examined today . continue IV steroids at this time ;  patient currently on 1 L/min oxygen by nasal cannula. no acute events noted . NNB of 1380 cc thus far . On Lasix 20 mg daily . Noted EP recs - No plan for PPM at this time. Echo findings noted . PT/OT evaluated and recommended SNF       Medications:  Reviewed    Scheduled Medications    ipratropium-albuterol  1 ampule Inhalation Q4H WA    memantine  10 mg Oral BID    methylPREDNISolone  40 mg Intravenous Q8H    amLODIPine  5 mg Oral Daily    furosemide  20 mg Oral Daily    apixaban  2.5 mg Oral BID    sodium chloride flush  10 mL Intravenous 2 times per day    famotidine  20 mg Oral Daily    levothyroxine  100 mcg Oral Daily    atorvastatin  20 mg Oral Nightly    sodium chloride flush  10 mL Intravenous 2 times per day    OXcarbazepine  150 mg Oral BID     PRN Meds: sodium chloride flush, polyethylene glycol, promethazine **OR** ondansetron, acetaminophen **OR** [DISCONTINUED] acetaminophen      Intake/Output Summary (Last 24 hours) at 7/3/2020 1207  Last data filed at 7/3/2020 0848  Gross per 24 hour   Intake 0 ml   Output 0 ml   Net 0 ml       Physical Exam Performed:  BP (!) 169/83   Pulse 79   Temp 97.7 °F (36.5 °C) (Axillary)   Resp 20   Ht 5' 2\" (1.575 m)   Wt 212 lb 8 oz (96.4 kg)   SpO2 97%   BMI 38.87 kg/m²     General appearance:  No apparent distress, appears stated age and cooperative. Oxygen by NC 1-2  L/min  HEENT:  Normal cephalic, atraumatic without obvious deformity. Pupils equal, round, and reactive to light. Extra ocular muscles intact. Conjunctivae/corneas clear. Neck: Supple, with full range of motion. No jugular venous distention. Trachea midline. Respiratory:  Normal respiratory effort.   Diminished breath sounds at bases with fine crackles. Audible Wheezes noted   Cardiovascular:  Regular rate and rhythm with normal S1/S2 without murmurs, rubs or gallops. Abdomen: Soft, non-tender, non-distended with normal bowel sounds. Musculoskeletal:  No clubbing, cyanosis or edema bilaterally. Full range of motion without deformity. Skin: Skin color, texture, turgor normal.  No rashes or lesions. Neurologic:  Neurovascularly intact without any focal sensory/motor deficits. Cranial nerves: II-XII intact, grossly non-focal. Confused at baseline   Psychiatric:  Alert and demented, thought content inappropriate, Lacks insight  Capillary Refill: Brisk,< 3 seconds   Peripheral Pulses: +2 palpable, equal bilaterally       Labs:   Recent Labs     07/01/20  0552 07/02/20  0528 07/03/20  0504   WBC 11.3* 10.7 8.4   HGB 12.6 12.7 12.4   HCT 38.6 38.9 37.6    231 212     Recent Labs     07/01/20 0552 07/02/20  0528 07/03/20  0504    140 142   K 3.6 3.3* 4.0   CL 96* 98* 99   CO2 31 33* 34*   BUN 34* 26* 22*   CREATININE 0.9 0.8 0.8   CALCIUM 8.9 8.9 8.8       Active Hospital Problems    Diagnosis Date Noted    Persistent atrial fibrillation [I48.19] 08/06/2018     Priority: High    AV block [I44.30] 08/06/2018     Priority: High    Bradycardia [R00.1]     Intermittent asthma without complication [V04.33]     Late onset Alzheimer's disease without behavioral disturbance (HCC) [G30.1, F02.80]     Pulmonary hypertension (HCC) [I27.20]     Shortness of breath [R06.02] 06/29/2020    SOB (shortness of breath) [R06.02] 06/29/2020    Acute on chronic diastolic CHF (congestive heart failure) ([facility]) [I50.33]     Acute hypoxemic respiratory failure ([facility] [J96.01] 08/07/2018    Essential hypertension [I10]     Morbid obesity ([facility] [E66.01] 08/04/2018     Assessment/Plan:  1.  SOBOE with acute on chronic hypoxic respiratory failure due to COPD exacerbation due to Aspiration   -Currently requiring up to 6 L/min high flow nasal cannula oxygen. Started on IV Solu-Medrol 40 mg every 8  and transition to p.o. prednisone 40 mg daily thereafter.  -DuoNeb inhaler scheduled and PRN . Patient had a SARS-CoV-2 NAAT  Testing at [facility] ED and was negative   -Chest x-ray on admission negative for pneumonia. -Pulmonology following and assisting with management  -Wean oxygen as able as patient improves. - SLP evaluated and recommended MBS      2. 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  -Patient not on any rate limiting medication at this time. Resumed Eliquis for anticoagulation on 7/1/2 (  Held in anticipation for procedure/pacemaker on admission) . -Monitor on telemetry.  -Noted cardiology recommendations -repeated echo on 6/30/2020 showed normal LVEF 55 to 60%; moderate right-sided chamber enlargement. RV systolic function is moderately reduced and severe pulmonary artery pressure estimated at 94 mmHg consistent with severe pulmonary hypertension. -TSH 3.96  -EP evaluated and recommended no intervention for now. Suggested to consider long-term ambulatory EKG monitoring and stated could consider pacemaker if her functional status improves or if symptoms such as near syncope or syncope evolve.     3. HypoThyroidism -on Synthroid; continue     4. 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      5. Morbid Obesity -  With There is no height or weight on file to calculate BMI. Complicating assessment and treatment. Placing patient at risk for multiple co-morbidities as well as early death and contributing to the patient's presentation. Counseled on weight loss.      6.  Acute on chronic diastolic CHF with bilateral pleural effusions POA -patient started on IV Lasix and subsequently transitioned home dose; monitor I's/O and daily weights closely. NNB of 1.3 L thus far     DVT Prophylaxis:Eliquis held in anticipation of procedure   Diet: Diet NPO Time Specified  Code Status: Limited    PT/OT Eval Status:  Evaluated and recommended SNF     Dispo -Likely 2  pending clinical improvement. The note was completed using Dragon -speech recognition software & EMR  . Every effort was made to ensure accuracy; however, inadvertent computerized transcription errors may be present.     Teodora Hernandez MD

## 2020-07-03 NOTE — PLAN OF CARE
Patient's EF (Ejection Fraction) is greater than  40%  Heart Failure Medications:   Diuretics[de-identified] Furosemide     (One of the following REQUIRED for EF <40%/SYSTOLIC FAILURE but MAY be used in EF% >40%/DIASTOLIC FAILURE)        ACE[de-identified] None        ARB[de-identified] None         ARNI[de-identified] None    (Beta Blockers)   NON- Evidenced Based Beta Blocker (for EF% >40%/DIASTOLIC FAILURE): None     Evidenced Based Beta Blocker::(REQUIRED for EF% <40%/SYSTOLIC FAILURE) None  . .................................................................................................................................................. Patient's weights and intake/output reviewed: Yes    Patient's Last Weight: 212 lbs obtained by bed scale. Difference of 6 lbs less than last documented weight. Intake/Output Summary (Last 24 hours) at 7/3/2020 1559  Last data filed at 7/3/2020 1558  Gross per 24 hour   Intake 960 ml   Output 0 ml   Net 960 ml       Comorbidities Reviewed Yes    Patient has a past medical history of Alzheimer's dementia ([address] Asthma, Atrial fibrillation ([address] Blood clot in vein, CHF (congestive heart failure) ([address] Dementia ([address] GERD (gastroesophageal reflux disease), Hypertension, Kidney failure, MRSA (methicillin resistant staph aureus) culture positive, and Osteoporosis. >>For CHF and Comorbidity documentation on Education Time and Topics, please see Education Tab    Progressive Mobility Assessment:  What is this patient's Current Level of Mobility?: Ambulatory- with Assistance  How was this patient Mobilized today?: Up to Chair                 With Whom? Nurse, PCA, PT and OT                 Level of Difficulty/Assistance: 1x Assist     Pt up in chair at this time on .5 L O2. Pt with complaints of shortness of breath. Pt with nonpitting lower extremity edema.      Patient and/or Family's stated Goal of Care this Admission: reduce shortness of breath and be more comfortable prior to discharge        :

## 2020-07-03 NOTE — PROGRESS NOTES
Physical Therapy  Facility/Department: [facility] C4 PCU  Daily Treatment Note  NAME: Kirill Ivory  : 1935  MRN: [id number]    Date of Service: 7/3/2020    Discharge Recommendations:  Subacute/Skilled Nursing Facility   PT Equipment Recommendations  Equipment Needed: No  Other: Defer to next level of care    Assessment   Assessment: Pt was limited by mild agitation secondary to Alzheimers; willing to sit up for lunch. Presented with poor trunk and LE control during bed mobility with the HOB elevated and use of the handrails to get EOB. Requiring assistance and sequencing cues throughout. Transfers and gait training were unsteady and unsafe; requiring intermittent assistance for balance. Pt was left in the chair bedside chair to enjoy lunch. Treatment Diagnosis: Impaired functional mobility and gait  Specific instructions for Next Treatment: Progress mobility as tolerated  Prognosis: Good  Decision Making: Low Complexity  PT Education: Goals; Energy Conservation; Injury Prevention;PT Role;General Safety;Plan of Care;Orientation;Gait Training;Disease Specific Education;Precautions; Home Exercise Program;Functional Mobility Training;Transfer Training  Patient Education: Pt verbalized understanding, will benefit from further reinforcement secondary to cognition  Barriers to Learning: None  REQUIRES PT FOLLOW UP: Yes     Patient Diagnosis(es): There were no encounter diagnoses. has a past medical history of Alzheimer's dementia ([facility] [address] Asthma, Atrial fibrillation ([address] Blood clot in vein, CHF (congestive heart failure) ([address] Dementia ([address] GERD (gastroesophageal reflux disease), Hypertension, Kidney failure, MRSA (methicillin resistant staph aureus) culture positive, and Osteoporosis. has a past surgical history that includes Cholecystectomy; eye surgery; Hysterectomy;  Thyroidectomy; pr esophagogastroduodenoscopy transoral diagnostic (N/A, 2018); and [facility] [place] [facility]N/A, 8/8/2018). Restrictions  Restrictions/Precautions  Restrictions/Precautions: Fall Risk  Position Activity Restriction  Other position/activity restrictions: 4L O2, purwick, telemetry, up with assist  Subjective   General  Additional Pertinent Hx: A-fib, Alzheimers  Subjective  Subjective: Pt was in bed willing to sit up in the chair    Pain Assessment  Pain Assessment: Advanced Dementia  Benitez-Baker Pain Rating: Hurts a little bit       Orientation  Orientation  Overall Orientation Status: Impaired  Cognition      Objective   Bed mobility  Supine to Sit: Minimal assistance  Scooting: Minimal assistance  Transfers  Sit to Stand: Minimal Assistance  Stand to sit: Minimal Assistance  Bed to Chair: Contact guard assistance  Ambulation  Ambulation?: Yes  Ambulation 1  Surface: level tile  Device: Rolling Walker  Other Apparatus: O2  Assistance: Minimal assistance  Quality of Gait: B minimal toe clearance, b decreased heel strike, significantly forward flexed trunk and downward gaze. Pt mildly unsteady, no overt LOB. Gait Deviations: Slow Maye; Increased NAFISA; Decreased step length;Decreased step height;Shuffles  Distance: 3 ft into bed side chair      Balance  Sitting - Static: Fair;+  Sitting - Dynamic: Fair;+  Standing - Static: Fair  Standing - Dynamic: Fair;-    AM-PAC Score     AM-PAC Inpatient Mobility without Stair Climbing Raw Score : 14 (07/03/20 1553)  AM-PAC Inpatient without Stair Climbing T-Scale Score : 40.85 (07/03/20 1553)  Mobility Inpatient CMS 0-100% Score: 53.33 (07/03/20 1553)  Mobility Inpatient without Stair CMS G-Code Modifier : CK (07/03/20 1553)       Goals  Short term goals  Time Frame for Short term goals: 1 week 7/07/20  Short term goal 1: Pt will complete supine to/from sit with CGA  Short term goal 2: Pt will complete sit to/from stand with SBA  Short term goal 3: Pt will ambulate >20' with RW with SBA without LOB  Short term goal 4: 7/04/20: Pt will participate in 12-15 reps BLE

## 2020-07-04 ENCOUNTER — APPOINTMENT (OUTPATIENT)
Dept: GENERAL RADIOLOGY | Age: 85
DRG: 189 | End: 2020-07-04
Attending: INTERNAL MEDICINE
Payer: MEDICARE

## 2020-07-04 LAB
ANION GAP SERPL CALCULATED.3IONS-SCNC: 6 MMOL/L (ref 3–16)
BUN BLDV-MCNC: 27 MG/DL (ref 7–20)
CALCIUM SERPL-MCNC: 8.5 MG/DL (ref 8.3–10.6)
CHLORIDE BLD-SCNC: 99 MMOL/L (ref 99–110)
CO2: 35 MMOL/L (ref 21–32)
CREAT SERPL-MCNC: 0.7 MG/DL (ref 0.6–1.2)
GFR AFRICAN AMERICAN: >60
GFR NON-AFRICAN AMERICAN: >60
GLUCOSE BLD-MCNC: 145 MG/DL (ref 70–99)
HCT VFR BLD CALC: 38.4 % (ref 36–48)
HEMOGLOBIN: 12.6 G/DL (ref 12–16)
MCH RBC QN AUTO: 30.2 PG (ref 26–34)
MCHC RBC AUTO-ENTMCNC: 32.8 G/DL (ref 31–36)
MCV RBC AUTO: 91.9 FL (ref 80–100)
PDW BLD-RTO: 16.2 % (ref 12.4–15.4)
PLATELET # BLD: 234 K/UL (ref 135–450)
PMV BLD AUTO: 8.3 FL (ref 5–10.5)
POTASSIUM REFLEX MAGNESIUM: 3.7 MMOL/L (ref 3.5–5.1)
RBC # BLD: 4.18 M/UL (ref 4–5.2)
SODIUM BLD-SCNC: 140 MMOL/L (ref 136–145)
WBC # BLD: 8.9 K/UL (ref 4–11)

## 2020-07-04 PROCEDURE — 85027 COMPLETE CBC AUTOMATED: CPT

## 2020-07-04 PROCEDURE — 2700000000 HC OXYGEN THERAPY PER DAY

## 2020-07-04 PROCEDURE — 94761 N-INVAS EAR/PLS OXIMETRY MLT: CPT

## 2020-07-04 PROCEDURE — 2060000000 HC ICU INTERMEDIATE R&B

## 2020-07-04 PROCEDURE — 6370000000 HC RX 637 (ALT 250 FOR IP): Performed by: INTERNAL MEDICINE

## 2020-07-04 PROCEDURE — 92526 ORAL FUNCTION THERAPY: CPT

## 2020-07-04 PROCEDURE — 80048 BASIC METABOLIC PNL TOTAL CA: CPT

## 2020-07-04 PROCEDURE — 6360000002 HC RX W HCPCS: Performed by: INTERNAL MEDICINE

## 2020-07-04 PROCEDURE — 71045 X-RAY EXAM CHEST 1 VIEW: CPT

## 2020-07-04 PROCEDURE — 2580000003 HC RX 258: Performed by: INTERNAL MEDICINE

## 2020-07-04 PROCEDURE — 94640 AIRWAY INHALATION TREATMENT: CPT

## 2020-07-04 RX ORDER — PREDNISONE 10 MG/1
40 TABLET ORAL DAILY
Status: DISCONTINUED | OUTPATIENT
Start: 2020-07-05 | End: 2020-07-07 | Stop reason: HOSPADM

## 2020-07-04 RX ADMIN — METHYLPREDNISOLONE SODIUM SUCCINATE 40 MG: 40 INJECTION, POWDER, FOR SOLUTION INTRAMUSCULAR; INTRAVENOUS at 03:53

## 2020-07-04 RX ADMIN — OXCARBAZEPINE 150 MG: 150 TABLET, FILM COATED ORAL at 08:48

## 2020-07-04 RX ADMIN — MEMANTINE 10 MG: 5 TABLET ORAL at 22:59

## 2020-07-04 RX ADMIN — FUROSEMIDE 20 MG: 20 TABLET ORAL at 08:49

## 2020-07-04 RX ADMIN — FAMOTIDINE 20 MG: 20 TABLET, FILM COATED ORAL at 06:57

## 2020-07-04 RX ADMIN — IPRATROPIUM BROMIDE AND ALBUTEROL SULFATE 1 AMPULE: .5; 3 SOLUTION RESPIRATORY (INHALATION) at 08:02

## 2020-07-04 RX ADMIN — MEMANTINE 10 MG: 5 TABLET ORAL at 08:49

## 2020-07-04 RX ADMIN — ACETAMINOPHEN 650 MG: 325 TABLET ORAL at 03:52

## 2020-07-04 RX ADMIN — OXCARBAZEPINE 150 MG: 150 TABLET, FILM COATED ORAL at 23:10

## 2020-07-04 RX ADMIN — IPRATROPIUM BROMIDE AND ALBUTEROL SULFATE 1 AMPULE: .5; 3 SOLUTION RESPIRATORY (INHALATION) at 16:26

## 2020-07-04 RX ADMIN — IPRATROPIUM BROMIDE AND ALBUTEROL SULFATE 1 AMPULE: .5; 3 SOLUTION RESPIRATORY (INHALATION) at 20:20

## 2020-07-04 RX ADMIN — APIXABAN 2.5 MG: 2.5 TABLET, FILM COATED ORAL at 22:59

## 2020-07-04 RX ADMIN — AMLODIPINE BESYLATE 5 MG: 5 TABLET ORAL at 08:48

## 2020-07-04 RX ADMIN — LEVOTHYROXINE SODIUM 100 MCG: 0.1 TABLET ORAL at 06:58

## 2020-07-04 RX ADMIN — METHYLPREDNISOLONE SODIUM SUCCINATE 40 MG: 40 INJECTION, POWDER, FOR SOLUTION INTRAMUSCULAR; INTRAVENOUS at 11:52

## 2020-07-04 RX ADMIN — Medication 10 ML: at 08:49

## 2020-07-04 RX ADMIN — APIXABAN 2.5 MG: 2.5 TABLET, FILM COATED ORAL at 08:49

## 2020-07-04 RX ADMIN — ATORVASTATIN CALCIUM 20 MG: 10 TABLET, FILM COATED ORAL at 23:00

## 2020-07-04 RX ADMIN — IPRATROPIUM BROMIDE AND ALBUTEROL SULFATE 1 AMPULE: .5; 3 SOLUTION RESPIRATORY (INHALATION) at 12:43

## 2020-07-04 RX ADMIN — Medication 10 ML: at 23:10

## 2020-07-04 ASSESSMENT — PAIN SCALES - PAIN ASSESSMENT IN ADVANCED DEMENTIA (PAINAD)
CONSOLABILITY: 0
TOTALSCORE: 4
BREATHING: 2
BODYLANGUAGE: 1
FACIALEXPRESSION: 1
NEGVOCALIZATION: 0

## 2020-07-04 ASSESSMENT — PAIN SCALES - GENERAL
PAINLEVEL_OUTOF10: 0
PAINLEVEL_OUTOF10: 5
PAINLEVEL_OUTOF10: 0

## 2020-07-04 ASSESSMENT — PAIN SCALES - WONG BAKER: WONGBAKER_NUMERICALRESPONSE: 2

## 2020-07-04 NOTE — PLAN OF CARE
Problem: Falls - Risk of:  Goal: Will remain free from falls  Description: Will remain free from falls  7/4/2020 1019 by Enrico Camara RN  Outcome: Met This Shift  7/4/2020 0303 by Lillian Truong RN  Outcome: Met This Shift     Problem: Falls - Risk of:  Goal: Absence of physical injury  Description: Absence of physical injury  7/4/2020 1019 by Enrico Camara RN  Outcome: Met This Shift  7/4/2020 0303 by Lillian Truong RN  Outcome: Met This Shift

## 2020-07-04 NOTE — PLAN OF CARE
Patient's EF (Ejection Fraction) is greater than 40%    Heart Failure Medications:  Diuretics[de-identified] Furosemide    (One of the following REQUIRED for EF <40%/SYSTOLIC FAILURE but MAY be used in EF% >40%/DIASTOLIC FAILURE)        ACE[de-identified] None        ARB[de-identified] None         ARNI[de-identified] None    (Beta Blockers)  NON- Evidenced Based Beta Blocker (for EF% >40%/DIASTOLIC FAILURE): None    Evidenced Based Beta Blocker::(REQUIRED for EF% <40%/SYSTOLIC FAILURE) Carvedilol- Coreg  . .................................................................................................................................................. Patient's weights and intake/output reviewed: Yes    Patient's Last Weight: 210.9 lbs obtained by standing scale. Difference of 3 lbs more than last documented weight. Intake/Output Summary (Last 24 hours) at 7/4/2020 0647  Last data filed at 7/4/2020 0448  Gross per 24 hour   Intake 2160 ml   Output --   Net 2160 ml       Comorbidities Reviewed Yes    Patient has a past medical history of Alzheimer's dementia ([address] Asthma, Atrial fibrillation ([address] Blood clot in vein, CHF (congestive heart failure) ([address] Dementia ([address] GERD (gastroesophageal reflux disease), Hypertension, Kidney failure, MRSA (methicillin resistant staph aureus) culture positive, and Osteoporosis. >>For CHF and Comorbidity documentation on Education Time and Topics, please see Education Tab    Progressive Mobility Assessment:  What is this patient's Current Level of Mobility?: Ambulatory- with Assistance  How was this patient Mobilized today?: Edge of Bed, Up to Chair, Bedside Commode,  Up to Toilet/Shower, and Up in Room                 With Whom? Nurse, PCA, PT, and OT                 Level of Difficulty/Assistance: 1x Assist     Pt resting in bed at this time on 2 L O2. Pt with complaints of shortness of breath. Pt with nonpitting lower extremity edema.      Patient and/or Family's stated Goal of Care this Admission: reduce shortness of breath, increase activity tolerance, and be more comfortable prior to discharge        :

## 2020-07-04 NOTE — PLAN OF CARE
Problem: Falls - Risk of:  Goal: Will remain free from falls  Description: Will remain free from falls  Outcome: Met This Shift  Goal: Absence of physical injury  Description: Absence of physical injury  Outcome: Met This Shift     Problem: Injury - Risk of, Physical Injury:  Goal: Will remain free from falls  Description: Will remain free from falls  Outcome: Met This Shift  Goal: Absence of physical injury  Description: Absence of physical injury  Outcome: Met This Shift     Problem: Mood - Altered:  Goal: Absence of abusive behavior  Description: Absence of abusive behavior  Outcome: Met This Shift     Problem: Psychomotor Activity - Altered:  Goal: Absence of psychomotor disturbance signs and symptoms  Description: Absence of psychomotor disturbance signs and symptoms  Outcome: Met This Shift     Problem: Sleep Pattern Disturbance:  Goal: Appears well-rested  Description: Appears well-rested  Outcome: Met This Shift     Problem: Skin Integrity:  Goal: Will show no infection signs and symptoms  Description: Will show no infection signs and symptoms  Outcome: Met This Shift  Goal: Absence of new skin breakdown  Description: Absence of new skin breakdown  Outcome: Met This Shift     Problem: OXYGENATION/RESPIRATORY FUNCTION  Goal: Patient will maintain patent airway  Outcome: Met This Shift     Problem: Confusion - Acute:  Goal: Absence of continued neurological deterioration signs and symptoms  Description: Absence of continued neurological deterioration signs and symptoms  Outcome: Ongoing  Goal: Mental status will be restored to baseline  Description: Mental status will be restored to baseline  Outcome: Ongoing     Problem: Discharge Planning:  Goal: Ability to perform activities of daily living will improve  Description: Ability to perform activities of daily living will improve  Outcome: Ongoing     Problem: Mood - Altered:  Goal: Verbalizations of feeling emotionally comfortable while being cared for will increase  Description: Verbalizations of feeling emotionally comfortable while being cared for will increase  Outcome: Ongoing     Problem: Sensory Perception - Impaired:  Goal: Demonstrations of improved sensory functioning will increase  Description: Demonstrations of improved sensory functioning will increase  Outcome: Ongoing  Goal: Decrease in sensory misperception frequency  Description: Decrease in sensory misperception frequency  Outcome: Ongoing  Goal: Able to refrain from responding to false sensory perceptions  Description: Able to refrain from responding to false sensory perceptions  Outcome: Ongoing  Goal: Demonstrates accurate environmental perceptions  Description: Demonstrates accurate environmental perceptions  Outcome: Ongoing  Goal: Able to distinguish between reality-based and nonreality-based thinking  Description: Able to distinguish between reality-based and nonreality-based thinking  Outcome: Ongoing  Goal: Able to interrupt nonreality-based thinking  Description: Able to interrupt nonreality-based thinking  Outcome: Ongoing     Problem: OXYGENATION/RESPIRATORY FUNCTION  Goal: Patient will achieve/maintain normal respiratory rate/effort  Description: Respiratory rate and effort will be within normal limits for the patient  Outcome: Ongoing     Problem: Discharge Planning:  Goal: Participates in care planning  Description: Participates in care planning  Outcome: Not Met This Shift     Problem: Mood - Altered:  Goal: Mood stable  Description: Mood stable  Outcome: Not Met This Shift

## 2020-07-04 NOTE — PROGRESS NOTES
Speech Language Pathology  Facility/Department: [facility] C4 PCU  Dysphagia Daily Treatment Note    NAME: Prasanna Vazquez  : 1935  MRN: [id number]    Patient Diagnosis(es):   Patient Active Problem List    Diagnosis Date Noted    Persistent atrial fibrillation 2018     Priority: High    AV block 2018     Priority: High    Bradycardia     Intermittent asthma without complication     Late onset Alzheimer's disease without behavioral disturbance (HCC)     Pulmonary hypertension (HCC)     Shortness of breath 2020    SOB (shortness of breath) 2020    Acute on chronic diastolic CHF (congestive heart failure) ([facility] [address]     Acute hypoxemic respiratory failure ([facility] [address] 2018    Pulmonary infiltrates     Diastolic dysfunction     Pleural effusion, bilateral 2018    Morbid obesity with BMI of 40.0-44.9, adult ([facility] [address] 2018    Myonecrosis 2018    Atelectasis 2018    Ineffective airway clearance 2018    Essential hypertension     Morbid obesity ([facility] [address] 2018    Hematemesis 2018     Allergies: Allergies   Allergen Reactions    Codeine Itching    Sulfa Antibiotics Other (See Comments)     Per Pt daughter Bernice Garg) \" Not know her reaction to medication\"     Subjective: 82year old female admitted 20 with shortness of breath. Pt had MBS on 20, and evaluating SLP recommended pureed food textures with nectar thick liquids. Pain: pt denies    Current Diet: DIET DYSPHAGIA PUREED; Mildly Thick (Nectar)    Diet Tolerance:  Patient tolerating current diet level without signs/symptoms of penetration / aspiration. P.O. Trials:  [place] / Mildly Thick   x Cup sip x 12   Puree   x X 10   Semi-Solid   x X 8     Dysphagia Treatment and Impressions:  RN okays SLP entry into pt's room. Pt alert, oriented to self only. RR 32/min prior to po trials with O2 sat of 97% on 2lpm O2 via NC.  Pt has very shallow breathing pattern. No overt s/s of aspiration noted with nectar thick liquid trials. Vocal quality remains dry and clear throughout assessment. Mastication is prolonged but WFL with semisolid food trials. No oral residuals post swallow. Will advance solids to minced/moist.  RR at 28/min following po trials. O2 sat is 98%. Dysphagia Goals:  Timeframe for Long-term Goals: 10 days (7/13/20)  Goal 1: The pt was consistently tolerate a safest and least restrictive diet. Today, 07/04: Progressing. Ongoing. Short-term Goals  Timeframe for Short-term Goals: 8 days (7/11/20)  1. The pt will tolerate puree solids without s/s of aspiration or significant dysphagia 5/5 Today, 07/04: Progressing. Ongoing. 2. The patient will tolerate recommended diet without observed clinical signs of aspiration, Today, 07/04: Progressing. Ongoing. 3. The patient/caregiver will demonstrate understanding of compensatory strategies for improved swallowing safety. , Today, 07/04: Progressing. Ongoing. 4. The patient will tolerate mechanical soft foods 10/10. Today, 07/04: Progressing. Ongoing. Recommendations:  Solid Consistency: advance to MINCED/MOIST  Liquid Consistency: continue NECTAR  Medication: crushed in puree    Patient/Family/Caregiver Education: reviewed plan of care with pt    Compensatory Strategies: Small bites/sips;Assist feed;Eat/Feed slowly; No straws;Upright as possible for all oral intake;Remain upright for 30-45 minutes after meals    Plan:    Continued Dysphagia treatment with goals per plan of care. Discharge Recommendations: defer to PT/OT    If pt discharges from hospital prior to Speech/Swallowing discharge, this note serves as tx and discharge summary. Total Treatment Time / Charges     Time in Time out Total Time / units   Cognitive Tx         Speech Tx      Dysphagia Tx 1355 1415 20 min / 1 unit     Signature:    An Arzola, [id number]  Speech-Language Pathologist

## 2020-07-04 NOTE — PLAN OF CARE
Patient's EF (Ejection Fraction) is greater than 40%    Heart Failure Medications:  Diuretics[de-identified] Furosemide    (One of the following REQUIRED for EF <40%/SYSTOLIC FAILURE but MAY be used in EF% >40%/DIASTOLIC FAILURE)        ACE[de-identified] None        ARB[de-identified] None         ARNI[de-identified] None    (Beta Blockers)  NON- Evidenced Based Beta Blocker (for EF% >40%/DIASTOLIC FAILURE): None    Evidenced Based Beta Blocker::(REQUIRED for EF% <40%/SYSTOLIC FAILURE) Carvedilol- Coreg  . .................................................................................................................................................. Patient's weights and intake/output reviewed: Yes    Patient's Last Weight: 210.9  lbs obtained by standing scale. Difference of 3 lbs more than last documented weight. Intake/Output Summary (Last 24 hours) at 7/4/2020 1020  Last data filed at 7/4/2020 0448  Gross per 24 hour   Intake 2160 ml   Output --   Net 2160 ml       Comorbidities Reviewed Yes    Patient has a past medical history of Alzheimer's dementia ([address] Asthma, Atrial fibrillation ([address] Blood clot in vein, CHF (congestive heart failure) ([address] Dementia ([address] GERD (gastroesophageal reflux disease), Hypertension, Kidney failure, MRSA (methicillin resistant staph aureus) culture positive, and Osteoporosis. >>For CHF and Comorbidity documentation on Education Time and Topics, please see Education Tab    Progressive Mobility Assessment:  What is this patient's Current Level of Mobility?: Ambulatory- with Assistance  How was this patient Mobilized today?: Up to Chair and Bedside Commode                 With Whom? Nurse and PCA                 Level of Difficulty/Assistance: 1x Assist     Pt up in chair at this time on 2 L O2. Pt with complaints of shortness of breath. Pt with nonpitting lower extremity edema.      Patient and/or Family's stated Goal of Care this Admission: reduce shortness of breath, increase activity tolerance, better understand heart failure and disease management, be more comfortable, and reduce lower extremity edema prior to discharge        :

## 2020-07-04 NOTE — PROGRESS NOTES
MD Knox notified of malodorous urine via [employer]. States may be due to low fluid intake. Afebrile. No UA at this time. Will continue to monitor. Will review results with Dr. Moore.     Per Dr. Moore let patient know of normal stress test.    Spoke to patient and let him know of normal stress test and he verbalized understanding and was thankful for services provided.

## 2020-07-04 NOTE — PROGRESS NOTES
Hospitalist Progress Note      PCP: Jessy Onofre MD    Date of Admission: 6/29/2020    Chief Complaint: SOB and directly admitted from [facility] Course: Reviewed H&P. Subjective: Patient seen and examined today . Seen with son Mark Castorena)  at bedside . continue IV steroids for today and transition to p.o. prednisone starting tomorrow. patient currently on 0.5 L/min oxygen by nasal cannula. no acute events noted . On Lasix 20 mg daily . Noted EP recs - No plan for PPM at this time. Echo findings noted . PT/OT evaluated and recommended SNF    -Discussed with patient and her son at bedside regarding plan of care     Medications:  Reviewed    Scheduled Medications    ipratropium-albuterol  1 ampule Inhalation Q4H WA    memantine  10 mg Oral BID    methylPREDNISolone  40 mg Intravenous Q8H    amLODIPine  5 mg Oral Daily    furosemide  20 mg Oral Daily    apixaban  2.5 mg Oral BID    sodium chloride flush  10 mL Intravenous 2 times per day    famotidine  20 mg Oral Daily    levothyroxine  100 mcg Oral Daily    atorvastatin  20 mg Oral Nightly    sodium chloride flush  10 mL Intravenous 2 times per day    OXcarbazepine  150 mg Oral BID     PRN Meds: sodium chloride flush, polyethylene glycol, promethazine **OR** ondansetron, acetaminophen **OR** [DISCONTINUED] acetaminophen      Intake/Output Summary (Last 24 hours) at 7/4/2020 1355  Last data filed at 7/4/2020 1304  Gross per 24 hour   Intake 2280 ml   Output --   Net 2280 ml       Physical Exam Performed:  /71   Pulse 76   Temp 97.9 °F (36.6 °C)   Resp 15   Ht 5' 2\" (1.575 m)   Wt 210 lb 9.6 oz (95.5 kg)   SpO2 97%   BMI 38.52 kg/m²     General appearance:  No apparent distress, appears stated age and cooperative. Oxygen by NC 1-2  L/min  HEENT:  Normal cephalic, atraumatic without obvious deformity. Pupils equal, round, and reactive to light. Extra ocular muscles intact. Conjunctivae/corneas clear.   Neck: Supple, with full range of motion. No jugular venous distention. Trachea midline. Respiratory:  Normal respiratory effort. Diminished breath sounds at bases with fine crackles. Audible Wheezes noted   Cardiovascular:  Regular rate and rhythm with normal S1/S2 without murmurs, rubs or gallops. Abdomen: Soft, non-tender, non-distended with normal bowel sounds. Musculoskeletal:  No clubbing, cyanosis or edema bilaterally. Full range of motion without deformity. Skin: Skin color, texture, turgor normal.  No rashes or lesions. Neurologic:  Neurovascularly intact without any focal sensory/motor deficits. Cranial nerves: II-XII intact, grossly non-focal. Confused at baseline   Psychiatric:  Alert and demented, thought content inappropriate, Lacks insight  Capillary Refill: Brisk,< 3 seconds   Peripheral Pulses: +2 palpable, equal bilaterally       Labs:   Recent Labs     07/02/20  0528 07/03/20  0504 07/04/20  0543   WBC 10.7 8.4 8.9   HGB 12.7 12.4 12.6   HCT 38.9 37.6 38.4    212 234     Recent Labs     07/02/20  0528 07/03/20  0504 07/04/20  0543    142 140   K 3.3* 4.0 3.7   CL 98* 99 99   CO2 33* 34* 35*   BUN 26* 22* 27*   CREATININE 0.8 0.8 0.7   CALCIUM 8.9 8.8 8.5       Active Hospital Problems    Diagnosis Date Noted    Persistent atrial fibrillation [I48.19] 08/06/2018     Priority: High    AV block [I44.30] 08/06/2018     Priority: High    Bradycardia [R00.1]     Intermittent asthma without complication [W55.47]     Late onset Alzheimer's disease without behavioral disturbance (HCC) [G30.1, F02.80]     Pulmonary hypertension (HCC) [I27.20]     Shortness of breath [R06.02] 06/29/2020    SOB (shortness of breath) [R06.02] 06/29/2020    Acute on chronic diastolic CHF (congestive heart failure) (HCC) [I50.33]     Acute hypoxemic respiratory failure ([facility] [address] [J96.01] 08/07/2018    Essential hypertension [I10]     Morbid obesity ([facility] [address] [E66.01] 08/04/2018     Assessment/Plan:  1.  SOBOE with acute on chronic hypoxic respiratory failure due to COPD exacerbation due to Aspiration   -Currently requiring up to 6 L/min high flow nasal cannula oxygen. Started on IV Solu-Medrol 40 mg every 8  and transition to p.o. prednisone 40 mg daily thereafter.  -DuoNeb inhaler scheduled and PRN . Patient had a SARS-CoV-2 NAAT  Testing at [facility] ED and was negative   -Chest x-ray on admission negative for pneumonia. -Pulmonology following and assisting with management  -Wean oxygen as able as patient improves. - SLP evaluated and recommended MBS -done on 7/3/2020-showed penetration but no aspiration. SLP recommended dysphagia puréed diet with nectar thick liquids.     2. 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  -Patient not on any rate limiting medication at this time. Resumed Eliquis for anticoagulation on 7/1/2 (  Held in anticipation for procedure/pacemaker on admission) . -Monitor on telemetry.  -Noted cardiology recommendations -repeated echo on 6/30/2020 showed normal LVEF 55 to 60%; moderate right-sided chamber enlargement. RV systolic function is moderately reduced and severe pulmonary artery pressure estimated at 94 mmHg consistent with severe pulmonary hypertension. -TSH 3.96  -EP evaluated and recommended no intervention for now. Suggested to consider long-term ambulatory EKG monitoring and stated could consider pacemaker if her functional status improves or if symptoms such as near syncope or syncope evolve.     3. HypoThyroidism -on Synthroid; continue     4. 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      5. Morbid Obesity -  With There is no height or weight on file to calculate BMI. Complicating assessment and treatment.  Placing patient at risk for multiple co-morbidities as well as early death and contributing to the patient's presentation. Counseled on weight loss. 6.  Acute on chronic diastolic CHF with bilateral pleural effusions POA -patient started on IV Lasix and subsequently transitioned to home dose; monitor I's/O and daily weights closely. NNB of 1.3 L thus far     DVT Prophylaxis:Eliquis held in anticipation of procedure   Diet: DIET DYSPHAGIA PUREED; Mildly Thick (Nectar)  Code Status: Limited    PT/OT Eval Status:  Evaluated and recommended SNF     Dispo -Likely 2 days to SNF pending clinical improvement. The note was completed using Dragon -speech recognition software & EMR  . Every effort was made to ensure accuracy; however, inadvertent computerized transcription errors may be present.     Driss Burris MD

## 2020-07-05 LAB
ANION GAP SERPL CALCULATED.3IONS-SCNC: 7 MMOL/L (ref 3–16)
BUN BLDV-MCNC: 28 MG/DL (ref 7–20)
CALCIUM SERPL-MCNC: 8.6 MG/DL (ref 8.3–10.6)
CHLORIDE BLD-SCNC: 95 MMOL/L (ref 99–110)
CO2: 36 MMOL/L (ref 21–32)
CREAT SERPL-MCNC: 0.8 MG/DL (ref 0.6–1.2)
GFR AFRICAN AMERICAN: >60
GFR NON-AFRICAN AMERICAN: >60
GLUCOSE BLD-MCNC: 128 MG/DL (ref 70–99)
HCT VFR BLD CALC: 39.9 % (ref 36–48)
HEMOGLOBIN: 12.9 G/DL (ref 12–16)
MAGNESIUM: 2.5 MG/DL (ref 1.8–2.4)
MCH RBC QN AUTO: 29.5 PG (ref 26–34)
MCHC RBC AUTO-ENTMCNC: 32.4 G/DL (ref 31–36)
MCV RBC AUTO: 91 FL (ref 80–100)
PDW BLD-RTO: 16 % (ref 12.4–15.4)
PLATELET # BLD: 246 K/UL (ref 135–450)
PMV BLD AUTO: 8.2 FL (ref 5–10.5)
POTASSIUM REFLEX MAGNESIUM: 3.4 MMOL/L (ref 3.5–5.1)
RBC # BLD: 4.38 M/UL (ref 4–5.2)
SODIUM BLD-SCNC: 138 MMOL/L (ref 136–145)
WBC # BLD: 10 K/UL (ref 4–11)

## 2020-07-05 PROCEDURE — 94761 N-INVAS EAR/PLS OXIMETRY MLT: CPT

## 2020-07-05 PROCEDURE — 2580000003 HC RX 258: Performed by: INTERNAL MEDICINE

## 2020-07-05 PROCEDURE — 6370000000 HC RX 637 (ALT 250 FOR IP): Performed by: INTERNAL MEDICINE

## 2020-07-05 PROCEDURE — 83735 ASSAY OF MAGNESIUM: CPT

## 2020-07-05 PROCEDURE — 85027 COMPLETE CBC AUTOMATED: CPT

## 2020-07-05 PROCEDURE — 94640 AIRWAY INHALATION TREATMENT: CPT

## 2020-07-05 PROCEDURE — 80048 BASIC METABOLIC PNL TOTAL CA: CPT

## 2020-07-05 PROCEDURE — 36415 COLL VENOUS BLD VENIPUNCTURE: CPT

## 2020-07-05 PROCEDURE — 2060000000 HC ICU INTERMEDIATE R&B

## 2020-07-05 RX ADMIN — IPRATROPIUM BROMIDE AND ALBUTEROL SULFATE 1 AMPULE: .5; 3 SOLUTION RESPIRATORY (INHALATION) at 08:34

## 2020-07-05 RX ADMIN — APIXABAN 2.5 MG: 2.5 TABLET, FILM COATED ORAL at 21:34

## 2020-07-05 RX ADMIN — FAMOTIDINE 20 MG: 20 TABLET, FILM COATED ORAL at 06:32

## 2020-07-05 RX ADMIN — FUROSEMIDE 20 MG: 20 TABLET ORAL at 09:51

## 2020-07-05 RX ADMIN — OXCARBAZEPINE 150 MG: 150 TABLET, FILM COATED ORAL at 21:39

## 2020-07-05 RX ADMIN — IPRATROPIUM BROMIDE AND ALBUTEROL SULFATE 1 AMPULE: .5; 3 SOLUTION RESPIRATORY (INHALATION) at 20:45

## 2020-07-05 RX ADMIN — Medication 10 ML: at 09:52

## 2020-07-05 RX ADMIN — MEMANTINE 10 MG: 5 TABLET ORAL at 09:51

## 2020-07-05 RX ADMIN — IPRATROPIUM BROMIDE AND ALBUTEROL SULFATE 1 AMPULE: .5; 3 SOLUTION RESPIRATORY (INHALATION) at 16:30

## 2020-07-05 RX ADMIN — Medication 10 ML: at 21:34

## 2020-07-05 RX ADMIN — AMLODIPINE BESYLATE 5 MG: 5 TABLET ORAL at 09:51

## 2020-07-05 RX ADMIN — ATORVASTATIN CALCIUM 20 MG: 10 TABLET, FILM COATED ORAL at 21:33

## 2020-07-05 RX ADMIN — OXCARBAZEPINE 150 MG: 150 TABLET, FILM COATED ORAL at 09:50

## 2020-07-05 RX ADMIN — APIXABAN 2.5 MG: 2.5 TABLET, FILM COATED ORAL at 09:51

## 2020-07-05 RX ADMIN — LEVOTHYROXINE SODIUM 100 MCG: 0.1 TABLET ORAL at 06:32

## 2020-07-05 RX ADMIN — MEMANTINE 10 MG: 5 TABLET ORAL at 21:34

## 2020-07-05 RX ADMIN — PREDNISONE 40 MG: 10 TABLET ORAL at 09:52

## 2020-07-05 ASSESSMENT — PAIN SCALES - GENERAL
PAINLEVEL_OUTOF10: 0

## 2020-07-05 NOTE — PROGRESS NOTES
4 Eyes Skin Assessment     The patient is being assess for   Shift Handoff    I agree that 2 RN's have performed a thorough Head to Toe Skin Assessment on the patient. ALL assessment sites listed below have been assessed. Areas assessed by both nurses:   [x]   Head, Face, and Ears   [x]   Shoulders, Back, and Chest, Abdomen  [x]   Arms, Elbows, and Hands   [x]   Coccyx, Sacrum, and Ischium  [x]   Legs, Feet, and Heels        Scratch on nose ,appears self inflicted by long nails. **SHARE this note so that the co-signing nurse is able to place an eSignature**    Co-signer eSignature: Electronically signed by Clayton Garner RN on 7/4/20 at 8:16 PM EDT    Does the Patient have Skin Breakdown?   Yes LDA WOUND CARE was Initiated documentation include the Kerrie-wound, Wound Assessment, Measurements, Dressing Treatment, Drainage, and Color\",          Darvin Prevention initiated:  Yes   Wound Care Orders initiated:  Yes      [id number]  nurse consulted for Pressure Injury (Stage 3,4, Unstageable, DTI, NWPT, Complex wounds)and New or Established Ostomies:  No      Primary Nurse eSignature: Electronically signed by Francisca Bland RN on 7/4/20 at 8:23 PM EDT       eyes

## 2020-07-05 NOTE — PROGRESS NOTES
midline. Respiratory:  Normal respiratory effort. Diminished breath sounds at bases with fine crackles. Audible Wheezes noted   Cardiovascular:  Regular rate and rhythm with normal S1/S2 without murmurs, rubs or gallops. Abdomen: Soft, non-tender, non-distended with normal bowel sounds. Musculoskeletal:  No clubbing, cyanosis or edema bilaterally. Full range of motion without deformity. Skin: Skin color, texture, turgor normal.  No rashes or lesions. Neurologic:  Neurovascularly intact without any focal sensory/motor deficits. Cranial nerves: II-XII intact, grossly non-focal. Confused at baseline   Psychiatric:  Alert and demented, thought content inappropriate, Lacks insight  Capillary Refill: Brisk,< 3 seconds   Peripheral Pulses: +2 palpable, equal bilaterally       Labs:   Recent Labs     07/03/20  0504 07/04/20  0543 07/05/20  0546   WBC 8.4 8.9 10.0   HGB 12.4 12.6 12.9   HCT 37.6 38.4 39.9    234 246     Recent Labs     07/03/20  0504 07/04/20  0543 07/05/20  0546    140 138   K 4.0 3.7 3.4*   CL 99 99 95*   CO2 34* 35* 36*   BUN 22* 27* 28*   CREATININE 0.8 0.7 0.8   CALCIUM 8.8 8.5 8.6       Active Hospital Problems    Diagnosis Date Noted    Persistent atrial fibrillation [I48.19] 08/06/2018     Priority: High    AV block [I44.30] 08/06/2018     Priority: High    Bradycardia [R00.1]     Intermittent asthma without complication [R31.47]     Late onset Alzheimer's disease without behavioral disturbance (HCC) [G30.1, F02.80]     Pulmonary hypertension (HCC) [I27.20]     Shortness of breath [R06.02] 06/29/2020    SOB (shortness of breath) [R06.02] 06/29/2020    Acute on chronic diastolic CHF (congestive heart failure) (HCC) [I50.33]     Acute hypoxemic respiratory failure ([facility] [J96.01] 08/07/2018    Essential hypertension [I10]     Morbid obesity ([facility] [E66.01] 08/04/2018     Assessment/Plan:  1.  SOBOE with acute on chronic hypoxic respiratory failure due to COPD exacerbation due to Aspiration   -Currently requiring up to 6 L/min high flow nasal cannula oxygen. Started on IV Solu-Medrol 40 mg every 8  and transitioned to p.o. prednisone 40 mg daily thereafter.  -DuoNeb inhaler scheduled and PRN . Patient had a SARS-CoV-2 NAAT  Testing at [facility] ED and was negative   -Chest x-ray on admission negative for pneumonia. -Pulmonology following and assisting with management  -Wean oxygen as able as patient improves. - SLP evaluated and recommended MBS -done on 7/3/2020-showed penetration but no aspiration. SLP recommended dysphagia puréed diet with nectar thick liquids.     2. Tachybrady Syndrome in the setting of Afib POA-patient noted to be bradycardic and subsequently transferred to [facility] for EP evaluation. Improved  -Patient not on any rate limiting medication PTA. Resumed Eliquis for anticoagulation on 7/1/2   -Monitor on telemetry.  -Noted cardiology recommendations -repeated echo on 6/30/2020 showed normal LVEF 55 to 60%; moderate right-sided chamber enlargement. RV systolic function is moderately reduced and severe pulmonary artery pressure estimated at 94 mmHg consistent with severe pulmonary hypertension. -TSH 3.96  -EP evaluated and recommended no intervention for now. Suggested to consider long-term ambulatory EKG monitoring and stated could consider pacemaker if her functional status improves or if symptoms such as near syncope or syncope evolve.     3. HypoThyroidism -on Synthroid; continue     4. 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      5. Morbid Obesity -  With There is no height or weight on file to calculate BMI. Complicating assessment and treatment. Placing patient at risk for multiple co-morbidities as well as early death and contributing to the patient's presentation. Counseled on weight loss.      6.  Acute on chronic diastolic CHF with bilateral pleural effusions POA -patient started on IV Lasix and subsequently transitioned to home dose; monitor I's/O and daily weights closely. DVT Prophylaxis:Eliquis held in anticipation of procedure   Diet: DIET DYSPHAGIA MINCED AND MOIST; Dysphagia Pureed; Mildly Thick (Nectar)  Code Status: Limited    PT/OT Eval Status:  Evaluated and recommended SNF     Dispo -Likely tomorrow to [facility]  pending clinical improvement. Patient presented from [facility]. The note was completed using Dragon -speech recognition software & EMR  . Every effort was made to ensure accuracy; however, inadvertent computerized transcription errors may be present.     Sharron Licea MD

## 2020-07-05 NOTE — PROGRESS NOTES
4 Eyes Skin Assessment     The patient is being assess for   Shift Handoff    I agree that 2 RN's have performed a thorough Head to Toe Skin Assessment on the patient. ALL assessment sites listed below have been assessed. Areas assessed by both nurses:   [x]   Head, Face, and Ears   [x]   Shoulders, Back, and Chest, Abdomen  [x]   Arms, Elbows, and Hands   [x]   Coccyx, Sacrum, and Ischium  [x]   Legs, Feet, and Heels  Scratch on nose, apparently self inflicted as pt has long fingernails        **SHARE this note so that the co-signing nurse is able to place an eSignature**    Co-signer eSignature: Electronically signed by Chris Chavez RN on 7/4/20 at 8:22 PM EDT    Does the Patient have Skin Breakdown?   Yes LDA WOUND CARE was Initiated documentation include the Kerrie-wound, Wound Assessment, Measurements, Dressing Treatment, Drainage, and Color\",          Darvin Prevention initiated:  Yes   Wound Care Orders initiated:  Yes      [id number]  nurse consulted for Pressure Injury (Stage 3,4, Unstageable, DTI, NWPT, Complex wounds)and New or Established Ostomies:  No      Primary Nurse eSignature: Electronically signed by Chris Chavez RN on 7/4/20 at 8:22 PM EDT       eyes

## 2020-07-05 NOTE — PLAN OF CARE
No falls on nights. See I&o, see am labs,no acute resp distress noted, no overt edema, pleasantly confused. Keeps stating that she is going to get  to someone called [facility].

## 2020-07-05 NOTE — PROGRESS NOTES
4 Eyes Skin Assessment     The patient is being assess for   Shift Handoff    I agree that 2 RN's have performed a thorough Head to Toe Skin Assessment on the patient. ALL assessment sites listed below have been assessed. Areas assessed by both nurses:   [x]   Head, Face, and Ears   [x]   Shoulders, Back, and Chest, Abdomen  [x]   Arms, Elbows, and Hands   [x]   Coccyx, Sacrum, and Ischium  [x]   Legs, Feet, and Heels        Abrasion on nose. **SHARE this note so that the co-signing nurse is able to place an eSignature**    Co-signer eSignature: {Esignature:[id number]}    Does the Patient have Skin Breakdown?   Yes LDA WOUND CARE was Initiated documentation include the Kerrie-wound, Wound Assessment, Measurements, Dressing Treatment, Drainage, and Color\",          Darvin Prevention initiated:  Yes   Wound Care Orders initiated:  Yes      [id number] nurse consulted for Pressure Injury (Stage 3,4, Unstageable, DTI, NWPT, Complex wounds)and New or Established Ostomies:  No      Primary Nurse eSignature: Electronically signed by Enoch Richardson RN on 7/4/20 at 8:24 PM EDT       eye

## 2020-07-05 NOTE — PROGRESS NOTES
Overall rating of care tbd/10  Level of assistance:1-2  Care plan:documented  Case management/Discharge planning:probably Monday

## 2020-07-05 NOTE — PLAN OF CARE
Patient's EF (Ejection Fraction) is greater than 40%    Heart Failure Medications:  Diuretics[de-identified] Furosemide    (One of the following REQUIRED for EF <40%/SYSTOLIC FAILURE but MAY be used in EF% >40%/DIASTOLIC FAILURE)        ACE[de-identified] None        ARB[de-identified] None         ARNI[de-identified] None    (Beta Blockers)  NON- Evidenced Based Beta Blocker (for EF% >40%/DIASTOLIC FAILURE): None    Evidenced Based Beta Blocker::(REQUIRED for EF% <40%/SYSTOLIC FAILURE) None  . .................................................................................................................................................. Patient's weights and intake/output reviewed: Yes    Patient's Last Weight: 209 lbs obtained by standing scale. Difference of 1 lbs less than last documented weight. Intake/Output Summary (Last 24 hours) at 7/5/2020 1251  Last data filed at 7/5/2020 1229  Gross per 24 hour   Intake 1330 ml   Output 0 ml   Net 1330 ml       Comorbidities Reviewed Yes    Patient has a past medical history of Alzheimer's dementia ([address] Asthma, Atrial fibrillation ([address] Blood clot in vein, CHF (congestive heart failure) ([address] Dementia ([address] GERD (gastroesophageal reflux disease), Hypertension, Kidney failure, MRSA (methicillin resistant staph aureus) culture positive, and Osteoporosis. >>For CHF and Comorbidity documentation on Education Time and Topics, please see Education Tab    Progressive Mobility Assessment:  What is this patient's Current Level of Mobility?: Ambulatory- with Assistance  How was this patient Mobilized today?: Edge of Bed, Unable to Mobilize, and Patient Refuses to Mobilize                 With Whom? Nurse and PCA                 Level of Difficulty/Assistance: 2x Assist     Pt resting in bed at this time on 2 L O2. Pt with complaints of shortness of breath. Pt with pitting lower extremity edema.      Patient and/or Family's stated Goal of Care this Admission: reduce shortness of breath, increase activity tolerance,

## 2020-07-05 NOTE — PROGRESS NOTES
Patient found with O2 off SPO2 IS 86%. Will not put it back on despite attempts to convince. Patient being belligerent stating she will not where her oxygen.

## 2020-07-05 NOTE — PROGRESS NOTES
Patient's EF (Ejection Fraction) is greater than 40%    Heart Failure Medications:   Diuretics[de-identified] Furosemide, Torsemide, Spironolactone, Metalozone, Other and None     (One of the following REQUIRED for EF <40%/SYSTOLIC FAILURE but MAY be used in EF% >40%/DIASTOLIC FAILURE)        ACE[de-identified] Lisinopril, Enalapril,  Ramipril, Other and None        ARB[de-identified] Valsartan, Losartan, Other and None         ARNI[de-identified] None    (Beta Blockers)   NON- Evidenced Based Beta Blocker (for EF% >40%/DIASTOLIC FAILURE): Metoprolol TARTrate- Lopressor, Atenolol- Tenormin, Propranolol- Inderal, Esmolol-Brevibloc, Sotalol-Betapace, Labetalol- Trandate, Timolol-Blocadren, Other and None     Evidenced Based Beta Blocker::(REQUIRED for EF% <40%/SYSTOLIC FAILURE) Metoprolol SUCCinate- Toprol XL, Carvedilol- Coreg, Bisoprolol-Zebeta and None  . .................................................................................................................................................. Patient's weights and intake/output reviewed: Yes    Patient's Last Weight: 209 lbs obtained by standing scale. Difference of 2 lbs less than last documented weight. Intake/Output Summary (Last 24 hours) at 7/5/2020 1222  Last data filed at 7/5/2020 5845  Gross per 24 hour   Intake 1090 ml   Output 0 ml   Net 1090 ml       Comorbidities Reviewed Yes    Patient has a past medical history of Alzheimer's dementia ([facility] Asthma, Atrial fibrillation ([facility] Blood clot in vein, CHF (congestive heart failure) ([facility] Dementia ([facility] GERD (gastroesophageal reflux disease), Hypertension, Kidney failure, MRSA (methicillin resistant staph aureus) culture positive, and Osteoporosis.      >>For CHF and Comorbidity documentation on Education Time and Topics, please see Education Tab    Progressive Mobility Assessment:  What is this patient's Current Level of Mobility?: Ambulatory- with Assistance  How was this patient Mobilized today?: Edge of Bed, Up to Chair, Bedside Commode,  Up to Toilet/Shower, Up in Room, Up in [place], Unable to [employer] and Patient Refuses to [address]? Nurse, PCA, PT, OT and Self                 Level of Difficulty/Assistance: 1x Assist     Pt resting in bed at this time on 3 L O2. Pt denies shortness of breath. Pt with pitting lower extremity edema.      Patient and/or Family's stated Goal of Care this Admission: reduce shortness of breath, increase activity tolerance, better understand heart failure and disease management, be more comfortable and reduce lower extremity edema prior to discharge        :

## 2020-07-06 LAB
ANION GAP SERPL CALCULATED.3IONS-SCNC: 10 MMOL/L (ref 3–16)
BACTERIA: ABNORMAL /HPF
BILIRUBIN URINE: NEGATIVE
BLOOD, URINE: ABNORMAL
BUN BLDV-MCNC: 22 MG/DL (ref 7–20)
CALCIUM SERPL-MCNC: 8.5 MG/DL (ref 8.3–10.6)
CHLORIDE BLD-SCNC: 97 MMOL/L (ref 99–110)
CLARITY: ABNORMAL
CO2: 33 MMOL/L (ref 21–32)
COLOR: YELLOW
COMMENT UA: ABNORMAL
CREAT SERPL-MCNC: 0.7 MG/DL (ref 0.6–1.2)
GFR AFRICAN AMERICAN: >60
GFR NON-AFRICAN AMERICAN: >60
GLUCOSE BLD-MCNC: 121 MG/DL (ref 70–99)
GLUCOSE URINE: NEGATIVE MG/DL
HCT VFR BLD CALC: 40.7 % (ref 36–48)
HEMOGLOBIN: 13.5 G/DL (ref 12–16)
KETONES, URINE: NEGATIVE MG/DL
LEUKOCYTE ESTERASE, URINE: ABNORMAL
MCH RBC QN AUTO: 30.2 PG (ref 26–34)
MCHC RBC AUTO-ENTMCNC: 33.1 G/DL (ref 31–36)
MCV RBC AUTO: 91.3 FL (ref 80–100)
MICROSCOPIC EXAMINATION: YES
NITRITE, URINE: POSITIVE
PDW BLD-RTO: 16 % (ref 12.4–15.4)
PH UA: 8 (ref 5–8)
PLATELET # BLD: 231 K/UL (ref 135–450)
PMV BLD AUTO: 8.2 FL (ref 5–10.5)
POTASSIUM REFLEX MAGNESIUM: 3.6 MMOL/L (ref 3.5–5.1)
PROTEIN UA: NEGATIVE MG/DL
RBC # BLD: 4.45 M/UL (ref 4–5.2)
RBC UA: ABNORMAL /HPF (ref 0–4)
SODIUM BLD-SCNC: 140 MMOL/L (ref 136–145)
SPECIFIC GRAVITY UA: 1.01 (ref 1–1.03)
URINE TYPE: ABNORMAL
UROBILINOGEN, URINE: 0.2 E.U./DL
WBC # BLD: 10.6 K/UL (ref 4–11)
WBC UA: >100 /HPF (ref 0–5)

## 2020-07-06 PROCEDURE — 87077 CULTURE AEROBIC IDENTIFY: CPT

## 2020-07-06 PROCEDURE — 6360000002 HC RX W HCPCS: Performed by: INTERNAL MEDICINE

## 2020-07-06 PROCEDURE — 2700000000 HC OXYGEN THERAPY PER DAY

## 2020-07-06 PROCEDURE — 97110 THERAPEUTIC EXERCISES: CPT

## 2020-07-06 PROCEDURE — 6370000000 HC RX 637 (ALT 250 FOR IP): Performed by: INTERNAL MEDICINE

## 2020-07-06 PROCEDURE — 2060000000 HC ICU INTERMEDIATE R&B

## 2020-07-06 PROCEDURE — 80048 BASIC METABOLIC PNL TOTAL CA: CPT

## 2020-07-06 PROCEDURE — 97530 THERAPEUTIC ACTIVITIES: CPT

## 2020-07-06 PROCEDURE — 94640 AIRWAY INHALATION TREATMENT: CPT

## 2020-07-06 PROCEDURE — 94761 N-INVAS EAR/PLS OXIMETRY MLT: CPT

## 2020-07-06 PROCEDURE — 87186 SC STD MICRODIL/AGAR DIL: CPT

## 2020-07-06 PROCEDURE — U0003 INFECTIOUS AGENT DETECTION BY NUCLEIC ACID (DNA OR RNA); SEVERE ACUTE RESPIRATORY SYNDROME CORONAVIRUS 2 (SARS-COV-2) (CORONAVIRUS DISEASE [COVID-19]), AMPLIFIED PROBE TECHNIQUE, MAKING USE OF HIGH THROUGHPUT TECHNOLOGIES AS DESCRIBED BY CMS-2020-01-R: HCPCS

## 2020-07-06 PROCEDURE — 2580000003 HC RX 258: Performed by: INTERNAL MEDICINE

## 2020-07-06 PROCEDURE — 87086 URINE CULTURE/COLONY COUNT: CPT

## 2020-07-06 PROCEDURE — 85027 COMPLETE CBC AUTOMATED: CPT

## 2020-07-06 PROCEDURE — 36415 COLL VENOUS BLD VENIPUNCTURE: CPT

## 2020-07-06 PROCEDURE — 81001 URINALYSIS AUTO W/SCOPE: CPT

## 2020-07-06 PROCEDURE — 97535 SELF CARE MNGMENT TRAINING: CPT

## 2020-07-06 RX ADMIN — APIXABAN 2.5 MG: 2.5 TABLET, FILM COATED ORAL at 20:37

## 2020-07-06 RX ADMIN — PREDNISONE 40 MG: 10 TABLET ORAL at 09:29

## 2020-07-06 RX ADMIN — CEFTRIAXONE SODIUM 1 G: 1 INJECTION, POWDER, FOR SOLUTION INTRAMUSCULAR; INTRAVENOUS at 17:18

## 2020-07-06 RX ADMIN — LEVOTHYROXINE SODIUM 100 MCG: 0.1 TABLET ORAL at 09:32

## 2020-07-06 RX ADMIN — Medication 10 ML: at 09:38

## 2020-07-06 RX ADMIN — AMLODIPINE BESYLATE 5 MG: 5 TABLET ORAL at 09:28

## 2020-07-06 RX ADMIN — OXCARBAZEPINE 150 MG: 150 TABLET, FILM COATED ORAL at 20:37

## 2020-07-06 RX ADMIN — MEMANTINE 10 MG: 5 TABLET ORAL at 09:23

## 2020-07-06 RX ADMIN — ATORVASTATIN CALCIUM 20 MG: 10 TABLET, FILM COATED ORAL at 20:37

## 2020-07-06 RX ADMIN — APIXABAN 2.5 MG: 2.5 TABLET, FILM COATED ORAL at 09:23

## 2020-07-06 RX ADMIN — OXCARBAZEPINE 150 MG: 150 TABLET, FILM COATED ORAL at 09:23

## 2020-07-06 RX ADMIN — IPRATROPIUM BROMIDE AND ALBUTEROL SULFATE 1 AMPULE: .5; 3 SOLUTION RESPIRATORY (INHALATION) at 08:43

## 2020-07-06 RX ADMIN — IPRATROPIUM BROMIDE AND ALBUTEROL SULFATE 1 AMPULE: .5; 3 SOLUTION RESPIRATORY (INHALATION) at 11:05

## 2020-07-06 RX ADMIN — FAMOTIDINE 20 MG: 20 TABLET, FILM COATED ORAL at 09:32

## 2020-07-06 RX ADMIN — MEMANTINE 10 MG: 5 TABLET ORAL at 20:37

## 2020-07-06 RX ADMIN — FUROSEMIDE 20 MG: 20 TABLET ORAL at 09:23

## 2020-07-06 RX ADMIN — Medication 10 ML: at 21:37

## 2020-07-06 RX ADMIN — IPRATROPIUM BROMIDE AND ALBUTEROL SULFATE 1 AMPULE: .5; 3 SOLUTION RESPIRATORY (INHALATION) at 20:18

## 2020-07-06 RX ADMIN — IPRATROPIUM BROMIDE AND ALBUTEROL SULFATE 1 AMPULE: .5; 3 SOLUTION RESPIRATORY (INHALATION) at 16:33

## 2020-07-06 ASSESSMENT — PAIN SCALES - GENERAL
PAINLEVEL_OUTOF10: 0

## 2020-07-06 NOTE — PLAN OF CARE
Problem: Falls - Risk of:  Goal: Will remain free from falls  Description: Will remain free from falls  7/6/2020 1052 by Tania Jackson RN  Outcome: Ongoing  Note: Pt will remain free from falls throughout hospital stay. Fall precautions in place, bed alarm on, bed in lowest position with wheels locked and side rails 2/4 up. Room door open and hourly rounding completed. Will continue to monitor throughout shift. Problem: Injury - Risk of, Physical Injury:  Goal: Will remain free from falls  Description: Will remain free from falls  7/6/2020 1052 by Tania Jackson RN  Outcome: Ongoing  Note: Pt will remain free from falls throughout hospital stay. Fall precautions in place, bed alarm on, bed in lowest position with wheels locked and side rails 2/4 up. Room door open and hourly rounding completed. Will continue to monitor throughout shift. Problem: Skin Integrity:  Goal: Will show no infection signs and symptoms  Description: Will show no infection signs and symptoms  Outcome: Ongoing  Note: Pt is at risk for skin breakdown. Pt will have skin assessments every shift, Q2 turns, heels elevated off of the bed, and friction and shear prevented when possible. Will continue to monitor for signs of skin breakdown and enforce prevention measures.

## 2020-07-06 NOTE — PROGRESS NOTES
Nutrition Assessment    Type and Reason for Visit: Initial(LOS)    Nutrition Recommendations:   1. Dysphagia minced and moist (removed puree from diet order due to 2 conflicting diet textures), mildly thick liquids, medications in puree  2. Magic cup with meals to enhance protein, can use for medications as well  3. Will monitor nutritional adequacy, nutrition-related labs, weights, BMs, and clinical progress     Nutrition Assessment: Nutritional status improving AEB eating % meals. SLP recommended dysphagia minced and moist, mildly thick liquids, medications in puree, puree diet was also included in diet but removed to prevent confusion and errors. Will add Magic cup with meals due to extended hospital stay and poor skin integrity. Malnutrition Assessment:  · Malnutrition Status: At risk for malnutrition    Nutrition Risk Level:  Moderate    Nutrient Needs:  · Estimated Daily Total Kcal: 0034-7717  · Estimated Daily Protein (g): 75-90  · Estimated Daily Total Fluid (ml/day): 1 kcal/ml    Nutrition Diagnosis:   · Problem: Increased nutrient needs  · Etiology: related to Increased demand for energy/nutrients     Signs and symptoms:  as evidenced by (poor skin integrity, extended hospital stay)    Objective Information:  · Nutrition-Focused Physical Findings: averaging BM 1-2 per day  · Wound Type: (scattered abrasions and redness)  · Current Nutrition Therapies:  · Oral Diet Orders: Dysphagia Minced and Moist (Dysphagia 2), Mildly Thick   · Oral Diet intake: %  · Oral Nutrition Supplement (ONS) Orders:    · ONS intake: Unable to assess  · Anthropometric Measures:  · Ht: 5' 2\" (157.5 cm)   · Current Body Wt: 212 lb 1 oz (96.2 kg)  · Ideal Body Wt: 110 lb (49.9 kg), % Ideal Body    · BMI Classification: BMI 35.0 - 39.9 Obese Class II    Nutrition Interventions:   Continue current diet, Start ONS  Continued Inpatient Monitoring    Nutrition Evaluation:   · Evaluation: Goals set   · Goals: Patient will eat 50% or greater of meals and supplements without dysphagia.       · Monitoring: Pertinent Labs, Chewing/Swallowing, Nutrition Progression, Meal Intake, Supplement Intake      Electronically signed by Cecilio Garzon RD, LD on 7/6/20 at 4:27 PM EDT    Contact Number: Office: [phone]; [address]: [address]

## 2020-07-06 NOTE — PLAN OF CARE
Problem: Falls - Risk of:  Goal: Will remain free from falls  Description: Will remain free from falls  7/6/2020 0113 by Yetta Nageotte, RN  Note: Pt will remain free of falls this shift. Bedside table and call light within reach, bed alarm in place. Pt instructed to call out when in need of assistance, verbalized understanding. Will continue to monitor.

## 2020-07-06 NOTE — PROGRESS NOTES
MD paged- \"Pt admitted for possible PNA. Pt largely incontinent, urine dark and extremely malodorous. Could we potentially get a UA please? Please advise. Thank you. \"

## 2020-07-06 NOTE — PROGRESS NOTES
Hospitalist Progress Note      PCP: Malathi Blue MD    Date of Admission: 6/29/2020    Chief Complaint: SOB and directly admitted from [facility] Course: Reviewed H&P. Subjective: SOB improving. Conchetta Peaks Appetite stable. Medications:  Reviewed    Scheduled Medications    predniSONE  40 mg Oral Daily    ipratropium-albuterol  1 ampule Inhalation Q4H WA    memantine  10 mg Oral BID    amLODIPine  5 mg Oral Daily    furosemide  20 mg Oral Daily    apixaban  2.5 mg Oral BID    sodium chloride flush  10 mL Intravenous 2 times per day    famotidine  20 mg Oral Daily    levothyroxine  100 mcg Oral Daily    atorvastatin  20 mg Oral Nightly    sodium chloride flush  10 mL Intravenous 2 times per day    OXcarbazepine  150 mg Oral BID     PRN Meds: sodium chloride flush, polyethylene glycol, promethazine **OR** ondansetron, acetaminophen **OR** [DISCONTINUED] acetaminophen      Intake/Output Summary (Last 24 hours) at 7/6/2020 1628  Last data filed at 7/6/2020 1350  Gross per 24 hour   Intake 1160 ml   Output 0 ml   Net 1160 ml       Physical Exam Performed:  BP (!) 169/89   Pulse 67   Temp 98.1 °F (36.7 °C) (Oral)   Resp 16   Ht 5' 2\" (1.575 m)   Wt 212 lb 1.3 oz (96.2 kg)   SpO2 98%   BMI 38.79 kg/m²     General appearance:  No apparent distress, appears stated age and cooperative. HEENT:  Normal cephalic, atraumatic without obvious deformity. Pupils equal, round, and reactive to light. Extra ocular muscles intact. Conjunctivae/corneas clear. Neck: Supple, with full range of motion. No jugular venous distention. Trachea midline. Respiratory:  Normal respiratory effort. Diminished breath sounds at bases with fine crackles. Audible Wheezes noted   Cardiovascular:  Regular rate and rhythm with normal S1/S2 without murmurs, rubs or gallops. Abdomen: Soft, non-tender, non-distended with normal bowel sounds. Musculoskeletal:  No clubbing, cyanosis or edema bilaterally.   Full range of motion without deformity. Skin: Skin color, texture, turgor normal.  No rashes or lesions. Neurologic:  Neurovascularly intact without any focal sensory/motor deficits. Cranial nerves: II-XII intact, grossly non-focal. Confused at baseline   Psychiatric:  Alert and demented, thought content inappropriate, Lacks insight  Capillary Refill: Brisk,< 3 seconds   Peripheral Pulses: +2 palpable, equal bilaterally       Labs:   Recent Labs     07/04/20  0543 07/05/20  0546 07/06/20  0506   WBC 8.9 10.0 10.6   HGB 12.6 12.9 13.5   HCT 38.4 39.9 40.7    246 231     Recent Labs     07/04/20  0543 07/05/20  0546 07/06/20  0506    138 140   K 3.7 3.4* 3.6   CL 99 95* 97*   CO2 35* 36* 33*   BUN 27* 28* 22*   CREATININE 0.7 0.8 0.7   CALCIUM 8.5 8.6 8.5       Active Hospital Problems    Diagnosis Date Noted    Persistent atrial fibrillation [I48.19] 08/06/2018     Priority: High    AV block [I44.30] 08/06/2018     Priority: High    Bradycardia [R00.1]     Intermittent asthma without complication [H20.75]     Late onset Alzheimer's disease without behavioral disturbance (HCC) [G30.1, F02.80]     Pulmonary hypertension (HCC) [I27.20]     Shortness of breath [R06.02] 06/29/2020    SOB (shortness of breath) [R06.02] 06/29/2020    Acute on chronic diastolic CHF (congestive heart failure) (HCC) [I50.33]     Acute hypoxemic respiratory failure ([facility] [J96.01] 08/07/2018    Essential hypertension [I10]     Morbid obesity ([facility] [E66.01] 08/04/2018     Assessment/Plan:  1. SOBOE with acute on chronic hypoxic respiratory failure due to COPD exacerbation due to Aspiration   -Currently requiring up to 6 L/min high flow nasal cannula oxygen. Started on IV Solu-Medrol 40 mg every 8  and transitioned to p.o. prednisone 40 mg daily thereafter.  -DuoNeb inhaler scheduled and PRN . Patient had a SARS-CoV-2 NAAT  Testing at [facility] ED and was negative   -Chest x-ray on admission negative for pneumonia.    -Pulmonology

## 2020-07-06 NOTE — PROGRESS NOTES
restrictions: 4L O2, purwick, telemetry, up with assist  Subjective   General  Chart Reviewed: Yes  Additional Pertinent Hx: A-fib, Alzheimers  Response To Previous Treatment: Patient with no complaints from previous session. Family / Caregiver Present: No  Referring Practitioner: Xenia Bojorquez MD  Subjective  Subjective: Pt was in bed willing to sit up in the chair. Pt rpts: \"I sometimes get up on my own and walk in the hallways here\". Pt was instructed to wait for assistance before attempting to get up d/t the fact that it takes moderate help from a caregiver to get her out of bed safely. General Comment  Comments: RN cleared pt for session  Pain Screening  Patient Currently in Pain: Denies  Vital Signs  Patient Currently in Pain: Denies       Orientation  Orientation  Overall Orientation Status: Impaired  Orientation Level: Oriented to person;Disoriented to place; Disoriented to time;Disoriented to situation  Cognition      Objective   Bed mobility  Supine to Sit: Moderate assistance  Sit to Supine: ( )  Scooting: Minimal assistance  Transfers  Sit to Stand: Moderate Assistance  Stand to sit: Minimal Assistance  Bed to Chair: Contact guard assistance(rw)  Ambulation  Ambulation?: Yes  More Ambulation?: No(Pt demos fatigue)  Ambulation 1  Surface: level tile  Device: Rolling Walker  Other Apparatus: (RA)  Quality of Gait: B minimal toe clearance, b decreased heel strike,  flexed trunk and downward gaze. Pt slightly unsteady, no overt LOB. Gait Deviations: Slow Maye; Increased NAFISA; Decreased step length;Decreased step height;Shuffles  Distance: 4 ft into bed side chair      Balance  Posture: Fair  Sitting - Static: Fair;+  Sitting - Dynamic: Fair;+  Standing - Static: Fair  Standing - Dynamic: Fair;-  Comments: standing w/ rw  Exercises  Heelslides: 15 B  Hip Flexion: 15 B  Hip Abduction: 15 B  Knee Long Arc Quad: Seated BLE x 10  Ankle Pumps: 15 B          AM-PAC Score     AM-PAC Inpatient Mobility without

## 2020-07-06 NOTE — PROGRESS NOTES
Occupational Therapy  Facility/Department: [facility] C4 PCU  Daily Treatment Note  NAME: Elizabeth Robin  : 1935  MRN: [id number]    Date of Service: 2020    Discharge Recommendations:  Subacute/Skilled Nursing Facility     Assessment   Performance deficits / Impairments: Decreased functional mobility ; Decreased ADL status; Decreased cognition;Decreased endurance;Decreased balance;Decreased strength;Decreased safe awareness  Assessment: Pt max A for brief change and bladder hygiene. Pt min A for functional transfers and mobility with RW. Pt with poor safety and decreased insight into deficits, pleasantly confused. Pt repeatedly asking for something to drink, but then refusing thickened liquids. Pt would benefit from continued skilled OT. Prognosis: Fair  OT Education: OT Role;Plan of Care;ADL Adaptive Strategies;Transfer Training;Orientation  REQUIRES OT FOLLOW UP: Yes  Activity Tolerance  Activity Tolerance: Patient Tolerated treatment well  Activity Tolerance: Pt c/o feeling like she can't breathe, O2 sat 92% or better throughout session on both room air and 2L for comfort. Safety Devices  Safety Devices in place: Yes  Type of devices: Left in chair;Chair alarm in place;Call light within reach;Nurse notified;Gait belt         Patient Diagnosis(es): There were no encounter diagnoses. has a past medical history of Alzheimer's dementia ([facility] [address] Asthma, Atrial fibrillation ([address] Blood clot in vein, CHF (congestive heart failure) ([address] Dementia ([address] GERD (gastroesophageal reflux disease), Hypertension, Kidney failure, MRSA (methicillin resistant staph aureus) culture positive, and Osteoporosis. has a past surgical history that includes Cholecystectomy; eye surgery; Hysterectomy; Thyroidectomy; pr esophagogastroduodenoscopy transoral diagnostic (N/A, 2018); and pr brncc w/brncl alveolar lavage (N/A, 2018).     Restrictions  Restrictions/Precautions  Restrictions/Precautions: Fall Risk  Position Activity Restriction  Other position/activity restrictions: 2L O2 for comfort, tele     Subjective   General  Chart Reviewed: Yes  Patient assessed for rehabilitation services?: Yes  Response to previous treatment: Patient with no complaints from previous session  Family / Caregiver Present: No  Referring Practitioner: Leana Watkins MD  Diagnosis: SOB    Subjective  Subjective: Pt up in chair, agreeable to OT treatment. Pt perseverating on \"getting  to [employer] on Saturday. \"    Vital Signs  Patient Currently in Pain: Denies     Orientation  Orientation  Overall Orientation Status: Impaired  Orientation Level: Oriented to person;Disoriented to situation;Disoriented to place; Disoriented to time     Objective    ADL  Toileting: Maximum assistance(incontinent of urine)     Balance  Sitting Balance: Supervision  Standing Balance: Minimal assistance(with RW)    Functional Mobility  Functional - Mobility Device: Rolling Walker  Activity: To/from bathroom  Assist Level: Minimal assistance  Functional Mobility Comments: assist to navigate RW, poor safety, forward flexed posture    Toilet Transfers  Toilet - Technique: Ambulating(with RW)  Equipment Used: Standard toilet(with use of L grab bar)  Toilet Transfer: Minimal assistance     Transfers  Sit to stand: Minimal assistance  Stand to sit: Minimal assistance  Transfer Comments: cues for safety      Cognition  Overall Cognitive Status: Exceptions  Following Commands: Follows one step commands with repetition; Follows one step commands with increased time  Attention Span: Attends with cues to redirect  Memory: Decreased recall of recent events;Decreased short term memory  Safety Judgement: Decreased awareness of need for safety;Decreased awareness of need for assistance  Insights: Decreased awareness of deficits  Initiation: Requires cues for some  Sequencing: Requires cues for some     Exercises  Shoulder Flexion: 10x  Elbow Flexion: 15x  Elbow Extension: 15x  Supination: 20x  Pronation: 20x  Wrist Flexion: 20x  Wrist Extension: 20x  Grasp/Release: 20x     Plan   Plan  Times per week: 3-5x's a week while in acute care    AM-PAC Score  AM-PAC Inpatient Daily Activity Raw Score: 15 (07/06/20 1426)  AM-PAC Inpatient ADL T-Scale Score : 34.69 (07/06/20 1426)  ADL Inpatient CMS 0-100% Score: 56.46 (07/06/20 1426)  ADL Inpatient CMS G-Code Modifier : CK (07/06/20 1426)    Goals  Short term goals  Time Frame for Short term goals: 1 week unless otherwise specified 7/7  Short term goal 1: Pt will complete toilet transfers with SBA bt 7/5 --GOAL NOT MET, pt demos min A 7/6/20  Short term goal 2: Pt will complete oral care at sink with CGA for balance-- ongoing  Short term goal 3: Pt will complete LE dressing with modA -- ongoing  Patient Goals   Patient goals : \"to find my glasses\"     Therapy Time   Individual Concurrent Group Co-treatment   Time In 1345         Time Out 1423         [facility]

## 2020-07-06 NOTE — PLAN OF CARE
Problem: OXYGENATION/RESPIRATORY FUNCTION  Goal: Patient will maintain patent airway  Outcome: Ongoing  Note:   Patient's EF (Ejection Fraction) is greater than 40%    Heart Failure Medications:  Diuretics[de-identified] Furosemide    (One of the following REQUIRED for EF <40%/SYSTOLIC FAILURE but MAY be used in EF% >40%/DIASTOLIC FAILURE)        ACE[de-identified] None        ARB[de-identified] None         ARNI[de-identified] None    (Beta Blockers)  NON- Evidenced Based Beta Blocker (for EF% >40%/DIASTOLIC FAILURE): None    Evidenced Based Beta Blocker::(REQUIRED for EF% <40%/SYSTOLIC FAILURE) None  . .................................................................................................................................................. Patient's weights and intake/output reviewed: Yes    Patient's Last Weight: 212 lbs obtained by bed scale. Difference of 3 lbs more than last documented weight. Intake/Output Summary (Last 24 hours) at 7/6/2020 1053  Last data filed at 7/6/2020 0647  Gross per 24 hour   Intake 1040 ml   Output --   Net 1040 ml       Comorbidities Reviewed Yes    Patient has a past medical history of Alzheimer's dementia ([address] Asthma, Atrial fibrillation ([address] Blood clot in vein, CHF (congestive heart failure) ([address] Dementia ([address] GERD (gastroesophageal reflux disease), Hypertension, Kidney failure, MRSA (methicillin resistant staph aureus) culture positive, and Osteoporosis. >>For CHF and Comorbidity documentation on Education Time and Topics, please see Education Tab    Progressive Mobility Assessment:  What is this patient's Current Level of Mobility?: Ambulatory- with Assistance  How was this patient Mobilized today?: Edge of Bed                 With Whom? PT and OT                 Level of Difficulty/Assistance: 1x Assist     Pt sitting in bed at this time on 2 L O2. Pt with complaints of shortness of breath. Pt without lower extremity edema.      Patient and/or Family's stated Goal of Care this Admission: reduce shortness of breath, increase activity tolerance, better understand heart failure and disease management, and be more comfortable prior to discharge        :

## 2020-07-06 NOTE — PLAN OF CARE
Nutrition Problem: Increased nutrient needs  Intervention: Food and/or Nutrient Delivery: Continue current diet, Start ONS  Nutritional Goals: Patient will eat 50% or greater of meals and supplements without dysphagia.

## 2020-07-07 VITALS
DIASTOLIC BLOOD PRESSURE: 62 MMHG | BODY MASS INDEX: 39.03 KG/M2 | RESPIRATION RATE: 14 BRPM | SYSTOLIC BLOOD PRESSURE: 148 MMHG | TEMPERATURE: 98.6 F | WEIGHT: 212.08 LBS | HEIGHT: 62 IN | HEART RATE: 73 BPM | OXYGEN SATURATION: 94 %

## 2020-07-07 LAB
ANION GAP SERPL CALCULATED.3IONS-SCNC: 10 MMOL/L (ref 3–16)
BUN BLDV-MCNC: 22 MG/DL (ref 7–20)
CALCIUM SERPL-MCNC: 8.6 MG/DL (ref 8.3–10.6)
CHLORIDE BLD-SCNC: 98 MMOL/L (ref 99–110)
CO2: 34 MMOL/L (ref 21–32)
CREAT SERPL-MCNC: 0.6 MG/DL (ref 0.6–1.2)
GFR AFRICAN AMERICAN: >60
GFR NON-AFRICAN AMERICAN: >60
GLUCOSE BLD-MCNC: 94 MG/DL (ref 70–99)
HCT VFR BLD CALC: 41.3 % (ref 36–48)
HEMOGLOBIN: 13.4 G/DL (ref 12–16)
MCH RBC QN AUTO: 29.8 PG (ref 26–34)
MCHC RBC AUTO-ENTMCNC: 32.4 G/DL (ref 31–36)
MCV RBC AUTO: 92.1 FL (ref 80–100)
PDW BLD-RTO: 15.9 % (ref 12.4–15.4)
PLATELET # BLD: 239 K/UL (ref 135–450)
PMV BLD AUTO: 8.4 FL (ref 5–10.5)
POTASSIUM REFLEX MAGNESIUM: 3.6 MMOL/L (ref 3.5–5.1)
RBC # BLD: 4.49 M/UL (ref 4–5.2)
REPORT: NORMAL
SARS-COV-2: NOT DETECTED
SODIUM BLD-SCNC: 142 MMOL/L (ref 136–145)
THIS TEST SENT TO: NORMAL
WBC # BLD: 11.1 K/UL (ref 4–11)

## 2020-07-07 PROCEDURE — 2700000000 HC OXYGEN THERAPY PER DAY

## 2020-07-07 PROCEDURE — 80048 BASIC METABOLIC PNL TOTAL CA: CPT

## 2020-07-07 PROCEDURE — 6370000000 HC RX 637 (ALT 250 FOR IP): Performed by: INTERNAL MEDICINE

## 2020-07-07 PROCEDURE — 94640 AIRWAY INHALATION TREATMENT: CPT

## 2020-07-07 PROCEDURE — 85027 COMPLETE CBC AUTOMATED: CPT

## 2020-07-07 PROCEDURE — 94761 N-INVAS EAR/PLS OXIMETRY MLT: CPT

## 2020-07-07 PROCEDURE — 36415 COLL VENOUS BLD VENIPUNCTURE: CPT

## 2020-07-07 RX ORDER — AMLODIPINE BESYLATE 5 MG/1
5 TABLET ORAL DAILY
Qty: 30 TABLET | Refills: 3 | DISCHARGE
Start: 2020-07-08

## 2020-07-07 RX ORDER — POLYETHYLENE GLYCOL 3350 17 G/17G
17 POWDER, FOR SOLUTION ORAL DAILY PRN
Qty: 527 G | Refills: 1 | Status: ON HOLD | DISCHARGE
Start: 2020-07-07 | End: 2020-08-07 | Stop reason: HOSPADM

## 2020-07-07 RX ORDER — PREDNISONE 20 MG/1
40 TABLET ORAL DAILY
Qty: 6 TABLET | Refills: 0 | DISCHARGE
Start: 2020-07-08 | End: 2020-07-11

## 2020-07-07 RX ORDER — CEFUROXIME AXETIL 500 MG/1
500 TABLET ORAL 2 TIMES DAILY
Qty: 10 TABLET | Refills: 0 | DISCHARGE
Start: 2020-07-07 | End: 2020-07-12

## 2020-07-07 RX ORDER — FUROSEMIDE 20 MG/1
20 TABLET ORAL DAILY
Qty: 60 TABLET | Refills: 3 | Status: ON HOLD | DISCHARGE
Start: 2020-07-08 | End: 2020-08-07 | Stop reason: SDUPTHER

## 2020-07-07 RX ADMIN — IPRATROPIUM BROMIDE AND ALBUTEROL SULFATE 1 AMPULE: .5; 3 SOLUTION RESPIRATORY (INHALATION) at 08:36

## 2020-07-07 RX ADMIN — LEVOTHYROXINE SODIUM 100 MCG: 0.1 TABLET ORAL at 06:04

## 2020-07-07 RX ADMIN — AMLODIPINE BESYLATE 5 MG: 5 TABLET ORAL at 08:26

## 2020-07-07 RX ADMIN — APIXABAN 2.5 MG: 2.5 TABLET, FILM COATED ORAL at 08:26

## 2020-07-07 RX ADMIN — OXCARBAZEPINE 150 MG: 150 TABLET, FILM COATED ORAL at 08:26

## 2020-07-07 RX ADMIN — FUROSEMIDE 20 MG: 20 TABLET ORAL at 08:26

## 2020-07-07 RX ADMIN — PREDNISONE 40 MG: 10 TABLET ORAL at 08:26

## 2020-07-07 RX ADMIN — IPRATROPIUM BROMIDE AND ALBUTEROL SULFATE 1 AMPULE: .5; 3 SOLUTION RESPIRATORY (INHALATION) at 15:34

## 2020-07-07 RX ADMIN — FAMOTIDINE 20 MG: 20 TABLET, FILM COATED ORAL at 06:04

## 2020-07-07 RX ADMIN — MEMANTINE 10 MG: 5 TABLET ORAL at 08:26

## 2020-07-07 ASSESSMENT — PAIN SCALES - GENERAL
PAINLEVEL_OUTOF10: 0
PAINLEVEL_OUTOF10: 0

## 2020-07-07 NOTE — DISCHARGE SUMMARY
Hospital Medicine Discharge Summary    Patient: Randy Ramsey     Age: 82 y.o. Gender: female  : 1935   MRN: [id number]  Code Status: Full     Admit Date: 2020   Discharge Date: 2020    Disposition:  ECF [facility])    Condition at Discharge: Stable    Primary Care Provider: Chad Galicia MD    Admitting Physician: Mayra Aguiar MD  Discharge Physician: Melly Rush MD       Discharge Diagnoses: Active Hospital Problems    Diagnosis    Persistent atrial fibrillation [I48.19]     Priority: High    AV block [I44.30]     Priority: High    Bradycardia [R00.1]    Intermittent asthma without complication [Q15.09]    Late onset Alzheimer's disease without behavioral disturbance (HCC) [G30.1, F02.80]    Pulmonary hypertension (HCC) [I27.20]    Shortness of breath [R06.02]    SOB (shortness of breath) [R06.02]    Acute on chronic diastolic CHF (congestive heart failure) (HCC) [I50.33]    Acute hypoxemic respiratory failure ([facility] [address] [J96.01]    Essential hypertension [I10]    Morbid obesity ([facility] [address] [E66.01]       Hospital Course:   82 y.o. female with PMH of [place]'s dementia, A. fib RVR on Eliquis for anticoagulation; asthma/COPD (uses oxygen as needed ); chronic diastolic CHF ,ECF  resident of [facility] who presented to [facility] ED with C/o shortness of breath. She was requiring up to 6 L/min oxygen by nasal cannula. During her [facility] ED stay she was noted to be bradycardic with heart rate in 30s. Cardiology was consulted and subsequently patient transferred to [facility] for admission for further evaluation and management. Assessment/Plan:  1. SOBOE with acute on chronic hypoxic respiratory failure due to COPD exacerbation due to Aspiration   -Improved with steroids, nebs. -Patient had a SARS-CoV-2 NAAT  Testing at [facility] ED and was negative   -Chest x-ray on admission negative for pneumonia.    -Pulmonology following and assisting with management  - SLP evaluated and recommended MBS -done on 7/3/2020-showed penetration but no aspiration. SLP recommended dysphagia puréed diet with nectar thick liquids.     2. Tachybrady Syndrome in the setting of Afib POA-patient noted to be bradycardic and subsequently transferred to [facility] for EP evaluation. Improved  -Patient not on any rate limiting medication PTA. Resumed Eliquis for anticoagulation on 7/1/2   -repeated echo on 6/30/2020 showed normal LVEF 55 to 60%; moderate right-sided chamber enlargement. RV systolic function is moderately reduced and severe pulmonary artery pressure estimated at 94 mmHg consistent with severe pulmonary hypertension. -TSH 3.96  -EP evaluated and recommended no intervention for now. Suggested to consider long-term ambulatory EKG monitoring and stated could consider pacemaker if her functional status improves or if symptoms such as near syncope or syncope evolve.     3. HypoThyroidism -on Synthroid; continue     4. Mild to moderate Alzheimer dementia at baseline -on Namenda extended release at home; Held as it is known to cause AV blocks as rare side  effect. OK to resume at this time; benefit > risk.      5. Morbid Obesity -  With There is no height or weight on file to calculate BMI. Complicating assessment and treatment. Placing patient at risk for multiple co-morbidities as well as early death and contributing to the patient's presentation. Counseled on weight loss. 6.  Acute on chronic diastolic CHF with bilateral pleural effusions POA -patient started on IV Lasix and subsequently transitioned to home dose; monitor I's/O and daily weights closely. 7. Acute Cystitis/UTI: Foul smelling urine. Grossly abnormal UA. UCx positive for pan-sensitive E.coli. Continue Ceftin at MD.         Exam:   BP (!) 148/62   Pulse 73   Temp 98.6 °F (37 °C) (Oral)   Resp 14   Ht 5' 2\" (1.575 m)   Wt 212 lb 1.3 oz (96.2 kg)   SpO2 94%   BMI 38.79 kg/m²   General appearance:  No apparent distress, appears stated age and cooperative. HEENT:  Normal cephalic, atraumatic without obvious deformity. Pupils equal, round, and reactive to light. Extra ocular muscles intact. Conjunctivae/corneas clear. Neck: Supple, with full range of motion. No jugular venous distention. Trachea midline. Respiratory:  Normal respiratory effort. Diminished breath sounds at bases with fine crackles. Audible Wheezes noted   Cardiovascular:  Regular rate and rhythm with normal S1/S2 without murmurs, rubs or gallops. Abdomen: Soft, non-tender, non-distended with normal bowel sounds. Musculoskeletal:  No clubbing, cyanosis or edema bilaterally. Full range of motion without deformity. Skin: Skin color, texture, turgor normal.  No rashes or lesions. Neurologic:  Neurovascularly intact without any focal sensory/motor deficits.  Cranial nerves: II-XII intact, grossly non-focal. Confused at baseline   Psychiatric:  Alert and demented, thought content inappropriate, Lacks insight  Capillary Refill: Brisk,< 3 seconds   Peripheral Pulses: +2 palpable, equal bilaterally     Patient Discharge Instructions:   Recommended Follow-up, Labs or Other Treatments After Discharge:    PT/OT  Continue Ceftin 500 mg PO BID x 5 days for E.coli UTI           Discharge Medications:   Discharge Medication List as of 7/7/2020  2:52 PM      START taking these medications    Details   amLODIPine (NORVASC) 5 MG tablet Take 1 tablet by mouth daily, Disp-30 tablet, R-3DC to SNF      cefUROXime (CEFTIN) 500 MG tablet Take 1 tablet by mouth 2 times daily for 5 days, Disp-10 tablet, R-0DC to SNF      furosemide (LASIX) 20 MG tablet Take 1 tablet by mouth daily, Disp-60 tablet, R-3DC to SNF      predniSONE (DELTASONE) 20 MG tablet Take 2 tablets by mouth daily for 3 days, Disp-6 tablet, R-0DC to SNF      polyethylene glycol (GLYCOLAX) 17 g packet Take 17 g by mouth daily as needed for Constipation, Disp-527 g, R-1DC to SNF           Discharge Medication List as of 7/7/2020  2:52 PM        Discharge Medication List as of 7/7/2020  2:52 PM      CONTINUE these medications which have NOT CHANGED    Details   apixaban (ELIQUIS) 2.5 MG TABS tablet Take 1 tablet by mouth 2 times daily, Disp-60 tablet, R-2NO PRINT      acetaminophen (TYLENOL) 500 MG tablet Take 500 mg by mouth every 6 hours as needed for PainHistorical Med      albuterol (PROVENTIL) (2.5 MG/3ML) 0.083% nebulizer solution Take 2.5 mg by nebulization every 6 hours as needed for WheezingHistorical Med      famotidine (PEPCID) 20 MG tablet Take 20 mg by mouth DailyHistorical Med      levothyroxine (SYNTHROID) 100 MCG tablet Take 100 mcg by mouth DailyHistorical Med      magnesium oxide (MAG-OX) 400 MG tablet Take 400 mg by mouth 2 times dailyHistorical Med      montelukast (SINGULAIR) 10 MG tablet Take 10 mg by mouth dailyHistorical Med      Multiple Vitamins-Minerals (THERAPEUTIC MULTIVITAMIN-MINERALS) tablet Take 1 tablet by mouth dailyHistorical Med      memantine ER (NAMENDA XR) 28 MG CP24 extended release capsule Take by mouth dailyHistorical Med      OXcarbazepine (TRILEPTAL) 150 MG tablet Take 150 mg by mouth 2 times dailyHistorical Med      oxybutynin (DITROPAN-XL) 5 MG extended release tablet Take 5 mg by mouth dailyHistorical Med      senna (SENOKOT) 8.6 MG tablet Take 2 tablets by mouth dailyHistorical Med      simvastatin (ZOCOR) 10 MG tablet Take 10 mg by mouth nightlyHistorical Med      Cholecalciferol (VITAMIN D3) 2000 units CAPS Take 1 capsule by mouth dailyHistorical Med           Discharge Medication List as of 7/7/2020  2:52 PM            Significant Test Results    Xr Chest Portable    Result Date: 7/4/2020  EXAMINATION: ONE XRAY VIEW OF THE CHEST 7/4/2020 5:17 pm COMPARISON: July 2, 2020 HISTORY: ORDERING SYSTEM PROVIDED HISTORY: possible aspiration TECHNOLOGIST PROVIDED HISTORY: One view Reason for exam:->possible aspiration FINDINGS: Cardiac silhouette is enlarged.   No pneumothorax. Small bilateral pleural effusions, similar to previous exam.  Pulmonary vascular congestion, unchanged. No acute abnormality. No significant change in appearance of the chest.     Xr Chest Portable    Result Date: 6/29/2020  EXAMINATION: ONE XRAY VIEW OF THE CHEST 6/29/2020 3:56 am COMPARISON: None. HISTORY: ORDERING SYSTEM PROVIDED HISTORY: R/O PNA TECHNOLOGIST PROVIDED HISTORY: Reason for exam:->R/O PNA Reason for Exam: sob Type of Exam: Unknown Additional signs and symptoms: Shortness of Breath (Pt here from [facility] for SOB, pt on NRB 15 lpm upon arrival, pt confused, pt has COPD and anxiety, had duoneb approx 30 min ago, pt unable to gi\ve hx FINDINGS: The cardiomediastinal silhouette is enlarged. There is mild pulmonary vascular congestion. There are trace bilateral pleural effusions. There is no pneumothorax. There is no acute osseous abnormality. Mild pulmonary vascular congestion. Cardiomegaly. Trace bilateral pleural effusions. Xr Chest 1 Vw    Result Date: 7/2/2020  EXAMINATION: ONE XRAY VIEW OF THE CHEST 7/2/2020 10:47 am COMPARISON: Chest radiograph performed 06/29/2020. HISTORY: ORDERING SYSTEM PROVIDED HISTORY: SOB, hypoxia TECHNOLOGIST PROVIDED HISTORY: Reason for exam:->SOB, hypoxia Reason for Exam: SOB, hypoxia Acuity: Unknown Type of Exam: Unknown FINDINGS: There is chronic pulmonary change. There is a right basilar infiltrate. There is no pneumothorax. The heart is enlarged. The upper abdomen is unremarkable. The extrathoracic soft tissues are unremarkable. Cardiomegaly and chronic pulmonary change with right basilar infiltrate. Fl Modified Barium Swallow W Video    Result Date: 7/3/2020  EXAMINATION: MODIFIED BARIUM SWALLOW WAS PERFORMED IN CONJUNCTION WITH SPEECH PATHOLOGY SERVICES TECHNIQUE: Fluoroscopic evaluation of the swallowing mechanism was performed with multiple consistency of barium product.  FLUOROSCOPY DOSE AND TYPE OR TIME AND EXPOSURES: 2.2 minutes fluoroscopy 3 spot films COMPARISON: None HISTORY: ORDERING SYSTEM PROVIDED HISTORY: High Aspiration risk per SLP TECHNOLOGIST PROVIDED HISTORY: Reason for exam:->High Aspiration risk per SLP Reason for Exam: trouble swallowing Acuity: Acute Type of Exam: Initial FINDINGS: Penetration was seen. No definite aspiration     Penetration. No definite aspiration Please see separate speech pathology report for full discussion of findings and recommendations. Consults:     IP CONSULT TO CARDIOLOGY  IP CONSULT TO PULMONOLOGY  IP CONSULT TO CARDIOLOGY    Labs: For convenience and continuity at follow-up the following most recent labs are provided:    Lab Results   Component Value Date    WBC 11.1 07/07/2020    HGB 13.4 07/07/2020    HCT 41.3 07/07/2020    MCV 92.1 07/07/2020     07/07/2020     07/07/2020    K 3.6 07/07/2020    CL 98 07/07/2020    CO2 34 07/07/2020    BUN 22 07/07/2020    CREATININE 0.6 07/07/2020    CALCIUM 8.6 07/07/2020    PHOS 2.5 08/08/2018    TROPONINI <0.01 06/29/2020    ALKPHOS 65 06/29/2020    ALT 25 06/29/2020    AST 20 06/29/2020    BILITOT 0.3 06/29/2020    LABALBU 4.0 06/29/2020    LABA1C 5.8 08/04/2018     No results found for: INR      The patient was seen and examined on day of discharge and this discharge summary is in conjunction with any daily progress note from day of discharge. Time spent on discharge is more than 30 minutes in the examination, evaluation, counseling and review of medications and discharge plan. Signed:    Severo Mcmurray, MD   7/8/2020    Thank you Isaura Mendes MD for the opportunity to be involved in this patient's care. If you have any questions or concerns please feel free to contact my office [phone].

## 2020-07-07 NOTE — CARE COORDINATION
TERE spoke with Ashlie Kilpatrick at [facility]. She updated them that the  Dr notes indicate the Pt will DC tomorrow and Pt covid was ordered yesterday. Ashlie Kilpatrick reported the Pt will DC skilled to them. She said they are all ready for her and she will follow her in epic. She will advise if she has any needs before DC.   SHADI Colin

## 2020-07-07 NOTE — CARE COORDINATION
CASE MANAGEMENT DISCHARGE SUMMARY      Discharge to: [facility] completed: none  Hospital Exemption Notification (HENS) completed: none needed per [address] ordered/agency:     Transportation:       Medical Transport explained to pt/family. Pt/family voice no agency preference. Agency used:[facility]    time:630-7pm   Ambulance form completed: Yes    Confirmed discharge plan with:     Patient: yes     Family, name and contact number: Elizabeth Zelda via phone       Facility/Agency, name:  [facility] faxed   Phone number for report to facility: [phone]     RN, name: Malcolm Garcia    Note: Discharging nurse to complete [facility], reconcile [facility], and place final copy with patient's discharge packet. RN to ensure that written prescriptions for  Level II medications are sent with patient to the facility as per protocol.   SHADI Carreon

## 2020-07-07 NOTE — DISCHARGE INSTR - COC
Continuity of Care Form    Patient Name: Anita Schultz   :  1935  MRN:  [id number]    Admit date:  2020  Discharge date:  20    Code Status Order: Limited   Advance Directives:   Advance Care Flowsheet Documentation     Date/Time Healthcare Directive Type of Healthcare Directive Copy in [address] Agent's Name Healthcare Agent's Phone Number    [phone]  Yes, patient has an advance directive for healthcare treatment  Durable power of  for health care  Yes, copy in chart  Adult Children  Faisal Mcgee   [phone]          Admitting Physician:  Driss Burris MD  PCP: Katja Cole MD    Discharging Nurse: [address] [facility]/Room#: 8024/7541-59  [facility] Unit Phone Number: [phone]    Emergency Contact:   Extended Emergency Contact Information  Primary Emergency Contact: Halina Falk [facility])  Address: [address] Phone: [phone]  Mobile Phone: [phone]  Relation: Child  Secondary Emergency Contact: Rob Falk  Address: [address] [place] [address] Phone: [phone]  Mobile Phone: [phone]  Relation: Child    Past Surgical History:  Past Surgical History:   Procedure Laterality Date    CHOLECYSTECTOMY      EYE SURGERY      HYSTERECTOMY      [place] [address] W/BRNCL ALVEOLAR LAVAGE N/A 2018    BRONCHOSCOPY ALVEOLAR LAVAGE performed by Jasmine Forte MD at [facility] ESOPHAGOGASTRODUODENOSCOPY TRANSORAL DIAGNOSTIC N/A 2018    EGD ESOPHAGOGASTRODUODENOSCOPY performed by Omari Frances MD at [facility]         Immunization History: There is no immunization history on file for this patient.     Active Problems:  Patient Active Problem List   Diagnosis Code    Hematemesis K92.0    Morbid obesity ([address] E66.01    Persistent atrial fibrillation I48.19    AV Rinsed/Irrigated with saline 7/4/2020 11:30 PM   Wound Length (cm) 5 cm 7/4/2020  7:35 PM   Wound Width (cm) 1 cm 7/4/2020  7:35 PM   Wound Surface Area (cm^2) 5 cm^2 7/4/2020  7:35 PM   Wound Assessment Clean;Dry; Intact 7/7/2020  8:23 AM   Drainage Amount None 7/4/2020  7:35 PM   Red%Wound Bed 100 7/4/2020  7:35 PM   Number of days: 2        Elimination:  Continence:   · Bowel: No  · Bladder: No  Urinary Catheter: None   Colostomy/Ileostomy/Ileal Conduit: No       Date of Last BM: 7/7/20    Intake/Output Summary (Last 24 hours) at 7/7/2020 1342  Last data filed at 7/7/2020 0953  Gross per 24 hour   Intake 1275 ml   Output 0 ml   Net 1275 ml     I/O last 3 completed shifts: In: 5959 [P.O.:1275]  Out: 0     Safety Concerns: At Risk for Falls and Aspiration Risk    Impairments/Disabilities:      None    Nutrition Therapy:  Current Nutrition Therapy:   - Oral Diet:  Dysphagia 2 mechanically altered    Routes of Feeding: Oral  Liquids: Nectar Thick Liquids  Daily Fluid Restriction: no  Last Modified Barium Swallow with Video (Video Swallowing Test): not done    Treatments at the Time of Hospital Discharge:   Respiratory Treatments: ***  Oxygen Therapy:  is on oxygen at 2 L/min per nasal cannula.   Ventilator:    - No ventilator support    Rehab Therapies: Physical Therapy and Occupational Therapy  Weight Bearing Status/Restrictions: No weight bearing restirctions  Other Medical Equipment (for information only, NOT a DME order):  ***  Other Treatments: ***    Patient's personal belongings (please select all that are sent with patient):  {[facility] DME Belongings:[id number]}    RN SIGNATURE:  Electronically signed by Sonam Thomson RN on 7/7/20 at 2:52 PM EDT    CASE MANAGEMENT/SOCIAL WORK SECTION    Inpatient Status Date: ***    Readmission Risk Assessment Score:  Readmission Risk              Risk of Unplanned Readmission:        15           Discharging to Facility/ Agency   · Name: Merly [facility]  · VBKKX:[phone]  · Fax:[phone]  ·     / signature: Electronically signed by SHADI Abdullahi on 7/7/20 at 1:45 PM EDT    PHYSICIAN SECTION    Prognosis: Good    Condition at Discharge: Stable    Rehab Potential (if transferring to Rehab): Good    Recommended Follow-up, Labs or Other Treatments After Discharge:    PT/OT  Continue Ceftin 500 mg PO BID x 5 days for E.coli UTI         Physician Certification: I certify the above information and transfer of Denton Hylton  is necessary for the continuing treatment of the diagnosis listed and that she requires Intermediate Nursing Care for greater 30 days.      Update Admission H&P: No change in H&P    PHYSICIAN SIGNATURE:  Electronically signed by Wendy Gonzalez MD on 7/7/20 at 2:20 PM EDT

## 2020-07-08 LAB
ORGANISM: ABNORMAL
URINE CULTURE, ROUTINE: ABNORMAL

## 2020-08-04 ENCOUNTER — APPOINTMENT (OUTPATIENT)
Dept: GENERAL RADIOLOGY | Age: 85
DRG: 189 | End: 2020-08-04
Payer: MEDICARE

## 2020-08-04 ENCOUNTER — APPOINTMENT (OUTPATIENT)
Dept: CT IMAGING | Age: 85
DRG: 189 | End: 2020-08-04
Payer: MEDICARE

## 2020-08-04 ENCOUNTER — HOSPITAL ENCOUNTER (INPATIENT)
Age: 85
LOS: 3 days | Discharge: NURSING, REHAB OR LONG-TERM CARE | DRG: 189 | End: 2020-08-07
Attending: STUDENT IN AN ORGANIZED HEALTH CARE EDUCATION/TRAINING PROGRAM | Admitting: INTERNAL MEDICINE
Payer: MEDICARE

## 2020-08-04 PROBLEM — J96.01 ACUTE RESPIRATORY FAILURE WITH HYPOXIA AND HYPERCAPNIA (HCC): Status: ACTIVE | Noted: 2020-08-04

## 2020-08-04 PROBLEM — J96.02 ACUTE RESPIRATORY FAILURE WITH HYPOXIA AND HYPERCAPNIA (HCC): Status: ACTIVE | Noted: 2020-08-04

## 2020-08-04 LAB
A/G RATIO: 1.3 (ref 1.1–2.2)
ALBUMIN SERPL-MCNC: 4 G/DL (ref 3.4–5)
ALP BLD-CCNC: 77 U/L (ref 40–129)
ALT SERPL-CCNC: 18 U/L (ref 10–40)
AMORPHOUS: ABNORMAL /HPF
ANION GAP SERPL CALCULATED.3IONS-SCNC: 10 MMOL/L (ref 3–16)
ANISOCYTOSIS: ABNORMAL
AST SERPL-CCNC: 17 U/L (ref 15–37)
ATYPICAL LYMPHOCYTE RELATIVE PERCENT: 2 % (ref 0–6)
BACTERIA: ABNORMAL /HPF
BANDED NEUTROPHILS RELATIVE PERCENT: 1 % (ref 0–7)
BASE EXCESS ARTERIAL: 10.8 MMOL/L (ref -3–3)
BASE EXCESS VENOUS: 5.7 MMOL/L (ref -3–3)
BASOPHILIC STIPPLING: ABNORMAL
BASOPHILS ABSOLUTE: 0.1 K/UL (ref 0–0.2)
BASOPHILS RELATIVE PERCENT: 1 %
BILIRUB SERPL-MCNC: 0.4 MG/DL (ref 0–1)
BILIRUBIN URINE: NEGATIVE
BLOOD, URINE: ABNORMAL
BUN BLDV-MCNC: 16 MG/DL (ref 7–20)
CALCIUM SERPL-MCNC: 9.4 MG/DL (ref 8.3–10.6)
CARBOXYHEMOGLOBIN ARTERIAL: 0.6 % (ref 0–1.5)
CARBOXYHEMOGLOBIN: 1.7 % (ref 0–1.5)
CHLORIDE BLD-SCNC: 100 MMOL/L (ref 99–110)
CLARITY: CLEAR
CO2: 36 MMOL/L (ref 21–32)
COLOR: YELLOW
CREAT SERPL-MCNC: 0.8 MG/DL (ref 0.6–1.2)
D DIMER: 2092 NG/ML DDU (ref 0–229)
EKG ATRIAL RATE: 300 BPM
EKG DIAGNOSIS: NORMAL
EKG Q-T INTERVAL: 414 MS
EKG QRS DURATION: 72 MS
EKG QTC CALCULATION (BAZETT): 449 MS
EKG R AXIS: 90 DEGREES
EKG T AXIS: -37 DEGREES
EKG VENTRICULAR RATE: 71 BPM
EOSINOPHILS ABSOLUTE: 0 K/UL (ref 0–0.6)
EOSINOPHILS RELATIVE PERCENT: 0 %
EPITHELIAL CELLS, UA: ABNORMAL /HPF (ref 0–5)
GFR AFRICAN AMERICAN: >60
GFR NON-AFRICAN AMERICAN: >60
GLOBULIN: 3.1 G/DL
GLUCOSE BLD-MCNC: 103 MG/DL (ref 70–99)
GLUCOSE BLD-MCNC: 135 MG/DL (ref 70–99)
GLUCOSE URINE: NEGATIVE MG/DL
HCO3 ARTERIAL: 37 MMOL/L (ref 21–29)
HCO3 VENOUS: 35.6 MMOL/L (ref 23–29)
HCT VFR BLD CALC: 37.4 % (ref 36–48)
HEMOGLOBIN, ART, EXTENDED: 12.3 G/DL (ref 12–16)
HEMOGLOBIN: 11.8 G/DL (ref 12–16)
HYPOCHROMIA: ABNORMAL
KETONES, URINE: NEGATIVE MG/DL
LACTIC ACID: 0.9 MMOL/L (ref 0.4–2)
LEUKOCYTE ESTERASE, URINE: NEGATIVE
LYMPHOCYTES ABSOLUTE: 0.6 K/UL (ref 1–5.1)
LYMPHOCYTES RELATIVE PERCENT: 5 %
MAGNESIUM: 2.2 MG/DL (ref 1.8–2.4)
MCH RBC QN AUTO: 29.6 PG (ref 26–34)
MCHC RBC AUTO-ENTMCNC: 31.4 G/DL (ref 31–36)
MCV RBC AUTO: 94.1 FL (ref 80–100)
METHEMOGLOBIN ARTERIAL: 0.3 %
METHEMOGLOBIN VENOUS: 0.3 %
MICROSCOPIC EXAMINATION: YES
MONOCYTES ABSOLUTE: 0.6 K/UL (ref 0–1.3)
MONOCYTES RELATIVE PERCENT: 8 %
MUCUS: ABNORMAL /LPF
MYELOCYTE PERCENT: 1 %
NEUTROPHILS ABSOLUTE: 6.8 K/UL (ref 1.7–7.7)
NEUTROPHILS RELATIVE PERCENT: 82 %
NITRITE, URINE: NEGATIVE
O2 CONTENT ARTERIAL: 16 ML/DL
O2 CONTENT, VEN: 13 VOL %
O2 SAT, ARTERIAL: 94.9 %
O2 SAT, VEN: 69 %
O2 THERAPY: ABNORMAL
O2 THERAPY: ABNORMAL
PCO2 ARTERIAL: 57.1 MMHG (ref 35–45)
PCO2, VEN: 81.9 MMHG (ref 40–50)
PDW BLD-RTO: 16.4 % (ref 12.4–15.4)
PERFORMED ON: ABNORMAL
PH ARTERIAL: 7.43 (ref 7.35–7.45)
PH UA: 5.5 (ref 5–8)
PH VENOUS: 7.26 (ref 7.35–7.45)
PLATELET # BLD: 228 K/UL (ref 135–450)
PLATELET SLIDE REVIEW: ADEQUATE
PMV BLD AUTO: 8.4 FL (ref 5–10.5)
PO2 ARTERIAL: 73.7 MMHG (ref 75–108)
PO2, VEN: 42.8 MMHG (ref 25–40)
POIKILOCYTES: ABNORMAL
POLYCHROMASIA: ABNORMAL
POTASSIUM REFLEX MAGNESIUM: 3.8 MMOL/L (ref 3.5–5.1)
PRO-BNP: 1738 PG/ML (ref 0–449)
PROCALCITONIN: 0.05 NG/ML (ref 0–0.15)
PROTEIN UA: NEGATIVE MG/DL
RBC # BLD: 3.98 M/UL (ref 4–5.2)
RBC UA: ABNORMAL /HPF (ref 0–4)
SLIDE REVIEW: ABNORMAL
SODIUM BLD-SCNC: 146 MMOL/L (ref 136–145)
SPECIFIC GRAVITY UA: 1.01 (ref 1–1.03)
TCO2 ARTERIAL: 38.8 MMOL/L
TCO2 CALC VENOUS: 38 MMOL/L
TOTAL PROTEIN: 7.1 G/DL (ref 6.4–8.2)
TROPONIN: <0.01 NG/ML
TROPONIN: <0.01 NG/ML
TSH SERPL DL<=0.05 MIU/L-ACNC: 12.5 UIU/ML (ref 0.27–4.2)
URINE REFLEX TO CULTURE: YES
URINE TYPE: ABNORMAL
UROBILINOGEN, URINE: 0.2 E.U./DL
WBC # BLD: 8.1 K/UL (ref 4–11)
WBC UA: ABNORMAL /HPF (ref 0–5)

## 2020-08-04 PROCEDURE — 36415 COLL VENOUS BLD VENIPUNCTURE: CPT

## 2020-08-04 PROCEDURE — 85025 COMPLETE CBC W/AUTO DIFF WBC: CPT

## 2020-08-04 PROCEDURE — 83880 ASSAY OF NATRIURETIC PEPTIDE: CPT

## 2020-08-04 PROCEDURE — 81001 URINALYSIS AUTO W/SCOPE: CPT

## 2020-08-04 PROCEDURE — 87150 DNA/RNA AMPLIFIED PROBE: CPT

## 2020-08-04 PROCEDURE — 2700000000 HC OXYGEN THERAPY PER DAY

## 2020-08-04 PROCEDURE — 6360000002 HC RX W HCPCS: Performed by: NURSE PRACTITIONER

## 2020-08-04 PROCEDURE — 2580000003 HC RX 258: Performed by: NURSE PRACTITIONER

## 2020-08-04 PROCEDURE — 85379 FIBRIN DEGRADATION QUANT: CPT

## 2020-08-04 PROCEDURE — 93010 ELECTROCARDIOGRAM REPORT: CPT | Performed by: INTERNAL MEDICINE

## 2020-08-04 PROCEDURE — 82803 BLOOD GASES ANY COMBINATION: CPT

## 2020-08-04 PROCEDURE — 84443 ASSAY THYROID STIM HORMONE: CPT

## 2020-08-04 PROCEDURE — 99285 EMERGENCY DEPT VISIT HI MDM: CPT

## 2020-08-04 PROCEDURE — 71045 X-RAY EXAM CHEST 1 VIEW: CPT

## 2020-08-04 PROCEDURE — 83605 ASSAY OF LACTIC ACID: CPT

## 2020-08-04 PROCEDURE — 87040 BLOOD CULTURE FOR BACTERIA: CPT

## 2020-08-04 PROCEDURE — 6360000004 HC RX CONTRAST MEDICATION: Performed by: STUDENT IN AN ORGANIZED HEALTH CARE EDUCATION/TRAINING PROGRAM

## 2020-08-04 PROCEDURE — U0003 INFECTIOUS AGENT DETECTION BY NUCLEIC ACID (DNA OR RNA); SEVERE ACUTE RESPIRATORY SYNDROME CORONAVIRUS 2 (SARS-COV-2) (CORONAVIRUS DISEASE [COVID-19]), AMPLIFIED PROBE TECHNIQUE, MAKING USE OF HIGH THROUGHPUT TECHNOLOGIES AS DESCRIBED BY CMS-2020-01-R: HCPCS

## 2020-08-04 PROCEDURE — 2580000003 HC RX 258: Performed by: STUDENT IN AN ORGANIZED HEALTH CARE EDUCATION/TRAINING PROGRAM

## 2020-08-04 PROCEDURE — 71260 CT THORAX DX C+: CPT

## 2020-08-04 PROCEDURE — 6370000000 HC RX 637 (ALT 250 FOR IP): Performed by: NURSE PRACTITIONER

## 2020-08-04 PROCEDURE — 93005 ELECTROCARDIOGRAM TRACING: CPT | Performed by: STUDENT IN AN ORGANIZED HEALTH CARE EDUCATION/TRAINING PROGRAM

## 2020-08-04 PROCEDURE — 2000000000 HC ICU R&B

## 2020-08-04 PROCEDURE — 96365 THER/PROPH/DIAG IV INF INIT: CPT

## 2020-08-04 PROCEDURE — 83735 ASSAY OF MAGNESIUM: CPT

## 2020-08-04 PROCEDURE — 6360000002 HC RX W HCPCS

## 2020-08-04 PROCEDURE — 99291 CRITICAL CARE FIRST HOUR: CPT | Performed by: INTERNAL MEDICINE

## 2020-08-04 PROCEDURE — 80053 COMPREHEN METABOLIC PANEL: CPT

## 2020-08-04 PROCEDURE — 6370000000 HC RX 637 (ALT 250 FOR IP)

## 2020-08-04 PROCEDURE — 94761 N-INVAS EAR/PLS OXIMETRY MLT: CPT

## 2020-08-04 PROCEDURE — 84145 PROCALCITONIN (PCT): CPT

## 2020-08-04 PROCEDURE — 84484 ASSAY OF TROPONIN QUANT: CPT

## 2020-08-04 PROCEDURE — 6360000002 HC RX W HCPCS: Performed by: STUDENT IN AN ORGANIZED HEALTH CARE EDUCATION/TRAINING PROGRAM

## 2020-08-04 PROCEDURE — 87086 URINE CULTURE/COLONY COUNT: CPT

## 2020-08-04 RX ORDER — MONTELUKAST SODIUM 10 MG/1
10 TABLET ORAL DAILY
Status: DISCONTINUED | OUTPATIENT
Start: 2020-08-04 | End: 2020-08-07 | Stop reason: HOSPADM

## 2020-08-04 RX ORDER — FUROSEMIDE 10 MG/ML
INJECTION INTRAMUSCULAR; INTRAVENOUS
Status: COMPLETED
Start: 2020-08-04 | End: 2020-08-04

## 2020-08-04 RX ORDER — SODIUM CHLORIDE 9 MG/ML
INJECTION, SOLUTION INTRAVENOUS
Status: DISCONTINUED
Start: 2020-08-04 | End: 2020-08-05

## 2020-08-04 RX ORDER — AMLODIPINE BESYLATE 5 MG/1
TABLET ORAL
Status: COMPLETED
Start: 2020-08-04 | End: 2020-08-04

## 2020-08-04 RX ORDER — PROMETHAZINE HYDROCHLORIDE 25 MG/1
12.5 TABLET ORAL EVERY 6 HOURS PRN
Status: DISCONTINUED | OUTPATIENT
Start: 2020-08-04 | End: 2020-08-07 | Stop reason: HOSPADM

## 2020-08-04 RX ORDER — ACETAMINOPHEN 325 MG/1
650 TABLET ORAL EVERY 6 HOURS PRN
Status: DISCONTINUED | OUTPATIENT
Start: 2020-08-04 | End: 2020-08-07 | Stop reason: HOSPADM

## 2020-08-04 RX ORDER — POLYETHYLENE GLYCOL 3350 17 G/17G
17 POWDER, FOR SOLUTION ORAL DAILY PRN
Status: DISCONTINUED | OUTPATIENT
Start: 2020-08-04 | End: 2020-08-04 | Stop reason: SDUPTHER

## 2020-08-04 RX ORDER — ATORVASTATIN CALCIUM 10 MG/1
10 TABLET, FILM COATED ORAL DAILY
Status: DISCONTINUED | OUTPATIENT
Start: 2020-08-05 | End: 2020-08-07 | Stop reason: HOSPADM

## 2020-08-04 RX ORDER — LEVOFLOXACIN 5 MG/ML
500 INJECTION, SOLUTION INTRAVENOUS EVERY 24 HOURS
Status: DISCONTINUED | OUTPATIENT
Start: 2020-08-05 | End: 2020-08-05

## 2020-08-04 RX ORDER — ONDANSETRON 2 MG/ML
4 INJECTION INTRAMUSCULAR; INTRAVENOUS EVERY 6 HOURS PRN
Status: DISCONTINUED | OUTPATIENT
Start: 2020-08-04 | End: 2020-08-07 | Stop reason: HOSPADM

## 2020-08-04 RX ORDER — M-VIT,TX,IRON,MINS/CALC/FOLIC 27MG-0.4MG
1 TABLET ORAL DAILY
Status: DISCONTINUED | OUTPATIENT
Start: 2020-08-05 | End: 2020-08-07 | Stop reason: HOSPADM

## 2020-08-04 RX ORDER — SODIUM CHLORIDE 0.9 % (FLUSH) 0.9 %
10 SYRINGE (ML) INJECTION EVERY 12 HOURS SCHEDULED
Status: DISCONTINUED | OUTPATIENT
Start: 2020-08-04 | End: 2020-08-07 | Stop reason: HOSPADM

## 2020-08-04 RX ORDER — PIPERACILLIN SODIUM, TAZOBACTAM SODIUM 3; .375 G/15ML; G/15ML
INJECTION, POWDER, LYOPHILIZED, FOR SOLUTION INTRAVENOUS
Status: DISCONTINUED
Start: 2020-08-04 | End: 2020-08-05

## 2020-08-04 RX ORDER — FAMOTIDINE 20 MG/1
TABLET, FILM COATED ORAL
Status: COMPLETED
Start: 2020-08-04 | End: 2020-08-04

## 2020-08-04 RX ORDER — OXCARBAZEPINE 300 MG/1
TABLET, FILM COATED ORAL
Status: COMPLETED
Start: 2020-08-04 | End: 2020-08-04

## 2020-08-04 RX ORDER — POLYETHYLENE GLYCOL 3350 17 G/17G
17 POWDER, FOR SOLUTION ORAL DAILY PRN
Status: DISCONTINUED | OUTPATIENT
Start: 2020-08-04 | End: 2020-08-07 | Stop reason: HOSPADM

## 2020-08-04 RX ORDER — LEVOFLOXACIN 5 MG/ML
750 INJECTION, SOLUTION INTRAVENOUS ONCE
Status: COMPLETED | OUTPATIENT
Start: 2020-08-04 | End: 2020-08-04

## 2020-08-04 RX ORDER — FUROSEMIDE 10 MG/ML
20 INJECTION INTRAMUSCULAR; INTRAVENOUS ONCE
Status: COMPLETED | OUTPATIENT
Start: 2020-08-04 | End: 2020-08-04

## 2020-08-04 RX ORDER — MEMANTINE HYDROCHLORIDE 5 MG/1
10 TABLET ORAL 2 TIMES DAILY
Status: DISCONTINUED | OUTPATIENT
Start: 2020-08-04 | End: 2020-08-07 | Stop reason: HOSPADM

## 2020-08-04 RX ORDER — MEMANTINE HYDROCHLORIDE 5 MG/1
TABLET ORAL
Status: COMPLETED
Start: 2020-08-04 | End: 2020-08-04

## 2020-08-04 RX ORDER — FAMOTIDINE 20 MG/1
20 TABLET, FILM COATED ORAL DAILY
Status: DISCONTINUED | OUTPATIENT
Start: 2020-08-04 | End: 2020-08-07 | Stop reason: HOSPADM

## 2020-08-04 RX ORDER — SODIUM CHLORIDE 0.9 % (FLUSH) 0.9 %
SYRINGE (ML) INJECTION
Status: DISCONTINUED
Start: 2020-08-04 | End: 2020-08-05

## 2020-08-04 RX ORDER — OXYBUTYNIN CHLORIDE 5 MG/1
5 TABLET, EXTENDED RELEASE ORAL DAILY
Status: DISCONTINUED | OUTPATIENT
Start: 2020-08-05 | End: 2020-08-07 | Stop reason: HOSPADM

## 2020-08-04 RX ORDER — SODIUM CHLORIDE 0.9 % (FLUSH) 0.9 %
10 SYRINGE (ML) INJECTION PRN
Status: DISCONTINUED | OUTPATIENT
Start: 2020-08-04 | End: 2020-08-07 | Stop reason: HOSPADM

## 2020-08-04 RX ORDER — SENNA PLUS 8.6 MG/1
2 TABLET ORAL DAILY
Status: DISCONTINUED | OUTPATIENT
Start: 2020-08-05 | End: 2020-08-07 | Stop reason: HOSPADM

## 2020-08-04 RX ORDER — VITAMIN B COMPLEX
2000 TABLET ORAL DAILY
Status: DISCONTINUED | OUTPATIENT
Start: 2020-08-05 | End: 2020-08-07 | Stop reason: HOSPADM

## 2020-08-04 RX ORDER — FUROSEMIDE 10 MG/ML
40 INJECTION INTRAMUSCULAR; INTRAVENOUS 2 TIMES DAILY
Status: DISCONTINUED | OUTPATIENT
Start: 2020-08-04 | End: 2020-08-06

## 2020-08-04 RX ORDER — MONTELUKAST SODIUM 10 MG/1
TABLET ORAL
Status: COMPLETED
Start: 2020-08-04 | End: 2020-08-04

## 2020-08-04 RX ORDER — OXCARBAZEPINE 300 MG/1
150 TABLET, FILM COATED ORAL 2 TIMES DAILY
Status: DISCONTINUED | OUTPATIENT
Start: 2020-08-04 | End: 2020-08-07 | Stop reason: HOSPADM

## 2020-08-04 RX ORDER — LEVOTHYROXINE SODIUM 0.1 MG/1
100 TABLET ORAL DAILY
Status: DISCONTINUED | OUTPATIENT
Start: 2020-08-05 | End: 2020-08-07 | Stop reason: HOSPADM

## 2020-08-04 RX ORDER — ACETAMINOPHEN 650 MG/1
650 SUPPOSITORY RECTAL EVERY 6 HOURS PRN
Status: DISCONTINUED | OUTPATIENT
Start: 2020-08-04 | End: 2020-08-07 | Stop reason: HOSPADM

## 2020-08-04 RX ORDER — AMLODIPINE BESYLATE 5 MG/1
5 TABLET ORAL DAILY
Status: DISCONTINUED | OUTPATIENT
Start: 2020-08-04 | End: 2020-08-07 | Stop reason: HOSPADM

## 2020-08-04 RX ADMIN — MAGNESIUM GLUCONATE 500 MG ORAL TABLET 400 MG: 500 TABLET ORAL at 20:49

## 2020-08-04 RX ADMIN — APIXABAN 2.5 MG: 5 TABLET, FILM COATED ORAL at 20:48

## 2020-08-04 RX ADMIN — OXCARBAZEPINE 150 MG: 300 TABLET, FILM COATED ORAL at 20:47

## 2020-08-04 RX ADMIN — LEVOFLOXACIN 750 MG: 5 INJECTION, SOLUTION INTRAVENOUS at 13:35

## 2020-08-04 RX ADMIN — FUROSEMIDE 40 MG: 10 INJECTION, SOLUTION INTRAMUSCULAR; INTRAVENOUS at 20:48

## 2020-08-04 RX ADMIN — Medication 10 ML: at 20:45

## 2020-08-04 RX ADMIN — FAMOTIDINE 20 MG: 20 TABLET ORAL at 20:46

## 2020-08-04 RX ADMIN — MEMANTINE HYDROCHLORIDE 10 MG: 5 TABLET ORAL at 20:48

## 2020-08-04 RX ADMIN — AMLODIPINE BESYLATE 5 MG: 5 TABLET ORAL at 20:49

## 2020-08-04 RX ADMIN — MONTELUKAST SODIUM 10 MG: 10 TABLET, FILM COATED ORAL at 20:49

## 2020-08-04 RX ADMIN — PIPERACILLIN SODIUM AND TAZOBACTAM SODIUM 3.38 G: 3; .375 INJECTION, POWDER, LYOPHILIZED, FOR SOLUTION INTRAVENOUS at 12:01

## 2020-08-04 RX ADMIN — MONTELUKAST SODIUM 10 MG: 10 TABLET, COATED ORAL at 20:49

## 2020-08-04 RX ADMIN — IOPAMIDOL 85 ML: 755 INJECTION, SOLUTION INTRAVENOUS at 11:20

## 2020-08-04 RX ADMIN — FUROSEMIDE 20 MG: 10 INJECTION, SOLUTION INTRAVENOUS at 12:01

## 2020-08-04 RX ADMIN — PIPERACILLIN SODIUM AND TAZOBACTAM SODIUM 3.38 G: 3; .375 INJECTION, POWDER, LYOPHILIZED, FOR SOLUTION INTRAVENOUS at 20:45

## 2020-08-04 NOTE — PROGRESS NOTES
4 Eyes Skin Assessment     The patient is being assess for   Admission    I agree that 2 RN's have performed a thorough Head to Toe Skin Assessment on the patient. ALL assessment sites listed below have been assessed. Areas assessed by both nurses:   [x]   Head, Face, and Ears   [x]   Shoulders, Back, and Chest, Abdomen  [x]   Arms, Elbows, and Hands   [x]   Coccyx, Sacrum, and Ischium  [x]   Legs, Feet, and Heels        Lower buttocks looks purplish and blanches  **SHARE this note so that the co-signing nurse is able to place an eSignature**    Co-signer eSignature: {Esignature:[id number]}    Does the Patient have Skin Breakdown?   No          Darvin Prevention initiated:  Yes   Wound Care Orders initiated:  No      C nurse consulted for Pressure Injury (Stage 3,4, Unstageable, DTI, NWPT, Complex wounds)and New or Established Ostomies:  No      Primary Nurse eSignature: Electronically signed by Luis Miguel Jacobs RN on 8/4/20 at 7:07 PM EDT

## 2020-08-04 NOTE — CONSULTS
Reason for referral and CC: Acute hypoxic respiratory failure    HISTORY OF PRESENT ILLNESS: 53-year-old female with a history of CHF and A. fib and dementia presented with shortness of breath from her nursing home. She was recently hospitalized for hypoxia and bradycardia. She had negative COVID test at that time and has been quarantined at her nursing home so her likelihood of infection is low. She is not able to contribute to history due to confusion but she was admitted to the ICU for BiPAP given her hypoxia and CHF. In the emergency room she did not tolerate BiPAP due to some agitation and trying to pull it off. She has some increased work of breathing but she currently is not requiring BiPAP but should remain in the ICU for close monitoring. Past Medical History:   Diagnosis Date    Alzheimer's dementia ([address]     Asthma     Atrial fibrillation ([address]     Blood clot in vein     Removal of Blood clot in Left Groin    CHF (congestive heart failure) (HCC)     Dementia (HCC)     GERD (gastroesophageal reflux disease)     Hypertension     Kidney failure     MRSA (methicillin resistant staph aureus) culture positive 08/08/2018    + resp cx    Osteoporosis      Past Surgical History:   Procedure Laterality Date    CHOLECYSTECTOMY      EYE SURGERY      HYSTERECTOMY      [place] [address] W/BRNCL ALVEOLAR LAVAGE N/A 8/8/2018    BRONCHOSCOPY ALVEOLAR LAVAGE performed by Leonela Mendez MD at [address] [facility] ESOPHAGOGASTRODUODENOSCOPY TRANSORAL DIAGNOSTIC N/A 8/4/2018    EGD ESOPHAGOGASTRODUODENOSCOPY performed by Elizabeth Bay MD at [facility] History  family history is not on file. Social History:  reports that she has never smoked. She has never used smokeless tobacco.   has no history on file for alcohol. ALLERGIES:  Patient is allergic to codeine and sulfa antibiotics.   Continuous Infusions:    Scheduled Meds:   amLODIPine  5 mg Oral Daily    apixaban  2.5 mg Oral BID    [START ON 8/5/2020] Vitamin D  2,000 Units Oral Daily    famotidine  20 mg Oral Daily    [START ON 8/5/2020] levothyroxine  100 mcg Oral Daily    magnesium oxide  400 mg Oral BID    memantine  10 mg Oral BID    montelukast  10 mg Oral Daily    [START ON 8/5/2020] therapeutic multivitamin-minerals  1 tablet Oral Daily    OXcarbazepine  150 mg Oral BID    [START ON 8/5/2020] oxybutynin  5 mg Oral Daily    [START ON 8/5/2020] senna  2 tablet Oral Daily    [START ON 8/5/2020] atorvastatin  10 mg Oral Daily    sodium chloride flush  10 mL Intravenous 2 times per day    furosemide  40 mg Intravenous BID    [START ON 8/5/2020] levofloxacin  500 mg Intravenous Q24H    piperacillin-tazobactam  3.375 g Intravenous Q8H     PRN Meds:  sodium chloride flush, acetaminophen **OR** acetaminophen, polyethylene glycol, promethazine **OR** ondansetron    REVIEW OF SYSTEMS:  Constitutional: Negative for fever  HENT: Negative for sore throat  Eyes: Negative for redness   Respiratory: Negative for dyspnea, no cough  Cardiovascular: Negative for chest pain  Gastrointestinal: Negative for vomiting, diarrhea   Genitourinary: Negative for hematuria   Musculoskeletal: Negative for arthralgias   Skin: Negative for rash  Neurological: Negative for syncope  Hematological: Negative for adenopathy  Psychiatric/Behavorial: Negative for anxiety    PHYSICAL EXAM: /88   Pulse 105   Temp 98.2 °F (36.8 °C) (Oral)   Resp 26   Ht 5' 2\" (1.575 m)   Wt 216 lb 7.9 oz (98.2 kg)   SpO2 97%   BMI 39.60 kg/m²  on 8lpm  Constitutional:  some acute distress. Eyes: PERRL. Conjunctivae anicteric. Neck:  Trachea is midline. Respiratory: + accessory muscle usage. Deferred due to covif  Cardiovascular:  Deferred due to covid  Skin: No rash on the exposed extremities. No Nodules or induration on exposed extremities. Psychiatric: +anxiety or Agitation.  Alert but not oriented    CBC:   Recent Labs 08/04/20  0915   WBC 8.1   HGB 11.8*   HCT 37.4   MCV 94.1        BMP:   Recent Labs     08/04/20  0915   *   K 3.8      CO2 36*   BUN 16   CREATININE 0.8        Recent Labs     08/04/20  0915   AST 17   ALT 18   BILITOT 0.4   ALKPHOS 77     No results for input(s): PROTIME, INR in the last 72 hours.   Recent Labs     08/04/20  1840   COLORU Yellow   PHUR 5.5   WBCUA 10-20*   RBCUA 0-2   MUCUS 1+*   BACTERIA Rare*   CLARITYU Clear   SPECGRAV 1.015   LEUKOCYTESUR Negative   UROBILINOGEN 0.2   BILIRUBINUR Negative   BLOODU TRACE-INTACT*   GLUCOSEU Negative   AMORPHOUS 1+     Recent Labs     08/04/20  1901   PHART 7.430   HPE6UQB 57.1*   PO2ART 73.7*       Chest imaging was reviewed by me and showed 8/4/20 CTPA: no PE; R> L pleural effusions    ASSESSMENT:  · Acute hypoxic respiratory failure  · Acute Diastolic CHF   · Moderate right and small left pleural effusion  · Chronic Afib  · Dementia  · Hypothyroid    PLAN:  · Bipap PRN  Supplemental oxygen to maintain SaO2 >92%; wean as tolerated   · IV lasix  · Norvasc  · levothyroxine  · Eliquis  · CCT 31 min    Thank you Pilo Monet MD for this consult

## 2020-08-04 NOTE — ED NOTES
Bed: 03  Expected date:   Expected time:   Means of arrival:   Comments:  Mikki Carrillo  08/04/20 4231

## 2020-08-04 NOTE — ED NOTES
Patient pulling blood pressure cuff off, saying, \"Its too tight\".       Ayaka Amaya RN  08/04/20 9297

## 2020-08-04 NOTE — LETTER
Beneficiary Notification Letter  BPCI Advanced     Your Doctor or Mary,    We wanted to let you know that your health care provider, [facility], has volunteered to take part in our [facility] for [facility] Medicaid Services (CMS) Bundled Payments for [facility] (BPCI Advanced). This doesnt change your Medicare rights or benefits and you dont need to do anything. What are bundled payments? A bundled payment combines, or bundles together, payments that Medicare makes to your health care providers for the many different kinds of medical services you might get in a specific time period. In BPCI Advanced, this time period could include a hospital  inpatient stay or outpatient procedure, plus 90 days. Why would Medicare bundle payments? Bundled payments are thought of as a value-based way to pay because health care  providers are responsible for both the quality and cost of medical care they give. This is  a relatively new way of paying health care providers compared to thefee-for-service  way Medicare has traditionally paid, where providers are paid separately for each  service they provide. Bundled payments encourage these providers to work together to  provide better, more coordinated care during your hospital stay, or outpatient procedure,  and through your recovery. What does BPCI Advance mean for me? Youre more likely to get even better care when hospitals, doctors, and other health care  providers work together. In BPCI Advanced, hospitals, doctors, and other health care  providers may be rewarded for providing better, more coordinated health care. Medicare  will watch BPCI Advanced participants closely to make sure that you and other patients  keep getting efficient, high quality care. What do I need to know about BPCI Advanced? Whats most important for you to know is that your Medicare rights and benefits wont  change because your health care provider is participating in [address]. Medicare  will keep covering all of your medically necessary services. Even though Medicare will pay your doctor in a different way under BPCI Advanced,  how much you have to pay wont change. Health care providers and suppliers who  are enrolled in Medicare will submit their Medicare claims like they always have. Youll have all the same Medicare rights and protections, including the right to  choose which hospital, doctor, or other health care provider you see. If you dont want to  get care from a health care provider whos participating in [address], then youll  have to choose a different health care provider whos not participating in the Model. How can I give feedback about my health care? Medicare might ask you to take a voluntary survey about the services and care you  received from [facility] during your hospital stay or outpatient procedure and for a specific period of time afterwards. You can decide whether you want to take the voluntary survey, but if you do, itll help Medicare make BPCI Advanced and the care of other Medicare patients better. If you have concerns or complaints about your care, you can:   · Talk to your doctor or health care provider. · Contact your Beneficiary and Family Centered Care Quality Improvement   Organization NAYELI GALLARDO [facility]). You can get your BFCC-QIOs phone number at   Medicare.gov/contacts or by calling 1-800-MEDICARE. TTY users can call   5-871.522.6830. Where can I learn more about BPCI Advanced? Learn more about BPCI Advanced at https://innovation.cms.gov/initiatives/bpci-advanced/:  · A list of all the hospitals and physician group practices in the country participating   in [address].   · All of the inpatient and outpatient Clinical Episodes that are currently included   under BPCI Advanced. A Clinical Episode is a grouping of medical conditions or   diagnoses that are included in the [facility].

## 2020-08-04 NOTE — ED PROVIDER NOTES
[address] ED      CHIEF COMPLAINT  Shortness of Breath (Pt arrive via [facility] from [facility]. Per nurse, pt was in acute respiratory failure at the nursing home and her face was red. She was saying that she couldnt breath so the nurse put her on 5L NRB and then when her oxygen went above 90%, she turned the NRB to 3L. Pt is normally on 2L NC d/t chronic resp failure. She was 81% on 2L upon arrival. Pt was given multiple breathing treatment enroute. )       HISTORY OF PRESENT ILLNESS  Tank Miles is a 82 y.o. female  who presents to the ED complaining of shortness of breath. Patient has a history of Alzheimer's dementia and is currently staying at [address] [place] [address]. Patient was recently hospitalized for hypoxic respiratory failure, also bradycardia at that time. Patient tested negative for COVID at that time, was sent back to her nursing home where she was quarantined for the past 14 days. This morning, patient was having shortness of breath and was found to be hypoxic on her normal 2 L nasal cannula. She was placed on a nonrebreather with improvement of her saturation to greater than 90. She received a few breathing treatments prior to arrival.  She does have a history of chronic hypoxic respiratory failure, also history of pulmonary hypertension, CHF. Patient is unable to contribute much to the history secondary to her history of Alzheimer's. No other complaints, modifying factors or associated symptoms. I have reviewed the following from the nursing documentation.     Past Medical History:   Diagnosis Date    Alzheimer's dementia ([facility]     Asthma     Atrial fibrillation ([address]     Blood clot in vein     Removal of Blood clot in Left Groin    CHF (congestive heart failure) (HCC)     Dementia (HCC)     GERD (gastroesophageal reflux disease)     Hypertension     Kidney failure     MRSA (methicillin resistant staph aureus) culture positive 08/08/2018    + resp cx  Osteoporosis      Past Surgical History:   Procedure Laterality Date    CHOLECYSTECTOMY      EYE SURGERY      HYSTERECTOMY      [place] [address] W/BRNCL ALVEOLAR LAVAGE N/A 8/8/2018    BRONCHOSCOPY ALVEOLAR LAVAGE performed by Evans Santillan MD at [address] [facility] ESOPHAGOGASTRODUODENOSCOPY TRANSORAL DIAGNOSTIC N/A 8/4/2018    EGD ESOPHAGOGASTRODUODENOSCOPY performed by Althea Wade MD at [facility]       History reviewed. No pertinent family history. Social History     Socioeconomic History    Marital status:       Spouse name: Not on file    Number of children: Not on file    Years of education: Not on file    Highest education level: Not on file   Occupational History    Not on file   Social Needs    Financial resource strain: Not on file    Food insecurity     Worry: Not on file     Inability: Not on file    Transportation needs     Medical: Not on file     Non-medical: Not on file   Tobacco Use    Smoking status: Never Smoker    Smokeless tobacco: Never Used   Substance and Sexual Activity    Alcohol use: Not on file    Drug use: Not on file    Sexual activity: Not on file   Lifestyle    Physical activity     Days per week: Not on file     Minutes per session: Not on file    Stress: Not on file   Relationships    Social connections     Talks on phone: Not on file     Gets together: Not on file     Attends [employer] service: Not on file     Active member of club or organization: Not on file     Attends meetings of clubs or organizations: Not on file     Relationship status: Not on file    Intimate partner violence     Fear of current or ex partner: Not on file     Emotionally abused: Not on file     Physically abused: Not on file     Forced sexual activity: Not on file   Other Topics Concern    Not on file   Social History Narrative    Not on file     Current Facility-Administered Medications   Medication Dose Route Frequency Provider Last Rate Last Dose    piperacillin-tazobactam (ZOSYN) 3.375 g in sodium chloride 0.9 % 100 mL IVPB extended infusion (mini-bag)  3.375 g Intravenous Once Magalie De La Torre MD 25 mL/hr at 08/04/20 1201 3.375 g at 08/04/20 1201    And    levoFLOXacin (LEVAQUIN) 750 MG/150ML infusion 750 mg  750 mg Intravenous Once Magalie De La Torre MD         Current Outpatient Medications   Medication Sig Dispense Refill    amLODIPine (NORVASC) 5 MG tablet Take 1 tablet by mouth daily 30 tablet 3    furosemide (LASIX) 20 MG tablet Take 1 tablet by mouth daily 60 tablet 3    polyethylene glycol (GLYCOLAX) 17 g packet Take 17 g by mouth daily as needed for Constipation 527 g 1    apixaban (ELIQUIS) 2.5 MG TABS tablet Take 1 tablet by mouth 2 times daily 60 tablet 2    acetaminophen (TYLENOL) 500 MG tablet Take 500 mg by mouth every 6 hours as needed for Pain      albuterol (PROVENTIL) (2.5 MG/3ML) 0.083% nebulizer solution Take 2.5 mg by nebulization every 6 hours as needed for Wheezing      famotidine (PEPCID) 20 MG tablet Take 20 mg by mouth Daily      levothyroxine (SYNTHROID) 100 MCG tablet Take 100 mcg by mouth Daily      magnesium oxide (MAG-OX) 400 MG tablet Take 400 mg by mouth 2 times daily      montelukast (SINGULAIR) 10 MG tablet Take 10 mg by mouth daily      Multiple Vitamins-Minerals (THERAPEUTIC MULTIVITAMIN-MINERALS) tablet Take 1 tablet by mouth daily      memantine ER (NAMENDA XR) 28 MG CP24 extended release capsule Take by mouth daily      OXcarbazepine (TRILEPTAL) 150 MG tablet Take 150 mg by mouth 2 times daily      oxybutynin (DITROPAN-XL) 5 MG extended release tablet Take 5 mg by mouth daily      senna (SENOKOT) 8.6 MG tablet Take 2 tablets by mouth daily      simvastatin (ZOCOR) 10 MG tablet Take 10 mg by mouth nightly      Cholecalciferol (VITAMIN D3) 2000 units CAPS Take 1 capsule by mouth daily       Allergies   Allergen Reactions    Codeine Itching    Sulfa Antibiotics Other (See Comments)     Per Pt daughter Magalie Watkins) \" Not know her reaction to medication\"       REVIEW OF SYSTEMS  10 systems reviewed, pertinent positives per HPI otherwise noted to be negative. PHYSICAL EXAM  /76   Pulse 91   Temp 97.8 °F (36.6 °C)   Resp 29   Wt 215 lb (97.5 kg)   SpO2 92%   BMI 39.32 kg/m²    GENERAL APPEARANCE: Awake and alert  HENT: Normocephalic. Atraumatic. Mucous membranes are tacky  NECK: Supple. EYES: PERRL. EOM's grossly intact. HEART/CHEST: Irregular, no murmurs. LUNGS: Poor air exchange bilaterally throughout lung fields, crackles on the right greater than left. Speaking comfortably in full sentences. ABDOMEN: No tenderness. Soft. Non-distended. No masses. No organomegaly. No guarding or rebound. MUSCULOSKELETAL: 2+ pitting edema bilateral lower extremities. Compartments soft. No deformity. No tenderness in the extremities. All extremities neurovascularly intact. SKIN: Warm and dry. No acute rashes. NEUROLOGICAL: Alert and oriented. CN's 2-12 intact. No gross facial drooping. PSYCHIATRIC: Normal mood and affect. LABS  I have reviewed all labs for this visit.    Results for orders placed or performed during the hospital encounter of 08/04/20   CBC Auto Differential   Result Value Ref Range    WBC 8.1 4.0 - 11.0 K/uL    RBC 3.98 (L) 4.00 - 5.20 M/uL    Hemoglobin 11.8 (L) 12.0 - 16.0 g/dL    Hematocrit 37.4 36.0 - 48.0 %    MCV 94.1 80.0 - 100.0 fL    MCH 29.6 26.0 - 34.0 pg    MCHC 31.4 31.0 - 36.0 g/dL    RDW 16.4 (H) 12.4 - 15.4 %    Platelets 418 430 - 809 K/uL    MPV 8.4 5.0 - 10.5 fL    PLATELET SLIDE REVIEW Adequate     SLIDE REVIEW see below     Neutrophils % 82.0 %    Lymphocytes % 5.0 %    Monocytes % 8.0 %    Eosinophils % 0.0 %    Basophils % 1.0 %    Neutrophils Absolute 6.8 1.7 - 7.7 K/uL    Lymphocytes Absolute 0.6 (L) 1.0 - 5.1 K/uL    Monocytes Absolute 0.6 0.0 - 1.3 K/uL    Eosinophils Absolute 0.0 0.0 - 0.6 K/uL    Basophils Absolute 0.1 0.0 Per chart review does have a history of previous blood clot and d-dimer was ordered which returned at 2092. Her VBG revealed a pH of 7.256 with a PCO2 of 81.9. Patient remained stable throughout her course of the stay in the ED, we did not start her on BiPAP. I have low suspicion for COVID at this time as she was recently hospitalized and tested negative and apparently has been in isolation at her nursing home for the past 14 days. We will retest her as a precaution. Her chest x-ray showed increased congestive failure with pleural effusion greater on the right as well as basilar airspace disease likely from pulmonary edema but cannot rule out a superimposed pneumonia. Patient was started on antibiotics accordingly. CT with PE protocol was performed which showed no evidence of pulmonary embolism, did show CHF with evidence of pulmonary edema. No infiltrates are visualized. I have low suspicion of the patient has pneumonia at this time however we will keep her on antibiotic coverage for the time being. I believe that her symptoms are likely secondary to fluid overload and CHF exacerbation. Patient will be hospitalized for further work-up and treatment of her condition. I did speak to the hospitalist as well as pulmonology and patient will be hospitalized in the ICU. During the patient's ED course, the patient was given:  Medications   piperacillin-tazobactam (ZOSYN) 3.375 g in sodium chloride 0.9 % 100 mL IVPB extended infusion (mini-bag) (3.375 g Intravenous New Bag 8/4/20 1201)     And   levoFLOXacin (LEVAQUIN) 750 MG/150ML infusion 750 mg (has no administration in time range)   furosemide (LASIX) injection 20 mg (20 mg Intravenous Given 8/4/20 1201)   iopamidol (ISOVUE-370) 76 % injection 85 mL (85 mLs Intravenous Given 8/4/20 1120)        CLINICAL IMPRESSION  1. Acute on chronic respiratory failure with hypoxia and hypercapnia (HCC)    2.  Acute on chronic congestive heart failure, unspecified heart failure type ([address]    3. Pneumonia due to organism        Blood pressure 136/76, pulse 91, temperature 97.8 °F (36.6 °C), resp. rate 29, weight 215 lb (97.5 kg), SpO2 92 %. [address] was admitted in fair condition. Patient was given scripts for the following medications. I counseled patient how to take these medications. New Prescriptions    No medications on file       Follow-up with:  Misa Alvarez MD  [address] [facility] [id number] [phone]            DISCLAIMER: This chart was created using Dragon dictation software. Efforts were made by me to ensure accuracy, however some errors may be present due to limitations of this technology and occasionally words are not transcribed correctly.         Coreen Mcdonald MD  08/04/20 9876

## 2020-08-04 NOTE — ED NOTES
Patient continues to remove blood pressure cuff.  Patient was encouraged to leave it on.      Kenan Carter RN  08/04/20 8185

## 2020-08-04 NOTE — ED NOTES
Patient screaming into the hallway that she \"can't breathe\". Patient continues to removed blood pressure cuff and nasal cannula. Patient was redirected. sats 92% on 6L. Ok per Dr. Vance Fam to put patient on high flow nasal cannula.       Elieser Crowder RN  08/04/20 4521

## 2020-08-05 LAB
ANION GAP SERPL CALCULATED.3IONS-SCNC: 11 MMOL/L (ref 3–16)
BUN BLDV-MCNC: 12 MG/DL (ref 7–20)
CALCIUM SERPL-MCNC: 8.8 MG/DL (ref 8.3–10.6)
CHLORIDE BLD-SCNC: 94 MMOL/L (ref 99–110)
CO2: 37 MMOL/L (ref 21–32)
CREAT SERPL-MCNC: 0.8 MG/DL (ref 0.6–1.2)
GFR AFRICAN AMERICAN: >60
GFR NON-AFRICAN AMERICAN: >60
GLUCOSE BLD-MCNC: 113 MG/DL (ref 70–99)
HCT VFR BLD CALC: 37.4 % (ref 36–48)
HEMOGLOBIN: 11.9 G/DL (ref 12–16)
MCH RBC QN AUTO: 29.5 PG (ref 26–34)
MCHC RBC AUTO-ENTMCNC: 31.8 G/DL (ref 31–36)
MCV RBC AUTO: 92.8 FL (ref 80–100)
PDW BLD-RTO: 16.1 % (ref 12.4–15.4)
PLATELET # BLD: 228 K/UL (ref 135–450)
PMV BLD AUTO: 8.7 FL (ref 5–10.5)
POTASSIUM SERPL-SCNC: 3.2 MMOL/L (ref 3.5–5.1)
RBC # BLD: 4.03 M/UL (ref 4–5.2)
REPORT: NORMAL
SARS-COV-2, NAA: NOT DETECTED
SODIUM BLD-SCNC: 142 MMOL/L (ref 136–145)
TROPONIN: <0.01 NG/ML
URINE CULTURE, ROUTINE: NORMAL
WBC # BLD: 8.9 K/UL (ref 4–11)

## 2020-08-05 PROCEDURE — 2700000000 HC OXYGEN THERAPY PER DAY

## 2020-08-05 PROCEDURE — 2580000003 HC RX 258: Performed by: NURSE PRACTITIONER

## 2020-08-05 PROCEDURE — 84484 ASSAY OF TROPONIN QUANT: CPT

## 2020-08-05 PROCEDURE — 80048 BASIC METABOLIC PNL TOTAL CA: CPT

## 2020-08-05 PROCEDURE — 85027 COMPLETE CBC AUTOMATED: CPT

## 2020-08-05 PROCEDURE — 92610 EVALUATE SWALLOWING FUNCTION: CPT

## 2020-08-05 PROCEDURE — 94761 N-INVAS EAR/PLS OXIMETRY MLT: CPT

## 2020-08-05 PROCEDURE — 6360000002 HC RX W HCPCS: Performed by: NURSE PRACTITIONER

## 2020-08-05 PROCEDURE — 99233 SBSQ HOSP IP/OBS HIGH 50: CPT | Performed by: INTERNAL MEDICINE

## 2020-08-05 PROCEDURE — 36415 COLL VENOUS BLD VENIPUNCTURE: CPT

## 2020-08-05 PROCEDURE — 6360000002 HC RX W HCPCS: Performed by: INTERNAL MEDICINE

## 2020-08-05 PROCEDURE — 6370000000 HC RX 637 (ALT 250 FOR IP): Performed by: NURSE PRACTITIONER

## 2020-08-05 PROCEDURE — 99223 1ST HOSP IP/OBS HIGH 75: CPT | Performed by: INTERNAL MEDICINE

## 2020-08-05 PROCEDURE — 6370000000 HC RX 637 (ALT 250 FOR IP): Performed by: INTERNAL MEDICINE

## 2020-08-05 PROCEDURE — 2000000000 HC ICU R&B

## 2020-08-05 RX ORDER — POTASSIUM CHLORIDE 7.45 MG/ML
10 INJECTION INTRAVENOUS
Status: COMPLETED | OUTPATIENT
Start: 2020-08-05 | End: 2020-08-05

## 2020-08-05 RX ORDER — POTASSIUM CHLORIDE 750 MG/1
40 TABLET, EXTENDED RELEASE ORAL ONCE
Status: COMPLETED | OUTPATIENT
Start: 2020-08-05 | End: 2020-08-05

## 2020-08-05 RX ADMIN — PIPERACILLIN SODIUM AND TAZOBACTAM SODIUM 3.38 G: 3; .375 INJECTION, POWDER, LYOPHILIZED, FOR SOLUTION INTRAVENOUS at 06:18

## 2020-08-05 RX ADMIN — OXCARBAZEPINE 150 MG: 300 TABLET, FILM COATED ORAL at 21:17

## 2020-08-05 RX ADMIN — MUPIROCIN: 20 OINTMENT TOPICAL at 08:44

## 2020-08-05 RX ADMIN — MAGNESIUM GLUCONATE 500 MG ORAL TABLET 400 MG: 500 TABLET ORAL at 21:17

## 2020-08-05 RX ADMIN — MULTIPLE VITAMINS W/ MINERALS TAB 1 TABLET: TAB at 08:25

## 2020-08-05 RX ADMIN — FUROSEMIDE 40 MG: 10 INJECTION, SOLUTION INTRAMUSCULAR; INTRAVENOUS at 18:42

## 2020-08-05 RX ADMIN — FAMOTIDINE 20 MG: 20 TABLET ORAL at 06:18

## 2020-08-05 RX ADMIN — AMLODIPINE BESYLATE 5 MG: 5 TABLET ORAL at 08:25

## 2020-08-05 RX ADMIN — APIXABAN 2.5 MG: 5 TABLET, FILM COATED ORAL at 08:26

## 2020-08-05 RX ADMIN — MEMANTINE HYDROCHLORIDE 10 MG: 5 TABLET ORAL at 21:16

## 2020-08-05 RX ADMIN — FUROSEMIDE 40 MG: 10 INJECTION, SOLUTION INTRAMUSCULAR; INTRAVENOUS at 08:20

## 2020-08-05 RX ADMIN — LEVOTHYROXINE SODIUM 100 MCG: 100 TABLET ORAL at 06:18

## 2020-08-05 RX ADMIN — Medication 2000 UNITS: at 08:24

## 2020-08-05 RX ADMIN — MUPIROCIN: 20 OINTMENT TOPICAL at 21:17

## 2020-08-05 RX ADMIN — Medication 10 MEQ: at 11:29

## 2020-08-05 RX ADMIN — SENNOSIDES 17.2 MG: 8.6 TABLET, FILM COATED ORAL at 08:26

## 2020-08-05 RX ADMIN — APIXABAN 2.5 MG: 5 TABLET, FILM COATED ORAL at 21:16

## 2020-08-05 RX ADMIN — Medication 10 ML: at 08:27

## 2020-08-05 RX ADMIN — ATORVASTATIN CALCIUM 10 MG: 10 TABLET, FILM COATED ORAL at 08:25

## 2020-08-05 RX ADMIN — Medication 10 ML: at 21:18

## 2020-08-05 RX ADMIN — POTASSIUM CHLORIDE 40 MEQ: 750 TABLET, EXTENDED RELEASE ORAL at 11:29

## 2020-08-05 RX ADMIN — OXYBUTYNIN CHLORIDE 5 MG: 5 TABLET, EXTENDED RELEASE ORAL at 08:25

## 2020-08-05 RX ADMIN — OXCARBAZEPINE 150 MG: 300 TABLET, FILM COATED ORAL at 08:25

## 2020-08-05 RX ADMIN — MAGNESIUM GLUCONATE 500 MG ORAL TABLET 400 MG: 500 TABLET ORAL at 08:25

## 2020-08-05 RX ADMIN — Medication 10 MEQ: at 13:29

## 2020-08-05 RX ADMIN — MEMANTINE HYDROCHLORIDE 10 MG: 5 TABLET ORAL at 08:25

## 2020-08-05 RX ADMIN — MONTELUKAST SODIUM 10 MG: 10 TABLET, COATED ORAL at 21:16

## 2020-08-05 ASSESSMENT — PAIN SCALES - GENERAL
PAINLEVEL_OUTOF10: 0

## 2020-08-05 NOTE — PROGRESS NOTES
Speech Language Pathology  Facility/Department: [facility] ICU   CLINICAL BEDSIDE SWALLOW EVALUATION    Recommended Diet and Intervention  Diet Solids Recommendation: Dysphagia Minced and Moist (Dysphagia II)  Liquid Consistency Recommendation: Thin  Recommended Form of Meds: Meds in puree  *Consider completion of MBSS to correlate clinical findings (pt previously on nectar-thick liquids, 20; pt currently on thin liquids at [facility] per treating SLP via phone)      NAME: Prasanna Vazquez  : 1935  MRN: [id number]    ADMISSION DATE: 2020  ADMITTING DIAGNOSIS: has Hematemesis; Morbid obesity ([address] Persistent atrial fibrillation; AV block; Essential hypertension; Acute hypoxemic respiratory failure ([address] Pulmonary infiltrates; Diastolic dysfunction; Pleural effusion, bilateral; Morbid obesity with BMI of 40.0-44.9, adult ([address] Myonecrosis; Atelectasis; Ineffective airway clearance; Shortness of breath; SOB (shortness of breath); Acute on chronic diastolic CHF (congestive heart failure) ([address] Pulmonary hypertension ([address] Bradycardia; Intermittent asthma without complication; Late onset Alzheimer's disease without behavioral disturbance ([address] and Acute respiratory failure with hypoxia and hypercapnia ([address] on their problem list.  ONSET DATE: Pt admitted to [facility] on 20    Recent Chest Xray/CT of Chest (20): Impression    Increased congestive failure with pleural effusion greater on the right. Basilar airspace disease likely from pulmonary edema however possibility of    superimposed pneumonia is also considered. Date of Eval: 2020  Evaluating Therapist: Loly Montesinos    Current Diet level:  Current Diet : Regular  Current Liquid Diet : Thin      Primary Complaint: Per MD H&P, \"Nani Bernard is a 82 y.o. female  who presents to the ED complaining of shortness of breath. Patient has a history of Alzheimer's dementia and is currently staying at [address] [place] [address]. Patient was recently hospitalized for hypoxic respiratory failure, also bradycardia at that time. Patient tested negative for COVID at that time, was sent back to her nursing home where she was quarantined for the past 14 days. This morning, patient was having shortness of breath and was found to be hypoxic on her normal 2 L nasal cannula. She was placed on a nonrebreather with improvement of her saturation to greater than 90. She received a few breathing treatments prior to arrival.  She does have a history of chronic hypoxic respiratory failure, also history of pulmonary hypertension, CHF. Patient is unable to contribute much to the history secondary to her history of Alzheimer's\". Pain:  Pain Assessment  Pain Assessment: 0-10  Pain Level: 0    Reason for Referral  Royal Hurtado was referred for a bedside swallow evaluation to assess the efficiency of her swallow function, identify signs and symptoms of aspiration and make recommendations regarding safe dietary consistencies, effective compensatory strategies, and safe eating environment. Impression  Dysphagia Diagnosis: Mild to moderate oral stage dysphagia;Mild pharyngeal stage dysphagia. Dysphagia Outcome Severity Scale: Level 4: Mild moderate dysphagia- Intermittent supervision/cueing. One - two diet consistencies restricted   Pt seen upright in bed, alert but confused (baseline dementia). RN OK'd SLP entry and evaluation, denied concern for dysphagia. Pt currently on 8 L O2 NC. She completed delayed volitional swallow. Pt agreeable to minimal PO trials during BSE (thin liquids via cup and straw only; she pleasantly declined all solid PO trials) -- had recently finished lunch. Pt observed with ~6 oz thin liquid via cup and straw with grossly timely swallow initiation noted throughout. Per SLP at [facility] via phone, pt on a Dysphagia II (Minced and moist) diet with thin liquids at [facility].   Will modify pt's solid PO diet to correlate with NH diet-- RN aware. No overt s/s of aspiration/penetration noted with thin liquid trials via cup and straw. No change in O2 sats, RR consistent at 28/min throughout BSE. ST has recommended nectar-thick liquids during past admissions, pt with multiple MBSS completed in the past (most recently on 7/3/20). Per treating SLP at NH, pt appears to be tolerating thin liquids without difficulty at bedside, however, MBSS may be warranted to correlate clinical findings. Recommend downgrade to Dysphagia II (minced and moist) diet with thin liquids, meds whole in puree. ST to continue to follow. Treatment Plan  Requires SLP Intervention: Yes  Duration/Frequency of Treatment: 2-3x/week for LOS  D/C Recommendations: SNF       Recommended Diet and Intervention  Diet Solids Recommendation: Dysphagia Minced and Moist (Dysphagia II)  Liquid Consistency Recommendation: Thin  Recommended Form of Meds: Meds in puree  Recommendations: Dysphagia treatment; Modified barium swallow study  Therapeutic Interventions: Diet tolerance monitoring;Patient/Family education;Oral care    Compensatory Swallowing Strategies  Compensatory Swallowing Strategies: Small bites/sips;Upright as possible for all oral intake;Remain upright for 30-45 minutes after meals;Eat/Feed slowly; Alternate solids and liquids    Treatment/Goals  Short-term Goals  Timeframe for Short-term Goals: 3 days (8/8/20)  Long-term Goals  Timeframe for Long-term Goals: 5 days (8/10/20)  Goal 1: The pt will tolerate safest and least restrictive diet without s/s of aspiration. Dysphagia Goals: The patient will tolerate recommended diet without observed clinical signs of aspiration; The patient will tolerate instrumental swallowing procedure; The patient/caregiver will demonstrate understanding of compensatory strategies for improved swallowing safety. ;The patient will tolerate regular consistency solids 10/10.     General  Chart Reviewed: Yes  Comments: Pt admitted d/t acute on chronic respiratory failure, PNA. Pt has hx of dementia, GERD, and CHF per chart. Behavior/Cognition: Alert; Cooperative;Confused; Requires cueing  Respiratory Status: O2 via nasual cannula  O2 Device: Nasal cannula  Liters of Oxygen: 8 L  Communication Observation: Functional  Follows Directions: Simple           Vision/Hearing  Vision  Vision: Within Functional Limits  Hearing  Hearing: Exceptions to [facility]  Hearing Exceptions: Hard of hearing/hearing concerns    Oral Motor Deficits  Oral/Motor  Oral Motor: Exceptions to WFL(Reduced buccal strength)  Labial Strength: Reduced    Oral Phase Dysfunction  Oral Phase  Oral Phase: Exceptions  Oral Phase  Oral Phase - Comment: Pt pleasantly declined all solid PO trials at this time, recently finished lunch. Per SLP at [facility], pt on a Dysphagia II (Minced and moist) diet with thin liquids at [facility]. Will modify pt's solid PO diet to correlate with NH diet. RN aware. Will continue to monitor solid PO intake in future sessions. Indicators of Pharyngeal Phase Dysfunction   Pharyngeal Phase  Pharyngeal Phase: WFL  Pharyngeal Phase   Pharyngeal: No overt s/s of aspiration/penetration noted with thin liquid trials via cup and straw (pt declined all solid PO this date). No change in O2 sats, RR consistent at 28/min throughout BSE. Prognosis  Prognosis  Prognosis for safe diet advancement: fair  Barriers to reach goals: age;cognitive deficits  Individuals consulted  Consulted and agree with results and recommendations: Patient;RN    Education  Patient Education: Pt educated on reason for referral, role of ST, assessment results and recommendations. Patient Education Response: Needs reinforcement; No evidence of learning  Safety Devices in place: Yes  Type of devices: Bed alarm in place;Call light within reach; Left in bed;Nurse notified       Therapy Time  SLP Individual Minutes  Time In: 1226  Time Out: 7973  Minutes: 10; dysphagia kristen Weiss Ma, M.S. [id number] [facility]  Speech-language pathologist  [id number]

## 2020-08-05 NOTE — DISCHARGE INSTR - COC
Continuity of Care Form    Patient Name: Mozell Seip   :  1935  MRN:  [id number]    Admit date:  2020  Discharge date:  2020  Code Status Order: Full Code   Advance Directives:     Admitting Physician:  Khushbu Gomez MD  PCP: Blaire Gilliland MD    Discharging Nurse: [facility] Unit/Room#: 4125/2930-03  Discharging Unit Phone Number: ***    Emergency Contact:   Extended Emergency Contact Information  Primary Emergency Contact: Halina Falk [facility])  Address: Chantal Menchaca [address] [place] [address], [facility] [address] Phone: [phone]  Mobile Phone: [phone]  Relation: Child  Secondary Emergency Contact: Rob Falk  Address: SON [place] [facility] [address]  Phone: [phone]  Mobile Phone: [phone]  Relation: Child    Past Surgical History:  Past Surgical History:   Procedure Laterality Date    CHOLECYSTECTOMY      EYE SURGERY      HYSTERECTOMY      [address] W/BRNCL ALVEOLAR LAVAGE N/A 2018    BRONCHOSCOPY ALVEOLAR LAVAGE performed by Carlton Hill MD at [address] [facility] ESOPHAGOGASTRODUODENOSCOPY TRANSORAL DIAGNOSTIC N/A 2018    EGD ESOPHAGOGASTRODUODENOSCOPY performed by Satinder Chávez MD at [address] [facility]         Immunization History: There is no immunization history on file for this patient.     Active Problems:  Patient Active Problem List   Diagnosis Code    Hematemesis K92.0    Morbid obesity ([facility] [address] E66.01    Persistent atrial fibrillation I48.19    AV block I44.30    Essential hypertension I10    Acute hypoxemic respiratory failure (HCC) J96.01    Pulmonary infiltrates X87.9    Diastolic dysfunction P16.73    Pleural effusion, bilateral J90    Morbid obesity with BMI of 40.0-44.9, adult (HCC) E66.01, Z68.41    Myonecrosis M62.89    Atelectasis J98.11    Ineffective airway clearance R06.89    Shortness of breath R06.02    SOB (shortness of breath) R06.02    Acute on chronic diastolic CHF (congestive heart failure) ([facility]) I50.33    Pulmonary hypertension ([facility]) I27.20    Bradycardia R00.1    Intermittent asthma without complication U02.74    Late onset Alzheimer's disease without behavioral disturbance ([facility]) G30.1, F02.80    Acute respiratory failure with hypoxia and hypercapnia ([facility]) J96.01, J96.02       Isolation/Infection:   Isolation          Droplet Plus        Patient Infection Status     Infection Onset Added Last Indicated Last Indicated By Review Planned Expiration Resolved Resolved By    None active    Resolved    COVID-19 Rule Out 08/04/20 08/04/20 08/04/20 COVID-19 (Ordered)   08/05/20 Rule-Out Test Resulted    COVID-19 Rule Out 06/29/20 06/29/20 06/29/20 COVID-19 (Ordered)   06/29/20 Rule-Out Test Resulted    MRSA  08/12/18 08/12/18 Danika Gracia RN   06/29/20 Antonietta Saenz RN          Nurse Assessment:  Last Vital Signs: BP (!) 128/99   Pulse 100   Temp 98.5 °F (36.9 °C) (Axillary)   Resp 27   Ht 5' 2\" (1.575 m)   Wt 217 lb 9.6 oz (98.7 kg)   SpO2 95%   BMI 39.80 kg/m²     Last documented pain score (0-10 scale):    Last Weight:   Wt Readings from Last 1 Encounters:   08/05/20 217 lb 9.6 oz (98.7 kg)     Mental Status:  {IP PT MENTAL STATUS:72946}    IV Access:  { FUENTES IV ACCESS:627878029}    Nursing Mobility/ADLs:  Walking   {P DME NKSE:347029357}  Transfer  {P DME EZHI:408486726}  Bathing  {P DME HXLD:575553381}  Dressing  {P DME OXUV:995579569}  Toileting  {P DME PJFA:019747524}  Feeding  {P DME LANM:407057840}  Med Admin  {P DME ASAZ:558014949}  Med Delivery   { FUENTES MED Delivery:552094062}    Wound Care Documentation and Therapy:  Wound 07/04/20 Nose abrasion noted on nose which pt. said that she had acquired in a fall.  (Active)   Dressing Changed Changed/New 07/04/20 2330   Dressing/Treatment Other (comment) 07/04/20 2330   Wound Cleansed Rinsed/Irrigated with saline 07/04/20 2330   Wound Length (cm) 5 cm 07/04/20    Wound Width (cm) 1 cm 20   Wound Surface Area (cm^2) 5 cm^2 20   Wound Assessment Clean;Dry; Intact 20   Drainage Amount None 20   Red%Wound Bed 100 20   Number of days: 31        Elimination:  Continence:   · Bowel: {YES / EM:72887}  · Bladder: {YES / RU:04379}  Urinary Catheter: {Urinary Catheter:997892047}   Colostomy/Ileostomy/Ileal Conduit: {YES / XE:36547}       Date of Last BM: ***    Intake/Output Summary (Last 24 hours) at 2020 1036  Last data filed at 2020 0835  Gross per 24 hour   Intake 949 ml   Output 1370 ml   Net -421 ml     I/O last 3 completed shifts:   In: 900 [I.V.:650; IV Piggyback:250]  Out: 80 [Urine:1100]    Safety Concerns:     [employer] Safety Concerns:509648876}    Impairments/Disabilities:      [employer] Impairments/Disabilities:100419757}    Nutrition Therapy:  Current Nutrition Therapy:   [employer] Diet List:930670762}    Routes of Feeding: {P DME Other Feedings:443261375}  Liquids: {Slp liquid thickness:36636}  Daily Fluid Restriction: {CHP DME Yes amt example:135252191}  Last Modified Barium Swallow with Video (Video Swallowing Test): {Done Not Done AAL:112685546}    Treatments at the Time of Hospital Discharge:   Respiratory Treatments: ***  Oxygen Therapy:  {Therapy; copd oxygen:97264}  Ventilator:    {MH CC Vent MHMT:483999623}    Rehab Therapies: {THERAPEUTIC INTERVENTION:0468248163}  Weight Bearing Status/Restrictions: [address]  Weight Bearin}  Other Medical Equipment (for information only, NOT a DME order):  {EQUIPMENT:750146694}  Other Treatments: ***    Patient's personal belongings (please select all that are sent with patient):  {CHP DME Belongings:264869372}    RN SIGNATURE:  {Esignature:811913499}    CASE MANAGEMENT/SOCIAL WORK SECTION    Inpatient Status Date: 2020    Readmission Risk Assessment Score:  Readmission Risk              Risk of Unplanned Readmission:        16

## 2020-08-05 NOTE — CARE COORDINATION
Case Management Assessment  Initial Evaluation    Date/Time of Evaluation: 8/5/2020 10:37 AM  Assessment Completed by: Dominique Rosa    Patient Name: Jo Aguirre  YOB: 1935  Diagnosis: Acute respiratory failure with hypoxia and hypercapnia ([facility] [address] [J96.01, J96.02]  Date / Time: 8/4/2020  8:49 AM  Admission status/Date:  Inpatient 8/4/20  Chart Reviewed: Yes      Patient Interviewed: No   Family Interviewed:  Yes - Vanessa Guard      Hospitalization in the last 30 days:  Yes    Contacts  :  Michael Caper   Relationship to Patient: daughter, [place]  Phone Number:  [phone]  Alternate Contact:     Relationship to Patient:     Phone Number:    Met with:    Current PCP  Jessy Onofre MD    Financial  Medicare  Precert required for SNF :  N        3 night stay required - Y    ADLS  Support Systems:    Transportation: EMS transportation    Meal Preparation: per [address]: [facility]  Steps:   Plans to Return to Present Housing: Yes  Other Identified Issues:     Home Care Information  Currently active with [address] : No     Passport/Waiver : No  :                      Phone Number:    Passport/Waiver Services: none          Durable Medical Equipment   DME Provider:  Per facility  Equipment:  Walker___Cane___RTS___ BSC___Shower Chair___  02__ at ____Liter(s)---Uses________HHN___ CPAP___ BiPap___ Hospital Bed___W/C____Other________      Has Home O2 in place on admit:  Yes  From ECF   If above answer is No ---is pt presently on O2 during hospitalization:    if yes CM to follow for potential DC O2 need  Informed of need to bring portable home O2 tank on day of discharge for nursing to connect prior to leaving:   Not Indicated  Verbalized agreement/Understanding:   Not Indicated    Community Service Affiliation  Dialysis:  No    · Name:  · Location  · Dialysis Schedule:  · Phone:   · Fax:     Outpatient PT/OT: No    Cancer Center: No     CHF Clinic: No     Pulmonary Rehab: No  Pain Clinic: No  Community Mental Health: No    Wound Clinic: No     COVID SCREENING: Yes - collected 8/4/20     Other: no    The Plan for Transition of Care is related to the following treatment goals:  Return to [facility]     The Patient and/or patient representative was provided with a choice of provider and agrees with the discharge plan. [x] Yes [] No    Freedom of choice list was provided with basic dialogue that supports the patient's individualized plan of care/goals, treatment preferences and shares the quality data associated with the providers. [x] Yes [] No    DISCHARGE PLAN:  Patient under COVID-19 rule out precautions. Called patient's daughter/POA, Artur Santana. She wants patient to return to [facility] at discharge. She is in long term care there but was receiving skilled therapy following a recent discharge from [facility]. Called Lori Klein at [facility] and she states patient can return skilled if therapy recommends. Explained Case Management role/services.

## 2020-08-05 NOTE — PROGRESS NOTES
Hand off report given to Nelia Patel RN and Carol Mcclain RN. Pt is in stable condition at this time. Care transferred.  Electronically signed by Alice Romero RN on 8/5/2020 at 7:20 AM

## 2020-08-05 NOTE — PROGRESS NOTES
Care rounds completed with Dr. Johanna Huang and multidisciplinary team. Reviewed labs, meds, VS, assessment, & plan of care for today. See dictated note and new orders for details.  Replace potassium 40 meq PO 20 meq IV, DC antibiotics, okay for PCU

## 2020-08-05 NOTE — PLAN OF CARE
Problem: Nutrition  Intervention: Swallowing evaluation  Note: Bedside swallow evaluation completed this date. Tim Horan M.S. [address] [facility]  Speech-language pathologist  [id number]      Intervention: Aspiration precautions  Note: Bedside swallow evaluation completed this date.     Tim Horan M.S. [address] [facility]  Speech-language pathologist  [phone]

## 2020-08-05 NOTE — PROGRESS NOTES
Shift assessment, completed, see flow sheet. Pt is alert and oriented x 1 (self only). Confused, but easily redirected. AF(chronic) 95, /69, SpO2 96%. Respirations are shallow and labored when patient gets anxious. Bilateral lung sounds diminished/expiratory wheezes. PIV, WNL with antibiotic infusing. Lua in place and patent with STAT lock. Call light within reach. Bed in lowest position. Bed alarm on. Will continue to monitor.

## 2020-08-05 NOTE — PLAN OF CARE
Problem: Skin Integrity:  Goal: Will show no infection signs and symptoms  Description: Will show no infection signs and symptoms  Outcome: Ongoing  Goal: Absence of new skin breakdown  Description: Absence of new skin breakdown  Outcome: Ongoing     Problem: Falls - Risk of:  Goal: Will remain free from falls  Description: Will remain free from falls  Outcome: Ongoing  Goal: Absence of physical injury  Description: Absence of physical injury  Outcome: Ongoing     Problem: OXYGENATION/RESPIRATORY FUNCTION  Goal: Patient will maintain patent airway  Outcome: Ongoing  Goal: Patient will achieve/maintain normal respiratory rate/effort  Description: Respiratory rate and effort will be within normal limits for the patient  Outcome: Ongoing     Problem: HEMODYNAMIC STATUS  Goal: Patient has stable vital signs and fluid balance  Outcome: Ongoing     Problem: FLUID AND ELECTROLYTE IMBALANCE  Goal: Fluid and electrolyte balance are achieved/maintained  Outcome: Ongoing     Problem: ACTIVITY INTOLERANCE/IMPAIRED MOBILITY  Goal: Mobility/activity is maintained at optimum level for patient  Outcome: Ongoing     Patient's EF (Ejection Fraction) is greater than 40%    Heart Failure Medications:  Diuretics[de-identified] Furosemide    (One of the following REQUIRED for EF <40%/SYSTOLIC FAILURE but MAY be used in EF% >40%/DIASTOLIC FAILURE)        ACE[de-identified] None        ARB[de-identified] None         ARNI[de-identified] None    (Beta Blockers)  NON- Evidenced Based Beta Blocker (for EF% >40%/DIASTOLIC FAILURE): None    Evidenced Based Beta Blocker::(REQUIRED for EF% <40%/SYSTOLIC FAILURE) None  . .................................................................................................................................................. Patient's Last Weight: 217.6 lbs obtained by bed scale. Difference in weight is 1.1 pounds less than last documented weight.     Intake/Output Summary (Last 24 hours) at 7/30/2020 1201  Last data filed at 7/30/2020 1022  Gross per 24 hour   Intake 1695 ml   Output 1025 ml   Net 670 ml       Comorbidities Reviewed Yes  Patient has a past medical history of AAA (abdominal aortic aneurysm) ([address] KAIT (acute kidney injury) ([address] Anemia, Aortic aneurysm ([address] B12 deficiency, BPH (benign prostatic hyperplasia), CAD (coronary artery disease), CHF (congestive heart failure) ([address] Cognitive disorder, Constipation, Eczema, Eczema, Falls frequently, GERD (gastroesophageal reflux disease), Glaucoma, Glaucoma, Hearing loss, bilateral, Hyperlipidemia, Hypertension, MDRO (multiple drug resistant organisms) resistance, MI, old, Osteoarthritis, Pneumonia, Prostate enlargement, PVD (peripheral vascular disease) ([address] Skin ulcer of hand ([address] Urinary retention, and UTI (urinary tract infection). >> For CHF and Comorbidity Education Time and Topics, please see Education Tab. Pt is currently on 8 L O2. Pt without lower extremity edema.  Patient's weights and intake/output reviewed:      Patient and/or Family's stated Goal of Care this Admission: reduce shortness of breath and be more comfortable prior to discharge

## 2020-08-05 NOTE — FLOWSHEET NOTE
Admission assessment complete. Due medications complete. Pt is demented at baseline and continually yells out that she cannot breathe. Pt is oriented only to self. Labs reviewed. Skin assessed; see 4eyes. Pt is currently on 8L HFNC; tolerating well. Pt becomes anxious very easily when a staff member is not at bedside. Lua cath intact and draining clear yellow urine. Call light and bedside table within reach. Will continue to monitor.  Electronically signed by Lu Bryant RN on 8/5/2020 at 3:57 AM         08/04/20 2104   Vitals   Temp 97.9 °F (36.6 °C)   Temp Source Axillary   Pulse 92   Heart Rate Source Monitor   Resp 21   BP (!) 145/76   MAP (mmHg) 93   BP Location Right Arm   BP Upper/Lower Upper   BP Method Automatic   Patient Position Semi fowlers   Level of Consciousness 0   MEWS Score 2   Oxygen Therapy   SpO2 98 %   O2 Device High flow nasal cannula   O2 Flow Rate (L/min) 8 L/min

## 2020-08-05 NOTE — PROGRESS NOTES
Pulmonary Progress Note  CC: Acute hypoxic respiratory failure    Subjective:  Refused Bipap, admits to SOB      EXAM: BP (!) 143/112   Pulse 128   Temp 98.5 °F (36.9 °C) (Axillary)   Resp (!) 33   Ht 5' 2\" (1.575 m)   Wt 217 lb 9.6 oz (98.7 kg)   SpO2 93%   BMI 39.80 kg/m²  on 8lpm    Constitutional:  No acute distress. Eyes: PERRL. Conjunctivae anicteric. ENT: Normal nose. Normal tongue. Neck:  Trachea is midline. No thyroid tenderness. Respiratory: + accessory muscle usage. decreased breath sounds. No wheezes. No rales. No Rhonchi. Cardiovascular: Normal S1S2. No digit clubbing. No digit cyanosis. No LE edema. Psychiatric: No anxiety or Agitation.  Alert and Oriented to person only    Scheduled Meds:   amLODIPine  5 mg Oral Daily    apixaban  2.5 mg Oral BID    Vitamin D  2,000 Units Oral Daily    famotidine  20 mg Oral Daily    levothyroxine  100 mcg Oral Daily    magnesium oxide  400 mg Oral BID    memantine  10 mg Oral BID    montelukast  10 mg Oral Daily    therapeutic multivitamin-minerals  1 tablet Oral Daily    OXcarbazepine  150 mg Oral BID    oxybutynin  5 mg Oral Daily    senna  2 tablet Oral Daily    atorvastatin  10 mg Oral Daily    sodium chloride flush  10 mL Intravenous 2 times per day    furosemide  40 mg Intravenous BID    levofloxacin  500 mg Intravenous Q24H    piperacillin-tazobactam  3.375 g Intravenous Q8H    piperacillin-tazobactam        sodium chloride flush        piperacillin-tazobactam         Continuous Infusions:   sodium chloride      sodium chloride       PRN Meds:  sodium chloride flush, acetaminophen **OR** acetaminophen, polyethylene glycol, promethazine **OR** ondansetron    Labs:  CBC:   Recent Labs     08/04/20  0915 08/05/20  0505   WBC 8.1 8.9   HGB 11.8* 11.9*   HCT 37.4 37.4   MCV 94.1 92.8    228     BMP:   Recent Labs     08/04/20  0915 08/05/20  0505   * 142   K 3.8 3.2*    94*   CO2 36* 37*   BUN 16 12 CREATININE 0.8 0.8       Cultures:  covid neg    Chest imaging was reviewed by me and showed 8/4/20 CTPA: no PE; R> L pleural effusions     ASSESSMENT:  · Acute hypoxic respiratory failure  · Acute Diastolic CHF   · Moderate right and small left pleural effusion  · Chronic Afib  · Dementia  · Hypothyroid     PLAN:  · Refused bipap  Supplemental oxygen to maintain SaO2 >92%; wean as tolerated   · Replace K  · Supplemental oxygen to maintain SaO2 >92%; wean as tolerated   · IV lasix  · Norvasc  · levothyroxine  · Eliquis  · Ok for PCU

## 2020-08-05 NOTE — PROGRESS NOTES
Pt continually yells out throughout shift for water and ice. Pt becomes very agitated when be educated on 1800 cc fluid restriction. Fluids being provided at appropriate intervals to comply with fluid restriction.  Electronically signed by Yayo Hunter RN on 8/5/2020 at 3:52 AM

## 2020-08-05 NOTE — H&P
Hospital Medicine History & Physical      PCP: Greta Leo MD    Date of Admission: 8/4/2020    Date of Service: Pt seen/examined on 8/5/2020     Chief Complaint:    Chief Complaint   Patient presents with    Shortness of Breath     Pt arrive via [facility] from [facility]. Per nurse, pt was in acute respiratory failure at the nursing home and her face was red. She was saying that she couldnt breath so the nurse put her on 5L NRB and then when her oxygen went above 90%, she turned the NRB to 3L. Pt is normally on 2L NC d/t chronic resp failure. She was 81% on 2L upon arrival. Pt was given multiple breathing treatment enroute. History Of Present Illness: The patient is a 82 y.o. female with hypertension, GERD, Dementia, CHF, a-fib, Asthma, and Alzheimer's disease who presents to [facility] from [facility] with c/o shortness of breath. She was recently admitted for hypoxic respiratory failure and bradycardia. She tested negative for COVID during that admission and was sent to SNF, where she has been quarantined. She was found to be short of breath this morning and hypoxic on her normal O2 settings. She was placed on a non-rebreather. Bipap attempted in ED, she did not tolerate it . Admitted to ICU, on high flow O2. Patient is requiring 10 L O2. Labs with hypokalemia. VBG with pH 7.2 and pCO2 81. CXR with increased congestive failure with pleural effusion greater on the right. CT negative for PE, shows pulmonary edema. Patient admitted to ICU. Pulmonology consulted. patient is awake, very anxious. Not oriented .  tachypneic     Past Medical History:        Diagnosis Date    Alzheimer's dementia ([address]     Asthma     Atrial fibrillation ([address]     Blood clot in vein     Removal of Blood clot in Left Groin    CHF (congestive heart failure) (HCC)     Dementia (HCC)     GERD (gastroesophageal reflux disease)     Hypertension     Kidney failure     MRSA (methicillin resistant staph aureus) culture positive 08/08/2018    + resp cx    Osteoporosis        Past Surgical History:        Procedure Laterality Date    CHOLECYSTECTOMY      EYE SURGERY      HYSTERECTOMY      MD [address] W/BRNCL ALVEOLAR LAVAGE N/A 8/8/2018    BRONCHOSCOPY ALVEOLAR LAVAGE performed by Katie Whitehead MD at [facility] ESOPHAGOGASTRODUODENOSCOPY TRANSORAL DIAGNOSTIC N/A 8/4/2018    EGD ESOPHAGOGASTRODUODENOSCOPY performed by Emily Underwood MD at [facility]         Medications Prior to Admission:    Prior to Admission medications    Medication Sig Start Date End Date Taking?  Authorizing Provider   amLODIPine (NORVASC) 5 MG tablet Take 1 tablet by mouth daily 7/8/20  Yes Milton Rogers MD   furosemide (LASIX) 20 MG tablet Take 1 tablet by mouth daily 7/8/20  Yes Milton Rogers MD   polyethylene glycol (GLYCOLAX) 17 g packet Take 17 g by mouth daily as needed for Constipation 7/7/20 8/6/20 Yes Milton Rogers MD   apixaban (ELIQUIS) 2.5 MG TABS tablet Take 1 tablet by mouth 2 times daily 8/14/18  Yes Odalys Frazier MD   acetaminophen (TYLENOL) 500 MG tablet Take 500 mg by mouth every 6 hours as needed for Pain   Yes Historical Provider, MD   albuterol (PROVENTIL) (2.5 MG/3ML) 0.083% nebulizer solution Take 2.5 mg by nebulization every 6 hours as needed for Wheezing   Yes Historical Provider, MD   famotidine (PEPCID) 20 MG tablet Take 20 mg by mouth Daily   Yes Historical Provider, MD   levothyroxine (SYNTHROID) 100 MCG tablet Take 100 mcg by mouth Daily   Yes Historical Provider, MD   magnesium oxide (MAG-OX) 400 MG tablet Take 400 mg by mouth 2 times daily   Yes Historical Provider, MD   montelukast (SINGULAIR) 10 MG tablet Take 10 mg by mouth daily   Yes Historical Provider, MD   Multiple Vitamins-Minerals (THERAPEUTIC MULTIVITAMIN-MINERALS) tablet Take 1 tablet by mouth daily   Yes Historical Provider, MD   memantine ER (NAMENDA XR) 28 MG CP24 extended release capsule Take by mouth daily   Yes Historical Provider, MD   OXcarbazepine (TRILEPTAL) 150 MG tablet Take 150 mg by mouth 2 times daily   Yes Historical Provider, MD   oxybutynin (DITROPAN-XL) 5 MG extended release tablet Take 5 mg by mouth daily   Yes Historical Provider, MD   senna (SENOKOT) 8.6 MG tablet Take 2 tablets by mouth daily   Yes Historical Provider, MD   simvastatin (ZOCOR) 10 MG tablet Take 10 mg by mouth nightly   Yes Historical Provider, MD   Cholecalciferol (VITAMIN D3) 2000 units CAPS Take 1 capsule by mouth daily   Yes Historical Provider, MD       Allergies:  Codeine and Sulfa antibiotics    Social History:  The patient currently lives at [address] [place], [place] [address]wy:   reports that she has never smoked. She has never used smokeless tobacco.  ETOH:   has no history on file for alcohol. Family History:   Positive as follows:    History reviewed. No pertinent family history. REVIEW OF SYSTEMS:     Could not be obtained , patient has dementia. PHYSICAL EXAM:    BP (!) 128/99   Pulse 100   Temp 98.5 °F (36.9 °C) (Axillary)   Resp 27   Ht 5' 2\" (1.575 m)   Wt 217 lb 9.6 oz (98.7 kg)   SpO2 95%   BMI 39.80 kg/m²     Gen: Mild to moderate distress. Alert. Awake. Not oriented. Confused and anxious  Eyes: PERRL. No sclera icterus. No conjunctival injection. ENT: No discharge. Pharynx clear. Neck: No JVD. No Carotid Bruit. Trachea midline. Resp: No accessory muscle use. Tachypneic.  no crackles. No wheezes. No rhonchi. CV: Regular rate. Regular rhythm. No murmur. No rub. No edema. GI: Non-tender. Non-distended. No masses. No organomegaly. Normal bowel sounds. No hernia. Skin: Warm and dry. No nodule on exposed extremities. No rash on exposed extremities. M/S: No cyanosis. No joint deformity. No clubbing. Neuro: Awake. Grossly nonfocal    Psych: Not oriented, anxious.   She has dementia    CBC:   Recent Labs     08/04/20  0915 08/05/20  0505   WBC 8.1 8. 9   HGB 11.8* 11.9*   HCT 37.4 37.4   MCV 94.1 92.8    228     BMP:   Recent Labs     08/04/20  0915 08/05/20  0505   * 142   K 3.8 3.2*    94*   CO2 36* 37*   BUN 16 12   CREATININE 0.8 0.8     LIVER PROFILE:   Recent Labs     08/04/20  0915   AST 17   ALT 18   BILITOT 0.4   ALKPHOS 77     UA:  Recent Labs     08/04/20  1840   COLORU Yellow   PHUR 5.5   WBCUA 10-20*   RBCUA 0-2   MUCUS 1+*   BACTERIA Rare*   CLARITYU Clear   SPECGRAV 1.015   LEUKOCYTESUR Negative   UROBILINOGEN 0.2   BILIRUBINUR Negative   BLOODU TRACE-INTACT*   GLUCOSEU Negative   AMORPHOUS 1+       CARDIAC ENZYMES  Recent Labs     08/04/20  0915 08/04/20  1850 08/05/20  0117   TROPONINI <0.01 <0.01 <0.01     ABG    Lab Results   Component Value Date    MUD9ELJ 37.0 08/04/2020    BEART 10.8 08/04/2020    I7KOCRIY 94.9 08/04/2020    PHART 7.430 08/04/2020    YUT8BXG 57.1 08/04/2020    PO2ART 73.7 08/04/2020    TZQ9RID 38.8 08/04/2020       CULTURES  COVID: pending  Blood: Staph coagulase negative  Blood: NGTD    EKG:  I have reviewed the EKG with the following interpretation:   Atrial fibrillation Controlled ventricular rate, Rightward axis Inferior wall ischemia    RADIOLOGY  CT CHEST PULMONARY EMBOLISM W CONTRAST   Final Result   1. Negative for pulmonary embolus. 2. CHF with pulmonary edema. XR CHEST PORTABLE   Final Result   Increased congestive failure with pleural effusion greater on the right. Basilar airspace disease likely from pulmonary edema however possibility of   superimposed pneumonia is also considered. Active Problems:    Acute respiratory failure with hypoxia and hypercapnia (HCC)  Resolved Problems:    * No resolved hospital problems. *        ASSESSMENT/PLAN:  Acute on chronic hypoxic/hypercapnic respiratory failure  - due to CHF  - admitted to ICU.    - Pulmonology consulted  - Supplemental O2. Wean as tolerated  - normally on 2 L  - requiring 10 L currently.  Wean as tolerated   - ruled out COVID --> testing neg    Acute on chronic diastolic CHF  - daily weights; I&O's  - low sodium diet, fluid restriction  - IV Lasix 40 mg BID. Hypokalemia  - replaced. Monitor electrolytes. Hypernatremia  - resolved on repeat. Chronic atrial fibrillation  - rate controlled. - continue  - AC: Eliquis    Hypothyroidism  - home dose of synthroid 100 mcg     Dementia with behavioral changes   - supportive care measures  - pt very anxious   - continue home medication regimen. Hypertension  - BP stable. Continue Amlodipine. GERD  - continue Pepcid. Hyperlipidemia  - on Atorvastatin. Obesity  - Body mass index is 39.8 kg/m². - Recommended weight loss    DVT Prophylaxis: Eliquis  Diet: DIET LOW SODIUM 2 GM; 1800 ml  Code Status: Full Code       Patient is very anxious  Calling out for help continuously. Tachypneic  O2 sats 89 to 90% on 10 L of oxygen.   Needs closer monitoring, will keep in ICU today        Juan Love MD    8/5/2020

## 2020-08-06 LAB
ANION GAP SERPL CALCULATED.3IONS-SCNC: 8 MMOL/L (ref 3–16)
BUN BLDV-MCNC: 12 MG/DL (ref 7–20)
CALCIUM SERPL-MCNC: 8.8 MG/DL (ref 8.3–10.6)
CHLORIDE BLD-SCNC: 91 MMOL/L (ref 99–110)
CO2: 39 MMOL/L (ref 21–32)
CREAT SERPL-MCNC: 0.8 MG/DL (ref 0.6–1.2)
GFR AFRICAN AMERICAN: >60
GFR NON-AFRICAN AMERICAN: >60
GLUCOSE BLD-MCNC: 103 MG/DL (ref 70–99)
GLUCOSE BLD-MCNC: 107 MG/DL (ref 70–99)
PERFORMED ON: ABNORMAL
POTASSIUM SERPL-SCNC: 3.4 MMOL/L (ref 3.5–5.1)
SODIUM BLD-SCNC: 138 MMOL/L (ref 136–145)
T4 TOTAL: 6.3 UG/DL (ref 4.5–10.9)

## 2020-08-06 PROCEDURE — 6360000002 HC RX W HCPCS: Performed by: NURSE PRACTITIONER

## 2020-08-06 PROCEDURE — 84436 ASSAY OF TOTAL THYROXINE: CPT

## 2020-08-06 PROCEDURE — 80048 BASIC METABOLIC PNL TOTAL CA: CPT

## 2020-08-06 PROCEDURE — 99232 SBSQ HOSP IP/OBS MODERATE 35: CPT | Performed by: INTERNAL MEDICINE

## 2020-08-06 PROCEDURE — 6370000000 HC RX 637 (ALT 250 FOR IP): Performed by: INTERNAL MEDICINE

## 2020-08-06 PROCEDURE — 6370000000 HC RX 637 (ALT 250 FOR IP): Performed by: NURSE PRACTITIONER

## 2020-08-06 PROCEDURE — 92526 ORAL FUNCTION THERAPY: CPT

## 2020-08-06 PROCEDURE — 99233 SBSQ HOSP IP/OBS HIGH 50: CPT | Performed by: INTERNAL MEDICINE

## 2020-08-06 PROCEDURE — 1200000000 HC SEMI PRIVATE

## 2020-08-06 PROCEDURE — 2580000003 HC RX 258: Performed by: NURSE PRACTITIONER

## 2020-08-06 PROCEDURE — 36415 COLL VENOUS BLD VENIPUNCTURE: CPT

## 2020-08-06 RX ORDER — FUROSEMIDE 20 MG/1
20 TABLET ORAL DAILY
Status: DISCONTINUED | OUTPATIENT
Start: 2020-08-07 | End: 2020-08-07

## 2020-08-06 RX ORDER — POTASSIUM CHLORIDE 750 MG/1
40 TABLET, EXTENDED RELEASE ORAL ONCE
Status: COMPLETED | OUTPATIENT
Start: 2020-08-06 | End: 2020-08-06

## 2020-08-06 RX ADMIN — MUPIROCIN: 20 OINTMENT TOPICAL at 09:25

## 2020-08-06 RX ADMIN — FUROSEMIDE 40 MG: 10 INJECTION, SOLUTION INTRAMUSCULAR; INTRAVENOUS at 09:25

## 2020-08-06 RX ADMIN — POTASSIUM CHLORIDE 40 MEQ: 750 TABLET, EXTENDED RELEASE ORAL at 11:24

## 2020-08-06 RX ADMIN — MAGNESIUM GLUCONATE 500 MG ORAL TABLET 400 MG: 500 TABLET ORAL at 09:24

## 2020-08-06 RX ADMIN — OXCARBAZEPINE 150 MG: 300 TABLET, FILM COATED ORAL at 21:56

## 2020-08-06 RX ADMIN — SENNOSIDES 17.2 MG: 8.6 TABLET, FILM COATED ORAL at 09:24

## 2020-08-06 RX ADMIN — OXYBUTYNIN CHLORIDE 5 MG: 5 TABLET, EXTENDED RELEASE ORAL at 09:24

## 2020-08-06 RX ADMIN — MULTIPLE VITAMINS W/ MINERALS TAB 1 TABLET: TAB at 09:24

## 2020-08-06 RX ADMIN — OXCARBAZEPINE 150 MG: 300 TABLET, FILM COATED ORAL at 09:25

## 2020-08-06 RX ADMIN — MEMANTINE HYDROCHLORIDE 10 MG: 5 TABLET ORAL at 09:24

## 2020-08-06 RX ADMIN — Medication 2000 UNITS: at 09:24

## 2020-08-06 RX ADMIN — APIXABAN 2.5 MG: 5 TABLET, FILM COATED ORAL at 21:55

## 2020-08-06 RX ADMIN — ATORVASTATIN CALCIUM 10 MG: 10 TABLET, FILM COATED ORAL at 09:25

## 2020-08-06 RX ADMIN — AMLODIPINE BESYLATE 5 MG: 5 TABLET ORAL at 09:24

## 2020-08-06 RX ADMIN — FAMOTIDINE 20 MG: 20 TABLET ORAL at 05:15

## 2020-08-06 RX ADMIN — MAGNESIUM GLUCONATE 500 MG ORAL TABLET 400 MG: 500 TABLET ORAL at 21:56

## 2020-08-06 RX ADMIN — LEVOTHYROXINE SODIUM 100 MCG: 100 TABLET ORAL at 05:15

## 2020-08-06 RX ADMIN — Medication 10 ML: at 09:25

## 2020-08-06 RX ADMIN — Medication 10 ML: at 21:57

## 2020-08-06 RX ADMIN — APIXABAN 2.5 MG: 5 TABLET, FILM COATED ORAL at 09:24

## 2020-08-06 RX ADMIN — MEMANTINE HYDROCHLORIDE 10 MG: 5 TABLET ORAL at 21:55

## 2020-08-06 RX ADMIN — MONTELUKAST SODIUM 10 MG: 10 TABLET, COATED ORAL at 21:55

## 2020-08-06 ASSESSMENT — PAIN SCALES - WONG BAKER: WONGBAKER_NUMERICALRESPONSE: 0

## 2020-08-06 NOTE — PROGRESS NOTES
Pulmonary Progress Note  CC: Acute hypoxic respiratory failure    Subjective:  Some confusion still      EXAM: BP (!) 140/74   Pulse 123   Temp 98 °F (36.7 °C) (Axillary)   Resp 19   Ht 5' 2\" (1.575 m)   Wt 203 lb 8 oz (92.3 kg)   SpO2 95%   BMI 37.22 kg/m²  on 3lpm    Constitutional:  No acute distress. Eyes: PERRL. Conjunctivae anicteric. ENT: Normal nose. Normal tongue. Neck:  Trachea is midline. No thyroid tenderness. Respiratory: no accessory muscle usage. decreased breath sounds. No wheezes. No rales. No Rhonchi. Cardiovascular: Normal S1S2. No digit clubbing. No digit cyanosis. No LE edema. Psychiatric: + anxiety or Agitation.  Alert and Oriented to person self only    Scheduled Meds:   mupirocin   Nasal BID    amLODIPine  5 mg Oral Daily    apixaban  2.5 mg Oral BID    Vitamin D  2,000 Units Oral Daily    famotidine  20 mg Oral Daily    levothyroxine  100 mcg Oral Daily    magnesium oxide  400 mg Oral BID    memantine  10 mg Oral BID    montelukast  10 mg Oral Daily    therapeutic multivitamin-minerals  1 tablet Oral Daily    OXcarbazepine  150 mg Oral BID    oxybutynin  5 mg Oral Daily    senna  2 tablet Oral Daily    atorvastatin  10 mg Oral Daily    sodium chloride flush  10 mL Intravenous 2 times per day    furosemide  40 mg Intravenous BID     Continuous Infusions:    PRN Meds:  sodium chloride flush, acetaminophen **OR** acetaminophen, polyethylene glycol, promethazine **OR** ondansetron    Labs:  CBC:   Recent Labs     08/04/20  0915 08/05/20  0505   WBC 8.1 8.9   HGB 11.8* 11.9*   HCT 37.4 37.4   MCV 94.1 92.8    228     BMP:   Recent Labs     08/04/20  0915 08/05/20  0505 08/06/20  0415   * 142 138   K 3.8 3.2* 3.4*    94* 91*   CO2 36* 37* 39*   BUN 16 12 12   CREATININE 0.8 0.8 0.8       Cultures:  covid neg    Chest imaging was reviewed by me and showed 8/4/20 CTPA: no PE; R> L pleural effusions     ASSESSMENT:  · Acute hypoxic respiratory failure  · On PRN O2 at nursing home  · Acute Diastolic CHF   · Moderate right and small left pleural effusion  · Chronic Afib  · Dementia  · Hypothyroid     PLAN:  · Replace K  · Supplemental oxygen to maintain SaO2 >92%; wean as tolerated   · IV lasix - change to home dose for tomorrow  · Norvasc  · levothyroxine  · Eliquis  · Ok for 2W or PCU if not discharged home  · I will not plan to follow

## 2020-08-06 NOTE — PROGRESS NOTES
Speech Language Pathology  Facility/Department: [facility] ICU  Dysphagia Daily Treatment Note    NAME: Elizabeth Robin  : 1935  MRN: [id number]    Patient Diagnosis(es):   Patient Active Problem List    Diagnosis Date Noted    Persistent atrial fibrillation 2018     Priority: High    AV block 2018     Priority: High    Acute on chronic respiratory failure with hypoxia and hypercapnia (HCC)     Acute on chronic congestive heart failure (HCC)     Dementia with behavioral disturbance (HCC)     Acute respiratory failure with hypoxia and hypercapnia (HCC) 2020    Bradycardia     Intermittent asthma without complication     Late onset Alzheimer's disease without behavioral disturbance (HCC)     Pulmonary hypertension (HCC)     Shortness of breath 2020    SOB (shortness of breath) 2020    Acute on chronic diastolic [facility]F [facility]congestive [facility] failure) [facility]     Acute hypoxemic respiratory failure ([address] 2018    Pulmonary infiltrates     Diastolic dysfunction     Pleural effusion, bilateral 2018    Morbid obesity with BMI of 40.0-44.9, adult ([address] 2018    Myonecrosis 2018    Atelectasis 2018    Ineffective airway clearance 2018    Essential hypertension     Morbid obesity ([facility] 2018    Hematemesis 2018     Allergies: Allergies   Allergen Reactions    Codeine Itching    Sulfa Antibiotics Other (See Comments)     Per Pt daughter Issac Human) \" Not know her reaction to medication\"     Subjective:    Pain:    Current Diet: DIET LOW SODIUM 2 GM; Dysphagia Minced and Moist; 1800 ml    Diet Tolerance:  Patient tolerating current diet level without signs/symptoms of penetration / aspiration. P.O. Trials:   Thin   x 3x trials of thins    Nectar / Mildly Thick       Honey / Moderately Thick       Pudding / Extremely Thick       Puree       Solid         Dysphagia Treatment and Impressions:  RN approved entry to room this date. Patient required mod-max cues for motivation for PO intake. Once positioned upright, patient presents with mild wet vocal quality and was consistent throughout PO trials despite cues to clear throat. Patient may benefit from MBSS to rule out aspiration if PO acceptance increases. Patient with improving respiratory status on current diet. Continue with POC. Dysphagia Goals:  Timeframe for Long-term Goals: 5 days (8/10/20)  Goal 1: The pt will tolerate safest and least restrictive diet without s/s of aspiration. 8/6, limited PO trials this date, see above. Short-term Goals  Timeframe for Short-term Goals: 3 days (8/8/20)  Dysphagia Goals: The patient will tolerate recommended diet without observed clinical signs of aspiration. 8/6, limited PO trials this date, see above. The patient will tolerate instrumental swallowing procedure. 8/6, instrumental would be beneficial, d/t refusing PO at bedside, readdress 8/7  The patient/caregiver will demonstrate understanding of compensatory strategies for improved swallowing safety. 8/6, see below   The patient will tolerate regular consistency solids 10/10. 8/6, did not address this date. Recommendations:  Solid Consistency: Minced and Moist   Liquid Consistency: Thin liquids  Medication: Meds whole in puree    Patient/Family/Caregiver Education: Patient educated on compensatory strategies below and need of increased PO trials to assess swallow. Patient did not demonstrate understanding    Compensatory Strategies: Sit up for all meals and thereafter for 30 minutes, Eat with small bites (1/2 tsp; 1 tsp) and Alternate solids with liquids    Plan:    Continued Dysphagia treatment with goals per plan of care. Discharge Recommendations: TBD    If pt discharges from hospital prior to Speech/Swallowing discharge, this note serves as tx and discharge summary.      Total Treatment Time / Charges     Time in Time out Total Time / units   Cognitive Tx Speech Tx      Dysphagia Tx 1022 1030 8 min / 1 unit     Signature: Cecelia Rowe. A.  [id number] [facility]  Speech-Language Pathologist [id number]

## 2020-08-06 NOTE — CONSULTS
Chart reviewed. Pt is not appropriate to receive CHF self care education. She is confused and is LTC SNF resident. She is a CHF re-admission from [facility] 6/29-7/7/2020. Code status has already been discussed and verified with family during this admission. Pt appears she would likely benefit from partnering with Hospice at her SNF for comfort care measures as this appears to align with goals. Recommend:    Introducing Hospice benefits with POA for future comfort care in SNF where pt resides.

## 2020-08-06 NOTE — PROGRESS NOTES
Dr. Ana Cristina Kuhn at the bedside to examine pt. See progress note for details. Reviewed POC for transfer to  with no telemetry. No further change in plan of care.

## 2020-08-06 NOTE — PROGRESS NOTES
EOS report and transfer of care to [facility]. Patient resting in bed, O2 saturation 100% on 3L HFNC. Stable condition at hand off.

## 2020-08-06 NOTE — PROGRESS NOTES
Reassessment complete, see flow sheets. Patient resting with eyes closed, appears comfortable. Unlabored breathing at rest. She awakens easily to voice. Denies pain. Remains A. Fib rhythm, rate controlled. Lua patent and secured. Safety measures in place and will continue to closely monitor.

## 2020-08-06 NOTE — PROGRESS NOTES
Shift assessment complete as per flow sheet. VSS. Atrial Fib on cardiac monitor, (pt has history of a. Fib, on eliquis). Breathing is labored on exertion, she is currently on 8L high flow NC (wears 2L at baseline). Will titrate as patient tolerates. She is alert and oriented to self only (PMH-dementia). She gets anxious easily and short term memory is impaired, writer unable to reorient patient. She will repeatedly say \"I can't hardly breathe\", SpO2 in upper 90s. She enjoys talking about her family and school, this helps calm her down. Medications administered as per MAR. Safety measures in place and will continue to closely monitor.

## 2020-08-06 NOTE — PROGRESS NOTES
Pt transferred out of ICU via bed with transport. Pt on 3/L O2 via NC. Pt in stable condition for transfer.

## 2020-08-06 NOTE — PROGRESS NOTES
Care rounds completed with Dr. Gordo Cormier and multidisciplinary team. Reviewed labs, meds, VS, assessment, & plan of care for today. See dictated note and new orders for details. Reviewed POC for lasix 20 mg daily, d/c Lasix 40 BID, potassium 40 meq PO replacement once, and transfer to 2W without telemetry or d/c home to nursing home. Reviewed code status with daughter/AILEENA Odalys Chong. Daughter stated patient does not want intubated. New orders from Dr. Gordo Cormier for Limited code with no intubation.

## 2020-08-06 NOTE — PROGRESS NOTES
Reassessment complete as per flow sheet. No acute changes noted from previous assessment. She remains A/Ox1- self only. Atrial Fibrillation on cardiac monitor, HR 70-90s. She is currently calm and cooperative. She has been sleeping on and off throughout the night, awakens every couple of hours, and becomes anxious at times. After comfort measures are provided, she then falls back asleep. Lua patent and secured with adequate urine output, see I&O flow sheet. She is now on 4L of supplemental oxygen, tolerating well. Will continue to wean as tolerated. Safety measures in place, will continue to monitor.

## 2020-08-06 NOTE — PROGRESS NOTES
Transferred down via bed from ICU in stable condition to room 226-1. No distress noted. V.s.s. Alert but confused. Bed alarm in place and turned on. Cleaned up of large incontinent bowel movement. Repositioned in bed. Call light in reach. Oriented to room and call light.

## 2020-08-06 NOTE — PROGRESS NOTES
Transfer to 2W room 226  from ICU 7. Report given to ST JOSY RN at the bedside. Transport request placed. Upon transferring patient to ordered level of care, patients medications were gathered from the following locations and given to receiving nurse during bedside report:    Locked  room : [x] Yes   [] No   Pyxis Bin:       [x] Yes   [] No      Pyxis Refrigerator:      [x] Yes   [] No   Tube System:       [x] Yes   [] No          The following paperwork was transferred with patient:    Medical record:  [x] Yes   [] No   Initial EKG:   [x] Yes   [] No   72 hour hold:   [] Yes   [x] No       Patient belongings:       [x] Yes   [] No    Belongings include: Shirt and undergarments. Heart Monitor:   [x] Yes   [] No   Continuous pulse ox:   [] Yes   [x] No      [] N/A  Tele monitor number 3 assigned to patient and placed on patient prior to transfer.           Family notified of transfer:  [x] Yes   [] No    Spoke with: Kaylene Cortez, child/poirma

## 2020-08-06 NOTE — FLOWSHEET NOTE
08/06/20 0055   Oxygen Therapy   SpO2 99 %   Pulse Oximeter Device Mode Continuous   Pulse Oximeter Device Location Finger   O2 Device High flow nasal cannula   O2 Flow Rate (L/min) 6 L/min       Patient's oxygen titrated down to 6L high flow. Will closely monitor.

## 2020-08-06 NOTE — PROGRESS NOTES
Progress Note    Admit Date:  8/4/2020    Subjective:  Ms. Lashay Redd looks much better today. She is calm. Saturations have improved. Jory García She was on 10 L of oxygen currently weaned down to 2 L.     -Telemetry A. brenda chronic. Objective:   /81   Pulse 74   Temp 97 °F (36.1 °C) (Axillary)   Resp 20   Ht 5' 2\" (1.575 m)   Wt 203 lb 8 oz (92.3 kg)   SpO2 99%   BMI 37.22 kg/m²       Intake/Output Summary (Last 24 hours) at 8/6/2020 1337  Last data filed at 8/6/2020 1129  Gross per 24 hour   Intake 1394 ml   Output 2150 ml   Net -756 ml         Physical Exam:  General:  Awake, alert, NAD   has dementia. Oriented to person   Skin:  Warm and dry  Neck:  JVD absent. Neck supple  Chest:  Diminished breath sounds, mild rhonchi   Cardiovascular:  RRR ,S1S2 normal  Abdomen:  Soft, non tender, non distended, BS +  Extremities:  No edema. Intact peripheral pulses.  Brisk cap refill, < 2 secs  Neuro: non focal      Medications:   Scheduled Meds:   [START ON 8/7/2020] furosemide  20 mg Oral Daily    mupirocin   Nasal BID    amLODIPine  5 mg Oral Daily    apixaban  2.5 mg Oral BID    Vitamin D  2,000 Units Oral Daily    famotidine  20 mg Oral Daily    levothyroxine  100 mcg Oral Daily    magnesium oxide  400 mg Oral BID    memantine  10 mg Oral BID    montelukast  10 mg Oral Daily    therapeutic multivitamin-minerals  1 tablet Oral Daily    OXcarbazepine  150 mg Oral BID    oxybutynin  5 mg Oral Daily    senna  2 tablet Oral Daily    atorvastatin  10 mg Oral Daily    sodium chloride flush  10 mL Intravenous 2 times per day       Continuous Infusions:      Data:  CBC:   Recent Labs     08/04/20  0915 08/05/20  0505   WBC 8.1 8.9   RBC 3.98* 4.03   HGB 11.8* 11.9*   HCT 37.4 37.4   MCV 94.1 92.8   RDW 16.4* 16.1*    228     BMP:   Recent Labs     08/04/20  0915 08/05/20  0505 08/06/20  0415   * 142 138   K 3.8 3.2* 3.4*    94* 91*   CO2 36* 37* 39*   BUN 16 12 12   CREATININE 0.8 0.8 0. 8     BNP: No results for input(s): BNP in the last 72 hours. PT/INR: No results for input(s): PROTIME, INR in the last 72 hours. APTT: No results for input(s): APTT in the last 72 hours. CARDIAC ENZYMES:   Recent Labs     08/04/20  0915 08/04/20  1850 08/05/20  0117   TROPONINI <0.01 <0.01 <0.01     FASTING LIPID PANEL:No results found for: CHOL, HDL, TRIG  LIVER PROFILE:   Recent Labs     08/04/20  0915   AST 17   ALT 18   BILITOT 0.4   ALKPHOS 77        Radiology  CT CHEST PULMONARY EMBOLISM W CONTRAST   Final Result   1. Negative for pulmonary embolus. 2. CHF with pulmonary edema. XR CHEST PORTABLE   Final Result   Increased congestive failure with pleural effusion greater on the right. Basilar airspace disease likely from pulmonary edema however possibility of   superimposed pneumonia is also considered. Assessment:  Active Problems:    Acute respiratory failure with hypoxia and hypercapnia (HCC)    Acute on chronic respiratory failure with hypoxia and hypercapnia (HCC)    Acute on chronic congestive heart failure (HCC)    Dementia with behavioral disturbance (HCC)  Resolved Problems:    * No resolved hospital problems. *      Plan:  Acute on chronic hypoxic/hypercapnic respiratory failure  - due to CHF  - admitted to ICU.    - Pulmonology consulted  - Supplemental O2. Wean as tolerated  - normally on 2 L  - requiring 10 L currently. Wean as tolerated , currently down to 2 L  - ruled out COVID --> testing neg     Acute on chronic diastolic CHF  - daily weights; I&O's  - low sodium diet, fluid restriction  - IV Lasix 40 mg BID. Continue. Switch to oral Lasix in a.m.     Hypokalemia  - replaced. Monitor electrolytes.      Hypernatremia  - resolved on repeat.      Chronic atrial fibrillation  - rate controlled.   - continue  - AC: Eliquis     Hypothyroidism  - home dose of synthroid 100 mcg      Dementia with behavioral changes   - supportive care measures  - pt very anxious   - continue home medication regimen.      Hypertension  - BP stable. Continue Amlodipine.      GERD  - continue Pepcid.      Hyperlipidemia  - on Atorvastatin.      Obesity  - Body mass index is 39.8 kg/m².   - Recommended weight loss     DVT Prophylaxis: Eliquis  Diet: DIET LOW SODIUM 2 GM; 1800 ml  Code Status: Full Code       Transfer to Rashad Newberry MD 8/6/2020 1:37 PM

## 2020-08-06 NOTE — PLAN OF CARE
Problem: Skin Integrity:  Goal: Will show no infection signs and symptoms  Description: Will show no infection signs and symptoms  8/5/2020 2040 by [employer]  Outcome: Ongoing  8/5/2020 1237 by Melissa Kilpatrick RN  Outcome: Ongoing  Goal: Absence of new skin breakdown  Description: Absence of new skin breakdown  8/5/2020 2040 by [employer]  Outcome: Ongoing  8/5/2020 1237 by Melissa Kilpatrick RN  Outcome: Ongoing     Problem: Falls - Risk of:  Goal: Will remain free from falls  Description: Will remain free from falls  8/5/2020 2040 by [employer]  Outcome: Ongoing  8/5/2020 1237 by Melissa Kilpatrick RN  Outcome: Ongoing  Goal: Absence of physical injury  Description: Absence of physical injury  8/5/2020 2040 [facility]  Outcome: Ongoing  8/5/2020 1237 by Melissa Kilpatrick RN  Outcome: Ongoing     Problem: OXYGENATION/RESPIRATORY FUNCTION  Goal: Patient will maintain patent airway  8/5/2020 2040 by [facility]  Outcome: Ongoing  8/5/2020 1237 by Melissa Kilpatrick RN  Outcome: Ongoing  Goal: Patient will achieve/maintain normal respiratory rate/effort  Description: Respiratory rate and effort will be within normal limits for the patient  8/5/2020 2040 by [employer]  Outcome: Ongoing  8/5/2020 1237 by Melissa Kilpatrick RN  Outcome: Ongoing     Problem: HEMODYNAMIC STATUS  Goal: Patient has stable vital signs and fluid balance  8/5/2020 2040 by [employer]  Outcome: Ongoing  8/5/2020 1237 by Melissa Kilpatrick RN  Outcome: Ongoing     Problem: FLUID AND ELECTROLYTE IMBALANCE  Goal: Fluid and electrolyte balance are achieved/maintained  8/5/2020 2040 by [employer]  Outcome: Ongoing  8/5/2020 1237 by Melissa Kilpatrick RN  Outcome: Ongoing     Problem: ACTIVITY INTOLERANCE/IMPAIRED MOBILITY  Goal: Mobility/activity is maintained at optimum level for patient  8/5/2020 2040 by [employer]  Outcome: Ongoing  8/5/2020 1237 by Melissa Kilpatrick RN  Outcome: Ongoing     Problem: Nutrition  Intervention: Swallowing evaluation  8/5/2020 1251 by Jasmyn Mendoza, SLP  Note: Bedside swallow evaluation completed this date. Jasmyn Mendoza M.S. [id number] [facility]  Speech-language pathologist  [id number]      Intervention: Aspiration precautions  8/5/2020 1251 by VU Dennis  Note: Bedside swallow evaluation completed this date.     Jasmyn Mendoza M.S. [id number] [facility]  Speech-language pathologist  [phone]

## 2020-08-06 NOTE — PROGRESS NOTES
Shift assessment, completed, see flow sheet. Pt is alert and oriented to self only, dementia history and at baseline per daughter. Following commands at times. Pt will yell out. Rhythm is a-fib, chronic rhythm with PO eliqus daily with HR 86, /58, MAP 71, SpO2 96%. Stable on 3/L O2 via NC. Daughter stated O2 at [facility]. Respirations are easy, even, and unlabored. Bilateral lung sounds diminished to lower lobes. Pt ate about 25% of breakfast. PIV Line in place, WNL with KVO infusing. Lua in place and patent with STAT lock. Urine is yellow/clear. Pt had large incon BM that is brown/liquid. Call light within reach. Bed in lowest position. Bed alarm on. Will continue to monitor.      08/06/20 0900   Vitals   Pulse 86   Resp 27   BP (!) 115/58   MAP (mmHg) 71   Oxygen Therapy   SpO2 96 %   O2 Device High flow nasal cannula   O2 Flow Rate (L/min) 3 L/min

## 2020-08-07 VITALS
BODY MASS INDEX: 37.73 KG/M2 | TEMPERATURE: 98 F | HEART RATE: 93 BPM | RESPIRATION RATE: 18 BRPM | SYSTOLIC BLOOD PRESSURE: 131 MMHG | OXYGEN SATURATION: 95 % | DIASTOLIC BLOOD PRESSURE: 62 MMHG | HEIGHT: 62 IN | WEIGHT: 205 LBS

## 2020-08-07 LAB
ANION GAP SERPL CALCULATED.3IONS-SCNC: 10 MMOL/L (ref 3–16)
BUN BLDV-MCNC: 12 MG/DL (ref 7–20)
CALCIUM SERPL-MCNC: 8.9 MG/DL (ref 8.3–10.6)
CHLORIDE BLD-SCNC: 96 MMOL/L (ref 99–110)
CO2: 37 MMOL/L (ref 21–32)
CREAT SERPL-MCNC: 0.8 MG/DL (ref 0.6–1.2)
CULTURE, BLOOD 2: ABNORMAL
CULTURE, BLOOD 2: ABNORMAL
GFR AFRICAN AMERICAN: >60
GFR NON-AFRICAN AMERICAN: >60
GLUCOSE BLD-MCNC: 107 MG/DL (ref 70–99)
ORGANISM: ABNORMAL
ORGANISM: ABNORMAL
POTASSIUM SERPL-SCNC: 3.9 MMOL/L (ref 3.5–5.1)
SODIUM BLD-SCNC: 143 MMOL/L (ref 136–145)

## 2020-08-07 PROCEDURE — 99239 HOSP IP/OBS DSCHRG MGMT >30: CPT | Performed by: INTERNAL MEDICINE

## 2020-08-07 PROCEDURE — 6370000000 HC RX 637 (ALT 250 FOR IP): Performed by: INTERNAL MEDICINE

## 2020-08-07 PROCEDURE — 80048 BASIC METABOLIC PNL TOTAL CA: CPT

## 2020-08-07 PROCEDURE — 2580000003 HC RX 258: Performed by: NURSE PRACTITIONER

## 2020-08-07 PROCEDURE — 6370000000 HC RX 637 (ALT 250 FOR IP): Performed by: NURSE PRACTITIONER

## 2020-08-07 PROCEDURE — 6360000002 HC RX W HCPCS: Performed by: INTERNAL MEDICINE

## 2020-08-07 PROCEDURE — 36415 COLL VENOUS BLD VENIPUNCTURE: CPT

## 2020-08-07 RX ORDER — FUROSEMIDE 40 MG/1
40 TABLET ORAL DAILY
Qty: 1 TABLET | Refills: 0 | Status: ON HOLD
Start: 2020-08-07 | End: 2020-08-24 | Stop reason: HOSPADM

## 2020-08-07 RX ORDER — FUROSEMIDE 10 MG/ML
20 INJECTION INTRAMUSCULAR; INTRAVENOUS ONCE
Status: COMPLETED | OUTPATIENT
Start: 2020-08-07 | End: 2020-08-07

## 2020-08-07 RX ORDER — FUROSEMIDE 10 MG/ML
40 INJECTION INTRAMUSCULAR; INTRAVENOUS ONCE
Status: DISCONTINUED | OUTPATIENT
Start: 2020-08-07 | End: 2020-08-07 | Stop reason: DRUGHIGH

## 2020-08-07 RX ADMIN — LEVOTHYROXINE SODIUM 100 MCG: 100 TABLET ORAL at 05:01

## 2020-08-07 RX ADMIN — APIXABAN 2.5 MG: 5 TABLET, FILM COATED ORAL at 07:58

## 2020-08-07 RX ADMIN — Medication 10 ML: at 07:58

## 2020-08-07 RX ADMIN — SENNOSIDES 17.2 MG: 8.6 TABLET, FILM COATED ORAL at 07:59

## 2020-08-07 RX ADMIN — MULTIPLE VITAMINS W/ MINERALS TAB 1 TABLET: TAB at 07:59

## 2020-08-07 RX ADMIN — FUROSEMIDE 20 MG: 20 TABLET ORAL at 07:59

## 2020-08-07 RX ADMIN — MEMANTINE HYDROCHLORIDE 10 MG: 5 TABLET ORAL at 07:59

## 2020-08-07 RX ADMIN — FUROSEMIDE 20 MG: 10 INJECTION, SOLUTION INTRAVENOUS at 10:06

## 2020-08-07 RX ADMIN — FAMOTIDINE 20 MG: 20 TABLET ORAL at 05:01

## 2020-08-07 RX ADMIN — Medication 2000 UNITS: at 07:59

## 2020-08-07 RX ADMIN — MAGNESIUM GLUCONATE 500 MG ORAL TABLET 400 MG: 500 TABLET ORAL at 07:59

## 2020-08-07 RX ADMIN — AMLODIPINE BESYLATE 5 MG: 5 TABLET ORAL at 07:59

## 2020-08-07 RX ADMIN — ATORVASTATIN CALCIUM 10 MG: 10 TABLET, FILM COATED ORAL at 07:58

## 2020-08-07 RX ADMIN — MUPIROCIN: 20 OINTMENT TOPICAL at 07:58

## 2020-08-07 RX ADMIN — OXCARBAZEPINE 150 MG: 300 TABLET, FILM COATED ORAL at 07:59

## 2020-08-07 RX ADMIN — OXYBUTYNIN CHLORIDE 5 MG: 5 TABLET, EXTENDED RELEASE ORAL at 07:59

## 2020-08-07 NOTE — PLAN OF CARE
Problem: Skin Integrity:  Goal: Will show no infection signs and symptoms  Description: Will show no infection signs and symptoms  8/7/2020 0955 by Dara Yang RN  Outcome: Completed  8/7/2020 0756 by Dara Yang RN  Outcome: Ongoing  8/6/2020 2035 by Gloria Neely RN  Outcome: Ongoing     Problem: Skin Integrity:  Goal: Absence of new skin breakdown  Description: Absence of new skin breakdown  8/7/2020 0955 by Dara Yang RN  Outcome: Completed  8/7/2020 0756 by Dara Yang RN  Outcome: Ongoing  8/6/2020 2035 by Gloria Neely RN  Outcome: Ongoing

## 2020-08-07 NOTE — FLOWSHEET NOTE
08/07/20 0745   Vital Signs   Temp 98 °F (36.7 °C)   Temp Source Oral   Pulse 93   Heart Rate Source Monitor   Resp 18   /62   BP Location Right lower arm   BP Upper/Lower Lower   Patient Position Semi fowlers   Level of Consciousness 0   MEWS Score 1   Oxygen Therapy   SpO2 95 %   O2 Device High flow nasal cannula   O2 Flow Rate (L/min) 3 L/min   Pt sitting up in bed high semi lee, pt constantly yelling out, per report this is normal behavior. Spoke with pt, pt alert to self only and can not follow multiple step directions. Oxygen on at 3 liters, saturation at 96%. AM assessment complete. Call light in reach.

## 2020-08-07 NOTE — PLAN OF CARE
Problem: Skin Integrity:  Goal: Will show no infection signs and symptoms  Description: Will show no infection signs and symptoms  8/7/2020 0756 by Caroline Reddy RN  Outcome: Ongoing  8/6/2020 2035 by Khushbu Curry RN  Outcome: Ongoing     Problem: Falls - Risk of:  Goal: Will remain free from falls  Description: Will remain free from falls  8/7/2020 0756 by Caroline Reddy RN  Outcome: Ongoing  8/6/2020 2035 by Khushbu Curry RN  Outcome: Ongoing     Problem: OXYGENATION/RESPIRATORY FUNCTION  Goal: Patient will maintain patent airway  8/7/2020 0756 by Caroline Reddy RN  Outcome: Ongoing  8/6/2020 2035 by Khushbu Curry RN  Outcome: Ongoing

## 2020-08-07 NOTE — PROGRESS NOTES
input(s): BNP in the last 72 hours. PT/INR: No results for input(s): PROTIME, INR in the last 72 hours. APTT: No results for input(s): APTT in the last 72 hours. CARDIAC ENZYMES:   Recent Labs     08/04/20  0915 08/04/20  1850 08/05/20  0117   TROPONINI <0.01 <0.01 <0.01     FASTING LIPID PANEL:No results found for: CHOL, HDL, TRIG  LIVER PROFILE:   Recent Labs     08/04/20  0915   AST 17   ALT 18   BILITOT 0.4   ALKPHOS 77        Radiology  CT CHEST PULMONARY EMBOLISM W CONTRAST   Final Result   1. Negative for pulmonary embolus. 2. CHF with pulmonary edema. XR CHEST PORTABLE   Final Result   Increased congestive failure with pleural effusion greater on the right. Basilar airspace disease likely from pulmonary edema however possibility of   superimposed pneumonia is also considered. Assessment:  Active Problems:    Acute respiratory failure with hypoxia and hypercapnia (HCC)    Acute on chronic respiratory failure with hypoxia and hypercapnia (HCC)    Acute on chronic congestive heart failure (HCC)    Dementia with behavioral disturbance (HCC)  Resolved Problems:    * No resolved hospital problems. *      Plan:  Acute on chronic hypoxic/hypercapnic respiratory failure  - due to CHF  - admitted to ICU.    - Pulmonology consulted  - Supplemental O2. Wean as tolerated  - normally on 2 L  - requiring 10 L currently. Wean as tolerated , currently down to 2 L  - ruled out COVID --> testing neg     Acute on chronic diastolic CHF  - daily weights; I&O's  - low sodium diet, fluid restriction  - IV Lasix 40 mg BID. 40 mg iv lasix x1 today     Hypokalemia  - replaced. Monitor electrolytes.      Hypernatremia  - resolved on repeat.      Chronic atrial fibrillation  - rate controlled. - continue  - AC: Eliquis     Hypothyroidism  - home dose of synthroid 100 mcg      Dementia with behavioral changes   - supportive care measures  - pt very anxious   - continue home medication regimen.    Hypertension  - BP stable. Continue Amlodipine.      GERD  - continue Pepcid.      Hyperlipidemia  - on Atorvastatin.      Obesity  - Body mass index is 39.8 kg/m².   - Recommended weight loss     DVT Prophylaxis: Eliquis  Diet: DIET LOW SODIUM 2 GM; 1800 ml  Code Status: Full Code       D/c to Pascual Thompson MD 8/7/2020 9:08 AM No

## 2020-08-07 NOTE — PROGRESS NOTES
Pt picked up by squad in stable condition. Removed IV, tolerated well, pt placed tissue on ac are where IV was removed and refused to allow staff to removed tissue, area was not actively bleeding.

## 2020-08-08 LAB — BLOOD CULTURE, ROUTINE: NORMAL

## 2020-08-12 ENCOUNTER — APPOINTMENT (OUTPATIENT)
Dept: CT IMAGING | Age: 85
DRG: 291 | End: 2020-08-12
Payer: MEDICARE

## 2020-08-12 ENCOUNTER — APPOINTMENT (OUTPATIENT)
Dept: GENERAL RADIOLOGY | Age: 85
DRG: 291 | End: 2020-08-12
Payer: MEDICARE

## 2020-08-12 ENCOUNTER — TELEPHONE (OUTPATIENT)
Dept: OTHER | Facility: CLINIC | Age: 85
End: 2020-08-12

## 2020-08-12 ENCOUNTER — HOSPITAL ENCOUNTER (INPATIENT)
Age: 85
LOS: 10 days | Discharge: NURSING, REHAB OR LONG-TERM CARE | DRG: 291 | End: 2020-08-24
Attending: EMERGENCY MEDICINE | Admitting: INTERNAL MEDICINE
Payer: MEDICARE

## 2020-08-12 LAB
A/G RATIO: 1.1 (ref 1.1–2.2)
ALBUMIN SERPL-MCNC: 3.8 G/DL (ref 3.4–5)
ALP BLD-CCNC: 71 U/L (ref 40–129)
ALT SERPL-CCNC: 16 U/L (ref 10–40)
ANION GAP SERPL CALCULATED.3IONS-SCNC: 9 MMOL/L (ref 3–16)
AST SERPL-CCNC: 35 U/L (ref 15–37)
BASE EXCESS ARTERIAL: 12.2 MMOL/L (ref -3–3)
BASOPHILS ABSOLUTE: 0.1 K/UL (ref 0–0.2)
BASOPHILS RELATIVE PERCENT: 0.6 %
BILIRUB SERPL-MCNC: 0.4 MG/DL (ref 0–1)
BUN BLDV-MCNC: 21 MG/DL (ref 7–20)
CALCIUM SERPL-MCNC: 9.1 MG/DL (ref 8.3–10.6)
CARBOXYHEMOGLOBIN ARTERIAL: 0.8 % (ref 0–1.5)
CHLORIDE BLD-SCNC: 95 MMOL/L (ref 99–110)
CO2: 38 MMOL/L (ref 21–32)
CREAT SERPL-MCNC: 0.9 MG/DL (ref 0.6–1.2)
EOSINOPHILS ABSOLUTE: 0.1 K/UL (ref 0–0.6)
EOSINOPHILS RELATIVE PERCENT: 1.1 %
GFR AFRICAN AMERICAN: >60
GFR NON-AFRICAN AMERICAN: 60
GLOBULIN: 3.4 G/DL
GLUCOSE BLD-MCNC: 207 MG/DL (ref 70–99)
HCO3 ARTERIAL: 39.5 MMOL/L (ref 21–29)
HCT VFR BLD CALC: 36.6 % (ref 36–48)
HEMOGLOBIN, ART, EXTENDED: 12.2 G/DL (ref 12–16)
HEMOGLOBIN: 11.4 G/DL (ref 12–16)
LACTIC ACID: 1.3 MMOL/L (ref 0.4–2)
LYMPHOCYTES ABSOLUTE: 1.2 K/UL (ref 1–5.1)
LYMPHOCYTES RELATIVE PERCENT: 11.3 %
MCH RBC QN AUTO: 29.4 PG (ref 26–34)
MCHC RBC AUTO-ENTMCNC: 31.2 G/DL (ref 31–36)
MCV RBC AUTO: 94.2 FL (ref 80–100)
METHEMOGLOBIN ARTERIAL: 0.3 %
MONOCYTES ABSOLUTE: 1 K/UL (ref 0–1.3)
MONOCYTES RELATIVE PERCENT: 8.7 %
NEUTROPHILS ABSOLUTE: 8.6 K/UL (ref 1.7–7.7)
NEUTROPHILS RELATIVE PERCENT: 78.3 %
O2 CONTENT ARTERIAL: 16 ML/DL
O2 SAT, ARTERIAL: 94 %
O2 THERAPY: ABNORMAL
PCO2 ARTERIAL: 65 MMHG (ref 35–45)
PDW BLD-RTO: 16 % (ref 12.4–15.4)
PH ARTERIAL: 7.4 (ref 7.35–7.45)
PLATELET # BLD: 225 K/UL (ref 135–450)
PMV BLD AUTO: 9.3 FL (ref 5–10.5)
PO2 ARTERIAL: 72.2 MMHG (ref 75–108)
POTASSIUM REFLEX MAGNESIUM: 4.2 MMOL/L (ref 3.5–5.1)
PRO-BNP: 1811 PG/ML (ref 0–449)
RBC # BLD: 3.88 M/UL (ref 4–5.2)
SODIUM BLD-SCNC: 142 MMOL/L (ref 136–145)
TCO2 ARTERIAL: 41.4 MMOL/L
TOTAL PROTEIN: 7.2 G/DL (ref 6.4–8.2)
TROPONIN: <0.01 NG/ML
WBC # BLD: 11 K/UL (ref 4–11)

## 2020-08-12 PROCEDURE — 71045 X-RAY EXAM CHEST 1 VIEW: CPT

## 2020-08-12 PROCEDURE — 82803 BLOOD GASES ANY COMBINATION: CPT

## 2020-08-12 PROCEDURE — 83605 ASSAY OF LACTIC ACID: CPT

## 2020-08-12 PROCEDURE — 36600 WITHDRAWAL OF ARTERIAL BLOOD: CPT

## 2020-08-12 PROCEDURE — 85025 COMPLETE CBC W/AUTO DIFF WBC: CPT

## 2020-08-12 PROCEDURE — 80053 COMPREHEN METABOLIC PANEL: CPT

## 2020-08-12 PROCEDURE — 87184 SC STD DISK METHOD PER PLATE: CPT

## 2020-08-12 PROCEDURE — 81001 URINALYSIS AUTO W/SCOPE: CPT

## 2020-08-12 PROCEDURE — 93005 ELECTROCARDIOGRAM TRACING: CPT | Performed by: EMERGENCY MEDICINE

## 2020-08-12 PROCEDURE — 6360000004 HC RX CONTRAST MEDICATION: Performed by: EMERGENCY MEDICINE

## 2020-08-12 PROCEDURE — 6360000002 HC RX W HCPCS: Performed by: NURSE PRACTITIONER

## 2020-08-12 PROCEDURE — 96365 THER/PROPH/DIAG IV INF INIT: CPT

## 2020-08-12 PROCEDURE — 87186 SC STD MICRODIL/AGAR DIL: CPT

## 2020-08-12 PROCEDURE — 87040 BLOOD CULTURE FOR BACTERIA: CPT

## 2020-08-12 PROCEDURE — 71260 CT THORAX DX C+: CPT

## 2020-08-12 PROCEDURE — 99285 EMERGENCY DEPT VISIT HI MDM: CPT

## 2020-08-12 PROCEDURE — 96368 THER/DIAG CONCURRENT INF: CPT

## 2020-08-12 PROCEDURE — 96375 TX/PRO/DX INJ NEW DRUG ADDON: CPT

## 2020-08-12 PROCEDURE — 83880 ASSAY OF NATRIURETIC PEPTIDE: CPT

## 2020-08-12 PROCEDURE — 84484 ASSAY OF TROPONIN QUANT: CPT

## 2020-08-12 PROCEDURE — 87086 URINE CULTURE/COLONY COUNT: CPT

## 2020-08-12 PROCEDURE — 87077 CULTURE AEROBIC IDENTIFY: CPT

## 2020-08-12 PROCEDURE — 2580000003 HC RX 258: Performed by: NURSE PRACTITIONER

## 2020-08-12 RX ADMIN — PIPERACILLIN SODIUM AND TAZOBACTAM SODIUM 3.38 G: 3; .375 INJECTION, POWDER, LYOPHILIZED, FOR SOLUTION INTRAVENOUS at 23:10

## 2020-08-12 RX ADMIN — VANCOMYCIN HYDROCHLORIDE 1000 MG: 1 INJECTION, POWDER, LYOPHILIZED, FOR SOLUTION INTRAVENOUS at 23:21

## 2020-08-12 RX ADMIN — DEXTROSE MONOHYDRATE 500 MG: 50 INJECTION, SOLUTION INTRAVENOUS at 23:10

## 2020-08-12 RX ADMIN — IOPAMIDOL 85 ML: 755 INJECTION, SOLUTION INTRAVENOUS at 23:34

## 2020-08-12 ASSESSMENT — ENCOUNTER SYMPTOMS
RHINORRHEA: 0
COLOR CHANGE: 0
ABDOMINAL PAIN: 0
SHORTNESS OF BREATH: 1
SORE THROAT: 0

## 2020-08-12 NOTE — ED NOTES
Bed: 05  Expected date:   Expected time:   Means of arrival:   Comments:     Kiya Edwards  08/12/20 1935

## 2020-08-13 LAB
AMORPHOUS: ABNORMAL /HPF
ANION GAP SERPL CALCULATED.3IONS-SCNC: 10 MMOL/L (ref 3–16)
BACTERIA: ABNORMAL /HPF
BASE EXCESS ARTERIAL: 11.4 MMOL/L (ref -3–3)
BILIRUBIN URINE: NEGATIVE
BLOOD, URINE: ABNORMAL
BUN BLDV-MCNC: 18 MG/DL (ref 7–20)
CALCIUM SERPL-MCNC: 9.1 MG/DL (ref 8.3–10.6)
CARBOXYHEMOGLOBIN ARTERIAL: 0.5 % (ref 0–1.5)
CHLORIDE BLD-SCNC: 95 MMOL/L (ref 99–110)
CLARITY: CLEAR
CO2: 37 MMOL/L (ref 21–32)
COLOR: YELLOW
CREAT SERPL-MCNC: 0.9 MG/DL (ref 0.6–1.2)
EKG ATRIAL RATE: 98 BPM
EKG DIAGNOSIS: NORMAL
EKG Q-T INTERVAL: 280 MS
EKG QRS DURATION: 70 MS
EKG QTC CALCULATION (BAZETT): 327 MS
EKG R AXIS: 95 DEGREES
EKG T AXIS: 216 DEGREES
EKG VENTRICULAR RATE: 82 BPM
EPITHELIAL CELLS, UA: ABNORMAL /HPF (ref 0–5)
GFR AFRICAN AMERICAN: >60
GFR NON-AFRICAN AMERICAN: 60
GLUCOSE BLD-MCNC: 110 MG/DL (ref 70–99)
GLUCOSE URINE: NEGATIVE MG/DL
HCO3 ARTERIAL: 39.3 MMOL/L (ref 21–29)
HEMOGLOBIN, ART, EXTENDED: 12.1 G/DL (ref 12–16)
KETONES, URINE: NEGATIVE MG/DL
LEUKOCYTE ESTERASE, URINE: ABNORMAL
MAGNESIUM: 2.2 MG/DL (ref 1.8–2.4)
METHEMOGLOBIN ARTERIAL: 0.3 %
MICROSCOPIC EXAMINATION: YES
NITRITE, URINE: POSITIVE
O2 CONTENT ARTERIAL: 15 ML/DL
O2 SAT, ARTERIAL: 86.1 %
O2 THERAPY: ABNORMAL
PCO2 ARTERIAL: 69.4 MMHG (ref 35–45)
PH ARTERIAL: 7.37 (ref 7.35–7.45)
PH UA: 5.5 (ref 5–8)
PO2 ARTERIAL: 54.4 MMHG (ref 75–108)
POTASSIUM SERPL-SCNC: 3.7 MMOL/L (ref 3.5–5.1)
PROCALCITONIN: 0.15 NG/ML (ref 0–0.15)
PROTEIN UA: 30 MG/DL
RBC UA: ABNORMAL /HPF (ref 0–4)
SARS-COV-2, NAA: NOT DETECTED
SODIUM BLD-SCNC: 142 MMOL/L (ref 136–145)
SPECIFIC GRAVITY UA: 1.02 (ref 1–1.03)
TCO2 ARTERIAL: 41.4 MMOL/L
URINE REFLEX TO CULTURE: YES
URINE TYPE: ABNORMAL
UROBILINOGEN, URINE: 0.2 E.U./DL
WBC UA: ABNORMAL /HPF (ref 0–5)

## 2020-08-13 PROCEDURE — 80048 BASIC METABOLIC PNL TOTAL CA: CPT

## 2020-08-13 PROCEDURE — 82803 BLOOD GASES ANY COMBINATION: CPT

## 2020-08-13 PROCEDURE — 36415 COLL VENOUS BLD VENIPUNCTURE: CPT

## 2020-08-13 PROCEDURE — 2580000003 HC RX 258: Performed by: INTERNAL MEDICINE

## 2020-08-13 PROCEDURE — 84145 PROCALCITONIN (PCT): CPT

## 2020-08-13 PROCEDURE — 83735 ASSAY OF MAGNESIUM: CPT

## 2020-08-13 PROCEDURE — 6370000000 HC RX 637 (ALT 250 FOR IP): Performed by: INTERNAL MEDICINE

## 2020-08-13 PROCEDURE — U0003 INFECTIOUS AGENT DETECTION BY NUCLEIC ACID (DNA OR RNA); SEVERE ACUTE RESPIRATORY SYNDROME CORONAVIRUS 2 (SARS-COV-2) (CORONAVIRUS DISEASE [COVID-19]), AMPLIFIED PROBE TECHNIQUE, MAKING USE OF HIGH THROUGHPUT TECHNOLOGIES AS DESCRIBED BY CMS-2020-01-R: HCPCS

## 2020-08-13 PROCEDURE — 6360000002 HC RX W HCPCS: Performed by: INTERNAL MEDICINE

## 2020-08-13 PROCEDURE — 94761 N-INVAS EAR/PLS OXIMETRY MLT: CPT

## 2020-08-13 PROCEDURE — 2700000000 HC OXYGEN THERAPY PER DAY

## 2020-08-13 PROCEDURE — 96366 THER/PROPH/DIAG IV INF ADDON: CPT

## 2020-08-13 PROCEDURE — 93010 ELECTROCARDIOGRAM REPORT: CPT | Performed by: INTERNAL MEDICINE

## 2020-08-13 PROCEDURE — G0378 HOSPITAL OBSERVATION PER HR: HCPCS

## 2020-08-13 PROCEDURE — 99223 1ST HOSP IP/OBS HIGH 75: CPT | Performed by: INTERNAL MEDICINE

## 2020-08-13 RX ORDER — ONDANSETRON 2 MG/ML
4 INJECTION INTRAMUSCULAR; INTRAVENOUS EVERY 6 HOURS PRN
Status: DISCONTINUED | OUTPATIENT
Start: 2020-08-13 | End: 2020-08-24 | Stop reason: HOSPADM

## 2020-08-13 RX ORDER — ACETAMINOPHEN 325 MG/1
650 TABLET ORAL EVERY 6 HOURS PRN
Status: DISCONTINUED | OUTPATIENT
Start: 2020-08-13 | End: 2020-08-24 | Stop reason: HOSPADM

## 2020-08-13 RX ORDER — SIMVASTATIN 10 MG
10 TABLET ORAL NIGHTLY
Status: DISCONTINUED | OUTPATIENT
Start: 2020-08-13 | End: 2020-08-19 | Stop reason: RX

## 2020-08-13 RX ORDER — POLYETHYLENE GLYCOL 3350 17 G/17G
17 POWDER, FOR SOLUTION ORAL DAILY PRN
Status: DISCONTINUED | OUTPATIENT
Start: 2020-08-13 | End: 2020-08-24 | Stop reason: HOSPADM

## 2020-08-13 RX ORDER — SODIUM CHLORIDE 0.9 % (FLUSH) 0.9 %
10 SYRINGE (ML) INJECTION PRN
Status: DISCONTINUED | OUTPATIENT
Start: 2020-08-13 | End: 2020-08-24 | Stop reason: HOSPADM

## 2020-08-13 RX ORDER — IPRATROPIUM BROMIDE AND ALBUTEROL SULFATE 2.5; .5 MG/3ML; MG/3ML
1 SOLUTION RESPIRATORY (INHALATION) EVERY 4 HOURS PRN
Status: DISCONTINUED | OUTPATIENT
Start: 2020-08-13 | End: 2020-08-24 | Stop reason: HOSPADM

## 2020-08-13 RX ORDER — PROMETHAZINE HYDROCHLORIDE 25 MG/1
12.5 TABLET ORAL EVERY 6 HOURS PRN
Status: DISCONTINUED | OUTPATIENT
Start: 2020-08-13 | End: 2020-08-24 | Stop reason: HOSPADM

## 2020-08-13 RX ORDER — AMLODIPINE BESYLATE 5 MG/1
5 TABLET ORAL DAILY
Status: DISCONTINUED | OUTPATIENT
Start: 2020-08-13 | End: 2020-08-24 | Stop reason: HOSPADM

## 2020-08-13 RX ORDER — SODIUM CHLORIDE 0.9 % (FLUSH) 0.9 %
10 SYRINGE (ML) INJECTION EVERY 12 HOURS SCHEDULED
Status: DISCONTINUED | OUTPATIENT
Start: 2020-08-13 | End: 2020-08-24 | Stop reason: HOSPADM

## 2020-08-13 RX ORDER — MONTELUKAST SODIUM 10 MG/1
10 TABLET ORAL DAILY
Status: DISCONTINUED | OUTPATIENT
Start: 2020-08-13 | End: 2020-08-24 | Stop reason: HOSPADM

## 2020-08-13 RX ORDER — FUROSEMIDE 10 MG/ML
40 INJECTION INTRAMUSCULAR; INTRAVENOUS 2 TIMES DAILY
Status: DISCONTINUED | OUTPATIENT
Start: 2020-08-13 | End: 2020-08-16

## 2020-08-13 RX ORDER — ACETAMINOPHEN 650 MG/1
650 SUPPOSITORY RECTAL EVERY 6 HOURS PRN
Status: DISCONTINUED | OUTPATIENT
Start: 2020-08-13 | End: 2020-08-24 | Stop reason: HOSPADM

## 2020-08-13 RX ORDER — OXCARBAZEPINE 300 MG/1
150 TABLET, FILM COATED ORAL 2 TIMES DAILY
Status: DISCONTINUED | OUTPATIENT
Start: 2020-08-13 | End: 2020-08-24 | Stop reason: HOSPADM

## 2020-08-13 RX ORDER — BUSPIRONE HYDROCHLORIDE 5 MG/1
5 TABLET ORAL 2 TIMES DAILY
COMMUNITY

## 2020-08-13 RX ORDER — LEVOTHYROXINE SODIUM 0.1 MG/1
100 TABLET ORAL DAILY
Status: DISCONTINUED | OUTPATIENT
Start: 2020-08-13 | End: 2020-08-24 | Stop reason: HOSPADM

## 2020-08-13 RX ADMIN — OXCARBAZEPINE 150 MG: 300 TABLET, FILM COATED ORAL at 09:33

## 2020-08-13 RX ADMIN — APIXABAN 2.5 MG: 5 TABLET, FILM COATED ORAL at 09:34

## 2020-08-13 RX ADMIN — MONTELUKAST SODIUM 10 MG: 10 TABLET, COATED ORAL at 09:33

## 2020-08-13 RX ADMIN — Medication 10 ML: at 09:34

## 2020-08-13 RX ADMIN — SIMVASTATIN 10 MG: 10 TABLET, FILM COATED ORAL at 20:48

## 2020-08-13 RX ADMIN — AMLODIPINE BESYLATE 5 MG: 5 TABLET ORAL at 09:33

## 2020-08-13 RX ADMIN — FUROSEMIDE 40 MG: 10 INJECTION, SOLUTION INTRAMUSCULAR; INTRAVENOUS at 09:33

## 2020-08-13 RX ADMIN — LEVOTHYROXINE SODIUM 100 MCG: 100 TABLET ORAL at 06:10

## 2020-08-13 RX ADMIN — FUROSEMIDE 40 MG: 10 INJECTION, SOLUTION INTRAMUSCULAR; INTRAVENOUS at 20:25

## 2020-08-13 RX ADMIN — APIXABAN 2.5 MG: 5 TABLET, FILM COATED ORAL at 20:48

## 2020-08-13 RX ADMIN — OXCARBAZEPINE 150 MG: 300 TABLET, FILM COATED ORAL at 20:48

## 2020-08-13 NOTE — PROGRESS NOTES
4 Eyes Skin Assessment     The patient is being assess for   Admission    I agree that 2 RN's have performed a thorough Head to Toe Skin Assessment on the patient. ALL assessment sites listed below have been assessed. Areas assessed by both nurses:   [x]   Head, Face, and Ears   [x]   Shoulders, Back, and Chest, Abdomen  [x]   Arms, Elbows, and Hands   [x]   Coccyx, Sacrum, and Ischium  [x]   Legs, Feet, and Heels        Scattered bruises, Scab on nose, coccyx red but blanchable Mepilex in place, excoriation to inner thighs   **SHARE this note so that the co-signing nurse is able to place an eSignature**    Co-signer eSignature: {Esignature:[id number]}    Does the Patient have Skin Breakdown?   No          Darvin Prevention initiated:  Yes   Wound Care Orders initiated:  No      [facility] nurse consulted for Pressure Injury (Stage 3,4, Unstageable, DTI, NWPT, Complex wounds)and New or Established Ostomies:  No      Primary Nurse eSignature: Electronically signed by Corky Soto RN on 8/13/20 at 3:29 AM EDT

## 2020-08-13 NOTE — ED PROVIDER NOTES
[address] ED  EMERGENCY DEPARTMENT ENCOUNTER        Pt Name: Stephen Mahmood  MRN: [id number]  [place] 1935  Date of evaluation: 8/12/2020  Provider: KANDI Villasenor - STEPHEN  PCP: Dylan Robertson MD  ED Attending: Catarina Campa MD    CHIEF COMPLAINT       Chief Complaint   Patient presents with    Shortness of Breath       HISTORY OF PRESENT ILLNESS   (Location/Symptom, Timing/Onset, Context/Setting, Quality, Duration, Modifying Factors, Severity)  Note limiting factors. Stephen Mahmood is a 82 y.o. female for sob. Onset was today. Context includes pt was brought in by ems for sob. Pt does not use oxygen at home at baseline. Alleviating factors include nothing. Aggravating factors include nothing. Pain is 0/10. nothing has been used for pain today. Nursing Notes were all reviewed and agreed with or any disagreements were addressed  in the HPI. REVIEW OF SYSTEMS  (2-9 systems for level 4, 10 or more for level 5)     Review of Systems   Constitutional: Negative for fever. HENT: Negative for congestion, rhinorrhea and sore throat. Respiratory: Positive for shortness of breath. Cardiovascular: Negative for chest pain. Gastrointestinal: Negative for abdominal pain. Genitourinary: Negative for decreased urine volume and difficulty urinating. Musculoskeletal: Negative for arthralgias and myalgias. Skin: Negative for color change and rash. Neurological: Negative for dizziness and light-headedness. Psychiatric/Behavioral: Negative for agitation. All other systems reviewed and are negative. Positivesand Pertinent negatives as per HPI. Except as noted above in the ROS, all other systems were reviewed and negative.        PAST MEDICAL HISTORY     Past Medical History:   Diagnosis Date    [facility]'s dementia ([facility]     Asthma     Atrial fibrillation ([facility]     Blood clot in vein     Removal of Blood clot in Left Groin    CHF [facility]congest[facility] failure) ([facility]     Dementia ([facility]     GERD (gastroesophageal reflux disease)     Hypertension     Kidney failure     MRSA (methicillin resistant staph aureus) culture positive 08/08/2018    + resp cx    Osteoporosis          SURGICAL HISTORY       Past Surgical History:   Procedure Laterality Date    CHOLECYSTECTOMY      EYE SURGERY      HYSTERECTOMY      [place] [address] W/BRNCL ALVEOLAR LAVAGE N/A 8/8/2018    BRONCHOSCOPY ALVEOLAR LAVAGE performed by Cynthia Barbosa MD at [address] ESOPHAGOGASTRODUODENOSCOPY TRANSORAL DIAGNOSTIC N/A 8/4/2018    EGD ESOPHAGOGASTRODUODENOSCOPY performed by Gladys Huang MD at [place] [address]       Previous Medications    ACETAMINOPHEN (TYLENOL) 500 MG TABLET    Take 500 mg by mouth every 6 hours as needed for Pain    ALBUTEROL (PROVENTIL) (2.5 MG/3ML) 0.083% NEBULIZER SOLUTION    Take 2.5 mg by nebulization every 6 hours as needed for Wheezing    AMLODIPINE (NORVASC) 5 MG TABLET    Take 1 tablet by mouth daily    APIXABAN (ELIQUIS) 2.5 MG TABS TABLET    Take 1 tablet by mouth 2 times daily    CHOLECALCIFEROL (VITAMIN D3) 2000 UNITS CAPS    Take 1 capsule by mouth daily    FAMOTIDINE (PEPCID) 20 MG TABLET    Take 20 mg by mouth Daily    FUROSEMIDE (LASIX) 40 MG TABLET    Take 1 tablet by mouth daily    LEVOTHYROXINE (SYNTHROID) 100 MCG TABLET    Take 100 mcg by mouth Daily    MAGNESIUM OXIDE (MAG-OX) 400 MG TABLET    Take 400 mg by mouth 2 times daily    MEMANTINE ER (NAMENDA XR) 28 MG CP24 EXTENDED RELEASE CAPSULE    Take by mouth daily    MONTELUKAST (SINGULAIR) 10 MG TABLET    Take 10 mg by mouth daily    MULTIPLE VITAMINS-MINERALS (THERAPEUTIC MULTIVITAMIN-MINERALS) TABLET    Take 1 tablet by mouth daily    OXCARBAZEPINE (TRILEPTAL) 150 MG TABLET    Take 150 mg by mouth 2 times daily    OXYBUTYNIN (DITROPAN-XL) 5 MG EXTENDED RELEASE TABLET    Take 5 mg by mouth daily    SENNA (SENOKOT) 8.6 MG TABLET    Take 2 tablets by mouth daily    SIMVASTATIN (ZOCOR) 10 MG TABLET    Take 10 mg by mouth nightly         ALLERGIES     Codeine and Sulfa antibiotics    FAMILY HISTORY     History reviewed. No pertinent family history. SOCIAL HISTORY       Social History     Socioeconomic History    Marital status:      Spouse name: None    Number of children: None    Years of education: None    Highest education level: None   Occupational History    None   Social Needs    Financial resource strain: None    Food insecurity     Worry: None     Inability: None    Transportation needs     Medical: None     Non-medical: None   Tobacco Use    Smoking status: Never Smoker    Smokeless tobacco: Never Used   Substance and Sexual Activity    Alcohol use: None    Drug use: None    Sexual activity: None   Lifestyle    Physical activity     Days per week: None     Minutes per session: None    Stress: None   Relationships    Social connections     Talks on phone: None     Gets together: None     Attends [employer] service: None     Active member of club or organization: None     Attends meetings of clubs or organizations: None     Relationship status: None    Intimate partner violence     Fear of current or ex partner: None     Emotionally abused: None     Physically abused: None     Forced sexual activity: None   Other Topics Concern    None   Social History Narrative    None       SCREENINGS             PHYSICAL EXAM    (up to 7 for level 4, 8 ormore for level 5)     ED Triage Vitals [08/12/20 1949]   BP Temp Temp Source Pulse Resp SpO2 Height Weight   133/78 98.1 °F (36.7 °C) Oral 76 22 100 % 5' 4\" (1.626 m) 225 lb (102.1 kg)       Physical Exam  Constitutional:       Appearance: She is well-developed. HENT:      Head: Normocephalic and atraumatic. Neck:      Musculoskeletal: Normal range of motion. Cardiovascular:      Rate and Rhythm: Normal rate.    Pulmonary:      Effort: Tachypnea and respiratory distress present. Breath sounds: No wheezing or rhonchi. Abdominal:      General: There is no distension. Palpations: Abdomen is soft. Musculoskeletal: Normal range of motion. Skin:     General: Skin is warm and dry. Neurological:      Mental Status: She is alert and oriented to person, place, and time.          DIAGNOSTIC RESULTS   LABS:    Labs Reviewed   CBC WITH AUTO DIFFERENTIAL - Abnormal; Notable for the following components:       Result Value    RBC 3.88 (*)     Hemoglobin 11.4 (*)     RDW 16.0 (*)     Neutrophils Absolute 8.6 (*)     All other components within normal limits    Narrative:     Performed at:  [facility],  [facility]   Phone [phone]   COMPREHENSIVE METABOLIC PANEL W/ REFLEX TO MG FOR LOW K - Abnormal; Notable for the following components:    Chloride 95 (*)     CO2 38 (*)     Glucose 207 (*)     BUN 21 (*)     GFR Non- 60 (*)     All other components within normal limits    Narrative:     Performed at:  [facility],  [employer] [place] [employer]   Phone [phone]   URINE RT REFLEX TO CULTURE - Abnormal; Notable for the following components:    Blood, Urine SMALL (*)     Protein, UA 30 (*)     Nitrite, Urine POSITIVE (*)     Leukocyte Esterase, Urine MODERATE (*)     All other components within normal limits    Narrative:     Performed at:  [facility]) - [facility],  [employer], [facility]   Phone [phone]   BRAIN NATRIURETIC PEPTIDE - Abnormal; Notable for the following components:    Pro-BNP 1,811 (*)     All other components within normal limits    Narrative:     Performed at:  [facility]) - [facility],  [employer], [employer]   Phone [phone]   BLOOD GAS, ARTERIAL - Abnormal; Notable for the following components:    pCO2, Arterial 65.0 (*)     pO2, Arterial 72.2 (*)     HCO3, Arterial 39.5 (*)     Base Excess, Arterial 12.2 (*)     All other components within normal limits    Narrative:     Performed at:  [facility],  [place] [employer], [facility]   Phone [phone]   MICROSCOPIC URINALYSIS - Abnormal; Notable for the following components:    WBC, UA 10-20 (*)     Bacteria, UA 2+ (*)     All other components within normal limits    Narrative:     Performed at:  [facility]) - [facility],  [place], [facility]   Phone [phone]   CULTURE, BLOOD 2   CULTURE, BLOOD 1   CULTURE, URINE   TROPONIN    Narrative:     Performed at:  [facility]) - [facility],  [place], [facility]   Phone [phone]   LACTIC ACID, PLASMA    Narrative:     Performed at:  [place] [address],  [employer], [facility]   Phone [phone]       All other labs were within normal range or not returned as of this dictation. EKG: All EKG's are interpreted by the Emergency Department Physician who either signs or Co-signs this chart in the absence of a cardiologist.  Please see their note for interpretation of EKG. RADIOLOGY:   CT chest pulmonary embolism with contrast interpreted by radiologist for  Impression:     No pulmonary embolism to the proximal segmental level. Moderate right and small left pleural effusions, decreased on the right and   increased on the left since the prior study 2 weeks ago. Evidence of mild interstitial edema. Portable chest x-ray interpreted by radiologist for  Impression:     Bilateral pleural effusions and basilar pulmonary edema versus atelectasis. Pneumonia is a differential concern.            Interpretation per the Radiologist below, if available at the time of this note:    CT CHEST PULMONARY EMBOLISM W CONTRAST   Final Result   No pulmonary embolism to the proximal segmental level. Moderate right and small left pleural effusions, decreased on the right and   increased on the left since the prior study 2 weeks ago. Evidence of mild interstitial edema. XR CHEST PORTABLE   Final Result   Bilateral pleural effusions and basilar pulmonary edema versus atelectasis. Pneumonia is a differential concern. Xr Chest Portable    Result Date: 8/12/2020  EXAMINATION: ONE XRAY VIEW OF THE CHEST 8/12/2020 9:22 pm COMPARISON: CT chest, 08/04/2020 HISTORY: ORDERING SYSTEM PROVIDED HISTORY: SOB TECHNOLOGIST PROVIDED HISTORY: Reason for exam:->SOB Reason for Exam: Shortness of Breath Acuity: Acute Type of Exam: Initial FINDINGS: The cardiac silhouette is enlarged. Thoracic aortic calcification is present. There are hazy opacities in the bases, including meniscoid costophrenic angle interfaces. Pulmonary vessels are congested. Bilateral pleural effusions and basilar pulmonary edema versus atelectasis. Pneumonia is a differential concern.          PROCEDURES   Unless otherwise noted below, none     Procedures    CRITICAL CARE TIME   N/A    CONSULTS:  IP CONSULT TO HOSPITALIST  IP CONSULT TO DIETITIAN      EMERGENCY DEPARTMENT COURSE and DIFFERENTIAL DIAGNOSIS/MDM:   Vitals:    Vitals:    08/12/20 2250 08/12/20 2251 08/12/20 2316 08/12/20 2323   BP:   124/69    Pulse:  82 104 83   Resp: 27 26 24 (!) 32   Temp:       TempSrc:       SpO2: 94% 94% 94% 96%   Weight:       Height:           Patient was given the following medications:  Medications   vancomycin 1000 mg IVPB in 250 mL D5W addavial (0 mg Intravenous Stopped 8/13/20 0059)   piperacillin-tazobactam (ZOSYN) 3.375 g in sodium chloride 0.9 % 100 mL IVPB (mini-bag) (0 g Intravenous Stopped 8/12/20 2320)   azithromycin (ZITHROMAX) 500 mg in D5W 250ml addavial (0 mg Intravenous Stopped 8/13/20 0059)   iopamidol (ISOVUE-370) 76 % injection 85 mL (85 mLs Intravenous Given 8/12/20 2334)         Patient was seen and evaluated by myself and Dr Dinah Burden. Patient here for increased work of breathing. Patient reports that she does not use any oxygen at home. She was sent in from the nursing home for increased work of breathing and shortness of breath. Patient denies any chest pain. On exam the patient is awake and alert hemodynamically stable but does appear to be having some increased work of breathing and occasionally tachypneic. She did have some oxygen desaturations in the ED and was placed on oxygen. Lab values have all been reviewed and interpreted. Patient was provided with IV antibiotics. Patient's urine was concerning for a UTI. CT of the chest for pulmonary embolism was negative for PE however it was concerning for pleural effusions. Consult was placed to the hospitalist for admission for UTI and increased work of breathing. Hospice is accepted. Patient's care was transitioned to the inpatient unit. The patient tolerated their visit well. They were seen and evaluated by the attending physician, Catarina Campa MD who agreed with the assessment and plan. The patient and / or the family were informed of the results of any tests, a time was given to answer questions, a plan was proposed and they agreed with plan. FINAL IMPRESSION      1. Urinary tract infection with hematuria, site unspecified    2. Pleural effusion    3.  Acute respiratory failure with hypoxia [facility])          DISPOSITION/PLAN   DISPOSITION Admitted 08/13/2020 12:49:05 AM      PATIENT REFERRED TO:  Dylan Robertson MD  [address] [facility]  [phone]            DISCHARGE MEDICATIONS:  New Prescriptions    No medications on file       DISCONTINUED MEDICATIONS:  Discontinued Medications    No medications on file              (Please note that portions of this note were completed with a voice recognition program.  Efforts were made to edit the dictations but occasionally words are mis-transcribed.)    Kailey Torres Percy Saucedo - CNP (electronically signed)       KANDI Peng - CNP  08/13/20 0122

## 2020-08-13 NOTE — PROGRESS NOTES
Charge RN unable to start Iv as well. Clinical has been called, will be coming to place Iv for patient.

## 2020-08-13 NOTE — H&P
Hospital Medicine History & Physical      PCP: Umberto Fang MD    Date of Admission: 8/12/2020    Date of Service: Pt seen/examined on 8/13/2020    Chief Complaint:    Chief Complaint   Patient presents with    Shortness of Breath     History Of Present Illness: The patient is a 82 y.o. female with alzheimers dementia, atrial fibrillation, chronic diastolic CHF, GERD, HTN who presented to [facility] ED with complaint of SOB. When I saw the patient she was feeling somewhat lethargic and unable to give me much of a history. Patient is apparently been noncompliant with home oxygen therapy. She came to the ER for shortness of breath. Denies any chest pain. Work-up showed acute on chronic respiratory failure likely due to underlying CHF. Because the patient is somewhat lethargic and there is a change in mental status I ordered an arterial blood gas. She however refused but woke up after the attempted to do one. She is admitted to the medical floor for COVID rule out. Recent COVID testing is negative.     Past Medical History:        Diagnosis Date    Alzheimer's dementia ([address]     Asthma     Atrial fibrillation ([address]     Blood clot in vein     Removal of Blood clot in Left Groin    CHF (congestive heart failure) (HCC)     Dementia (HCC)     GERD (gastroesophageal reflux disease)     Hypertension     Kidney failure     MRSA (methicillin resistant staph aureus) culture positive 08/08/2018    + resp cx    Osteoporosis        Past Surgical History:        Procedure Laterality Date    CHOLECYSTECTOMY      EYE SURGERY      HYSTERECTOMY      [place] [address] W/BRNCL ALVEOLAR LAVAGE N/A 8/8/2018    BRONCHOSCOPY ALVEOLAR LAVAGE performed by Meredith Langston MD at [facility] [facility] ESOPHAGOGASTRODUODENOSCOPY TRANSORAL DIAGNOSTIC N/A 8/4/2018    EGD ESOPHAGOGASTRODUODENOSCOPY performed by Adair Tracey MD at [facility]         Medications Prior to Admission: Prior to Admission medications    Medication Sig Start Date End Date Taking?  Authorizing Provider   busPIRone (BUSPAR) 5 MG tablet Take 5 mg by mouth 2 times daily   Yes Historical Provider, MD   furosemide (LASIX) 40 MG tablet Take 1 tablet by mouth daily 8/7/20   Nisha Heredia MD   amLODIPine (NORVASC) 5 MG tablet Take 1 tablet by mouth daily 7/8/20   Mary Gu MD   apixaban (ELIQUIS) 2.5 MG TABS tablet Take 1 tablet by mouth 2 times daily 8/14/18   Brijesh Mckee MD   acetaminophen (TYLENOL) 500 MG tablet Take 500 mg by mouth every 6 hours as needed for Pain    Historical Provider, MD   albuterol (PROVENTIL) (2.5 MG/3ML) 0.083% nebulizer solution Take 2.5 mg by nebulization every 6 hours as needed for Wheezing    Historical Provider, MD   famotidine (PEPCID) 20 MG tablet Take 20 mg by mouth Daily    Historical Provider, MD   levothyroxine (SYNTHROID) 100 MCG tablet Take 100 mcg by mouth Daily    Historical Provider, MD   magnesium oxide (MAG-OX) 400 MG tablet Take 400 mg by mouth 2 times daily    Historical Provider, MD   montelukast (SINGULAIR) 10 MG tablet Take 10 mg by mouth daily    Historical Provider, MD   Multiple Vitamins-Minerals (THERAPEUTIC MULTIVITAMIN-MINERALS) tablet Take 1 tablet by mouth daily    Historical Provider, MD   memantine ER (NAMENDA XR) 28 MG CP24 extended release capsule Take by mouth daily    Historical Provider, MD   OXcarbazepine (TRILEPTAL) 150 MG tablet Take 150 mg by mouth 2 times daily    Historical Provider, MD   oxybutynin (DITROPAN-XL) 5 MG extended release tablet Take 5 mg by mouth daily    Historical Provider, MD   senna (SENOKOT) 8.6 MG tablet Take 2 tablets by mouth daily    Historical Provider, MD   simvastatin (ZOCOR) 10 MG tablet Take 10 mg by mouth nightly    Historical Provider, MD   Cholecalciferol (VITAMIN D3) 2000 units CAPS Take 1 capsule by mouth daily    Historical Provider, MD       Allergies:  Codeine and Sulfa antibiotics    Social History:  The patient currently lives at [facility]    TOBACCO:   reports that she has never smoked. She has never used smokeless tobacco.  ETOH:   has no history on file for alcohol. Family History:   Positive as follows:    History reviewed. No pertinent family history. REVIEW OF SYSTEMS:       Unable to do one due to altered mental status    PHYSICAL EXAM:    /76   Pulse 92   Temp 98.5 °F (36.9 °C) (Oral)   Resp 22   Ht 5' 4\" (1.626 m)   Wt 225 lb (102.1 kg)   SpO2 94%   BMI 38.62 kg/m²     Gen: She is sleepy and does not answer my questions appropriately  Eyes: PERRL. No sclera icterus. No conjunctival injection. ENT: No discharge. Pharynx clear. Neck: No JVD. No Carotid Bruit. Trachea midline. Resp: No accessory muscle use. + crackles. No wheezes. No rhonchi. CV: Irregularly irregular rate and rhythm. No murmur. No rub. + edema. Capillary Refill: Brisk,< 3 seconds   Peripheral Pulses: +2 palpable, equal bilaterally   GI: Non-tender. Non-distended. No masses. No organomegaly. Normal bowel sounds. No hernia. Skin: Warm and dry. No nodule on exposed extremities. No rash on exposed extremities. M/S: No cyanosis. No joint deformity. No clubbing. Neuro: Awake. Grossly nonfocal    Psych: Oriented x 3. No anxiety or agitation.      CBC:   Recent Labs     08/12/20 2000   WBC 11.0   HGB 11.4*   HCT 36.6   MCV 94.2        BMP:   Recent Labs     08/12/20 2000 08/13/20  0604    142   K 4.2 3.7   CL 95* 95*   CO2 38* 37*   BUN 21* 18   CREATININE 0.9 0.9     LIVER PROFILE:   Recent Labs     08/12/20 2000   AST 35   ALT 16   BILITOT 0.4   ALKPHOS 71     UA:  Recent Labs     08/12/20  2128   COLORU Yellow   PHUR 5.5   WBCUA 10-20*   RBCUA 3-4   BACTERIA 2+*   CLARITYU Clear   SPECGRAV 1.020   LEUKOCYTESUR MODERATE*   UROBILINOGEN 0.2   BILIRUBINUR Negative   BLOODU SMALL*   GLUCOSEU Negative   AMORPHOUS Rare     CARDIAC ENZYMES  Recent Labs     08/12/20 2000   TROPONINI <0.01 CULTURES  Blood Cx: pending   Urine Cx: pending   COVID-19: pending     EKG:  I have reviewed the EKG with the following interpretation:   Atrial fibrillation (rate controlled), Rightward axis, Nonspecific ST and T wave abnormality    RADIOLOGY  CT CHEST PULMONARY EMBOLISM W CONTRAST   Final Result   No pulmonary embolism to the proximal segmental level. Moderate right and small left pleural effusions, decreased on the right and   increased on the left since the prior study 2 weeks ago. Evidence of mild interstitial edema. XR CHEST PORTABLE   Final Result   Bilateral pleural effusions and basilar pulmonary edema versus atelectasis. Pneumonia is a differential concern. Pertinent previous results reviewed   None     ASSESSMENT/PLAN:  Acute on chronic hypoxic RF  - normally uses 2L NC O2 continuous at home   - Now requiring 3L NC  - Suspect 2/2 acute on chronic dCHF, CXR with bilateral pleural effusions with pulmonary edema  - Was recently admitted, but no leukocytosis, PCT normal, lower concerns for HCAP as cause   - CAN NOT RULE OUT COVID-19   - DROPLET + PRECAUTIONS   - COVID-19 TESTING PENDING   - I ordered an ABG but she refused. She did wake up. Acute on chronic dCHF  Severe pulmonary HTN  - Last Echo with EF 55-60%, DD and severe pulm HTN  - Was recently admitted for the same, discharge weight was 205 lbs  - Admitted with weight 225 lbs  - Continue IV diuretics, Lasix 40 mg IV BID  - Strict I&O's , daily weights       UTI  - UA with + Nitrite and LE   - Urine Cx pending   - Continue , was given Vanc/Zosyn x 1 dose in ED    Chronic Atrial Fibrillation   - Continue AC with Eliquis   - Rate is controlled     HTN  - BP is controlled  - Continue Norvasc     Hypothyroidism   - Continue Synthroid     HLD  - Continue statin     Dementia with behavior disturbances  - Continue home medications  - Supportive Care     Morbid Obesity  - Body mass index is 38.62 kg/m².   - Complicating assessment and treatment. Placing patient at risk for multiple co-morbidities as well as early death and contributing to the patient's presentation.   - Counseled on weight loss.     DVT Prophylaxis: Eliquis  Diet: DIET LOW SODIUM 2 GM;   Code Status: DNR-CCA    KENDRA CASTLE 8/13/2020 1:20 PM

## 2020-08-13 NOTE — DISCHARGE INSTR - COC
Continuity of Care Form    Patient Name: Stephen Mahmood   :  1935  MRN:  [id number]    Admit date:  2020  Discharge date:  2020    Code Status Order: DNR-CCA   Advance Directives:   [address] Directive Type of Healthcare Directive Copy in [address] Agent's Name Healthcare Agent's Phone Number    20 0231  Yes, patient has an advance directive for healthcare treatment  Durable power of  for health care  No, copy requested from medical records  Healthcare power of   [address]  [id number]          Admitting Physician:  Janes Harris MD  PCP: Dylan Robertson MD    Discharging Nurse: [facility] Unit/Room#: 0207/0207-02  Discharging Unit Phone Number: [phone]    Emergency Contact:   Extended Emergency Contact Information  Primary Emergency Contact: Halina Falk ([facility])  Address: [address] Phone: [phone]  Mobile Phone: [phone]  Relation: Child  Secondary Emergency Contact: Rob Falk  Address: [place]ON [place] [address] Phone: [phone]  Mobile Phone: [phone]  Relation: Child    Past Surgical History:  Past Surgical History:   Procedure Laterality Date    CHOLECYSTECTOMY      EYE SURGERY      HYSTERECTOMY      [place] [address] W/BRNCL ALVEOLAR LAVAGE N/A 2018    BRONCHOSCOPY ALVEOLAR LAVAGE performed by Gasper Christy MD at [facility] ESOPHAGOGASTRODUODENOSCOPY TRANSORAL DIAGNOSTIC N/A 2018    EGD ESOPHAGOGASTRODUODENOSCOPY performed by Parveen Justin MD at [facility]         Immunization History: There is no immunization history on file for this patient.     Active Problems:  Patient Active Problem List   Diagnosis Code    Hematemesis K92.0    Morbid obesity ([facility] [address] E66.01    Persistent atrial fibrillation I48.19    AV block I44.30    Essential hypertension I10    Acute hypoxemic respiratory failure ([facility]) J96.01    Pulmonary infiltrates E31.0    Diastolic dysfunction P86.04    Pleural effusion, bilateral J90    Morbid obesity with BMI of 40.0-44.9, adult ([facility]) E66.01, Z68.41    Myonecrosis M62.89    Atelectasis J98.11    Ineffective airway clearance R06.89    Shortness of breath R06.02    SOB (shortness of breath) R06.02    Acute on chronic diastolic CHF (congestive heart failure) ([facility]) I50.33    Pulmonary hypertension ([facility]) I27.20    Bradycardia R00.1    Intermittent asthma without complication A09.89    Late onset Alzheimer's disease without behavioral disturbance ([facility]) G30.1, F02.80    Acute respiratory failure with hypoxia and hypercapnia ([facility]) J96.01, J96.02    Acute on chronic respiratory failure with hypoxia and hypercapnia ([facility]) J96.21, J96.22    Acute on chronic congestive heart failure ([facility]) I50.9    Dementia with behavioral disturbance ([facility]) F03.91       Isolation/Infection:   Isolation          Droplet Plus        Patient Infection Status     Infection Onset Added Last Indicated Last Indicated By Review Planned Expiration Resolved Resolved By    COVID-19 Rule Out 08/13/20 08/13/20 08/13/20 COVID-19 (Ordered)  08/27/20      Resolved    COVID-19 Rule Out 08/04/20 08/04/20 08/04/20 COVID-19 (Ordered)   08/05/20 Rule-Out Test Resulted    COVID-19 Rule Out 06/29/20 06/29/20 06/29/20 COVID-19 (Ordered)   06/29/20 Rule-Out Test Resulted    MRSA  08/12/18 08/12/18 Danika Gracia RN   06/29/20 Jens Godfrey, SIMRAN          Nurse Assessment:  Last Vital Signs: /76   Pulse 92   Temp 98.5 °F (36.9 °C) (Oral)   Resp 22   Ht 5' 4\" (1.626 m)   Wt 225 lb (102.1 kg)   SpO2 94%   BMI 38.62 kg/m²     Last documented pain score (0-10 scale):    Last Weight:   Wt Readings from Last 1 Encounters:   08/13/20 225 lb (102.1 kg)     Mental Status:  disoriented    IV Access:  - None    Nursing Mobility/ADLs:  Walking   Dependent  Transfer  Dependent  Bathing  Dependent  Dressing  Dependent  Toileting  Dependent  Feeding  Assisted  Med Admin  Assisted  Med Delivery   whole and prefers mixed with pudding    Wound Care Documentation and Therapy:  Wound 07/04/20 Nose abrasion noted on nose which pt. said that she had acquired in a fall. (Active)   Dressing Status Other (Comment) 08/07/20 0750   Dressing/Treatment Open to air 08/13/20 0929   Wound Assessment Dry; Intact 08/07/20 0750   Drainage Amount None 08/07/20 0750   Number of days: 39        Elimination:  Continence:   · Bowel: No  · Bladder: Yes and No  Urinary Catheter: Removal Date 8/24   Colostomy/Ileostomy/Ileal Conduit: No       Date of Last BM: 8/24/2020    Intake/Output Summary (Last 24 hours) at 8/13/2020 1014  Last data filed at 8/13/2020 0248  Gross per 24 hour   Intake 600 ml   Output 300 ml   Net 300 ml     I/O last 3 completed shifts: In: 600 [IV Piggyback:600]  Out: 300 [Urine:300]    Safety Concerns:     Sundowners [place], At Risk for Falls and Aspiration Risk    Impairments/Disabilities:      None    Nutrition Therapy:  Current Nutrition Therapy:   - Oral Diet:  Dysphagia 1 pureed and Low Sodium (2gm)    Routes of Feeding: Oral  Liquids: Nectar Thick Liquids, no drinking straw  Daily Fluid Restriction: yes - amount 1800 mL  Last Modified Barium Swallow with Video (Video Swallowing Test): not done    Treatments at the Time of Hospital Discharge:   Respiratory Treatments: See [facility]  Oxygen Therapy:  is on oxygen at 3.5 L/min per nasal cannula.   Ventilator:    - No ventilator support    Rehab Therapies: Physical Therapy and Occupational Therapy  Weight Bearing Status/Restrictions: No weight bearing restirctions, bed bound  Other Medical Equipment (for information only, NOT a DME order):  hospital bed  Other Treatments: N/A    Patient's personal belongings (please select all that are sent with patient):  Patient leaving with all personal belongings    RN SIGNATURE:  Electronically signed by John Smith. Dae Melgar RN on 8/24/20 at 5:42 PM EDT    CASE MANAGEMENT/SOCIAL WORK SECTION    Inpatient Status Date: 8/13/2020    Readmission Risk Assessment Score:  Readmission Risk              Risk of Unplanned Readmission:        17           Discharging to Facility/ Agency    Name: [facility]   Address: [address] [place] [address]. [address] Phone: [phone]   Fax: [phone]     Dialysis Facility (if applicable)   · Name:  · Address:  · Dialysis Schedule:  · Phone:  · Fax:    / signature: Electronically signed by Dwayne Diaz RN on 8/24/20 at 2:21 PM EDT    PHYSICIAN SECTION    Prognosis: Poor    Condition at Discharge: Terminal    Rehab Potential (if transferring to Rehab): Poor    Recommended Labs or Other Treatments After Discharge: Hospice    Physician Certification: I certify the above information and transfer of Tomy Jolley  is necessary for the continuing treatment of the diagnosis listed and that she requires [facility] for greater 30 days.      Update Admission H&P: No change in H&P    PHYSICIAN SIGNATURE:  Electronically signed by Rosie Batista MD on 8/24/20 at 12:49 PM EDT

## 2020-08-13 NOTE — FLOWSHEET NOTE
20 0200   Vital Signs   Temp 98.3 °F (36.8 °C)   Temp Source Oral   Pulse 100   Heart Rate Source Monitor   Resp 20   BP (!) 151/73   BP Location Right lower arm   BP Upper/Lower Lower   Patient Position High fowlers   Level of Consciousness 0   MEWS Score 1   Height and Weight   Height 5' 4\" (1.626 m)   Weight 225 lb (102.1 kg)   Weight Method Bed scale   BSA (Calculated - sq m) 2.15 sq meters   BMI (Calculated) 38.7   Oxygen Therapy   SpO2 94 %   O2 Device Nasal cannula   O2 Flow Rate (L/min) 3 L/min   Pt arrived to room in stable condition. Pt is alert to name and  only. Pt keeps repeating that \"I cant breath I'm gonna die\" Pt reassured that her oxygen is okay. Admission questions completed with nursing home paperwork. Telemetry monitor and cont pulse ox in place. Will continue to monitor. Call light within reach and bed alarm on

## 2020-08-13 NOTE — TELEPHONE ENCOUNTER
Writer spoke with Dr Oumar Garcia who states patient is a likely readmission due to requiring oxygen at this time. Will continue to monitor.

## 2020-08-13 NOTE — PROGRESS NOTES
Pt daughter Anahi Comer called for update she will be calling later this afternoon to see if COVID results are back. Daughter states that pt does not walk only pivots from recliner where she sleeps to wheelchair. Daughter unsure who placed the campoverde since she has not been able to visit pt at nursing home.

## 2020-08-13 NOTE — PROGRESS NOTES
Attempted ABG again and went in to assist [place] RT. Patient while in room became alert again and tried to [address] [facility] [address] out stating we were not going to be drawing her blood. Asked patient to tell her name and  and patient was able to answer correctly. Informed dr. Stephanie Godinez of this change and that patient is continuing to breathe shallow and Jean Marie Beckman stated to ensure patient is on a telemonitor and also a continuous pulse ox patient is on both.

## 2020-08-13 NOTE — PROGRESS NOTES
Spoke with dr. John Westfall he went into room to see patient and she is very confused now unable to tell her name and  as she could earlier. Very lethargic. He has ordered for a rapid ABG. Called respiratory to get a stat ABG on patient.

## 2020-08-13 NOTE — PROGRESS NOTES
Attempted ABG and pt cursing and combative with this RT. Discontinued attempt as patient continued to strike at this RT.

## 2020-08-13 NOTE — PROGRESS NOTES
Bedside report given to [facility] RN  pt in stable condition no needs at this time.  Call light within reach

## 2020-08-13 NOTE — PROGRESS NOTES
RESPIRATORY THERAPY ASSESSMENT    Name:  [address] [facility] [address] Record Number:  [id number]  Age: 82 y.o. Gender: female  : 1935  Today's Date:  2020  Room:  0207/0207-02    Assessment     Is the patient being admitted for a COPD or Asthma exacerbation? No   (If yes the patient will be seen every 4 hours for the first 24 hours and then reassessed)    Patient Admission Diagnosis      Allergies  Allergies   Allergen Reactions    Codeine Itching    Sulfa Antibiotics Other (See Comments)     Per Pt daughter Teddy Castro) \" Not know her reaction to medication\"       Minimum Predicted Vital Capacity:               Actual Vital Capacity:                    Pulmonary History:Asthma and CHF/Pulmonary Edema  Home Oxygen Therapy:  room air  Home Respiratory Therapy:Albuterol   Current Respiratory Therapy:  duoneb PRN          Respiratory Severity Index(RSI)   Patients with orders for inhalation medications, oxygen, or any therapeutic treatment modality will be placed on Respiratory Protocol. They will be assessed with the first treatment and at least every 72 hours thereafter. The following severity scale will be used to determine frequency of treatment intervention.     Smoking History: Pulmonary Disease or Smoking History, Greater than 15 pack year = 2    Social History  Social History     Tobacco Use    Smoking status: Never Smoker    Smokeless tobacco: Never Used   Substance Use Topics    Alcohol use: Not on file    Drug use: Not on file       Recent Surgical History: None = 0  Past Surgical History  Past Surgical History:   Procedure Laterality Date    CHOLECYSTECTOMY      EYE SURGERY      HYSTERECTOMY      [place] [address] W/BRNCL ALVEOLAR LAVAGE N/A 2018    BRONCHOSCOPY ALVEOLAR LAVAGE performed by Yadi Breen MD at [facility] [facility] ESOPHAGOGASTRODUODENOSCOPY TRANSORAL DIAGNOSTIC N/A 2018    EGD ESOPHAGOGASTRODUODENOSCOPY performed by Joon Aj MD at [facility] d. Can demonstrate a 2-3 second inspiratory hold  4. Bronchodilators will be administered via Hand Held Nebulizer [facility]) to patients when ANY of the following criteria are met  a. Incognizant or uncooperative          b. Patients treated with HHN at Home        c. Unable to demonstrate proper use of MDI with spacer     d. RR > 30 bpm   5. Bronchodilators will be delivered via Metered Dose Inhaler (MDI), HHN, Aerogen to intubated patients on mechanical ventilation. 6. Inhalation medication orders will be delivered and/or substituted as outlined below. Aerosolized Medications Ordering and Administration Guidelines:    1. All Medications will be ordered by a physician, and their frequency and/or modality will be adjusted as defined by the patients Respiratory Severity Index (RSI) score. 2. If the patient does not have documented COPD, consider discontinuing anticholinergics when RSI is less than 9.  3. If the bronchospasm worsens (increased RSI), then the bronchodilator frequency can be increased to a maximum of every 4 hours. If greater than every 4 hours is required, the physician will be contacted. 4. If the bronchospasm improves, the frequency of the bronchodilator can be decreased, based on the patient's RSI, but not less than home treatment regimen frequency. 5. Bronchodilator(s) will be discontinued if patient has a RSI less than 9 and has received no scheduled or as needed treatment for 72  Hrs. Patients Ordered on a Mucolytic Agent:    1. Must always be administered with a bronchodilator. 2. Discontinue if patient experiences worsened bronchospasm, or secretions have lessened to the point that the patient is able to clear them with a cough. Anti-inflammatory and Combination Medications:    1.  If the patient lacks prior history of lung disease, is not using inhaled anti-inflammatory medication at home, and lacks wheezing by examination or by history for at least 24 hours, contact

## 2020-08-13 NOTE — PROGRESS NOTES
Patient in need of new Iv. Came in to give patient IV antibiotics and found Iv had been pulled out and was laying in bed. Attempted with no luck. Informed charge to see if she can place an IV.

## 2020-08-14 ENCOUNTER — APPOINTMENT (OUTPATIENT)
Dept: GENERAL RADIOLOGY | Age: 85
DRG: 291 | End: 2020-08-14
Payer: MEDICARE

## 2020-08-14 LAB
ANION GAP SERPL CALCULATED.3IONS-SCNC: 6 MMOL/L (ref 3–16)
BASE EXCESS ARTERIAL: 13.5 MMOL/L (ref -3–3)
BASOPHILS ABSOLUTE: 0.1 K/UL (ref 0–0.2)
BASOPHILS RELATIVE PERCENT: 0.7 %
BUN BLDV-MCNC: 15 MG/DL (ref 7–20)
CALCIUM SERPL-MCNC: 8.8 MG/DL (ref 8.3–10.6)
CARBOXYHEMOGLOBIN ARTERIAL: 0.2 % (ref 0–1.5)
CHLORIDE BLD-SCNC: 94 MMOL/L (ref 99–110)
CO2: 39 MMOL/L (ref 21–32)
CREAT SERPL-MCNC: 0.8 MG/DL (ref 0.6–1.2)
EOSINOPHILS ABSOLUTE: 0.2 K/UL (ref 0–0.6)
EOSINOPHILS RELATIVE PERCENT: 2.4 %
GFR AFRICAN AMERICAN: >60
GFR NON-AFRICAN AMERICAN: >60
GLUCOSE BLD-MCNC: 104 MG/DL (ref 70–99)
HCO3 ARTERIAL: 41.2 MMOL/L (ref 21–29)
HCT VFR BLD CALC: 34.5 % (ref 36–48)
HEMOGLOBIN, ART, EXTENDED: 11.7 G/DL (ref 12–16)
HEMOGLOBIN: 10.9 G/DL (ref 12–16)
LYMPHOCYTES ABSOLUTE: 0.7 K/UL (ref 1–5.1)
LYMPHOCYTES RELATIVE PERCENT: 9 %
MAGNESIUM: 2.3 MG/DL (ref 1.8–2.4)
MCH RBC QN AUTO: 29.5 PG (ref 26–34)
MCHC RBC AUTO-ENTMCNC: 31.6 G/DL (ref 31–36)
MCV RBC AUTO: 93.4 FL (ref 80–100)
METHEMOGLOBIN ARTERIAL: 0.3 %
MONOCYTES ABSOLUTE: 0.8 K/UL (ref 0–1.3)
MONOCYTES RELATIVE PERCENT: 9.5 %
NEUTROPHILS ABSOLUTE: 6.5 K/UL (ref 1.7–7.7)
NEUTROPHILS RELATIVE PERCENT: 78.4 %
O2 CONTENT ARTERIAL: 16 ML/DL
O2 SAT, ARTERIAL: 98.4 %
O2 THERAPY: ABNORMAL
PCO2 ARTERIAL: 69.8 MMHG (ref 35–45)
PDW BLD-RTO: 16 % (ref 12.4–15.4)
PH ARTERIAL: 7.39 (ref 7.35–7.45)
PLATELET # BLD: 225 K/UL (ref 135–450)
PMV BLD AUTO: 8.8 FL (ref 5–10.5)
PO2 ARTERIAL: 128.2 MMHG (ref 75–108)
POTASSIUM SERPL-SCNC: 3.4 MMOL/L (ref 3.5–5.1)
RBC # BLD: 3.69 M/UL (ref 4–5.2)
SODIUM BLD-SCNC: 139 MMOL/L (ref 136–145)
TCO2 ARTERIAL: 43.4 MMOL/L
WBC # BLD: 8.3 K/UL (ref 4–11)

## 2020-08-14 PROCEDURE — 82803 BLOOD GASES ANY COMBINATION: CPT

## 2020-08-14 PROCEDURE — 6370000000 HC RX 637 (ALT 250 FOR IP): Performed by: INTERNAL MEDICINE

## 2020-08-14 PROCEDURE — 85025 COMPLETE CBC W/AUTO DIFF WBC: CPT

## 2020-08-14 PROCEDURE — 99223 1ST HOSP IP/OBS HIGH 75: CPT | Performed by: INTERNAL MEDICINE

## 2020-08-14 PROCEDURE — 81001 URINALYSIS AUTO W/SCOPE: CPT

## 2020-08-14 PROCEDURE — 2700000000 HC OXYGEN THERAPY PER DAY

## 2020-08-14 PROCEDURE — 6360000002 HC RX W HCPCS: Performed by: INTERNAL MEDICINE

## 2020-08-14 PROCEDURE — 36415 COLL VENOUS BLD VENIPUNCTURE: CPT

## 2020-08-14 PROCEDURE — G0378 HOSPITAL OBSERVATION PER HR: HCPCS

## 2020-08-14 PROCEDURE — 2580000003 HC RX 258: Performed by: INTERNAL MEDICINE

## 2020-08-14 PROCEDURE — 94761 N-INVAS EAR/PLS OXIMETRY MLT: CPT

## 2020-08-14 PROCEDURE — 99291 CRITICAL CARE FIRST HOUR: CPT | Performed by: INTERNAL MEDICINE

## 2020-08-14 PROCEDURE — 99232 SBSQ HOSP IP/OBS MODERATE 35: CPT | Performed by: INTERNAL MEDICINE

## 2020-08-14 PROCEDURE — 94640 AIRWAY INHALATION TREATMENT: CPT

## 2020-08-14 PROCEDURE — 71045 X-RAY EXAM CHEST 1 VIEW: CPT

## 2020-08-14 PROCEDURE — 87086 URINE CULTURE/COLONY COUNT: CPT

## 2020-08-14 PROCEDURE — 36600 WITHDRAWAL OF ARTERIAL BLOOD: CPT

## 2020-08-14 PROCEDURE — 80048 BASIC METABOLIC PNL TOTAL CA: CPT

## 2020-08-14 PROCEDURE — 83735 ASSAY OF MAGNESIUM: CPT

## 2020-08-14 RX ORDER — IPRATROPIUM BROMIDE AND ALBUTEROL SULFATE 2.5; .5 MG/3ML; MG/3ML
1 SOLUTION RESPIRATORY (INHALATION)
Status: DISCONTINUED | OUTPATIENT
Start: 2020-08-14 | End: 2020-08-23

## 2020-08-14 RX ORDER — LINEZOLID 2 MG/ML
600 INJECTION, SOLUTION INTRAVENOUS EVERY 12 HOURS
Status: COMPLETED | OUTPATIENT
Start: 2020-08-14 | End: 2020-08-20

## 2020-08-14 RX ORDER — FUROSEMIDE 10 MG/ML
40 INJECTION INTRAMUSCULAR; INTRAVENOUS ONCE
Status: COMPLETED | OUTPATIENT
Start: 2020-08-14 | End: 2020-08-14

## 2020-08-14 RX ORDER — POTASSIUM CHLORIDE 20 MEQ/1
40 TABLET, EXTENDED RELEASE ORAL ONCE
Status: COMPLETED | OUTPATIENT
Start: 2020-08-14 | End: 2020-08-14

## 2020-08-14 RX ADMIN — LEVOTHYROXINE SODIUM 100 MCG: 100 TABLET ORAL at 06:29

## 2020-08-14 RX ADMIN — Medication 10 ML: at 21:55

## 2020-08-14 RX ADMIN — OXCARBAZEPINE 150 MG: 300 TABLET, FILM COATED ORAL at 09:06

## 2020-08-14 RX ADMIN — CEFTRIAXONE SODIUM 1 G: 1 INJECTION, POWDER, FOR SOLUTION INTRAMUSCULAR; INTRAVENOUS at 14:38

## 2020-08-14 RX ADMIN — Medication 10 ML: at 09:07

## 2020-08-14 RX ADMIN — IPRATROPIUM BROMIDE AND ALBUTEROL SULFATE 1 AMPULE: .5; 3 SOLUTION RESPIRATORY (INHALATION) at 12:26

## 2020-08-14 RX ADMIN — LINEZOLID 600 MG: 600 INJECTION, SOLUTION INTRAVENOUS at 17:12

## 2020-08-14 RX ADMIN — FUROSEMIDE 40 MG: 10 INJECTION, SOLUTION INTRAMUSCULAR; INTRAVENOUS at 15:28

## 2020-08-14 RX ADMIN — AMLODIPINE BESYLATE 5 MG: 5 TABLET ORAL at 09:06

## 2020-08-14 RX ADMIN — APIXABAN 2.5 MG: 5 TABLET, FILM COATED ORAL at 09:07

## 2020-08-14 RX ADMIN — CEFEPIME 2 G: 2 INJECTION, POWDER, FOR SOLUTION INTRAVENOUS at 17:12

## 2020-08-14 RX ADMIN — IPRATROPIUM BROMIDE AND ALBUTEROL SULFATE 1 AMPULE: .5; 3 SOLUTION RESPIRATORY (INHALATION) at 15:48

## 2020-08-14 RX ADMIN — IPRATROPIUM BROMIDE AND ALBUTEROL SULFATE 1 AMPULE: .5; 3 SOLUTION RESPIRATORY (INHALATION) at 22:40

## 2020-08-14 RX ADMIN — OXCARBAZEPINE 150 MG: 300 TABLET, FILM COATED ORAL at 21:54

## 2020-08-14 RX ADMIN — MONTELUKAST SODIUM 10 MG: 10 TABLET, COATED ORAL at 09:06

## 2020-08-14 RX ADMIN — POTASSIUM CHLORIDE 40 MEQ: 1500 TABLET, EXTENDED RELEASE ORAL at 17:09

## 2020-08-14 RX ADMIN — IPRATROPIUM BROMIDE AND ALBUTEROL SULFATE 1 AMPULE: .5; 3 SOLUTION RESPIRATORY (INHALATION) at 19:11

## 2020-08-14 RX ADMIN — FUROSEMIDE 40 MG: 10 INJECTION, SOLUTION INTRAMUSCULAR; INTRAVENOUS at 09:06

## 2020-08-14 NOTE — CONSULTS
[address] [facility]  [phone]        Reason for Consultation/Chief Complaint:  No complaints    Consulted for CHF    History of Present Illness: Kirill Ivory is a 82 y.o. patient who presented to [facility] 8/12/20 with c/o SOB. Note recently admitted to [facility] 6/29-7/7/20 with bradycardia and diastolic CHF. Readmission [facility] 8/4/20/8/12/20 due to hypoxia/CHF  Resides at [facility]. She has PMH of Alzheimer's dementia, asthma, chronic afib on eliquis, hx bradycardia and pauses, chronic diastolic CHF, HTN, and GERD. Note ECHO 8/6/18 showed normal EF=55-60%; asynchronous septal motion; D-septum; LHV; type III DD; RV mild enlarged; mild MR/AI/TR. Note EP Dr. Golden Amaya and NP [facility] in 8/18 for bradycardia and pauses. Taken off long-acting cardizem and followed clinically without need for PM.               Admitted A 6/29/20 SOB and hypoxia. Initially on 15L NC and NRB. EKG 6/29/20 showed slow afib 39bpm with possible U waves vs higher grade AV block. Tele showed junctional bradycardia in 40's vs slow afib. Most recent ECHO 6/30/20 EF=55-60%. D-shaped septum c/w RV pressure and volume overload. Mild-moderate cLVH. Mild MR. Moderate right-sided chamber enlargement. Right ventricular systolic function is moderately reduced. Moderate TR (SPAP) is estimated at 94mmHg consistentwith severe pulm HTN. Regarding bradycardia note EP Dr. Golden Amaya did NOT recommend  unless has improved functional status and/or definitely symptomatic. Now admitted for SOB. Note she has advanced dementia and cannot reliably give history. She had been R/O for Covid. Admit EKG afib 82 bpm (no change from 8/4/20 EKG); Jani <0.01. Pro-BNP 1,811. CXR Bilateral pleural effusions and basilar pulmonary edema versus atelectasis. Note CT chest showed no PE. Moderate right and small left pleural effusions, decreased on the right and increased on the left since the prior study 2 weeks ago. Evidence of mild interstitial edema. Historical Provider, MD   magnesium oxide (MAG-OX) 400 MG tablet Take 400 mg by mouth 2 times daily    Historical Provider, MD   montelukast (SINGULAIR) 10 MG tablet Take 10 mg by mouth daily    Historical Provider, MD   Multiple Vitamins-Minerals (THERAPEUTIC MULTIVITAMIN-MINERALS) tablet Take 1 tablet by mouth daily    Historical Provider, MD   memantine ER (NAMENDA XR) 28 MG CP24 extended release capsule Take by mouth daily    Historical Provider, MD   OXcarbazepine (TRILEPTAL) 150 MG tablet Take 150 mg by mouth 2 times daily    Historical Provider, MD   oxybutynin (DITROPAN-XL) 5 MG extended release tablet Take 5 mg by mouth daily    Historical Provider, MD   senna (SENOKOT) 8.6 MG tablet Take 2 tablets by mouth daily    Historical Provider, MD   simvastatin (ZOCOR) 10 MG tablet Take 10 mg by mouth nightly    Historical Provider, MD   Cholecalciferol (VITAMIN D3) 2000 units CAPS Take 1 capsule by mouth daily    Historical Provider, MD        Allergies:  Codeine and Sulfa antibiotics     Review of Systems:   12 point ROS negative in all areas as listed below except as in [place]  Constitutional, EENT, Cardiovascular, pulmonary, GI, , Musculoskeletal, skin, neurological, hematological, endocrine, Psychiatric    Physical Examination:    Vitals:    08/14/20 0930   BP:    Pulse:    Resp:    Temp:    SpO2: 95%    Weight: 223 lb (101.2 kg)         General Appearance:  Alert, agitated and uncooperative, appears stated age   Head:  Normocephalic, without obvious abnormality, atraumatic   Eyes:  PERRL, conjunctiva/corneas clear       Nose: Nares normal, no drainage or sinus tenderness   Throat: Lips, mucosa, and tongue normal   Neck: Supple, symmetrical, trachea midline, no adenopathy, thyroid: not enlarged, symmetric, no tenderness/mass/nodules, no carotid bruit or JVD       Lungs:   Soft breath sounds   Chest Wall:  No tenderness or deformity   Heart:  +irregularly irregular, S1, S2 normal, no murmur, rub or gallop Abdomen:   Soft, non-tender, bowel sounds active all four quadrants,  no masses, no organomegaly           Extremities: Extremities normal, atraumatic, no cyanosis; 1+ BLE edema   Pulses: 2+ and symmetric   Skin: Skin color, texture, turgor normal, no rashes or lesions   Pysch: Normal mood and affect   Neurologic: Normal gross motor and sensory exam.         Labs  CBC:   Lab Results   Component Value Date    WBC 8.3 08/14/2020    RBC 3.69 08/14/2020    HGB 10.9 08/14/2020    HCT 34.5 08/14/2020    MCV 93.4 08/14/2020    RDW 16.0 08/14/2020     08/14/2020     CMP:    Lab Results   Component Value Date     08/14/2020    K 3.4 08/14/2020    K 4.2 08/12/2020    CL 94 08/14/2020    CO2 39 08/14/2020    BUN 15 08/14/2020    CREATININE 0.8 08/14/2020    GFRAA >60 08/14/2020    AGRATIO 1.1 08/12/2020    LABGLOM >60 08/14/2020    GLUCOSE 104 08/14/2020    PROT 7.2 08/12/2020    CALCIUM 8.8 08/14/2020    BILITOT 0.4 08/12/2020    ALKPHOS 71 08/12/2020    AST 35 08/12/2020    ALT 16 08/12/2020     PT/INR:  No results found for: PTINR  Lab Results   Component Value Date    CKTOTAL 345 (H) 08/05/2018    TROPONINI <0.01 08/12/2020     EKG 8/12/20   Atrial fibrillationBaseline artifactRightward axisNonspecific ST and T wave abnormalityAbnormal ECGWhen compared with ECG of 04-AUG-2020 09:05,No significant change was foundConfirmed by MARIYA Painting MD ([id number]) on 8/13/2020 7:34:27 AM       CXR 6/29/20  Impression   Mild pulmonary vascular congestion.       Cardiomegaly.       Trace bilateral pleural effusions.          8/6/18 ECHO Summary   Normal LV systolic function with EF of 55-60%.    Asynchronous septal motion noted.   D-shaped septum consistent with RV pressure and volume overload.   Mild concentric left ventricular hypertrophy.   Evidence for type III diastolic dysfunction.   Mild mitral annular calcification.   Mild biatrial enlargement.   The right ventricle is mildly enlarged.   Mild mitral, aortic, and tricuspid regurgitation.   Systolic pulmonic artery pressure (SPAP) is normal estimated at 32mmHg   (Right atrial pressure of 3mmHg). ECHO 6/30/20 Summary   Normal LV systolic function with EF of 55-60%. D-shaped septum c/w RV pressure and volume overload. Mild-moderate concentric left ventricular hypertrophy. Left ventricular diastolic filling pressure is elevated. Mild mitral regurgitation. Moderate right-sided chamber enlargement. Right ventricular systolic function is moderately reduced. Moderate tricuspid regurgitation. Systolic pulmonic artery pressure (SPAP) is estimated at 94mmHg consistent   with severe pulmonary hypertension (RAP of 8mmHg). Assessment: Yan Manley is a 82 y.o. patient who presented to [facility] 8/12/20 with c/o SOB. Note recently admitted to [facility] 6/29-7/7/20 with bradycardia and diastolic CHF. Readmission [facility] 8/4/20/8/12/20 due to hypoxia/CHF  Resides at [facility]. She has PMH of Alzheimer's dementia, asthma, chronic afib on eliquis, hx bradycardia and pauses, chronic diastolic CHF, HTN, and GERD. Note ECHO 8/6/18 showed normal EF=55-60%; asynchronous septal motion; D-septum; LHV; type III DD; RV mild enlarged; mild MR/AI/TR. Note EP Dr. Guzman Tamez and NP Sathya Alcala saw in Aug 2018 for bradycardia and pauses. Taken off long-acting cardizem and followed without need for PM.               Admitted A 6/29/20 SOB and hypoxia. Initially on 15L NC and NRB. EKG 6/29/20 showed slow afib 39bpm with possible U waves vs higher grade AV block. Tele showed junctional bradycardia in 40's vs slow afib. Most recent ECHO 6/30/20 EF=55-60%. D-shaped septum c/w RV pressure and volume overload. Mild-moderate cLVH. Mild MR. Moderate right-sided chamber enlargement. Right ventricular systolic function is moderately reduced. Moderate TR (SPAP) is estimated at 94mmHg consistentwith severe pulm HTN.  Regarding bradycardia note EP Dr. Guzman Tamez did NOT recommend  unless has improved functional status and/or definitely symptomatic. Now admitted for SOB. Note she has advanced dementia and cannot reliably give history. She had been R/O for Covid. Admit EKG afib 82 bpm (no change from 8/4/20 EKG); Jani <0.01. Pro-BNP 1,811. CXR Bilateral pleural effusions and basilar pulmonary edema versus atelectasis. Note CT chest showed no PE. Moderate right and small left pleural effusions, decreased on the right and increased on the left since the prior study 2 weeks ago. Evidence of mild interstitial edema. Diagnosis of acute on chronic diastolic CHF and chronic afib in elderly female with advanced dementia and hx bradycardia not deemed need for . Recs:  1. Continue IV lasix 40mg BID and adjust accordingly. 2. No need for cardiac testing at this time. Conservative med mgt with diuretics is only cardiac intervention I would recommend currently. 3. Continue eliquis for [facility] for afib. She would qualify for regular 5mg BID dose. ? Reason for lower dose 2.5mg.  4. Continue norvasc 5mg qd and zocor 10mg qhs.  5. Fluid and sodium restrict. 6. Follow telemetry.     Patient Active Problem List   Diagnosis    Hematemesis    Morbid obesity ([address]    Persistent atrial fibrillation    AV block    Essential hypertension    Acute respiratory failure with hypoxia (HCC)    Pulmonary infiltrates    Diastolic dysfunction    Pleural effusion    Morbid obesity with BMI of 40.0-44.9, adult (HCC)    Myonecrosis    Atelectasis    Ineffective airway clearance    Shortness of breath    SOB (shortness of breath)    Acute on chronic diastolic CHF (congestive heart failure) (HCC)    Pulmonary hypertension (HCC)    Bradycardia    Intermittent asthma without complication    Late onset Alzheimer's disease without behavioral disturbance (HCC)    Acute respiratory failure with hypoxia and hypercapnia (HCC)    Acute on chronic respiratory failure with hypoxia and hypercapnia (HCC)    Acute on chronic congestive heart failure ([facility] [facility]    Dementia with [facility] disturbance [facility]    Urinary tract infection with hematuria      Thank you for allowing to us to participate in the jesus or Yan Manley. Further evaluation will be based upon the patient's clinical course and testing results.

## 2020-08-14 NOTE — PROGRESS NOTES
Perfect serve message sent to Dr. Mariela Mathew regarding  K level of 3.4 and campoverde catheter. In transfer of unit report, Henry Harris was told that current campoverde was placed during last admission. On assessment of chart, campoverde was placed 8/4. Waiting to see if campoverde should be discontinued, or stay in place.

## 2020-08-14 NOTE — PROGRESS NOTES
Attempted to draw ABG. Pt became aggressive and attempted to hit RT and jerk her arm all over the place. I was not able to get ABG at this point. RN notified.

## 2020-08-14 NOTE — PROGRESS NOTES
Dr. Yasmeen Nails notified that patient transferred from 2 to -2 on 3 L NC, patient currently on 7 L HFNC, continuous pulse ox in place, SpO2 92%. Will be over to evaluate patient. Primary RN notified and aware.

## 2020-08-14 NOTE — FLOWSHEET NOTE
08/14/20 0900   Vital Signs   Temp 97.1 °F (36.2 °C)   Temp Source Oral   Pulse 100   Heart Rate Source Monitor   Resp 22   /72   BP Location Left upper arm   BP Upper/Lower Upper   Patient Position High fowlers   Level of Consciousness 0   MEWS Score 2   Patient Currently in Pain Denies   Oxygen Therapy   SpO2 94 %   O2 Device High flow nasal cannula   O2 Flow Rate (L/min) 6 L/min     Came in room to give morning medications and patients oxygen had dropped to the 80's. Upon entering the room found that patient had removed her nasal canula in her sleep. Placed nasal cannula back on patient and increased to 6L n/c. Oxygen came back up to 94%. Titrated patient back down to the 3L n/c and patient remains at 94-95%. Patient was assisted to set up her tray and fed this morning rather than usual just tray set up. Patient ate all of her food and took her morning medications. Bed alarm remains in place and call light in reach.

## 2020-08-14 NOTE — PROGRESS NOTES
Pt tolerating vapotherm well. Sp02 96% on 25L and 60%. Daughter at bedside and pt seems to be a little more anxious and wanting to go home with daughter. Daughter denies needs and call pt needs addressed at this time.

## 2020-08-14 NOTE — CONSULTS
Patient is being seen at the request of Dr. Yasmeen Nails  for a consultation for Acute Respiratory Failure, CHF    HISTORY OF PRESENT ILLNESS: 81 yo female with ETIENNE, CHF, CKD recently admitted to [facility] 8/4/20 to 8/12/20 with HFpEF now with worsening hypoxemia, has increased from 5 to 15 L O2 requirement. PAST MEDICAL HISTORY:  Past Medical History:   Diagnosis Date    Alzheimer's dementia ([address]     Asthma     Atrial fibrillation ([facility]     Blood clot in vein     Removal of Blood clot in Left Groin    CHF (congestive heart failure) (HCC)     Dementia (HCC)     GERD (gastroesophageal reflux disease)     Hypertension     Kidney failure     MRSA (methicillin resistant staph aureus) culture positive 08/08/2018    + resp cx    Osteoporosis      PAST SURGICAL HISTORY:  Past Surgical History:   Procedure Laterality Date    CHOLECYSTECTOMY      EYE SURGERY      HYSTERECTOMY      [place] [address] W/BRNCL ALVEOLAR LAVAGE N/A 8/8/2018    BRONCHOSCOPY ALVEOLAR LAVAGE performed by Rosenda Shrestha MD at [address] [facility] ESOPHAGOGASTRODUODENOSCOPY TRANSORAL DIAGNOSTIC N/A 8/4/2018    EGD ESOPHAGOGASTRODUODENOSCOPY performed by Miles Amador MD at [address]:  Unable to obtain because the patient is non-communicative     SOCIAL HISTORY:   reports that she has never smoked.  She has never used smokeless tobacco.    Scheduled Meds:   apixaban  5 mg Oral BID    potassium chloride  40 mEq Oral Once    ipratropium-albuterol  1 ampule Inhalation Q4H While awake    amLODIPine  5 mg Oral Daily    levothyroxine  100 mcg Oral Daily    montelukast  10 mg Oral Daily    OXcarbazepine  150 mg Oral BID    simvastatin  10 mg Oral Nightly    sodium chloride flush  10 mL Intravenous 2 times per day    furosemide  40 mg Intravenous BID    cefTRIAXone (ROCEPHIN) IV  1 g Intravenous Q24H     Continuous Infusions:    PRN Meds:  sodium chloride flush, acetaminophen **OR** acetaminophen, polyethylene glycol, promethazine **OR** ondansetron, ipratropium-albuterol    ALLERGIES:  Patient is allergic to codeine and sulfa antibiotics. REVIEW OF SYSTEMS:  Unable to obtain because the patient is non-communicative     PHYSICAL EXAM:  Blood pressure (!) 117/59, pulse 100, temperature 98 °F (36.7 °C), temperature source Oral, resp. rate 21, height 5' 4\" (1.626 m), weight 223 lb (101.2 kg), SpO2 (!) 88 %.' on 15 L  Gen: + distress. Eyes: PERRL. No sclera icterus. No conjunctival injection. ENT: No discharge. Pharynx clear. Neck: Trachea midline. No obvious mass. Resp: ++ accessory muscle use. few crackles. No wheezes. No rhonchi. No dullness on percussion. CV: Regular rate. Regular rhythm. No murmur or rub. No edema. Peripheral pulses are 2+. Capillary refill is less than 3 seconds. GI: Non-tender. Non-distended. No hernia. Skin: Warm and dry. No nodule on exposed extremities. Lymph: No cervical LAD. No supraclavicular LAD. M/S: No cyanosis. No joint deformity. No clubbing. Neuro: Awake. Alert. Moves all four extremities. Psych: Unable to obtain because the patient is non-communicative . LABS:  CBC:   Recent Labs     08/12/20 2000 08/14/20  0537   WBC 11.0 8.3   HGB 11.4* 10.9*   HCT 36.6 34.5*   MCV 94.2 93.4    225     BMP:   Recent Labs     08/12/20 2000 08/13/20  0604 08/14/20  0537    142 139   K 4.2 3.7 3.4*   CL 95* 95* 94*   CO2 38* 37* 39*   BUN 21* 18 15   CREATININE 0.9 0.9 0.8     LIVER PROFILE:   Recent Labs     08/12/20 2000   AST 35   ALT 16   BILITOT 0.4   ALKPHOS 71     PT/INR: No results for input(s): PROTIME, INR in the last 72 hours. APTT: No results for input(s): APTT in the last 72 hours.   UA:  Recent Labs     08/12/20 2128   COLORU Yellow   PHUR 5.5   WBCUA 10-20*   RBCUA 3-4   BACTERIA 2+*   CLARITYU Clear   SPECGRAV 1.020   LEUKOCYTESUR MODERATE*   UROBILINOGEN 0.2   BILIRUBINUR Negative   BLOODU SMALL*   GLUCOSEU

## 2020-08-14 NOTE — PROGRESS NOTES
RESPIRATORY THERAPY ASSESSMENT    Name:  [address] [facility] [address] Record Number:  [id number]  Age: 82 y.o. Gender: female  : 1935  Today's Date:  2020  Room:  /0325-02    Assessment     Is the patient being admitted for a COPD or Asthma exacerbation? No   (If yes the patient will be seen every 4 hours for the first 24 hours and then reassessed)    Patient Admission Diagnosis      Allergies  Allergies   Allergen Reactions    Codeine Itching    Sulfa Antibiotics Other (See Comments)     Per Pt daughter Ester Baxter) \" Not know her reaction to medication\"       Minimum Predicted Vital Capacity:     na          Actual Vital Capacity:      na              Pulmonary History: Asthma, CHF  Home Oxygen Therapy:  room air  Home Respiratory Therapy: albuterol. Patient states several times/day   Current Respiratory Therapy:  duoneb q4 prn  Treatment Type: HHN  Medications: Albuterol/Ipratropium    Respiratory Severity Index(RSI)   Patients with orders for inhalation medications, oxygen, or any therapeutic treatment modality will be placed on Respiratory Protocol. They will be assessed with the first treatment and at least every 72 hours thereafter. The following severity scale will be used to determine frequency of treatment intervention.     Smoking History: Pulmonary Disease or Smoking History, Greater than 15 pack year = 2    Social History  Social History     Tobacco Use    Smoking status: Never Smoker    Smokeless tobacco: Never Used   Substance Use Topics    Alcohol use: Not on file    Drug use: Not on file       Recent Surgical History: None = 0  Past Surgical History  Past Surgical History:   Procedure Laterality Date    CHOLECYSTECTOMY      EYE SURGERY      HYSTERECTOMY      [place] [address] W/BRNCL ALVEOLAR LAVAGE N/A 2018    BRONCHOSCOPY ALVEOLAR LAVAGE performed by Rosenda Shrestha MD at [address] [facility] ESOPHAGOGASTRODUODENOSCOPY TRANSORAL DIAGNOSTIC N/A 2018    EGD ESOPHAGOGASTRODUODENOSCOPY performed by Sergey Newton MD at [facility]         Level of Consciousness: Alert, Follows Commands but Disoriented = 1    Level of Activity: Mostly sedentary, minimal walking = 2    Respiratory Pattern: Dyspnea with exertion;Irregular pattern;or RR less than 6 = 2    Breath Sounds: Diminshed bilaterally and/or crackles = 2    Sputum   ,  , Sputum How Obtained: None  Cough: Strong, productive = 1    Vital Signs   BP (!) 117/59   Pulse 100   Temp 98 °F (36.7 °C) (Oral)   Resp 21   Ht 5' 4\" (1.626 m)   Wt 223 lb (101.2 kg)   SpO2 (!) 88%   BMI 38.28 kg/m²   SPO2 (COPD values may differ): Less than 86% on room air or greater than 92% on FiO2 greater than 50% = 4    Peak Flow (asthma only): not applicable = 0    RSI: 94-09 = Q4WA (every four hours while awake) and Q4hrs PRN        Plan       Goals: medication delivery, mobilize retained secretions, volume expansion and improve oxygenation    Patient/caregiver was educated on the proper method of use for Respiratory Care Devices:  Yes      Level of patient/caregiver understanding able to:   ? Verbalize understanding   ? Demonstrate understanding       ? Teach back        ? Needs reinforcement       ? No available caregiver               ? Other:     Response to education:  [address]     Is patient being placed on Home Treatment Regimen? No     Does the patient have everything they need prior to discharge? Yes     Comments: patient assessed/interviewed. Chart reviewed    Plan of Care: q4 w/a and prn    Electronically signed by Wally Crespo RCP on 8/14/2020 at 3:31 PM    Respiratory Protocol Guidelines     1. Assessment and treatment by Respiratory Therapy will be initiated for medication and therapeutic interventions upon initiation of aerosolized medication. 2. Physician will be contacted for respiratory rate (RR) greater than 35 breaths per minute.  Therapy will be held for heart rate (HR) greater than 140 beats per minute, pending direction from physician. 3. Bronchodilators will be administered via Metered Dose Inhaler (MDI) with spacer when the following criteria are met:  a. Alert and cooperative     b. HR < 140 bpm  c. RR < 30 bpm                d. Can demonstrate a 2-3 second inspiratory hold  4. Bronchodilators will be administered via Hand Held Nebulizer [facility]) to patients when ANY of the following criteria are met  a. Incognizant or uncooperative          b. Patients treated with HHN at Home        c. Unable to demonstrate proper use of MDI with spacer     d. RR > 30 bpm   5. Bronchodilators will be delivered via Metered Dose Inhaler (MDI), HHN, Aerogen to intubated patients on mechanical ventilation. 6. Inhalation medication orders will be delivered and/or substituted as outlined below. Aerosolized Medications Ordering and Administration Guidelines:    1. All Medications will be ordered by a physician, and their frequency and/or modality will be adjusted as defined by the patients Respiratory Severity Index (RSI) score. 2. If the patient does not have documented COPD, consider discontinuing anticholinergics when RSI is less than 9.  3. If the bronchospasm worsens (increased RSI), then the bronchodilator frequency can be increased to a maximum of every 4 hours. If greater than every 4 hours is required, the physician will be contacted. 4. If the bronchospasm improves, the frequency of the bronchodilator can be decreased, based on the patient's RSI, but not less than home treatment regimen frequency. 5. Bronchodilator(s) will be discontinued if patient has a RSI less than 9 and has received no scheduled or as needed treatment for 72  Hrs. Patients Ordered on a Mucolytic Agent:    1. Must always be administered with a bronchodilator.     2. Discontinue if patient experiences worsened bronchospasm, or secretions have lessened to the point that the patient is able to clear them with a cough.    Anti-inflammatory and Combination Medications:    1. If the patient lacks prior history of lung disease, is not using inhaled anti-inflammatory medication at home, and lacks wheezing by examination or by history for at least 24 hours, contact physician for possible discontinuation.

## 2020-08-14 NOTE — PROGRESS NOTES
4 Eyes Skin Assessment     The patient is being assess for   Transfer to New Unit    I agree that 2 RN's have performed a thorough Head to Toe Skin Assessment on the patient. ALL assessment sites listed below have been assessed. Areas assessed by both nurses:   [x]   Head, Face, and Ears   [x]   Shoulders, Back, and Chest, Abdomen  [x]   Arms, Elbows, and Hands   [x]   Coccyx, Sacrum, and Ischium  [x]   Legs, Feet, and Heels        Coccyx is red but blanchable with mepilex on. Patients groin has some excoriation and lots of yeast noted. No issues noted on te breast. Scattered bruising especially to BUE. Patients heels WDL- pink and blanchable. **SHARE this note so that the co-signing nurse is able to place an eSignature**    Co-signer eSignature: Electronically signed by Cruzito Ferrera RN on 8/14/20 at 1:18 PM EDT    Does the Patient have Skin Breakdown?   No          Darvin Prevention initiated:  Yes   Wound Care Orders initiated:  NA      [facility] nurse consulted for Pressure Injury (Stage 3,4, Unstageable, DTI, NWPT, Complex wounds)and New or Established Ostomies:  NA      Primary Nurse eSignature: Electronically signed by Mp Rosas RN on 8/14/20 at 12:05 PM EDT

## 2020-08-14 NOTE — PROGRESS NOTES
Have not yet been able to change out campoverde cath since pt is so dyspneic and can not tolerate being laid back. Will change campoverde as soon as able to, which will be resp status dependent.

## 2020-08-14 NOTE — FLOWSHEET NOTE
08/14/20 1351   Vital Signs   Temp 98 °F (36.7 °C)   Temp Source Oral   Pulse 100   Heart Rate Source Monitor   Resp 21   BP (!) 117/59   BP Location Left Arm   BP Upper/Lower Lower   MAP (mmHg) 78   Oxygen Therapy   SpO2 (!) 88 %   O2 Device High flow nasal cannula   O2 Flow Rate (L/min) 6 L/min   Patient Observation   Observations increased to 7L   Charge nurse made aware that pt has been increased from 3L up to 7L since arrival to unit. Charge sent perfect serve message to Dr. Monique Ward to make him aware.

## 2020-08-14 NOTE — PROGRESS NOTES
40 mg lasix administered slow IVP at 2030 - see MAR    ABG drawn from right radial. One attempt. Sight prepped per policy and procedure. Modified Yassine's test positive. Pressure held to sight after procedure for five minutes. No hematoma present. Pulse present after procedure. Pt tolerated procedure well Specimen sent to lab.   10 lpm [facility]    [address] [facility] [address]

## 2020-08-14 NOTE — PROGRESS NOTES
Progress Note    Admit Date:  8/12/2020    Subjective:  Ms. Jean Blackburn is more awake and alert today. She still continues to be short of breath. Denies any chest pain. She is not a very good historian. Her COVID-19 test is negative. Objective:   /72   Pulse 100   Temp 97.1 °F (36.2 °C) (Oral)   Resp 22   Ht 5' 4\" (1.626 m)   Wt 223 lb (101.2 kg)   SpO2 95%   BMI 38.28 kg/m²          Intake/Output Summary (Last 24 hours) at 8/14/2020 1017  Last data filed at 8/14/2020 0930  Gross per 24 hour   Intake 600 ml   Output 1550 ml   Net -950 ml       Physical Exam:    General appearance: alert, appears stated age and cooperative/mild respiratory distress  Head: Normocephalic, without obvious abnormality, atraumatic  Eyes: conjunctivae/corneas clear. PERRL, EOM's intact. Neck: no adenopathy, no carotid bruit, no JVD, supple, symmetrical, trachea midline and thyroid not enlarged, symmetric, no tenderness/mass/nodules  Lungs: Few bilateral crackles. Minimal accessory muscle use.   Heart: irregular rate and rhythm, S1, S2 normal, no murmur, click, rub or gallop  Abdomen: soft, non-tender; bowel sounds normal; no masses,  no organomegaly  Extremities: extremities normal, atraumatic, no cyanosis or edema  Pulses: 2+ and symmetric  Skin: Skin color, texture, turgor normal. No rashes or lesions  Neurologic: Grossly normal    Scheduled Meds:   amLODIPine  5 mg Oral Daily    apixaban  2.5 mg Oral BID    levothyroxine  100 mcg Oral Daily    montelukast  10 mg Oral Daily    OXcarbazepine  150 mg Oral BID    simvastatin  10 mg Oral Nightly    sodium chloride flush  10 mL Intravenous 2 times per day    furosemide  40 mg Intravenous BID    cefTRIAXone (ROCEPHIN) IV  1 g Intravenous Q24H       Continuous Infusions:      PRN Meds:  sodium chloride flush, acetaminophen **OR** acetaminophen, polyethylene glycol, promethazine **OR** ondansetron, ipratropium-albuterol      Data:  CBC:   Recent Labs     08/12/20 2000 08/14/20  0537   WBC 11.0 8.3   HGB 11.4* 10.9*   HCT 36.6 34.5*   MCV 94.2 93.4    225     BMP:   Recent Labs     08/12/20 2000 08/13/20  0604 08/14/20  0537    142 139   K 4.2 3.7 3.4*   CL 95* 95* 94*   CO2 38* 37* 39*   BUN 21* 18 15   CREATININE 0.9 0.9 0.8     LIVER PROFILE:   Recent Labs     08/12/20 2000   AST 35   ALT 16   BILITOT 0.4   ALKPHOS 71     PT/INR: No results for input(s): PROTIME, INR in the last 72 hours. Cultures:     Results for Polo Galvan (MRN [id number]) as of 8/14/2020 10:18   Ref. Range 8/13/2020 01:15   SARS-CoV-2, CARLOS Latest Ref Range: NOT DETECTED  NOT DETECTED     CT CHEST PULMONARY EMBOLISM W CONTRAST   Final Result   No pulmonary embolism to the proximal segmental level. Moderate right and small left pleural effusions, decreased on the right and   increased on the left since the prior study 2 weeks ago. Evidence of mild interstitial edema. XR CHEST PORTABLE   Final Result   Bilateral pleural effusions and basilar pulmonary edema versus atelectasis. Pneumonia is a differential concern. Assessment:  Principal Problem:    Acute respiratory failure with hypoxia (HCC)  Active Problems:    Essential hypertension    Pleural effusion    Morbid obesity with BMI of 40.0-44.9, adult (HCC)    Shortness of breath    Acute on chronic diastolic CHF (congestive heart failure) (HCC)    Dementia with behavioral disturbance (HCC)    Urinary tract infection with hematuria  Resolved Problems:    * No resolved hospital problems.  *      Plan:    Acute on chronic hypoxic RF  - normally uses 2L NC O2 continuous at home   - Now requiring 3L NC  - Suspect 2/2 acute on chronic dCHF, CXR with bilateral pleural effusions with pulmonary edema  - Was recently admitted, but no leukocytosis, PCT normal, lower concerns for HCAP as cause   - COVID 19 negative.     Acute on chronic dCHF  Severe pulmonary HTN  - Last Echo with EF 55-60%, DD and severe pulm HTN  - Was recently admitted for the same, discharge weight was 205 lbs  - Admitted with weight 225 lbs  - Continue IV diuretics, Lasix 40 mg IV BID  - Strict I&O's , daily weights   - consult cardiology. - transfer to PCU.        UTI  - UA with + Nitrite and LE   - Urine Cx pending   - Continue Rocephin, was given Vanc/Zosyn x 1 dose in ED     Chronic Atrial Fibrillation   - Continue AC with Eliquis   - Rate is controlled      HTN  - BP is controlled  - Continue Norvasc      Hypothyroidism   - Continue Synthroid      HLD  - Continue statin     Dementia with behavior disturbances  - Continue home medications  - Supportive Care      Morbid Obesity  - Body mass index is 38.62 kg/m². - Complicating assessment and treatment.  Placing patient at risk for multiple co-morbidities as well as early death and contributing to the patient's presentation.   - Counseled on weight loss.     DVT Prophylaxis: Eliquis  Diet: DIET LOW SODIUM 2 GM;   Code Status: DNR-CCA    Nikky Melgoza MD 8/14/2020 10:17 AM

## 2020-08-14 NOTE — DISCHARGE SUMMARY
Name:  Alex Hammer  Room:  5462/7745-41  MRN:    [id number]    Discharge Summary      This discharge summary is in conjunction with a complete physical exam done on the day of discharge. Attending Physician: MYIRAM Evans Discharging Physician: MYRIAM Evans Admit: 8/4/2020  Discharge:  8/7/2020    Diagnoses this Admission    Active Problems:    Acute respiratory failure with hypoxia and hypercapnia (HCC)    Acute on chronic respiratory failure with hypoxia and hypercapnia (HCC)    Acute on chronic congestive heart failure (HCC)    Dementia with behavioral disturbance (HCC)  Resolved Problems:    * No resolved hospital problems. *          Procedures (Please Review Full Report for Details)    Radiology  CT CHEST PULMONARY EMBOLISM W CONTRAST   Final Result   1. Negative for pulmonary embolus. 2. CHF with pulmonary edema.           XR CHEST PORTABLE   Final Result   Increased congestive failure with pleural effusion greater on the right. Basilar airspace disease likely from pulmonary edema however possibility of   superimposed pneumonia is also considered. Consults        HPI:  The patient is a 82 y.o. female with alzheimers dementia, atrial fibrillation, chronic diastolic CHF, GERD, HTN who presented to [facility] ED with complaint of SOB. When I saw the patient she was feeling somewhat lethargic and unable to give me much of a history. Patient is apparently been noncompliant with home oxygen therapy. She came to the ER for shortness of breath. Denies any chest pain. Work-up showed acute on chronic respiratory failure likely due to underlying CHF.     Because the patient is somewhat lethargic and there is a change in mental status I ordered an arterial blood gas. She however refused but woke up after the attempted to do one.     She is admitted to the medical floor for COVID rule out. Recent COVID testing is negative.           Hospital Course  Acute on chronic hypoxic/hypercapnic respiratory failure  - due to CHF  - admitted to ICU.    - Pulmonology consulted  - Supplemental O2. Wean as tolerated  - normally on 2 L  - requiring 10 L currently. Wean as tolerated , currently down to 2 L  - ruled out COVID --> testing neg     Acute on chronic diastolic CHF  - daily weights; I&O's  - low sodium diet, fluid restriction  - IV Lasix 40 mg BID. 40 mg iv lasix x1 today     Hypokalemia  - replaced. Monitor electrolytes.      Hypernatremia  - resolved on repeat.      Chronic atrial fibrillation  - rate controlled. - continue  - AC: Eliquis     Hypothyroidism  - home dose of synthroid 100 mcg      Dementia with behavioral changes   - supportive care measures  - pt very anxious   - continue home medication regimen.      Hypertension  - BP stable. Continue Amlodipine.      GERD  - continue Pepcid.      Hyperlipidemia  - on Atorvastatin.      Obesity  - Body mass index is 39.8 kg/m².   - Recommended weight loss    Discharge Medications     Medication List      CHANGE how you take these medications    furosemide 40 MG tablet  Commonly known as:  LASIX  Take 1 tablet by mouth daily  What changed:    · medication strength  · how much to take        CONTINUE taking these medications    acetaminophen 500 MG tablet  Commonly known as:  TYLENOL     albuterol (2.5 MG/3ML) 0.083% nebulizer solution  Commonly known as:  PROVENTIL     amLODIPine 5 MG tablet  Commonly known as:  NORVASC  Take 1 tablet by mouth daily     apixaban 2.5 MG Tabs tablet  Commonly known as:  ELIQUIS  Take 1 tablet by mouth 2 times daily     famotidine 20 MG tablet  Commonly known as:  PEPCID     levothyroxine 100 MCG tablet  Commonly known as:  SYNTHROID     magnesium oxide 400 MG tablet  Commonly known as:  MAG-OX     montelukast 10 MG tablet  Commonly known as:  SINGULAIR     Namenda XR 28 MG Cp24 extended release capsule  Generic drug:  memantine ER     OXcarbazepine 150 MG tablet  Commonly known as:  TRILEPTAL oxybutynin 5 MG extended release tablet  Commonly known as:  DITROPAN-XL     senna 8.6 MG tablet  Commonly known as:  SENOKOT     simvastatin 10 MG tablet  Commonly known as:  ZOCOR     therapeutic multivitamin-minerals tablet     Vitamin D3 50 MCG (2000 UT) Caps        STOP taking these medications    polyethylene glycol 17 g packet  Commonly known as:  GLYCOLAX           Where to Get Your Medications      Information about where to get these medications is not yet available    Ask your nurse or doctor about these medications  · furosemide 40 MG tablet           Discharge Condition/Location: Stable    Follow Up: Follow up with PCP.     Total time spent on discharge is 35 minutes      [facility], [place] [facility] 8/14/2020 2:53 PM

## 2020-08-14 NOTE — FLOWSHEET NOTE
20   Vital Signs   Temp 98.4 °F (36.9 °C)   Temp Source Oral   Pulse 109   Heart Rate Source Monitor   Resp 24   BP (!) 146/76   BP Location Left lower arm   BP Upper/Lower Lower   Patient Position High fowlers   Level of Consciousness 0   MEWS Score 3   Oxygen Therapy   SpO2 94 %   O2 Device High flow nasal cannula   O2 Flow Rate (L/min) 10 L/min   Pt alert to name and  only at this time. Pt keeps repeating  \"I cant breath\" reassured pt that she is okay and we are trying to help her breathing. Assessment completed. Pt shallow breathing. PM meds given with sip of water. Antibiotic started that was not given earlier d/t loss of IV. Lasix given by Sneha bliss. ABGs drawn and sent. Rapid COVID swab refused by pt ordered by  d/t possible BIPAP needs. Will continue to monitor.  Call light within reach and bed alarm on

## 2020-08-15 LAB
AMORPHOUS: ABNORMAL /HPF
ANION GAP SERPL CALCULATED.3IONS-SCNC: 7 MMOL/L (ref 3–16)
BACTERIA: ABNORMAL /HPF
BILIRUBIN URINE: NEGATIVE
BLOOD, URINE: ABNORMAL
BUN BLDV-MCNC: 14 MG/DL (ref 7–20)
CALCIUM SERPL-MCNC: 8.8 MG/DL (ref 8.3–10.6)
CHLORIDE BLD-SCNC: 94 MMOL/L (ref 99–110)
CLARITY: ABNORMAL
CO2: 38 MMOL/L (ref 21–32)
COLOR: YELLOW
CREAT SERPL-MCNC: 0.9 MG/DL (ref 0.6–1.2)
EPITHELIAL CELLS, UA: ABNORMAL /HPF (ref 0–5)
GFR AFRICAN AMERICAN: >60
GFR NON-AFRICAN AMERICAN: 60
GLUCOSE BLD-MCNC: 116 MG/DL (ref 70–99)
GLUCOSE URINE: NEGATIVE MG/DL
KETONES, URINE: NEGATIVE MG/DL
LEUKOCYTE ESTERASE, URINE: ABNORMAL
MAGNESIUM: 2 MG/DL (ref 1.8–2.4)
MICROSCOPIC EXAMINATION: YES
MUCUS: ABNORMAL /LPF
NITRITE, URINE: NEGATIVE
PH UA: 5.5 (ref 5–8)
POTASSIUM SERPL-SCNC: 3.6 MMOL/L (ref 3.5–5.1)
PROTEIN UA: ABNORMAL MG/DL
RBC UA: ABNORMAL /HPF (ref 0–4)
SODIUM BLD-SCNC: 139 MMOL/L (ref 136–145)
SPECIFIC GRAVITY UA: <=1.005 (ref 1–1.03)
URINE TYPE: ABNORMAL
UROBILINOGEN, URINE: 0.2 E.U./DL
WBC UA: ABNORMAL /HPF (ref 0–5)

## 2020-08-15 PROCEDURE — 80048 BASIC METABOLIC PNL TOTAL CA: CPT

## 2020-08-15 PROCEDURE — 36415 COLL VENOUS BLD VENIPUNCTURE: CPT

## 2020-08-15 PROCEDURE — 99232 SBSQ HOSP IP/OBS MODERATE 35: CPT | Performed by: INTERNAL MEDICINE

## 2020-08-15 PROCEDURE — 2060000000 HC ICU INTERMEDIATE R&B

## 2020-08-15 PROCEDURE — 99233 SBSQ HOSP IP/OBS HIGH 50: CPT | Performed by: INTERNAL MEDICINE

## 2020-08-15 PROCEDURE — 6370000000 HC RX 637 (ALT 250 FOR IP): Performed by: INTERNAL MEDICINE

## 2020-08-15 PROCEDURE — 94640 AIRWAY INHALATION TREATMENT: CPT

## 2020-08-15 PROCEDURE — 6360000002 HC RX W HCPCS: Performed by: INTERNAL MEDICINE

## 2020-08-15 PROCEDURE — 83735 ASSAY OF MAGNESIUM: CPT

## 2020-08-15 PROCEDURE — 94761 N-INVAS EAR/PLS OXIMETRY MLT: CPT

## 2020-08-15 PROCEDURE — G0378 HOSPITAL OBSERVATION PER HR: HCPCS

## 2020-08-15 PROCEDURE — 2580000003 HC RX 258: Performed by: INTERNAL MEDICINE

## 2020-08-15 PROCEDURE — 2700000000 HC OXYGEN THERAPY PER DAY

## 2020-08-15 PROCEDURE — 2580000003 HC RX 258

## 2020-08-15 RX ORDER — SODIUM CHLORIDE 9 MG/ML
INJECTION, SOLUTION INTRAVENOUS
Status: COMPLETED
Start: 2020-08-15 | End: 2020-08-15

## 2020-08-15 RX ADMIN — MONTELUKAST SODIUM 10 MG: 10 TABLET, COATED ORAL at 09:06

## 2020-08-15 RX ADMIN — IPRATROPIUM BROMIDE AND ALBUTEROL SULFATE 1 AMPULE: .5; 3 SOLUTION RESPIRATORY (INHALATION) at 23:23

## 2020-08-15 RX ADMIN — FUROSEMIDE 40 MG: 10 INJECTION, SOLUTION INTRAMUSCULAR; INTRAVENOUS at 09:07

## 2020-08-15 RX ADMIN — CEFEPIME 2 G: 2 INJECTION, POWDER, FOR SOLUTION INTRAVENOUS at 01:39

## 2020-08-15 RX ADMIN — LINEZOLID 600 MG: 600 INJECTION, SOLUTION INTRAVENOUS at 16:17

## 2020-08-15 RX ADMIN — IPRATROPIUM BROMIDE AND ALBUTEROL SULFATE 1 AMPULE: .5; 3 SOLUTION RESPIRATORY (INHALATION) at 07:03

## 2020-08-15 RX ADMIN — CEFEPIME 2 G: 2 INJECTION, POWDER, FOR SOLUTION INTRAVENOUS at 16:17

## 2020-08-15 RX ADMIN — OXCARBAZEPINE 150 MG: 300 TABLET, FILM COATED ORAL at 09:07

## 2020-08-15 RX ADMIN — IPRATROPIUM BROMIDE AND ALBUTEROL SULFATE 1 AMPULE: .5; 3 SOLUTION RESPIRATORY (INHALATION) at 16:00

## 2020-08-15 RX ADMIN — SODIUM CHLORIDE 250 ML: 9 INJECTION, SOLUTION INTRAVENOUS at 16:17

## 2020-08-15 RX ADMIN — CEFEPIME 2 G: 2 INJECTION, POWDER, FOR SOLUTION INTRAVENOUS at 09:07

## 2020-08-15 RX ADMIN — LEVOTHYROXINE SODIUM 100 MCG: 100 TABLET ORAL at 05:14

## 2020-08-15 RX ADMIN — IPRATROPIUM BROMIDE AND ALBUTEROL SULFATE 1 AMPULE: .5; 3 SOLUTION RESPIRATORY (INHALATION) at 20:18

## 2020-08-15 RX ADMIN — Medication 10 ML: at 09:07

## 2020-08-15 RX ADMIN — IPRATROPIUM BROMIDE AND ALBUTEROL SULFATE 1 AMPULE: .5; 3 SOLUTION RESPIRATORY (INHALATION) at 12:00

## 2020-08-15 RX ADMIN — SIMVASTATIN 10 MG: 10 TABLET, FILM COATED ORAL at 19:52

## 2020-08-15 RX ADMIN — FUROSEMIDE 40 MG: 10 INJECTION, SOLUTION INTRAMUSCULAR; INTRAVENOUS at 17:16

## 2020-08-15 RX ADMIN — AMLODIPINE BESYLATE 5 MG: 5 TABLET ORAL at 09:06

## 2020-08-15 RX ADMIN — LINEZOLID 600 MG: 600 INJECTION, SOLUTION INTRAVENOUS at 05:14

## 2020-08-15 RX ADMIN — Medication 10 ML: at 19:52

## 2020-08-15 RX ADMIN — OXCARBAZEPINE 150 MG: 300 TABLET, FILM COATED ORAL at 19:52

## 2020-08-15 ASSESSMENT — PAIN SCALES - GENERAL: PAINLEVEL_OUTOF10: 0

## 2020-08-15 NOTE — PROGRESS NOTES
Pt resting quietly in bed, c/o SOB- SpO2 96% on 25L/60%. VSS. A/O to self only. Resps labored at rest, diminished with expiratory wheezes to upper lobes. Lua intact, needs replaced- pt will not let this RN lay down the bed, increased dyspnea with slightest changes in position. Shift assessment complete, see flowsheet. Call light in reach, bed alarm engaged. Will monitor.

## 2020-08-15 NOTE — PROGRESS NOTES
[address]   Progress Note  Cardiology      Ricardo Paredes   Admission date:  8/12/2020  CC-f/up for CHF with hypoxemia  Subjective: She states she feels good.  Denies problems     Objective:  Medications/Labs all Reviewed     ipratropium-albuterol  1 ampule Inhalation Q4H While awake    cefepime  2 g Intravenous Q8H    linezolid  600 mg Intravenous Q12H    amLODIPine  5 mg Oral Daily    levothyroxine  100 mcg Oral Daily    montelukast  10 mg Oral Daily    OXcarbazepine  150 mg Oral BID    simvastatin  10 mg Oral Nightly    sodium chloride flush  10 mL Intravenous 2 times per day    furosemide  40 mg Intravenous BID       BMP:   Lab Results   Component Value Date     08/15/2020    K 3.6 08/15/2020    K 4.2 08/12/2020    CL 94 08/15/2020    CO2 38 08/15/2020    BUN 14 08/15/2020    CREATININE 0.9 08/15/2020    MG 2.00 08/15/2020     CBC:    Lab Results   Component Value Date    WBC 8.3 08/14/2020    RBC 3.69 08/14/2020    HGB 10.9 08/14/2020    HCT 34.5 08/14/2020    MCV 93.4 08/14/2020    RDW 16.0 08/14/2020     08/14/2020      PT/INR:  No results found for: INR, PROTIME  Cardiac Enzymes:    Lab Results   Component Value Date    CKTOTAL 345 08/05/2018     Lab Results   Component Value Date    TROPONINI <0.01 08/12/2020    TROPONINI <0.01 08/05/2020    TROPONINI <0.01 08/04/2020     BNP:  No results found for: BNP  FASTING LIPID PANEL:  No results found for: CHOL, HDL, TRIG    Physical Examination:    /68   Pulse 108   Temp 98 °F (36.7 °C) (Oral)   Resp 20   Ht 5' 4\" (1.626 m)   Wt 205 lb 9.6 oz (93.3 kg)   SpO2 96%   BMI 35.29 kg/m²      Respiratory:  · Resp Assessment: Normal respiratory effort  · Resp Auscultation: Clear to auscultation bilaterally   Cardiovascular:  · Auscultation: regular rate and rhythm, normal S1S2, irregular rhythm  · Palpation:  Nl PMI  · JVP:  normal  · Extremities: No Edema  Abdomen:  · Soft, non-tender  · Normal bowel sounds  Extremities:  ·  No Cyanosis or Clubbing  Neurological/Psychiatric:  · Oriented to  Person,not to time or place  · Non-anxious  Skin Warm and dry    Assessment:    Principal Problem:    Acute on chronic diastolic CHF (congestive heart failure) ([facility])  Plan: improved on iv lasix  Active Problems:    Essential hypertension      Acute respiratory failure with hypoxia (HCC)      Pleural effusion      Morbid obesity with BMI of 40.0-44.9, adult ([facility])        Dementia with behavioral disturbance ([facility])  Plan: appropriate today    Urinary tract infection with hematuria      Chronic a-fib  Plan:on low dose eliquis due to advanced age and dementia with likely fall risk so favor eliquis 2.5 mg bid when resumed      Plan:  1.   IV lasix today,consider palliative care  I have spent  35  minutes of face to face time with the patient with more than 50%  spent  counseling and coordinating care for Rodríguez Mahoney

## 2020-08-15 NOTE — PROGRESS NOTES
Pulmonary Progress Note  CC: Hypoxemia and shortness of breath    Subjective: Patient looks dramatically better today, less work of breathing, she says she is more comfortable. IV line peripheral    EXAM:   BP (!) 140/74   Pulse 108   Temp 98 °F (36.7 °C) (Oral)   Resp 24   Ht 5' 4\" (1.626 m)   Wt 205 lb 9.6 oz (93.3 kg)   SpO2 94%   BMI 35.29 kg/m²  on Vapotherm 20 L flow, 50%  Constitutional:  No acute distress   HEENT: no scleral icterus  Neck: No tracheal deviation present. Cardiovascular: Normal heart sounds. Pulmonary/Chest: No wheezes. No rhonchi. few rales. No decreased breath sounds. Much less accessory muscle use abdominal: Soft. Musculoskeletal: No cyanosis. No clubbing. Skin: Skin is warm and dry.      Scheduled Meds:   ipratropium-albuterol  1 ampule Inhalation Q4H While awake    cefepime  2 g Intravenous Q8H    linezolid  600 mg Intravenous Q12H    amLODIPine  5 mg Oral Daily    levothyroxine  100 mcg Oral Daily    montelukast  10 mg Oral Daily    OXcarbazepine  150 mg Oral BID    simvastatin  10 mg Oral Nightly    sodium chloride flush  10 mL Intravenous 2 times per day    furosemide  40 mg Intravenous BID     Continuous Infusions:    PRN Meds:  sodium chloride flush, acetaminophen **OR** acetaminophen, polyethylene glycol, promethazine **OR** ondansetron, ipratropium-albuterol    Labs:  CBC:   Recent Labs     08/12/20 2000 08/14/20  0537   WBC 11.0 8.3   HGB 11.4* 10.9*   HCT 36.6 34.5*   MCV 94.2 93.4    225     BMP:   Recent Labs     08/13/20  0604 08/14/20  0537 08/15/20  0458    139 139   K 3.7 3.4* 3.6   CL 95* 94* 94*   CO2 37* 39* 38*   BUN 18 15 14   CREATININE 0.9 0.8 0.9     Cultures:  8/12/2020 urine enterococcus and Pseudomonas  Enterococcus faecalis (2)     Antibiotic  Interpretation  ANDRES  Status     ampicillin  Sensitive  <=2  mcg/mL      nitrofurantoin  Sensitive  <=32  mcg/mL      tetracycline  Resistant  >8  mcg/mL      vancomycin  Sensitive 2  mcg/mL         8/12/20 CTPA   Impression    No pulmonary embolism to the proximal segmental level.         Moderate right and small left pleural effusions, decreased on the right and    increased on the left since the prior study 2 weeks ago.         Evidence of mild interstitial edema.       8/14/20 CXR increase in R pleural effusion    ASSESSMENT:  · Acute hypoxemic respiratory failure   · HFpEF with acute exacerbation  · Pleural effusions, worsening on right   · Dementia of the Alzheimer's type   · Obesity  · Pseudomonas and Enterococcus in urine     PLAN:  · High flow nasal oxygen for life threatening respiratory failure   · Diuresis per cardiology  · Cefepime/Zyvox D#2 pending final culture results

## 2020-08-15 NOTE — PROGRESS NOTES
Respiratory called and put patient on 6L HFNC- pt called out after 15 minutes shouting \"I cant breathe\" multiples times. RN placed patient back on vapotherm at this time.

## 2020-08-15 NOTE — PROGRESS NOTES
 sodium chloride flush  10 mL Intravenous 2 times per day    furosemide  40 mg Intravenous BID       Continuous Infusions:      PRN Meds:  sodium chloride flush, acetaminophen **OR** acetaminophen, polyethylene glycol, promethazine **OR** ondansetron, ipratropium-albuterol      Data:  CBC:   Recent Labs     08/12/20 2000 08/14/20  0537   WBC 11.0 8.3   HGB 11.4* 10.9*   HCT 36.6 34.5*   MCV 94.2 93.4    225     BMP:   Recent Labs     08/13/20  0604 08/14/20  0537 08/15/20  0458    139 139   K 3.7 3.4* 3.6   CL 95* 94* 94*   CO2 37* 39* 38*   BUN 18 15 14   CREATININE 0.9 0.8 0.9     LIVER PROFILE:   Recent Labs     08/12/20 2000   AST 35   ALT 16   BILITOT 0.4   ALKPHOS 71     PT/INR: No results for input(s): PROTIME, INR in the last 72 hours. Cultures:     Results for Gualberto Fall (MRN [id number]) as of 8/14/2020 10:18   Ref. Range 8/13/2020 01:15   SARS-CoV-2, CARLOS Latest Ref Range: NOT DETECTED  NOT DETECTED         Radiology  XR CHEST 1 VIEW   Final Result   No significant change diffuse bilateral pulmonary disease and bilateral   pleural effusions. CT CHEST PULMONARY EMBOLISM W CONTRAST   Final Result   No pulmonary embolism to the proximal segmental level. Moderate right and small left pleural effusions, decreased on the right and   increased on the left since the prior study 2 weeks ago. Evidence of mild interstitial edema. XR CHEST PORTABLE   Final Result   Bilateral pleural effusions and basilar pulmonary edema versus atelectasis. Pneumonia is a differential concern. ECHO   6/30/2020   Conclusions      Summary   Normal LV systolic function with EF of 55-60%. D-shaped septum c/w RV pressure and volume overload. Mild-moderate concentric left ventricular hypertrophy. Left ventricular diastolic filling pressure is elevated. Mild mitral regurgitation. Moderate right-sided chamber enlargement.    Right ventricular systolic function is moderately reduced. Moderate tricuspid regurgitation. Systolic pulmonic artery pressure (SPAP) is estimated at 94mmHg consistent   with severe pulmonary hypertension (RAP of 8mmHg). Assessment:  Principal Problem:    Acute on chronic diastolic CHF (congestive heart failure) (HCC)  Active Problems:    Essential hypertension    Acute respiratory failure with hypoxia (HCC)    Pleural effusion    Morbid obesity with BMI of 40.0-44.9, adult (HCC)    Shortness of breath    Dementia with behavioral disturbance (HCC)    Urinary tract infection with hematuria    Chronic a-fib  Resolved Problems:    * No resolved hospital problems. *      Plan:    Acute on chronic hypoxic RF  - normally uses 2L NC O2 continuous at home   - Suspect 2/2 acute on chronic dCHF, CXR with bilateral pleural effusions with pulmonary edema  - Was recently admitted, but no leukocytosis, PCT normal, lower concerns for HCAP as cause   - COVID 19 negative. - worsening hypoxia . 3L--> 15L --> high flow o2 per apotherm    Sitting hypoxia on Vapotherm     Acute on chronic dCHF  Severe pulmonary HTN  - Last Echo with EF 55-60%, DD and severe pulm HTN  - Was recently admitted for the same, discharge weight was 205 lbs  - Admitted with weight 225 lbs  - Continue IV diuretics, Lasix 40 mg IV BID  - Strict I&O's , daily weights   - consulted cardiology.        UTI  - UA with + Nitrite and LE   Urine cultures growing Pseudomonas and enterococcus,  antibiotics now changed to cefepime and Zyvox     Chronic Atrial Fibrillation   - Continue AC with Eliquis   - Rate is controlled      HTN  - BP is controlled  - Continue Norvasc      Hypothyroidism   - Continue Synthroid      HLD  - Continue statin     Dementia with behavior disturbances  - Continue home medications  - Supportive Care      Morbid Obesity  - Body mass index is 38.62 kg/m². - Complicating assessment and treatment.  Placing patient at risk for multiple co-morbidities as well as early death and contributing to the patient's presentation.   - Counseled on weight loss.     DVT Prophylaxis: Eliquis  Diet: DIET LOW SODIUM 2 GM;   Code Status: DNR-CCA       consider palliative care    Lavon Tate MD 8/15/2020 10:42 AM

## 2020-08-16 LAB
ANION GAP SERPL CALCULATED.3IONS-SCNC: 12 MMOL/L (ref 3–16)
BLOOD CULTURE, ROUTINE: NORMAL
BUN BLDV-MCNC: 11 MG/DL (ref 7–20)
CALCIUM SERPL-MCNC: 8.7 MG/DL (ref 8.3–10.6)
CHLORIDE BLD-SCNC: 94 MMOL/L (ref 99–110)
CO2: 36 MMOL/L (ref 21–32)
CREAT SERPL-MCNC: 0.8 MG/DL (ref 0.6–1.2)
CULTURE, BLOOD 2: NORMAL
GFR AFRICAN AMERICAN: >60
GFR NON-AFRICAN AMERICAN: >60
GLUCOSE BLD-MCNC: 135 MG/DL (ref 70–99)
MAGNESIUM: 2 MG/DL (ref 1.8–2.4)
POTASSIUM SERPL-SCNC: 3.1 MMOL/L (ref 3.5–5.1)
PRO-BNP: 1356 PG/ML (ref 0–449)
SODIUM BLD-SCNC: 142 MMOL/L (ref 136–145)
URINE CULTURE, ROUTINE: NORMAL

## 2020-08-16 PROCEDURE — 6370000000 HC RX 637 (ALT 250 FOR IP): Performed by: INTERNAL MEDICINE

## 2020-08-16 PROCEDURE — 80048 BASIC METABOLIC PNL TOTAL CA: CPT

## 2020-08-16 PROCEDURE — 36415 COLL VENOUS BLD VENIPUNCTURE: CPT

## 2020-08-16 PROCEDURE — 2700000000 HC OXYGEN THERAPY PER DAY

## 2020-08-16 PROCEDURE — 99232 SBSQ HOSP IP/OBS MODERATE 35: CPT | Performed by: INTERNAL MEDICINE

## 2020-08-16 PROCEDURE — 83880 ASSAY OF NATRIURETIC PEPTIDE: CPT

## 2020-08-16 PROCEDURE — 99233 SBSQ HOSP IP/OBS HIGH 50: CPT | Performed by: INTERNAL MEDICINE

## 2020-08-16 PROCEDURE — 2060000000 HC ICU INTERMEDIATE R&B

## 2020-08-16 PROCEDURE — 6360000002 HC RX W HCPCS: Performed by: INTERNAL MEDICINE

## 2020-08-16 PROCEDURE — 2580000003 HC RX 258: Performed by: INTERNAL MEDICINE

## 2020-08-16 PROCEDURE — 94640 AIRWAY INHALATION TREATMENT: CPT

## 2020-08-16 PROCEDURE — 94761 N-INVAS EAR/PLS OXIMETRY MLT: CPT

## 2020-08-16 PROCEDURE — 83735 ASSAY OF MAGNESIUM: CPT

## 2020-08-16 RX ORDER — FUROSEMIDE 40 MG/1
40 TABLET ORAL DAILY
Status: DISCONTINUED | OUTPATIENT
Start: 2020-08-16 | End: 2020-08-17

## 2020-08-16 RX ADMIN — IPRATROPIUM BROMIDE AND ALBUTEROL SULFATE 1 AMPULE: .5; 3 SOLUTION RESPIRATORY (INHALATION) at 11:16

## 2020-08-16 RX ADMIN — Medication 10 ML: at 08:19

## 2020-08-16 RX ADMIN — IPRATROPIUM BROMIDE AND ALBUTEROL SULFATE 1 AMPULE: .5; 3 SOLUTION RESPIRATORY (INHALATION) at 07:14

## 2020-08-16 RX ADMIN — IPRATROPIUM BROMIDE AND ALBUTEROL SULFATE 1 AMPULE: .5; 3 SOLUTION RESPIRATORY (INHALATION) at 15:22

## 2020-08-16 RX ADMIN — LINEZOLID 600 MG: 600 INJECTION, SOLUTION INTRAVENOUS at 16:22

## 2020-08-16 RX ADMIN — OXCARBAZEPINE 150 MG: 300 TABLET, FILM COATED ORAL at 08:16

## 2020-08-16 RX ADMIN — CEFEPIME 2 G: 2 INJECTION, POWDER, FOR SOLUTION INTRAVENOUS at 16:22

## 2020-08-16 RX ADMIN — LEVOTHYROXINE SODIUM 100 MCG: 100 TABLET ORAL at 05:42

## 2020-08-16 RX ADMIN — FUROSEMIDE 40 MG: 10 INJECTION, SOLUTION INTRAMUSCULAR; INTRAVENOUS at 08:16

## 2020-08-16 RX ADMIN — Medication 10 ML: at 19:59

## 2020-08-16 RX ADMIN — AMLODIPINE BESYLATE 5 MG: 5 TABLET ORAL at 08:16

## 2020-08-16 RX ADMIN — LINEZOLID 600 MG: 600 INJECTION, SOLUTION INTRAVENOUS at 05:42

## 2020-08-16 RX ADMIN — IPRATROPIUM BROMIDE AND ALBUTEROL SULFATE 1 AMPULE: .5; 3 SOLUTION RESPIRATORY (INHALATION) at 23:18

## 2020-08-16 RX ADMIN — OXCARBAZEPINE 150 MG: 300 TABLET, FILM COATED ORAL at 19:59

## 2020-08-16 RX ADMIN — IPRATROPIUM BROMIDE AND ALBUTEROL SULFATE 1 AMPULE: .5; 3 SOLUTION RESPIRATORY (INHALATION) at 18:53

## 2020-08-16 RX ADMIN — SIMVASTATIN 10 MG: 10 TABLET, FILM COATED ORAL at 19:59

## 2020-08-16 RX ADMIN — CEFEPIME 2 G: 2 INJECTION, POWDER, FOR SOLUTION INTRAVENOUS at 00:47

## 2020-08-16 RX ADMIN — MONTELUKAST SODIUM 10 MG: 10 TABLET, COATED ORAL at 08:16

## 2020-08-16 RX ADMIN — CEFEPIME 2 G: 2 INJECTION, POWDER, FOR SOLUTION INTRAVENOUS at 08:16

## 2020-08-16 NOTE — PROGRESS NOTES
Patient resting quietly in bed. No s/s of distress noted. Currently on 10L HFNC. Shift assessment complete, see flow sheet. Denies needs at this time. Set up for breakfast. Call light in reach. Will monitor.

## 2020-08-16 NOTE — PROGRESS NOTES
Pulmonary Progress Note  CC: Hypoxemia and shortness of breath    Subjective: Tells me she continues to improve. IV line peripheral    EXAM:   /66   Pulse 79   Temp 98.9 °F (37.2 °C) (Oral)   Resp 18   Ht 5' 4\" (1.626 m)   Wt 206 lb 12.8 oz (93.8 kg)   SpO2 97%   BMI 35.50 kg/m²  on Vapotherm 20 L flow, 50%  Constitutional:  No acute distress   HEENT: no scleral icterus  Neck: No tracheal deviation present. Cardiovascular: Normal heart sounds. + Edema  Pulmonary/Chest: No wheezes. No rhonchi. few rales. No decreased breath sounds. Abdominal: Soft. Musculoskeletal: No cyanosis. No clubbing. Skin: Skin is warm and dry.      Scheduled Meds:   ipratropium-albuterol  1 ampule Inhalation Q4H While awake    cefepime  2 g Intravenous Q8H    linezolid  600 mg Intravenous Q12H    amLODIPine  5 mg Oral Daily    levothyroxine  100 mcg Oral Daily    montelukast  10 mg Oral Daily    OXcarbazepine  150 mg Oral BID    simvastatin  10 mg Oral Nightly    sodium chloride flush  10 mL Intravenous 2 times per day    furosemide  40 mg Intravenous BID     Continuous Infusions:    PRN Meds:  sodium chloride flush, acetaminophen **OR** acetaminophen, polyethylene glycol, promethazine **OR** ondansetron, ipratropium-albuterol    Labs:  CBC:   Recent Labs     08/14/20  0537   WBC 8.3   HGB 10.9*   HCT 34.5*   MCV 93.4        BMP:   Recent Labs     08/14/20  0537 08/15/20  0458 08/16/20  0528    139 142   K 3.4* 3.6 3.1*   CL 94* 94* 94*   CO2 39* 38* 36*   BUN 15 14 11   CREATININE 0.8 0.9 0.8     Cultures:  8/12/2020 urine enterococcus and Pseudomonas  Enterococcus faecalis (2)     Antibiotic  Interpretation  ANDRES  Status     ampicillin  Sensitive  <=2  mcg/mL      nitrofurantoin  Sensitive  <=32  mcg/mL      tetracycline  Resistant  >8  mcg/mL      vancomycin  Sensitive  2  mcg/mL         8/12/20 CTPA   Impression    No pulmonary embolism to the proximal segmental level.         Moderate right and small left pleural effusions, decreased on the right and    increased on the left since the prior study 2 weeks ago.         Evidence of mild interstitial edema.       8/14/20 CXR increase in R pleural effusion    ASSESSMENT:  · Acute hypoxemic respiratory failure   · HFpEF with acute exacerbation  · Pleural effusions, worsening on right   · Dementia of the Alzheimer's type   · Obesity  · Pseudomonas and Enterococcus in urine     PLAN:  · High flow nasal oxygen for life threatening respiratory failure   · Diuresis per cardiology  · Cefepime/Zyvox D#3 pending final culture results

## 2020-08-16 NOTE — PROGRESS NOTES
Patient yelling out, \"I can't breathe. \" SpO2 87-89% on 10L HFNC. O2 titrated to 12L HFNC, SpO2 remains 89%. NC placed in mouth as patient continues to mouth breathe. SpO2 instantly improves to 98% with O2 in mouth. NC moved back to nose, SpO2 remains 96%.

## 2020-08-16 NOTE — PROGRESS NOTES
Patient refuses to hold or wear breathing treatment close to her face, says it makes it hard for her to breathe. I tried explaining to her that it helps her breathe. She repeatedly told me to leave her alone and to go away.

## 2020-08-16 NOTE — FLOWSHEET NOTE
08/15/20 1945   Vitals   Temp 98.2 °F (36.8 °C)   Temp Source Oral   Pulse 101   Heart Rate Source Monitor   Resp 20   /80   BP Location Left lower arm   BP Upper/Lower Upper   BP Method Automatic   Patient Position Semi fowlers   Level of Consciousness 0   MEWS Score 2   Patient Currently in Pain Denies   Cardiac Rhythm Atrial fibrillation   Oxygen Therapy   SpO2 98 %   O2 Device High flow nasal cannula   PEEP/CPAP (cm H2O) 8 cm H20   Shift assessment completed-see flow sheet. Patient in bed awake,alert and oriented to self only. Vitals obtained. 98% on 8 L High flow.  A-fib on monitor. Evening medications given per order. Patient denies any further needs,call light within reach, bed alarm on. Will continue to monitor.

## 2020-08-16 NOTE — PROGRESS NOTES
[address]   Progress Note  Cardiology      Mozell Seip   Admission date:  8/12/2020  CC-f/up for CHF with hypoxemia  Subjective: She states she feels good.  Denies problems     Objective:  Medications/Labs all Reviewed     ipratropium-albuterol  1 ampule Inhalation Q4H While awake    cefepime  2 g Intravenous Q8H    linezolid  600 mg Intravenous Q12H    amLODIPine  5 mg Oral Daily    levothyroxine  100 mcg Oral Daily    montelukast  10 mg Oral Daily    OXcarbazepine  150 mg Oral BID    simvastatin  10 mg Oral Nightly    sodium chloride flush  10 mL Intravenous 2 times per day    furosemide  40 mg Intravenous BID       BMP:   Lab Results   Component Value Date     08/16/2020    K 3.1 08/16/2020    K 4.2 08/12/2020    CL 94 08/16/2020    CO2 36 08/16/2020    BUN 11 08/16/2020    CREATININE 0.8 08/16/2020    MG 2.00 08/16/2020     CBC:    Lab Results   Component Value Date    WBC 8.3 08/14/2020    RBC 3.69 08/14/2020    HGB 10.9 08/14/2020    HCT 34.5 08/14/2020    MCV 93.4 08/14/2020    RDW 16.0 08/14/2020     08/14/2020      PT/INR:  No results found for: INR, PROTIME  Cardiac Enzymes:    Lab Results   Component Value Date    CKTOTAL 345 08/05/2018     Lab Results   Component Value Date    TROPONINI <0.01 08/12/2020    TROPONINI <0.01 08/05/2020    TROPONINI <0.01 08/04/2020     BNP:  No results found for: BNP  FASTING LIPID PANEL:  No results found for: CHOL, HDL, TRIG    Physical Examination:    /75   Pulse 112   Temp 97 °F (36.1 °C) (Oral)   Resp 20   Ht 5' 4\" (1.626 m)   Wt 206 lb 12.8 oz (93.8 kg)   SpO2 94%   BMI 35.50 kg/m²      Respiratory:  · Resp Assessment: Normal respiratory effort  · Resp Auscultation: Clear to auscultation bilaterally   Cardiovascular:  · Auscultation: regular rate and rhythm, normal S1S2, irregular rhythm  · Palpation:  Nl PMI  · JVP:  normal  · Extremities: No Edema  Abdomen:  · Soft, non-tender  · Normal bowel sounds  Extremities:  ·  No Cyanosis or Clubbing  Neurological/Psychiatric:  · Oriented to  Person,not to time or place  · Non-anxious  Skin Warm and dry    Assessment:    Principal Problem:    Acute on chronic diastolic CHF (congestive heart failure) ([facility])  Plan: improved on iv lasix  Active Problems:    Essential hypertension      Acute respiratory failure with hypoxia ([facility])      Pleural effusion      Morbid obesity with BMI of 40.0-44.9, adult ([facility])        Dementia with behavioral disturbance ([facility])  Plan: appropriate today    Urinary tract infection with hematuria      Chronic a-fib  Plan:on low dose eliquis due to advanced age and dementia with likely fall risk so favor eliquis 2.5 mg bid when resumed      Plan:  1.  switch to oral lasix today,consider palliative care  I have spent  35  minutes of face to face time with the patient with more than 50%  spent  counseling and coordinating care for LONA

## 2020-08-16 NOTE — PROGRESS NOTES
Progress Note    Admit Date:  8/12/2020    Patient with progressive hypoxia. O2 requirement increased , she was on 15 L of O2-->  on Vapotherm FiO2 60% 25 L . She is getting diuresis for CHF  Seen by cardiology and pulmonology      Denies any chest pain. She is not a very good historian. Her COVID-19 test is negative. Subjective:  Ms. Esther Bo feels much better today. She denies any shortness of breath. She is more awake and alert today. She is oriented to person. Oxygen saturations have improved and she is down to 10 L of oxygen now. She is starting to have good urine output   Weight has decreased significantly from 225 to 206 lbs. Overall she is feeling much better. ,      Objective:   /75   Pulse 112   Temp (!) 120 °F (48.9 °C)   Resp 20   Ht 5' 4\" (1.626 m)   Wt 206 lb 12.8 oz (93.8 kg)   SpO2 91%   BMI 35.50 kg/m²         Intake/Output Summary (Last 24 hours) at 8/16/2020 1003  Last data filed at 8/16/2020 0854  Gross per 24 hour   Intake 1215 ml   Output 1650 ml   Net -435 ml       Physical Exam:  General appearance: She is awake alert and oriented to person today . Cooperative . And in no distress . Head: Normocephalic, without obvious abnormality, atraumatic  Eyes: conjunctivae/corneas clear. PERRL, EOM's intact.   Neck: no adenopathy, no carotid bruit, no JVD, supple, symmetrical, trachea midline and thyroid not enlarged, symmetric, no tenderness/mass/nodules  Lungs: No accessory muscle use, diminished breath sounds, no wheezes rales or rhonchi today  Heart: irregular rate and rhythm, S1, S2 normal, no murmur, click, rub or gallop  Abdomen: soft, non-tender; bowel sounds normal; no masses,  no organomegaly  Extremities: extremities normal, atraumatic, no cyanosis or edema  Pulses: 2+ and symmetric  Skin: Skin color, texture, turgor normal. No rashes or lesions  Neurologic: Grossly normal    Scheduled Meds:   ipratropium-albuterol  1 ampule Inhalation Q4H While awake    cefepime  2 g Intravenous Q8H    linezolid  600 mg Intravenous Q12H    amLODIPine  5 mg Oral Daily    levothyroxine  100 mcg Oral Daily    montelukast  10 mg Oral Daily    OXcarbazepine  150 mg Oral BID    simvastatin  10 mg Oral Nightly    sodium chloride flush  10 mL Intravenous 2 times per day    furosemide  40 mg Intravenous BID       Continuous Infusions:      PRN Meds:  sodium chloride flush, acetaminophen **OR** acetaminophen, polyethylene glycol, promethazine **OR** ondansetron, ipratropium-albuterol      Data:  CBC:   Recent Labs     08/14/20  0537   WBC 8.3   HGB 10.9*   HCT 34.5*   MCV 93.4        BMP:   Recent Labs     08/14/20  0537 08/15/20  0458 08/16/20  0528    139 142   K 3.4* 3.6 3.1*   CL 94* 94* 94*   CO2 39* 38* 36*   BUN 15 14 11   CREATININE 0.8 0.9 0.8     LIVER PROFILE:   No results for input(s): AST, ALT, LIPASE, BILIDIR, BILITOT, ALKPHOS in the last 72 hours. Invalid input(s): AMYLASE,  ALB  PT/INR: No results for input(s): PROTIME, INR in the last 72 hours. Cultures:     Results for Karlynn Rubinstein (MRN [id number]) as of 8/14/2020 10:18   Ref. Range 8/13/2020 01:15   SARS-CoV-2, CARLOS Latest Ref Range: NOT DETECTED  NOT DETECTED         Radiology  XR CHEST 1 VIEW   Final Result   No significant change diffuse bilateral pulmonary disease and bilateral   pleural effusions. CT CHEST PULMONARY EMBOLISM W CONTRAST   Final Result   No pulmonary embolism to the proximal segmental level. Moderate right and small left pleural effusions, decreased on the right and   increased on the left since the prior study 2 weeks ago. Evidence of mild interstitial edema. XR CHEST PORTABLE   Final Result   Bilateral pleural effusions and basilar pulmonary edema versus atelectasis. Pneumonia is a differential concern. ECHO   6/30/2020   Conclusions      Summary   Normal LV systolic function with EF of 55-60%.    D-shaped septum c/w RV pressure and volume overload. Mild-moderate concentric left ventricular hypertrophy. Left ventricular diastolic filling pressure is elevated. Mild mitral regurgitation. Moderate right-sided chamber enlargement. Right ventricular systolic function is moderately reduced. Moderate tricuspid regurgitation. Systolic pulmonic artery pressure (SPAP) is estimated at 94mmHg consistent   with severe pulmonary hypertension (RAP of 8mmHg). Assessment:  Principal Problem:    Acute on chronic diastolic CHF (congestive heart failure) ([facility])  Active Problems:    Essential hypertension    Acute respiratory failure with hypoxia ([facility])    Pleural effusion    Morbid obesity with BMI of 40.0-44.9, adult ([facility])    Shortness of breath    Dementia with behavioral disturbance ([facility])    Urinary tract infection with hematuria    Chronic a-fib  Resolved Problems:    * No resolved hospital problems. *      Plan:    Acute on chronic hypoxic RF  - normally uses 2L NC O2 continuous at home   - Suspect 2/2 acute on chronic dCHF, CXR with bilateral pleural effusions with pulmonary edema  - Was recently admitted, but no leukocytosis, PCT normal, lower concerns for HCAP as cause   - COVID 19 negative. - worsening hypoxia . 3L--> 15L --> high flow o2 per Vapotherm --> O2 sats  improved she is down to 10 L now  Improving,  continue diuresis     Acute on chronic dCHF  Severe pulmonary HTN  - Last Echo with EF 55-60%, DD and severe pulm HTN  - Was recently admitted for the same, discharge weight was 205 lbs  - Admitted with weight 225 lbs  - Continue IV diuretics, Lasix 40 mg IV BID  - Strict I&O's , daily weights   - consulted cardiology.    - weight has improved today 225 --> 206 lbs .     UTI  - UA with + Nitrite and LE   Urine cultures growing Pseudomonas and enterococcus,  antibiotics now changed to cefepime and Zyvox, continue day #3     Chronic Atrial Fibrillation   - Continue AC with Eliquis   - Rate is controlled      HTN  - BP is controlled  - Continue Norvasc      Hypothyroidism   - Continue Synthroid      HLD  - Continue statin     Dementia with behavior disturbances  - Continue home medications  - Supportive Care      Morbid Obesity  - Body mass index is 38.62 kg/m². - Complicating assessment and treatment.  Placing patient at risk for multiple co-morbidities as well as early death and contributing to the patient's presentation.   - Counseled on weight loss.     DVT Prophylaxis: Eliquis  Diet: DIET LOW SODIUM 2 GM;   Code Status: DNR-KAREN Fermin MD 8/16/2020 10:03 AM

## 2020-08-16 NOTE — PROGRESS NOTES
Patient woke up yelling I cant breath. 02 84%. Increase 02 to 15L and notified RT. Patient still 87% to 90%. Breathing treatment given and patient up to 98% on 12L. Patient is more calm at this time. Will monitor. Report given to [facility]. Pt. Stable. Care transferred at this time.

## 2020-08-17 ENCOUNTER — APPOINTMENT (OUTPATIENT)
Dept: GENERAL RADIOLOGY | Age: 85
DRG: 291 | End: 2020-08-17
Payer: MEDICARE

## 2020-08-17 PROBLEM — J96.21 ACUTE ON CHRONIC RESPIRATORY FAILURE WITH HYPOXIA (HCC): Status: ACTIVE | Noted: 2018-08-07

## 2020-08-17 LAB
ANION GAP SERPL CALCULATED.3IONS-SCNC: 10 MMOL/L (ref 3–16)
BUN BLDV-MCNC: 11 MG/DL (ref 7–20)
CALCIUM SERPL-MCNC: 8.7 MG/DL (ref 8.3–10.6)
CHLORIDE BLD-SCNC: 92 MMOL/L (ref 99–110)
CO2: 34 MMOL/L (ref 21–32)
CREAT SERPL-MCNC: 0.9 MG/DL (ref 0.6–1.2)
GFR AFRICAN AMERICAN: >60
GFR NON-AFRICAN AMERICAN: 60
GLUCOSE BLD-MCNC: 144 MG/DL (ref 70–99)
MAGNESIUM: 2.2 MG/DL (ref 1.8–2.4)
ORGANISM: ABNORMAL
ORGANISM: ABNORMAL
POTASSIUM SERPL-SCNC: 3 MMOL/L (ref 3.5–5.1)
SODIUM BLD-SCNC: 136 MMOL/L (ref 136–145)
T4 FREE: 0.9 NG/DL (ref 0.9–1.8)
TSH REFLEX: 14.58 UIU/ML (ref 0.27–4.2)
URINE CULTURE, ROUTINE: ABNORMAL
URINE CULTURE, ROUTINE: ABNORMAL

## 2020-08-17 PROCEDURE — 6370000000 HC RX 637 (ALT 250 FOR IP): Performed by: INTERNAL MEDICINE

## 2020-08-17 PROCEDURE — 83735 ASSAY OF MAGNESIUM: CPT

## 2020-08-17 PROCEDURE — 92610 EVALUATE SWALLOWING FUNCTION: CPT

## 2020-08-17 PROCEDURE — 2580000003 HC RX 258: Performed by: INTERNAL MEDICINE

## 2020-08-17 PROCEDURE — 80048 BASIC METABOLIC PNL TOTAL CA: CPT

## 2020-08-17 PROCEDURE — 94761 N-INVAS EAR/PLS OXIMETRY MLT: CPT

## 2020-08-17 PROCEDURE — 6360000002 HC RX W HCPCS: Performed by: NURSE PRACTITIONER

## 2020-08-17 PROCEDURE — 2700000000 HC OXYGEN THERAPY PER DAY

## 2020-08-17 PROCEDURE — 6360000002 HC RX W HCPCS: Performed by: INTERNAL MEDICINE

## 2020-08-17 PROCEDURE — 6370000000 HC RX 637 (ALT 250 FOR IP): Performed by: PHYSICIAN ASSISTANT

## 2020-08-17 PROCEDURE — 84443 ASSAY THYROID STIM HORMONE: CPT

## 2020-08-17 PROCEDURE — 94640 AIRWAY INHALATION TREATMENT: CPT

## 2020-08-17 PROCEDURE — 84439 ASSAY OF FREE THYROXINE: CPT

## 2020-08-17 PROCEDURE — 2060000000 HC ICU INTERMEDIATE R&B

## 2020-08-17 PROCEDURE — 71045 X-RAY EXAM CHEST 1 VIEW: CPT

## 2020-08-17 PROCEDURE — 99232 SBSQ HOSP IP/OBS MODERATE 35: CPT | Performed by: NURSE PRACTITIONER

## 2020-08-17 PROCEDURE — 6370000000 HC RX 637 (ALT 250 FOR IP): Performed by: NURSE PRACTITIONER

## 2020-08-17 PROCEDURE — 99232 SBSQ HOSP IP/OBS MODERATE 35: CPT | Performed by: INTERNAL MEDICINE

## 2020-08-17 PROCEDURE — 36415 COLL VENOUS BLD VENIPUNCTURE: CPT

## 2020-08-17 RX ORDER — SPIRONOLACTONE 25 MG/1
25 TABLET ORAL DAILY
Status: DISCONTINUED | OUTPATIENT
Start: 2020-08-17 | End: 2020-08-24 | Stop reason: HOSPADM

## 2020-08-17 RX ORDER — POTASSIUM CHLORIDE 20 MEQ/1
40 TABLET, EXTENDED RELEASE ORAL 2 TIMES DAILY WITH MEALS
Status: DISCONTINUED | OUTPATIENT
Start: 2020-08-17 | End: 2020-08-17

## 2020-08-17 RX ORDER — POTASSIUM CHLORIDE 20 MEQ/1
40 TABLET, EXTENDED RELEASE ORAL ONCE
Status: COMPLETED | OUTPATIENT
Start: 2020-08-17 | End: 2020-08-17

## 2020-08-17 RX ORDER — FUROSEMIDE 10 MG/ML
40 INJECTION INTRAMUSCULAR; INTRAVENOUS 2 TIMES DAILY
Status: DISCONTINUED | OUTPATIENT
Start: 2020-08-17 | End: 2020-08-21

## 2020-08-17 RX ADMIN — LINEZOLID 600 MG: 600 INJECTION, SOLUTION INTRAVENOUS at 18:02

## 2020-08-17 RX ADMIN — SIMVASTATIN 10 MG: 10 TABLET, FILM COATED ORAL at 21:44

## 2020-08-17 RX ADMIN — IPRATROPIUM BROMIDE AND ALBUTEROL SULFATE 1 AMPULE: .5; 3 SOLUTION RESPIRATORY (INHALATION) at 15:12

## 2020-08-17 RX ADMIN — FUROSEMIDE 40 MG: 10 INJECTION, SOLUTION INTRAMUSCULAR; INTRAVENOUS at 19:20

## 2020-08-17 RX ADMIN — OXCARBAZEPINE 150 MG: 300 TABLET, FILM COATED ORAL at 09:12

## 2020-08-17 RX ADMIN — IPRATROPIUM BROMIDE AND ALBUTEROL SULFATE 1 AMPULE: .5; 3 SOLUTION RESPIRATORY (INHALATION) at 10:58

## 2020-08-17 RX ADMIN — OXCARBAZEPINE 150 MG: 300 TABLET, FILM COATED ORAL at 21:45

## 2020-08-17 RX ADMIN — IPRATROPIUM BROMIDE AND ALBUTEROL SULFATE 1 AMPULE: .5; 3 SOLUTION RESPIRATORY (INHALATION) at 07:17

## 2020-08-17 RX ADMIN — CEFEPIME 2 G: 2 INJECTION, POWDER, FOR SOLUTION INTRAVENOUS at 18:02

## 2020-08-17 RX ADMIN — SPIRONOLACTONE 25 MG: 25 TABLET ORAL at 18:06

## 2020-08-17 RX ADMIN — IPRATROPIUM BROMIDE AND ALBUTEROL SULFATE 1 AMPULE: .5; 3 SOLUTION RESPIRATORY (INHALATION) at 19:32

## 2020-08-17 RX ADMIN — FUROSEMIDE 40 MG: 40 TABLET ORAL at 09:12

## 2020-08-17 RX ADMIN — APIXABAN 2.5 MG: 5 TABLET, FILM COATED ORAL at 21:44

## 2020-08-17 RX ADMIN — CEFEPIME 2 G: 2 INJECTION, POWDER, FOR SOLUTION INTRAVENOUS at 09:12

## 2020-08-17 RX ADMIN — AMLODIPINE BESYLATE 5 MG: 5 TABLET ORAL at 09:12

## 2020-08-17 RX ADMIN — CEFEPIME 2 G: 2 INJECTION, POWDER, FOR SOLUTION INTRAVENOUS at 01:02

## 2020-08-17 RX ADMIN — MONTELUKAST SODIUM 10 MG: 10 TABLET, COATED ORAL at 09:12

## 2020-08-17 RX ADMIN — LEVOTHYROXINE SODIUM 100 MCG: 100 TABLET ORAL at 09:12

## 2020-08-17 RX ADMIN — IPRATROPIUM BROMIDE AND ALBUTEROL SULFATE 1 AMPULE: .5; 3 SOLUTION RESPIRATORY (INHALATION) at 04:58

## 2020-08-17 RX ADMIN — POTASSIUM CHLORIDE 40 MEQ: 1500 TABLET, EXTENDED RELEASE ORAL at 18:06

## 2020-08-17 RX ADMIN — LINEZOLID 600 MG: 600 INJECTION, SOLUTION INTRAVENOUS at 04:32

## 2020-08-17 RX ADMIN — Medication 10 ML: at 21:45

## 2020-08-17 NOTE — PROGRESS NOTES
[address]  Cardiology  Progress Note    Admission date:  2020    Reason for follow up visit: dCHF, PAH, pleural effusion     HPI/CC: Sparkle Edmond is a 82 y.o. female who presented to the hospital with complaints of shortness of breath. BNP 1,356. CXR showed bilateral pleural effusion dn pulmonary edema vs atelectasis. CT obtained and PE ruled out, moderate right and small left pleural effusions, decreased on the right and increased on the left since prior study 2 weeks ago. She is being treated for acute on chronic diastolic CHF, severe pulmonary hypertension, and chronic atrial fibrillation. Subjective: Resting in bed with HOB elevated. Currently on 8 liters HF down from 12 at beginning of day per nursing. Pt stated breathing is better. +orthopnea. Pt denies any chest pain, palpitations or dizziness. Pt is a poor historian. Vitals:  Blood pressure 131/77, pulse 97, temperature 97.2 °F (36.2 °C), temperature source Oral, resp. rate 24, height 5' 4\" (1.626 m), weight 207 lb 6.4 oz (94.1 kg), SpO2 97 %.   Temp  Av.1 °F (36.7 °C)  Min: 97.2 °F (36.2 °C)  Max: 98.8 °F (37.1 °C)  Pulse  Av  Min: 72  Max: 98  BP  Min: 126/62  Max: 135/60  SpO2  Av.7 %  Min: 93 %  Max: 99 %    24 hour I/O    Intake/Output Summary (Last 24 hours) at 2020 1239  Last data filed at 2020 0519  Gross per 24 hour   Intake 840 ml   Output 1850 ml   Net -1010 ml     Current Facility-Administered Medications   Medication Dose Route Frequency Provider Last Rate Last Dose    furosemide (LASIX) tablet 40 mg  40 mg Oral Daily Bubba Muller MD   40 mg at 20 0912    ipratropium-albuterol (DUONEB) nebulizer solution 1 ampule  1 ampule Inhalation Q4H While awake Tasha Rodriguez MD   1 ampule at 20 1058    cefepime (MAXIPIME) 2 g IVPB minibag  2 g Intravenous Q8H Tasha Rodriguez MD   Stopped at 20 0942    linezolid (ZYVOX) IVPB 600 mg  600 mg Intravenous Q12H Tasha Rodriguez MD moves legs and arms equally, normal mood and affect      Data Reviewed:  CXR 8/17/2020: Impression    Congestive failure with asymmetric left lower lobe airspace disease. Superimposed atelectasis or pneumonia is also therefore considered.         Suspect bilateral pleural effusion. CXR 8/14/2020: Impression No significant change diffuse bilateral pulmonary disease and bilateral pleural effusions. CT 8/12/2020: Impression No pulmonary embolism to the proximal segmental level. Moderate right and small left pleural effusions, decreased on the right and increased on the left since the prior study 2 weeks ago. Evidence of mild interstitial edema. Echo 6/30/2020:   Normal LV systolic function with EF of 55-60%. D-shaped septum c/w RV pressure and volume overload. Mild-moderate concentric left ventricular hypertrophy. Left ventricular diastolic filling pressure is elevated. Mild mitral regurgitation. Moderate right-sided chamber enlargement. Right ventricular systolic function is moderately reduced. Moderate tricuspid regurgitation. Systolic pulmonic artery pressure (SPAP) is estimated at 94mmHg consistent   with severe pulmonary hypertension (RAP of 8mmHg).       Lab Reviewed:     Renal Profile:  Lab Results   Component Value Date    CREATININE 0.9 08/17/2020    BUN 11 08/17/2020     08/17/2020    K 3.0 08/17/2020    K 4.2 08/12/2020    CL 92 08/17/2020    CO2 34 08/17/2020     CBC:    Lab Results   Component Value Date    WBC 8.3 08/14/2020    RBC 3.69 08/14/2020    HGB 10.9 08/14/2020    HCT 34.5 08/14/2020    MCV 93.4 08/14/2020    RDW 16.0 08/14/2020     08/14/2020     BNP:    Lab Results   Component Value Date    PROBNP 1,356 08/16/2020    PROBNP 1,811 08/12/2020    PROBNP 1,738 08/04/2020    PROBNP 1,972 06/29/2020     Fasting Lipid Panel:  No results found for: CHOL, HDL, TRIG  Cardiac Enzymes:  CK/MbTroponin  Lab Results   Component Value Date    CKTOTAL 345 08/05/2018    TROPONINI <0.01 08/12/2020     PT/ INR No results found for: INR, PROTIME  PTT No results found for: PTT   Lab Results   Component Value Date    MG 2.20 08/17/2020      Lab Results   Component Value Date    TSH 12.50 08/04/2020       All labs and imaging reviewed today    Assessment:  1. Acute on chronic hypoxic respiratory failure: ongoing increased oxygen demands over night, now back to 8 liters HF  2. Acute on chronic diastolic heart failure: ongoing   -Weights have been 225-->223-->205-->206-->207   -net negative 1,484 since admission   -BNP1,811 on admission and 1,356 8/16   -edema trace   -continue daily weights, low sodium diet, 1800 ml fluid restriction, and strict I/O -bilateral pleural effusions noted on chest X-ray today   3. Cor pulmonale  4. Severe pulmonary hypertension:Dr. Sloane Fuentes has seen patient in the past and does not recommend pulmonary HTN work up given age, demential, and debility  5. Persistent atrial fibrillation: stable   -hx of known asymptomatic pauses   -no PPM recommended at this time unless improved functional status and/or  definitely symptomatic   6. Dementia  7. Obesity  8. UTI  9. Hypokalemia: replaced with oral today, will add aldactone     Plan:   1. Start back IV Lasix 40 mg BID and monitor kidney function closely as well as potassium   2. Add aldactone 25 mg daily for diuresis as well as hypokalemia   3. CXR today shows bilateral pleural effusions  4. If not considering thoracentesis then would resume Eliquis, Per EP recommendations in July continue at low dose given hx of fluctuating creatinine and hx of hematemesis  5. Consider palliative care, pt is currently DNR [facility]  6. Continue daily weights, low sodium diet, 1800 fluid restriction, and strict I/O  7. Continue Norvasc and Zocor  8. Continue to monitor telemetry   9. Check TSH      Addendum: SIMRAN Shaw spoke with Dr. Cherry Vargas and ok to restart Eliquis.     Lindy Ochoa, APRN - CNP   [address]  ([phone])

## 2020-08-17 NOTE — PROGRESS NOTES
Pt in bed, continuously yelling out- screaming that she can't breathe and that she is going to die. Alert, oriented to self only. VSS,. O2 sats stable on 12L, resps labored r/t yelling. Lungs diminished. Lua intact draining clear/yellow urine, some sediment. Shift assessment complete, see flowsheet. Call light in reach, will monitor.

## 2020-08-17 NOTE — PROGRESS NOTES
and after discussion with my PA formulated the plan. Agree with note with the following edits. HPI:     I reviewed the patient's Past Medical History, Past Surgical History, Medications, and Allergies. Physical exam:    /77   Pulse 97   Temp 97.2 °F (36.2 °C) (Oral)   Resp 24   Ht 5' 4\" (1.626 m)   Wt 207 lb 6.4 oz (94.1 kg)   SpO2 97%   BMI 35.60 kg/m²     Gen: No distress. drowsy  Eyes: PERRL. No sclera icterus. No conjunctival injection. ENT: No discharge. Pharynx clear. Neck: Trachea midline. Normal thyroid. Resp: No accessory muscle use. No crackles. No wheezes. No rhonchi. No dullness on percussion. CV: Regular rate. Regular rhythm. No murmur or rub. No edema. GI: Non-tender. Non-distended. No masses. No organomegaly. Normal bowel sounds. No hernia. Skin: Warm and dry. No nodule on exposed extremities. No rash on exposed extremities. Lymph: No cervical LAD. No supraclavicular LAD. M/S: No cyanosis. No joint deformity. No clubbing. Neuro: drowsy,not oriented           MYRIAM Evans      Scheduled Meds:   furosemide  40 mg Oral Daily    ipratropium-albuterol  1 ampule Inhalation Q4H While awake    cefepime  2 g Intravenous Q8H    linezolid  600 mg Intravenous Q12H    amLODIPine  5 mg Oral Daily    levothyroxine  100 mcg Oral Daily    montelukast  10 mg Oral Daily    OXcarbazepine  150 mg Oral BID    simvastatin  10 mg Oral Nightly    sodium chloride flush  10 mL Intravenous 2 times per day       Continuous Infusions:      PRN Meds:  sodium chloride flush, acetaminophen **OR** acetaminophen, polyethylene glycol, promethazine **OR** ondansetron, ipratropium-albuterol      Data:  CBC:   No results for input(s): WBC, HGB, HCT, MCV, PLT in the last 72 hours.   BMP:   Recent Labs     08/15/20  0458 08/16/20  0528 08/17/20  0506    142 136   K 3.6 3.1* 3.0*   CL 94* 94* 92*   CO2 38* 36* 34*   BUN 14 11 11   CREATININE 0.9 0.8 0.9     LIVER PROFILE:   No results

## 2020-08-17 NOTE — PROGRESS NOTES
Patient is resting showing no s/s of distress. Patient is alert and oriented. Meds were given, see MAR. Patient is denying any needs. Bed is in lowest position and call light is within reach. Will continue to monitor. Shift assessment complete, see flowsheets. Patient repositioned and camp[place] care completed at this time.

## 2020-08-17 NOTE — PROGRESS NOTES
Pulmonary Progress Note  CC: Hypoxemia and shortness of breath    Subjective: More shortness of breath overnight    IV line peripheral    EXAM:   /72   Pulse 72   Temp 98.2 °F (36.8 °C) (Oral)   Resp 20   Ht 5' 4\" (1.626 m)   Wt 207 lb 6.4 oz (94.1 kg)   SpO2 99%   BMI 35.60 kg/m²  on 10 L  Constitutional:  No acute distress   HEENT: no scleral icterus  Neck: No tracheal deviation present. Cardiovascular: Normal heart sounds. + Edema  Pulmonary/Chest: No wheezes. No rhonchi. few rales. No decreased breath sounds. Abdominal: Soft. Musculoskeletal: No cyanosis. No clubbing. Skin: Skin is warm and dry. Scheduled Meds:   furosemide  40 mg Oral Daily    ipratropium-albuterol  1 ampule Inhalation Q4H While awake    cefepime  2 g Intravenous Q8H    linezolid  600 mg Intravenous Q12H    amLODIPine  5 mg Oral Daily    levothyroxine  100 mcg Oral Daily    montelukast  10 mg Oral Daily    OXcarbazepine  150 mg Oral BID    simvastatin  10 mg Oral Nightly    sodium chloride flush  10 mL Intravenous 2 times per day     Continuous Infusions:    PRN Meds:  sodium chloride flush, acetaminophen **OR** acetaminophen, polyethylene glycol, promethazine **OR** ondansetron, ipratropium-albuterol    Labs:  CBC:   No results for input(s): WBC, HGB, HCT, MCV, PLT in the last 72 hours.   BMP:   Recent Labs     08/15/20  0458 08/16/20  0528 08/17/20  0506    142 136   K 3.6 3.1* 3.0*   CL 94* 94* 92*   CO2 38* 36* 34*   BUN 14 11 11   CREATININE 0.9 0.8 0.9     Cultures:  8/12/2020 urine enterococcus and Pseudomonas  Enterococcus faecalis (2)     Antibiotic  Interpretation  ANDRES  Status     ampicillin  Sensitive  <=2  mcg/mL      nitrofurantoin  Sensitive  <=32  mcg/mL      tetracycline  Resistant  >8  mcg/mL      vancomycin  Sensitive  2  mcg/mL         8/12/20 CTPA   Impression    No pulmonary embolism to the proximal segmental level.         Moderate right and small left pleural effusions, decreased on the right and    increased on the left since the prior study 2 weeks ago.         Evidence of mild interstitial edema.       8/14/20 CXR increase in R pleural effusion    ASSESSMENT:  · Acute hypoxemic respiratory failure   · HFpEF with acute exacerbation  · Pleural effusions, worsening on right   · Dementia of the Alzheimer's type   · Obesity  · Pseudomonas and Enterococcus in urine     PLAN:  · High flow nasal oxygen for life threatening respiratory failure   · Diuresis per cardiology  · Cefepime/Zyvox D#4 pending final culture results Never smoker

## 2020-08-17 NOTE — PROGRESS NOTES
RESPIRATORY THERAPY ASSESSMENT    Name:  [address] Record Number:  [id number]  Age: 82 y.o. Gender: female  : 1935  Today's Date:  2020  Room:  /0325-02    Assessment     Is the patient being admitted for a COPD or Asthma exacerbation? No   (If yes the patient will be seen every 4 hours for the first 24 hours and then reassessed)    Patient Admission Diagnosis      Allergies  Allergies   Allergen Reactions    Codeine Itching    Sulfa Antibiotics Other (See Comments)     Per Pt daughter Megan Clemons) \" Not know her reaction to medication\"       Minimum Predicted Vital Capacity:     na          Actual Vital Capacity:      na              Pulmonary History:Asthma and CHF/Pulmonary Edema  Home Oxygen Therapy:  room air  Home Respiratory Therapy: albuterol 3-4 x day   Current Respiratory Therapy:  duoneb q4 w/a  Treatment Type: HHN  Medications: Albuterol/Ipratropium    Respiratory Severity Index(RSI)   Patients with orders for inhalation medications, oxygen, or any therapeutic treatment modality will be placed on Respiratory Protocol. They will be assessed with the first treatment and at least every 72 hours thereafter. The following severity scale will be used to determine frequency of treatment intervention.     Smoking History: Pulmonary Disease or Smoking History, Greater than 15 pack year = 2    Social History  Social History     Tobacco Use    Smoking status: Never Smoker    Smokeless tobacco: Never Used   Substance Use Topics    Alcohol use: Not on file    Drug use: Not on file       Recent Surgical History: None = 0  Past Surgical History  Past Surgical History:   Procedure Laterality Date    CHOLECYSTECTOMY      EYE SURGERY      HYSTERECTOMY      [address] W/BRNCL ALVEOLAR LAVAGE N/A 2018    BRONCHOSCOPY ALVEOLAR LAVAGE performed by Hinton Call, MD at [address] [facility] ESOPHAGOGASTRODUODENOSCOPY TRANSORAL DIAGNOSTIC N/A 2018    EGD ESOPHAGOGASTRODUODENOSCOPY performed by Naomie Pedro MD at [facility]         Level of Consciousness: Alert, Follows Commands but Disoriented = 1    Level of Activity: Mostly sedentary, minimal walking = 2    Respiratory Pattern: Dyspnea with exertion;Irregular pattern;or RR less than 6 = 2    Breath Sounds: Diminshed bilaterally and/or crackles = 2    Sputum   ,  , Sputum How Obtained: None  Cough: Strong, spontaneous, non-productive = 0    Vital Signs   /77   Pulse 97   Temp 97.2 °F (36.2 °C)   Resp 24   Ht 5' 4\" (1.626 m)   Wt 207 lb 6.4 oz (94.1 kg)   SpO2 97%   BMI 35.60 kg/m²   SPO2 (COPD values may differ): Less than 86% on room air or greater than 92% on FiO2 greater than 50% = 4    Peak Flow (asthma only): not applicable = 0    RSI: 30-25 = Q4WA (every four hours while awake) and Q4hrs PRN        Plan       Goals: medication delivery, mobilize retained secretions, volume expansion and improve oxygenation    Patient/caregiver was educated on the proper method of use for Respiratory Care Devices:  Yes      Level of patient/caregiver understanding able to:   ? Verbalize understanding   ? Demonstrate understanding       ? Teach back        ? Needs reinforcement       ? No available caregiver               ? Other:     Response to education:  Illona Nicely     Is patient being placed on Home Treatment Regimen? No     Does the patient have everything they need prior to discharge? Yes     Comments: patient assessed. Chart reviewed    Plan of Care: continue current therapy    Electronically signed by Greg Lagos RCP on 8/17/2020 at 11:20 AM    Respiratory Protocol Guidelines     1. Assessment and treatment by Respiratory Therapy will be initiated for medication and therapeutic interventions upon initiation of aerosolized medication. 2. Physician will be contacted for respiratory rate (RR) greater than 35 breaths per minute.  Therapy will be held for heart rate (HR) greater than 140 beats per minute, pending direction from physician. 3. Bronchodilators will be administered via Metered Dose Inhaler (MDI) with spacer when the following criteria are met:  a. Alert and cooperative     b. HR < 140 bpm  c. RR < 30 bpm                d. Can demonstrate a 2-3 second inspiratory hold  4. Bronchodilators will be administered via Hand Held Nebulizer [facility]) to patients when ANY of the following criteria are met  a. Incognizant or uncooperative          b. Patients treated with HHN at Home        c. Unable to demonstrate proper use of MDI with spacer     d. RR > 30 bpm   5. Bronchodilators will be delivered via Metered Dose Inhaler (MDI), HHN, Aerogen to intubated patients on mechanical ventilation. 6. Inhalation medication orders will be delivered and/or substituted as outlined below. Aerosolized Medications Ordering and Administration Guidelines:    1. All Medications will be ordered by a physician, and their frequency and/or modality will be adjusted as defined by the patients Respiratory Severity Index (RSI) score. 2. If the patient does not have documented COPD, consider discontinuing anticholinergics when RSI is less than 9.  3. If the bronchospasm worsens (increased RSI), then the bronchodilator frequency can be increased to a maximum of every 4 hours. If greater than every 4 hours is required, the physician will be contacted. 4. If the bronchospasm improves, the frequency of the bronchodilator can be decreased, based on the patient's RSI, but not less than home treatment regimen frequency. 5. Bronchodilator(s) will be discontinued if patient has a RSI less than 9 and has received no scheduled or as needed treatment for 72  Hrs. Patients Ordered on a Mucolytic Agent:    1. Must always be administered with a bronchodilator.     2. Discontinue if patient experiences worsened bronchospasm, or secretions have lessened to the point that the patient is able to clear them with a cough. Anti-inflammatory and Combination Medications:    1. If the patient lacks prior history of lung disease, is not using inhaled anti-inflammatory medication at home, and lacks wheezing by examination or by history for at least 24 hours, contact physician for possible discontinuation.

## 2020-08-17 NOTE — PROGRESS NOTES
Speech Language Pathology  Facility/Department: [facility] PCU TELEMETRY   CLINICAL BEDSIDE SWALLOW EVALUATION    NAME: Elizabeth Robin  : 1935  MRN: [id number]    ADMISSION DATE: 2020  ADMITTING DIAGNOSIS: has Hematemesis; Morbid obesity ([address] Persistent atrial fibrillation; AV block; Essential hypertension; Acute respiratory failure with hypoxia ([address] Pulmonary infiltrates; Diastolic dysfunction; Pleural effusion; Morbid obesity with BMI of 40.0-44.9, adult ([address] Myonecrosis; Atelectasis; Ineffective airway clearance; Shortness of breath; SOB (shortness of breath); Acute on chronic diastolic CHF (congestive heart failure) ([address] Pulmonary hypertension ([address] Bradycardia; Intermittent asthma without complication; Late onset Alzheimer's disease without behavioral disturbance ([address] Acute respiratory failure with hypoxia and hypercapnia ([address] Acute on chronic respiratory failure with hypoxia and hypercapnia ([address] Acute on chronic congestive heart failure ([address] Dementia with behavioral disturbance ([address] Urinary tract infection with hematuria; and Chronic a-fib on their problem list.  ONSET DATE: 20    Recent Chest Xray/CT of Chest:   FINDINGS:    The heart is enlarged.  Vessels are congested.  There is central pulmonary    edema.  Asymmetric disease is seen at the lung bases greater left lower lobe. This obscures the entire left diaphragm.  Patient is somewhat rotated to the    left.  Pleural effusion is suspected bilaterally.              Impression    Congestive failure with asymmetric left lower lobe airspace disease. Superimposed atelectasis or pneumonia is also therefore considered.         Suspect bilateral pleural effusion.           Date of Eval: 2020  Evaluating Therapist: Edmundhemmalena Medic    Current Diet level:  Current Diet : Dysphagia Minced and Moist (Dysphagia II)  Current Liquid Diet : Thin      Primary Complaint  Per MD H&P \"The patient is a 82 y.o. female with alzheimers dementia, atrial fibrillation, chronic diastolic CHF, GERD, HTN who presented to [facility] ED with complaint of SOB. When I saw the patient she was feeling somewhat lethargic and unable to give me much of a history. Patient is apparently been noncompliant with home oxygen therapy. She came to the ER for shortness of breath. Denies any chest pain. Work-up showed acute on chronic respiratory failure likely due to underlying CHF.     Because the patient is somewhat lethargic and there is a change in mental status I ordered an arterial blood gas. She however refused but woke up after the attempted to do one. She is admitted to the medical floor for COVID rule out. Recent COVID testing is negative. \"    Pain:  Pain Assessment  Pain Assessment: Faces  Pain Level: 0    Reason for Referral  Tomy Jolley was referred for a bedside swallow evaluation to assess the efficiency of her swallow function, identify signs and symptoms of aspiration and make recommendations regarding safe dietary consistencies, effective compensatory strategies, and safe eating environment. Impression  Dysphagia Diagnosis: Suspected needs further assessment  Dysphagia Outcome Severity Scale: Level 4: Mild moderate dysphagia- Intermittent supervision/cueing. One - two diet consistencies restricted     RN approved entry to room. Patient refusing all PO after 1x sip. Patient with recent upgrade at nursing home to thin liquids. D/t continued refusal of PO trials and increased oxygen requirement, recommend minced and moist and mildly thick (nectar) liquids, which was recommendation from previosu MBSS. Patient would benefit from f/u MBSS if agreeable which is questionable at this time. RN made aware of current diet recommendations. If patient presents with overt s/s of aspiration or change in respiratory status, please downgrade to strict NPO and re-contact SLP    7/3 MBSS \"Impressions:   The pt demonstrates moderate oropharyngeal demonstrate understanding of compensatory strategies for improved swallowing safety. General  Chart Reviewed: Yes  Comments: Patient with recent admission and readmission to hospital. Previously was on thin liquids and minced and moist per SLP at [facility]. Per MBSS recommendations, recopmmended minced and moist with NTL  Subjective  Subjective: Patient sitting upright in bed. Patient difficult to direct to task this date. Behavior/Cognition: Alert;Confused; Agitated; Uncooperative  Respiratory Status: O2 via nasual cannula  O2 Device: High flow nasal cannula  Liters of Oxygen: (12)  Communication Observation: Functional  Follows Directions: Simple  Dentition: Dentures top;Dentures bottom  Patient Positioning: Upright in bed  Baseline Vocal Quality: Normal  Prior Dysphagia History: SLP at NH recommended upgrade to thin liquids following MBSS with recommendation for NTL. Consistencies Administered: Thin - cup           Vision/Hearing       Oral Motor Deficits  Oral/Motor  Oral Motor: Exceptions to WFL(refusing to follow commands, no weakness noted at baseline)    Oral Phase Dysfunction  Oral Phase  Oral Phase: Exceptions  Oral Phase  Oral Phase - Comment: Unable to fully assess d/t refusal of PO intake. Indicators of Pharyngeal Phase Dysfunction   Pharyngeal Phase  Pharyngeal Phase: Exceptions  Pharyngeal Phase   Pharyngeal: Unable to fully assess d/t refusal of PO    Prognosis  Prognosis  Prognosis for safe diet advancement: guarded  Barriers to reach goals: age;cognitive deficits;fatigue;inconsistent alertness;motivation  Individuals consulted  Consulted and agree with results and recommendations: Patient;RN    Education  Patient Education: extensive education on Role of SLP, rational for PO trials, diet recommendations if refusal continues and POC  Patient Education Response: No evidence of learning;Needs reinforcement  Safety Devices in place: Yes  Type of devices: Bed alarm in place; Left in bed;Call light within reach;Nurse notified       Therapy Time  SLP Individual Minutes  Time In: 5786  Time Out: [facility] 7715  Minutes: [address] [facility] Destiny SALEH  [facility]-SLP  Speech-Language Pathologist [id number]      VU Crawford  8/17/2020 12:25 PM

## 2020-08-18 LAB
ANION GAP SERPL CALCULATED.3IONS-SCNC: 10 MMOL/L (ref 3–16)
BUN BLDV-MCNC: 13 MG/DL (ref 7–20)
CALCIUM SERPL-MCNC: 9 MG/DL (ref 8.3–10.6)
CHLORIDE BLD-SCNC: 94 MMOL/L (ref 99–110)
CO2: 36 MMOL/L (ref 21–32)
CREAT SERPL-MCNC: 1 MG/DL (ref 0.6–1.2)
GFR AFRICAN AMERICAN: >60
GFR NON-AFRICAN AMERICAN: 53
GLUCOSE BLD-MCNC: 111 MG/DL (ref 70–99)
POTASSIUM SERPL-SCNC: 3.5 MMOL/L (ref 3.5–5.1)
SODIUM BLD-SCNC: 140 MMOL/L (ref 136–145)

## 2020-08-18 PROCEDURE — 2700000000 HC OXYGEN THERAPY PER DAY

## 2020-08-18 PROCEDURE — 94640 AIRWAY INHALATION TREATMENT: CPT

## 2020-08-18 PROCEDURE — 94761 N-INVAS EAR/PLS OXIMETRY MLT: CPT

## 2020-08-18 PROCEDURE — 6360000002 HC RX W HCPCS: Performed by: NURSE PRACTITIONER

## 2020-08-18 PROCEDURE — 6370000000 HC RX 637 (ALT 250 FOR IP): Performed by: NURSE PRACTITIONER

## 2020-08-18 PROCEDURE — 99232 SBSQ HOSP IP/OBS MODERATE 35: CPT | Performed by: INTERNAL MEDICINE

## 2020-08-18 PROCEDURE — 6370000000 HC RX 637 (ALT 250 FOR IP): Performed by: INTERNAL MEDICINE

## 2020-08-18 PROCEDURE — 2060000000 HC ICU INTERMEDIATE R&B

## 2020-08-18 PROCEDURE — 99232 SBSQ HOSP IP/OBS MODERATE 35: CPT | Performed by: NURSE PRACTITIONER

## 2020-08-18 PROCEDURE — 2580000003 HC RX 258: Performed by: INTERNAL MEDICINE

## 2020-08-18 PROCEDURE — 92526 ORAL FUNCTION THERAPY: CPT

## 2020-08-18 PROCEDURE — 6360000002 HC RX W HCPCS: Performed by: INTERNAL MEDICINE

## 2020-08-18 PROCEDURE — 36415 COLL VENOUS BLD VENIPUNCTURE: CPT

## 2020-08-18 PROCEDURE — 80048 BASIC METABOLIC PNL TOTAL CA: CPT

## 2020-08-18 RX ADMIN — FUROSEMIDE 40 MG: 10 INJECTION, SOLUTION INTRAMUSCULAR; INTRAVENOUS at 17:20

## 2020-08-18 RX ADMIN — IPRATROPIUM BROMIDE AND ALBUTEROL SULFATE 1 AMPULE: .5; 3 SOLUTION RESPIRATORY (INHALATION) at 23:41

## 2020-08-18 RX ADMIN — IPRATROPIUM BROMIDE AND ALBUTEROL SULFATE 1 AMPULE: .5; 3 SOLUTION RESPIRATORY (INHALATION) at 08:55

## 2020-08-18 RX ADMIN — SPIRONOLACTONE 25 MG: 25 TABLET ORAL at 08:47

## 2020-08-18 RX ADMIN — Medication 10 ML: at 21:19

## 2020-08-18 RX ADMIN — OXCARBAZEPINE 150 MG: 300 TABLET, FILM COATED ORAL at 21:18

## 2020-08-18 RX ADMIN — SIMVASTATIN 10 MG: 10 TABLET, FILM COATED ORAL at 21:18

## 2020-08-18 RX ADMIN — LEVOTHYROXINE SODIUM 100 MCG: 100 TABLET ORAL at 05:50

## 2020-08-18 RX ADMIN — MONTELUKAST SODIUM 10 MG: 10 TABLET, COATED ORAL at 08:47

## 2020-08-18 RX ADMIN — LINEZOLID 600 MG: 600 INJECTION, SOLUTION INTRAVENOUS at 17:20

## 2020-08-18 RX ADMIN — LINEZOLID 600 MG: 600 INJECTION, SOLUTION INTRAVENOUS at 05:50

## 2020-08-18 RX ADMIN — AMLODIPINE BESYLATE 5 MG: 5 TABLET ORAL at 08:47

## 2020-08-18 RX ADMIN — CEFEPIME 2 G: 2 INJECTION, POWDER, FOR SOLUTION INTRAVENOUS at 01:07

## 2020-08-18 RX ADMIN — FUROSEMIDE 40 MG: 10 INJECTION, SOLUTION INTRAMUSCULAR; INTRAVENOUS at 07:36

## 2020-08-18 RX ADMIN — OXCARBAZEPINE 150 MG: 300 TABLET, FILM COATED ORAL at 08:47

## 2020-08-18 RX ADMIN — IPRATROPIUM BROMIDE AND ALBUTEROL SULFATE 1 AMPULE: .5; 3 SOLUTION RESPIRATORY (INHALATION) at 20:32

## 2020-08-18 RX ADMIN — Medication 10 ML: at 08:48

## 2020-08-18 RX ADMIN — CEFEPIME 2 G: 2 INJECTION, POWDER, FOR SOLUTION INTRAVENOUS at 08:47

## 2020-08-18 RX ADMIN — CEFEPIME 2 G: 2 INJECTION, POWDER, FOR SOLUTION INTRAVENOUS at 17:20

## 2020-08-18 ASSESSMENT — PAIN SCALES - PAIN ASSESSMENT IN ADVANCED DEMENTIA (PAINAD)
BREATHING: 1
CONSOLABILITY: 0
TOTALSCORE: 2
NEGVOCALIZATION: 1
TOTALSCORE: 2
FACIALEXPRESSION: 0
BREATHING: 2
NEGVOCALIZATION: 1
BREATHING: 1
TOTALSCORE: 3
NEGVOCALIZATION: 1
BODYLANGUAGE: 0
TOTALSCORE: 2
BODYLANGUAGE: 0
NEGVOCALIZATION: 1
NEGVOCALIZATION: 1
CONSOLABILITY: 0
BODYLANGUAGE: 0
CONSOLABILITY: 0
FACIALEXPRESSION: 0
FACIALEXPRESSION: 0
BREATHING: 1
CONSOLABILITY: 0
FACIALEXPRESSION: 0
FACIALEXPRESSION: 0
BREATHING: 1
TOTALSCORE: 2
BODYLANGUAGE: 0
BREATHING: 1
TOTALSCORE: 2
FACIALEXPRESSION: 0
BODYLANGUAGE: 0
BODYLANGUAGE: 0
CONSOLABILITY: 0
BODYLANGUAGE: 0
TOTALSCORE: 2
BREATHING: 1
FACIALEXPRESSION: 0
CONSOLABILITY: 0
CONSOLABILITY: 0

## 2020-08-18 NOTE — PROGRESS NOTES
Bedside report and Pt care transferred to [facility]. Pt denies any assistance at this time.

## 2020-08-18 NOTE — PROGRESS NOTES
Patient's EF (Ejection Fraction) is greater than 40%    Patient's weights and intake/output reviewed:    Patient's Last Weight: 207 lbs obtained by bed scale. Difference of 1 lbs more than last documented weight. Intake/Output Summary (Last 24 hours) at 8/18/2020 0130  Last data filed at 8/18/2020 0107  Gross per 24 hour   Intake 120 ml   Output 2100 ml   Net -1980 ml         Pt is currently on 7 L O2. Pt denies shortness of breath. Pt with nonpitting lower extremity edema. Patient and/or Family's stated Goal of Care this Admission: reduce shortness of breath, increase activity tolerance, better understand heart failure and disease management, be more comfortable, reduce lower extremity edema and unable to assess, will attempt again prior to discharge      Comorbidities Reviewed Yes  Patient has a past medical history of Alzheimer's dementia ([address] Asthma, Atrial fibrillation ([address] Blood clot in vein, CHF (congestive heart failure) ([address] Dementia ([address] GERD (gastroesophageal reflux disease), Hypertension, Kidney failure, MRSA (methicillin resistant staph aureus) culture positive, and Osteoporosis.          >>For CHF and Comorbidity documentation on Education Time and Topics, please see Education Tab

## 2020-08-18 NOTE — FLOWSHEET NOTE
08/18/20 0855   Vital Signs   Resp 30   Oxygen Therapy   SpO2 96 %   O2 Device High flow nasal cannula   O2 Flow Rate (L/min) 15 L/min     Patient yelling out \" I can't breathe\". RN entered room, > RR and work of breathing, accessory muscle use noted, diminished breath sounds bilaterally. RT Melquiades Renner called for PRN Jazmine. Page sent to pulmonary, awaiting call back. Will continue to monitor.

## 2020-08-18 NOTE — PROGRESS NOTES
Pt is lying in bed with their eyes closed. Respirations are easy and even. Call light within reach bed in lowest position with the wheels locked. Will continue to monitor.  Gilmer Choi

## 2020-08-18 NOTE — PROGRESS NOTES
Pt refusing to allow staff to lower bed for accurate bed weight. Pt encouraged to allow staff to provide care. Will attempt at a later time and assess if Pt willing to cooperate.

## 2020-08-18 NOTE — PROGRESS NOTES
Speech Language Pathology  Facility/Department: [facility] PCU TELEMETRY  Dysphagia Daily Treatment Note    NAME: Prasanna Vazquez  : 1935  MRN: [id number]     Standby recommendations from BSE for minced and moist solids and mildly-thick liquids given h/o dysphagia, current increased respiratory difficulty, and last known \"safest\" consistency per [facility] 2020. Recommend repeat MBS when pt is stable and able tolerate procedure from respiratory perspective. Recommend direct supervision, upright position during and 30 minutes after meals, small bites/sips, slow rate of po intake, cues to swallow twice oral care before and after meals, and encourage self-feeding. ST to continue to follow.       Patient Diagnosis(es):   Patient Active Problem List    Diagnosis Date Noted    Persistent atrial fibrillation 2018     Priority: High    AV block 2018     Priority: High    Chronic a-fib     Urinary tract infection with hematuria     Acute on chronic respiratory failure with hypoxia and hypercapnia (HCC)     Acute on chronic congestive heart failure (HCC)     Dementia with behavioral disturbance (HCC)     Acute respiratory failure with hypoxia and hypercapnia (HCC) 2020    Bradycardia     Intermittent asthma without complication     Late onset Alzheimer's disease without behavioral disturbance (HCC)     Pulmonary hypertension (HCC)     Shortness of breath 2020    SOB (shortness of breath) 2020    Acute on chronic diastolic CHF (congestive heart failure) (HCC)     Acute on chronic respiratory failure with hypoxia ([address] 2018    Pulmonary infiltrates     Diastolic dysfunction     Pleural effusion 2018    Morbid obesity with BMI of 40.0-44.9, adult ([address] 2018    Myonecrosis 2018    Atelectasis 2018    Ineffective airway clearance 2018    Essential hypertension     Morbid obesity [address] 2018    Hematemesis 08/03/2018     Allergies: Allergies   Allergen Reactions    Codeine Itching    Sulfa Antibiotics Other (See Comments)     Per Pt daughter Phuc Jimenez) \" Not know her reaction to medication\"     Onset Date: 08/12/2020    Subjective:  Pt repeatedly states \"I can't breathe. \" Danika/RN with pt when ST arrives. Regular solids and thin liquids on tray that is served to pt which matches diet order in system though ST recommendations after BSE 08/18/2020 were for minced and moist solids and mildly thick liquids. Pain: No c/o pain    Current Diet: DIET LOW SODIUM 2 GM; Dysphagia Minced and Moist; Mildly Thick (Nectar); 1800 ml    Diet Tolerance:      P.O. Trials: Thin   x x4 sips from cup   Nectar / Mildly Thick       Honey / Moderately Thick       Pudding / Extremely Thick       Puree       Solid   x x5 minced and moist pancake and peaches     Dysphagia Treatment and Impressions:  ST okayed for entry by Josesito/SIMRAN. Pt c/o not being able to breathe but O2 sats % for entire session with and without po intake. Pt is mouth breather with O2 per 15L high flow NC. RR:28 during session. Pt assessed with thin liquids via cup and minced and moist solids. Pt has audible wet cough prior to po presentations. Pt takes large cup sip despite cues for small sips. No overt clinical signs of aspiration observed after swallow of thin liquids, however reliability of clinical assessment is deemed decreased d/t presence of wet cough prior to presentations. Pt swallows with tongue protruded. Oral prep and transit of minced and moist solids appears timely with no oral residue noted after the swallow. Cough x1 (1 of 5) observed after swallow of minced and moist solids. Suspect possible pharyngeal residue given tongue protrusion during swallow. Pt refuses further po trials, including mildly-thick liquids and puree. Spoke with RT after session re: pt's respiratory status and c/o not being able to breathe.  Pt now getting pt discharges from hospital prior to Speech/Swallowing discharge, this note serves as tx and discharge summary. Total Treatment Time / Charges     Time in Time out Total Time / units   Cognitive Tx         Speech Tx      Dysphagia Tx 0850 1643 16min/1 unit     Signature:  Cristobal Reyes. Jennifer Turk M.A.  Mariza  Speech-Language Pathologist

## 2020-08-18 NOTE — PROGRESS NOTES
Progress Note    Admit Date:  8/12/2020    Patient with progressive hypoxia. O2 requirement increased , she was on 15 L of O2-->  on Vapotherm FiO2 60% 25 L. She is getting diuresis for CHF  Seen by cardiology and pulmonology      Denies any chest pain. She is not a very good historian. Her COVID-19 test is negative. Subjective:  Ms. Chantel Rhodes continues to complain of SOB. Oxygen was titrated to 15L HFNC due to increasing SOB, given HHN treatment and weaned to 12 L, pt does report feeling some better at nebulizer    Wt down from 207lb to 204. Diuresed ~ 3.5 L since admission. Objective:   /78   Pulse 94   Temp 97.5 °F (36.4 °C) (Axillary)   Resp 22   Ht 5' 4\" (1.626 m)   Wt 204 lb 5 oz (92.7 kg)   SpO2 100%   BMI 35.07 kg/m²         Intake/Output Summary (Last 24 hours) at 8/18/2020 8461  Last data filed at 8/18/2020 0848  Gross per 24 hour   Intake 130 ml   Output 2100 ml   Net -1970 ml       Physical Exam:  General appearance: awake alert and oriented to person today . Cooperative. mild distress - w/ some increased WOB. Head: Normocephalic, without obvious abnormality, atraumatic  Eyes: conjunctivae/corneas clear. Neck: no adenopathy, supple, symmetrical, trachea midline, symmetric  Lungs: mild accessory muscle use, diminished breath sounds, no wheezes rales or rhonchi, on 12 L  Heart: irregular rate and rhythm, S1, S2 normal, no murmur, click, rub or gallop  Abdomen: soft, non-tender; bowel sounds normal; no masses,  no organomegaly  Extremities: extremities normal, atraumatic, no cyanosis or edema  Skin: Skin color, texture, turgor normal. No rashes or lesions  Neurologic: Grossly normal      STACI Maldonado M.D.   have reviewed the chart on [place] and personally interviewed and examined patient, reviewed the data (labs and imaging) and after discussion with my PA formulated the plan. Agree with note with the following edits.   s.     Physical exam:    /78   Pulse 94   Temp 97.5 °F (36.4 °C) (Axillary)   Resp 22   Ht 5' 4\" (1.626 m)   Wt 204 lb 5 oz (92.7 kg)   SpO2 100%   BMI 35.07 kg/m²     Gen: No distress. drowsy  Eyes: PERRL. No sclera icterus. No conjunctival injection. ENT: No discharge. Pharynx clear. Neck: Trachea midline. Normal thyroid. Resp: No accessory muscle use. No crackles. No wheezes. No rhonchi. No dullness on percussion. CV: Regular rate. Regular rhythm. No murmur or rub. No edema. GI: Non-tender. Non-distended. No masses. No organomegaly. Normal bowel sounds. No hernia. Skin: Warm and dry. No nodule on exposed extremities. No rash on exposed extremities. Lymph: No cervical LAD. No supraclavicular LAD. M/S: No cyanosis. No joint deformity. No clubbing. Neuro: drowsy,not oriented           MYRIAM Evans      Scheduled Meds:   furosemide  40 mg Intravenous BID    spironolactone  25 mg Oral Daily    [Held by provider] apixaban  2.5 mg Oral BID    ipratropium-albuterol  1 ampule Inhalation Q4H While awake    cefepime  2 g Intravenous Q8H    linezolid  600 mg Intravenous Q12H    amLODIPine  5 mg Oral Daily    levothyroxine  100 mcg Oral Daily    montelukast  10 mg Oral Daily    OXcarbazepine  150 mg Oral BID    simvastatin  10 mg Oral Nightly    sodium chloride flush  10 mL Intravenous 2 times per day     PRN Meds:  sodium chloride flush, acetaminophen **OR** acetaminophen, polyethylene glycol, promethazine **OR** ondansetron, ipratropium-albuterol    Data:  BMP:   Recent Labs     08/16/20  0528 08/17/20  0506 08/18/20  0506    136 140   K 3.1* 3.0* 3.5   CL 94* 92* 94*   CO2 36* 34* 36*   BUN 11 11 13   CREATININE 0.8 0.9 1.0     Cultures:     Results for Karlynn Rubinstein (MRN [id number]) as of 8/14/2020 10:18   Ref.  Range 8/13/2020 01:15   SARS-CoV-2, CARLOS Latest Ref Range: NOT DETECTED  NOT DETECTED     Urine cx: Enterococcus faecalis, Pseudomonas aeruginosa    Susceptibility     Enterococcus faecalis (2) hypertension (RAP of 8mmHg). Assessment/Plan:    Acute on chronic hypoxic RF  - normally uses 2L NC O2 continuous at home   - Suspect 2/2 acute on chronic dCHF, CXR with bilateral pleural effusions with pulmonary edema  - Was recently admitted, but no leukocytosis, PCT normal, lower concerns for HCAP as cause   - COVID 19 negative. - worsening hypoxia. 3L--> 15L --> high flow O2 per Vapotherm --> O2 sats initialy improved, down to 8 L  - worsening sats this am, back on 15L HFNC  - given breathing treatments and down to 12 L      Acute on chronic dCHF  Severe pulmonary HTN  - Last Echo with EF 55-60%, DD and severe pulm HTN  - Was recently admitted for the same, discharge weight was 205 lbs  - Admitted with weight 225 lbs-> 207 > 204 lb  - consulted cardio. Sp IV lasix 40 mg BID-> PO lasix 40 mg daily on 8/16.    - changed to 40 mg IV Lasix BID  - eliquis held, poss thoracentesis? Dysphagia  - with recommendations for MBS, will hold off for now 2/2 increasing O2 requirements    UTI  - UA with + Nitrite and LE   Urine cultures growing Pseudomonas and enterococcus,  antibiotics now changed to cefepime and Zyvox, continue day #5     Chronic Atrial Fibrillation   - Eliquis held on 8/14- will need to discuss with pulm about resumption   - Rate is controlled      HTN  - BP is controlled  - Continue Norvasc      Hypothyroidism   - Continue Synthroid      HLD  - Continue statin     Dementia with behavior disturbances  - Continue home medications  - Supportive Care      Morbid Obesity  - Body mass index is 38.62 kg/m². - Complicating assessment and treatment. Placing patient at risk for multiple co-morbidities as well as early death and contributing to the patient's presentation.   - Counseled on weight loss.     Hypokalemia - Resolved  - replaced     DVT Prophylaxis: Eliquis ON HOLD   Diet: DIET LOW SODIUM 2 GM;  Dysphagia Minced and Moist; Mildly Thick (Nectar); 1800 ml   Code Status: DNR-CCA     Dispo: cont [facility]    Vianney Bustos PA-C 9:38 AM 8/18/2020     1 acute on chronic hypoxic respiratory failure. Acute on chronic diastolic congestive failure. Severe pulmonary hypertension. Was on IV Lasix. Currently on p.o. Lasix. Switched back to IV Lasix. she is down to 8 L of oxygen now. HEATHER Evans.

## 2020-08-18 NOTE — PROGRESS NOTES
Q4H While awake Beatriz Pierson MD   1 ampule at 08/17/20 1932    cefepime (MAXIPIME) 2 g IVPB minibag  2 g Intravenous Q8H Beatriz Pierson MD   Stopped at 08/18/20 5501    linezolid (ZYVOX) IVPB 600 mg  600 mg Intravenous Q12H Beatriz Pierson MD   Stopped at 08/18/20 0650    amLODIPine (NORVASC) tablet 5 mg  5 mg Oral Daily Manfred Brown MD   5 mg at 08/18/20 0847    levothyroxine (SYNTHROID) tablet 100 mcg  100 mcg Oral Daily Manfred Brown MD   100 mcg at 08/18/20 0550    montelukast (SINGULAIR) tablet 10 mg  10 mg Oral Daily Manfred Brown MD   10 mg at 08/18/20 0847    OXcarbazepine (TRILEPTAL) tablet 150 mg  150 mg Oral BID Manfred Brwon MD   150 mg at 08/18/20 0847    simvastatin (ZOCOR) tablet 10 mg  10 mg Oral Nightly Manfred Brown MD   10 mg at 08/17/20 2144    sodium chloride flush 0.9 % injection 10 mL  10 mL Intravenous 2 times per day Manfred Brown MD   10 mL at 08/18/20 0848    sodium chloride flush 0.9 % injection 10 mL  10 mL Intravenous PRN Manfred Brown MD        acetaminophen (TYLENOL) tablet 650 mg  650 mg Oral Q6H PRN Manfred Brown MD        Or    acetaminophen (TYLENOL) suppository 650 mg  650 mg Rectal Q6H PRN Manfred Brown MD        polyethylene glycol (GLYCOLAX) packet 17 g  17 g Oral Daily PRN Manfred Brown MD        promethazine (PHENERGAN) tablet 12.5 mg  12.5 mg Oral Q6H PRN Manfred Brown MD        Or    ondansetron (ZOFRAN) injection 4 mg  4 mg Intravenous Q6H PRN Manfred Brown MD        ipratropium-albuterol (DUONEB) nebulizer solution 1 ampule  1 ampule Inhalation Q4H PRN Manfred Brown MD   1 ampule at 08/18/20 0855         Objective:     Telemetry monitor: NSR     Physical Exam:  Constitutional:  Comfortable and alert, NAD, appears stated age  Eyes: PERRL, sclera nonicteric  Neck:  Supple, no masses, no thyroidmegaly, JVD hard to assess due to body habitus and position in bed  Skin:  Warm and dry; no rash or lesions  Heart:  Regular, normal apex, S1 and S2 normal, no M/G/R  Lungs:  Normal respiratory effort; bibasilar crackles and diminished, no wheezes/rhonchi  Abdomen: obese, soft, non tender, + bowel sounds  Extremities:  Trace BLE edema   Neuro: confused, moves legs and arms equally, normal mood and affect      Data Reviewed:  CXR 8/17/2020: Impression    Congestive failure with asymmetric left lower lobe airspace disease. Superimposed atelectasis or pneumonia is also therefore considered.         Suspect bilateral pleural effusion. CXR 8/14/2020: Impression No significant change diffuse bilateral pulmonary disease and bilateral pleural effusions. CT 8/12/2020: Impression No pulmonary embolism to the proximal segmental level. Moderate right and small left pleural effusions, decreased on the right and increased on the left since the prior study 2 weeks ago. Evidence of mild interstitial edema. Echo 6/30/2020:   Normal LV systolic function with EF of 55-60%. D-shaped septum c/w RV pressure and volume overload. Mild-moderate concentric left ventricular hypertrophy. Left ventricular diastolic filling pressure is elevated. Mild mitral regurgitation. Moderate right-sided chamber enlargement. Right ventricular systolic function is moderately reduced. Moderate tricuspid regurgitation. Systolic pulmonic artery pressure (SPAP) is estimated at 94mmHg consistent   with severe pulmonary hypertension (RAP of 8mmHg).     Lab Reviewed:   Renal Profile:  Lab Results   Component Value Date    CREATININE 1.0 08/18/2020    BUN 13 08/18/2020     08/18/2020    K 3.5 08/18/2020    K 4.2 08/12/2020    CL 94 08/18/2020    CO2 36 08/18/2020     CBC:    Lab Results   Component Value Date    WBC 8.3 08/14/2020    RBC 3.69 08/14/2020    HGB 10.9 08/14/2020    HCT 34.5 08/14/2020    MCV 93.4 08/14/2020    RDW 16.0 08/14/2020     08/14/2020     BNP:    Lab Results   Component Value Date    PROBNP 1,356 08/16/2020 PROBNP 1,811 08/12/2020    PROBNP 1,738 08/04/2020    PROBNP 1,972 06/29/2020     Fasting Lipid Panel:  No results found for: CHOL, HDL, TRIG  Cardiac Enzymes:  CK/MbTroponin  Lab Results   Component Value Date    CKTOTAL 345 08/05/2018    TROPONINI <0.01 08/12/2020     PT/ INR No results found for: INR, PROTIME  PTT No results found for: PTT   Lab Results   Component Value Date    MG 2.20 08/17/2020      Lab Results   Component Value Date    TSH 12.50 08/04/2020       All labs and imaging reviewed today    Assessment:  1. Acute on chronic hypoxic respiratory failure: ongoing increased oxygen demands up to 15 liters again this am, now back to 8 liters HF  2. Acute on chronic diastolic heart failure: ongoing   -Weights have been 225-->223-->205-->206-->207-->204   -net negative 3,454 since admission   -BNP1,811 on admission and 1,356 8/16   -edema trace   -continue daily weights, low sodium diet, 1800 ml fluid restriction, and strict I/O-bilateral pleural effusions noted on chest X-ray 8/17  3. Cor pulmonale  4. Severe pulmonary hypertension:Dr. Armani Gipson has seen patient in the past and does not recommend pulmonary HTN work up given age, demential, and debility  5. Persistent atrial fibrillation: stable- currently NSR today    -hx of known asymptomatic pauses   -no PPM recommended at this time unless improved functional status and/or definitely symptomatic   6. Dementia  7. Obesity  8. UTI  9. Hypokalemia: 3.5 after PRN Potassium yesterday and with addition on aldactone   10. Hypothyroidism: per IM TSH elevated  14.58, free T4 0.9    Plan:   1. Continue IV Lasix 40 mg BID and monitor kidney function closely as well as potassium   2. Continue aldactone 25 mg daily for diuresis as well as hypokalemia   3. Dr. Adrian Mackey to evaluate pleural effusions with bedside US and determine if need thoracentesis, Eliquis on hold per Suzan Hunt, SIMRAN  4. Consider palliative care, pt is currently DNR CCA  5.  Continue daily weights, low sodium diet, 1800 fluid restriction, and strict I/O  6. Continue Norvasc and Zocor  7.  Continue to monitor telemetry       Anyi Hernandez, APRN - [address]  [phone]

## 2020-08-18 NOTE — PROGRESS NOTES
Bedside report given to Linda De RN. Patient in stable condition. Care transferred. EMIR Jiménez, RN.

## 2020-08-18 NOTE — PROGRESS NOTES
Pulmonary Progress Note  CC: Hypoxemia and shortness of breath    Subjective: Worsening oxygenation overnight    IV line peripheral    EXAM:   /78   Pulse 94   Temp 97.5 °F (36.4 °C) (Axillary)   Resp 22   Ht 5' 4\" (1.626 m)   Wt 204 lb 5 oz (92.7 kg)   SpO2 92%   BMI 35.07 kg/m²  on 15 L  Constitutional:  No acute distress   HEENT: no scleral icterus  Neck: No tracheal deviation present. Cardiovascular: Normal heart sounds. + Edema  Pulmonary/Chest: No wheezes. No rhonchi. few rales. No decreased breath sounds. Abdominal: Soft. Musculoskeletal: No cyanosis. No clubbing. Skin: Skin is warm and dry. Scheduled Meds:   furosemide  40 mg Intravenous BID    spironolactone  25 mg Oral Daily    apixaban  2.5 mg Oral BID    ipratropium-albuterol  1 ampule Inhalation Q4H While awake    cefepime  2 g Intravenous Q8H    linezolid  600 mg Intravenous Q12H    amLODIPine  5 mg Oral Daily    levothyroxine  100 mcg Oral Daily    montelukast  10 mg Oral Daily    OXcarbazepine  150 mg Oral BID    simvastatin  10 mg Oral Nightly    sodium chloride flush  10 mL Intravenous 2 times per day     Continuous Infusions:    PRN Meds:  sodium chloride flush, acetaminophen **OR** acetaminophen, polyethylene glycol, promethazine **OR** ondansetron, ipratropium-albuterol    Labs:  CBC:   No results for input(s): WBC, HGB, HCT, MCV, PLT in the last 72 hours.   BMP:   Recent Labs     08/16/20  0528 08/17/20  0506 08/18/20  0506    136 140   K 3.1* 3.0* 3.5   CL 94* 92* 94*   CO2 36* 34* 36*   BUN 11 11 13   CREATININE 0.8 0.9 1.0     Cultures:  8/12/2020 urine enterococcus and Pseudomonas  Enterococcus faecalis (2)     Antibiotic  Interpretation  ANDRES  Status     ampicillin  Sensitive  <=2  mcg/mL      nitrofurantoin  Sensitive  <=32  mcg/mL      tetracycline  Resistant  >8  mcg/mL      vancomycin  Sensitive  2  mcg/mL     Pseudomonas Merrem resistant cefepime sensitive    8/12/20 CTPA   Impression    No pulmonary embolism to the proximal segmental level.         Moderate right and small left pleural effusions, decreased on the right and    increased on the left since the prior study 2 weeks ago.         Evidence of mild interstitial edema. 8/14/20 CXR increase in R pleural effusion    ASSESSMENT:  · Acute hypoxemic respiratory failure   · HFpEF with acute exacerbation  · Pleural effusions, worsening on right   · Dementia of the Alzheimer's type   · Obesity  · Pseudomonas and Enterococcus in urine     PLAN:  · Supplemental oxygen to maintain SaO2 >92%; wean as tolerated    · Diuresis per cardiology  · Cefepime/Zyvox D#5   · Bedside ultrasound, evaluate effusions   · Once again hold Eliquis beginning 8/18/2020 for possible thoracentesis.   Patient is clinically worsened

## 2020-08-19 ENCOUNTER — APPOINTMENT (OUTPATIENT)
Dept: GENERAL RADIOLOGY | Age: 85
DRG: 291 | End: 2020-08-19
Payer: MEDICARE

## 2020-08-19 LAB
ANION GAP SERPL CALCULATED.3IONS-SCNC: 10 MMOL/L (ref 3–16)
BUN BLDV-MCNC: 14 MG/DL (ref 7–20)
CALCIUM SERPL-MCNC: 9 MG/DL (ref 8.3–10.6)
CHLORIDE BLD-SCNC: 92 MMOL/L (ref 99–110)
CO2: 37 MMOL/L (ref 21–32)
CREAT SERPL-MCNC: 1 MG/DL (ref 0.6–1.2)
GFR AFRICAN AMERICAN: >60
GFR NON-AFRICAN AMERICAN: 53
GLUCOSE BLD-MCNC: 122 MG/DL (ref 70–99)
GLUCOSE BLD-MCNC: 122 MG/DL (ref 70–99)
GLUCOSE BLD-MCNC: 126 MG/DL (ref 70–99)
GLUCOSE BLD-MCNC: 151 MG/DL (ref 70–99)
GLUCOSE BLD-MCNC: 189 MG/DL (ref 70–99)
MAGNESIUM: 2 MG/DL (ref 1.8–2.4)
PERFORMED ON: ABNORMAL
POTASSIUM SERPL-SCNC: 3.2 MMOL/L (ref 3.5–5.1)
PRO-BNP: 1503 PG/ML (ref 0–449)
SODIUM BLD-SCNC: 139 MMOL/L (ref 136–145)

## 2020-08-19 PROCEDURE — 99232 SBSQ HOSP IP/OBS MODERATE 35: CPT | Performed by: NURSE PRACTITIONER

## 2020-08-19 PROCEDURE — 83880 ASSAY OF NATRIURETIC PEPTIDE: CPT

## 2020-08-19 PROCEDURE — 80048 BASIC METABOLIC PNL TOTAL CA: CPT

## 2020-08-19 PROCEDURE — 2580000003 HC RX 258: Performed by: INTERNAL MEDICINE

## 2020-08-19 PROCEDURE — 36415 COLL VENOUS BLD VENIPUNCTURE: CPT

## 2020-08-19 PROCEDURE — 92611 MOTION FLUOROSCOPY/SWALLOW: CPT

## 2020-08-19 PROCEDURE — 83735 ASSAY OF MAGNESIUM: CPT

## 2020-08-19 PROCEDURE — 6370000000 HC RX 637 (ALT 250 FOR IP): Performed by: INTERNAL MEDICINE

## 2020-08-19 PROCEDURE — 6360000002 HC RX W HCPCS: Performed by: INTERNAL MEDICINE

## 2020-08-19 PROCEDURE — 74230 X-RAY XM SWLNG FUNCJ C+: CPT

## 2020-08-19 PROCEDURE — 2060000000 HC ICU INTERMEDIATE R&B

## 2020-08-19 PROCEDURE — 6370000000 HC RX 637 (ALT 250 FOR IP): Performed by: NURSE PRACTITIONER

## 2020-08-19 PROCEDURE — 99232 SBSQ HOSP IP/OBS MODERATE 35: CPT | Performed by: INTERNAL MEDICINE

## 2020-08-19 PROCEDURE — 94761 N-INVAS EAR/PLS OXIMETRY MLT: CPT

## 2020-08-19 PROCEDURE — 2700000000 HC OXYGEN THERAPY PER DAY

## 2020-08-19 PROCEDURE — 6360000002 HC RX W HCPCS: Performed by: NURSE PRACTITIONER

## 2020-08-19 PROCEDURE — 94640 AIRWAY INHALATION TREATMENT: CPT

## 2020-08-19 RX ORDER — ATORVASTATIN CALCIUM 10 MG/1
10 TABLET, FILM COATED ORAL NIGHTLY
Status: DISCONTINUED | OUTPATIENT
Start: 2020-08-19 | End: 2020-08-24 | Stop reason: HOSPADM

## 2020-08-19 RX ORDER — POTASSIUM BICARBONATE 25 MEQ/1
50 TABLET, EFFERVESCENT ORAL ONCE
Status: DISCONTINUED | OUTPATIENT
Start: 2020-08-19 | End: 2020-08-19

## 2020-08-19 RX ORDER — POTASSIUM CHLORIDE 7.45 MG/ML
10 INJECTION INTRAVENOUS PRN
Status: DISCONTINUED | OUTPATIENT
Start: 2020-08-19 | End: 2020-08-24 | Stop reason: HOSPADM

## 2020-08-19 RX ORDER — FUROSEMIDE 10 MG/ML
40 INJECTION INTRAMUSCULAR; INTRAVENOUS ONCE
Status: COMPLETED | OUTPATIENT
Start: 2020-08-19 | End: 2020-08-19

## 2020-08-19 RX ORDER — POTASSIUM CHLORIDE 20 MEQ/1
40 TABLET, EXTENDED RELEASE ORAL PRN
Status: DISCONTINUED | OUTPATIENT
Start: 2020-08-19 | End: 2020-08-24 | Stop reason: HOSPADM

## 2020-08-19 RX ORDER — POTASSIUM CHLORIDE 20 MEQ/1
40 TABLET, EXTENDED RELEASE ORAL ONCE
Status: DISCONTINUED | OUTPATIENT
Start: 2020-08-19 | End: 2020-08-24 | Stop reason: HOSPADM

## 2020-08-19 RX ADMIN — LINEZOLID 600 MG: 600 INJECTION, SOLUTION INTRAVENOUS at 17:00

## 2020-08-19 RX ADMIN — AMLODIPINE BESYLATE 5 MG: 5 TABLET ORAL at 08:24

## 2020-08-19 RX ADMIN — FUROSEMIDE 40 MG: 10 INJECTION, SOLUTION INTRAMUSCULAR; INTRAVENOUS at 08:24

## 2020-08-19 RX ADMIN — SPIRONOLACTONE 25 MG: 25 TABLET ORAL at 08:24

## 2020-08-19 RX ADMIN — IPRATROPIUM BROMIDE AND ALBUTEROL SULFATE 1 AMPULE: .5; 3 SOLUTION RESPIRATORY (INHALATION) at 11:55

## 2020-08-19 RX ADMIN — Medication 10 ML: at 08:24

## 2020-08-19 RX ADMIN — IPRATROPIUM BROMIDE AND ALBUTEROL SULFATE 1 AMPULE: .5; 3 SOLUTION RESPIRATORY (INHALATION) at 07:02

## 2020-08-19 RX ADMIN — IPRATROPIUM BROMIDE AND ALBUTEROL SULFATE 1 AMPULE: .5; 3 SOLUTION RESPIRATORY (INHALATION) at 16:17

## 2020-08-19 RX ADMIN — APIXABAN 2.5 MG: 5 TABLET, FILM COATED ORAL at 11:57

## 2020-08-19 RX ADMIN — OXCARBAZEPINE 150 MG: 300 TABLET, FILM COATED ORAL at 08:24

## 2020-08-19 RX ADMIN — LINEZOLID 600 MG: 600 INJECTION, SOLUTION INTRAVENOUS at 04:58

## 2020-08-19 RX ADMIN — FUROSEMIDE 40 MG: 10 INJECTION, SOLUTION INTRAMUSCULAR; INTRAVENOUS at 20:42

## 2020-08-19 RX ADMIN — Medication 10 ML: at 20:42

## 2020-08-19 RX ADMIN — CEFEPIME 2 G: 2 INJECTION, POWDER, FOR SOLUTION INTRAVENOUS at 17:00

## 2020-08-19 RX ADMIN — OXCARBAZEPINE 150 MG: 300 TABLET, FILM COATED ORAL at 20:42

## 2020-08-19 RX ADMIN — LEVOTHYROXINE SODIUM 100 MCG: 100 TABLET ORAL at 05:03

## 2020-08-19 RX ADMIN — CEFEPIME 2 G: 2 INJECTION, POWDER, FOR SOLUTION INTRAVENOUS at 08:23

## 2020-08-19 RX ADMIN — ATORVASTATIN CALCIUM 10 MG: 10 TABLET, FILM COATED ORAL at 20:42

## 2020-08-19 RX ADMIN — CEFEPIME 2 G: 2 INJECTION, POWDER, FOR SOLUTION INTRAVENOUS at 00:37

## 2020-08-19 RX ADMIN — MONTELUKAST SODIUM 10 MG: 10 TABLET, COATED ORAL at 08:24

## 2020-08-19 RX ADMIN — IPRATROPIUM BROMIDE AND ALBUTEROL SULFATE 1 AMPULE: .5; 3 SOLUTION RESPIRATORY (INHALATION) at 20:52

## 2020-08-19 RX ADMIN — POTASSIUM BICARBONATE 40 MEQ: 782 TABLET, EFFERVESCENT ORAL at 08:24

## 2020-08-19 RX ADMIN — FUROSEMIDE 40 MG: 10 INJECTION, SOLUTION INTRAMUSCULAR; INTRAVENOUS at 15:50

## 2020-08-19 RX ADMIN — APIXABAN 2.5 MG: 5 TABLET, FILM COATED ORAL at 20:42

## 2020-08-19 ASSESSMENT — PAIN SCALES - PAIN ASSESSMENT IN ADVANCED DEMENTIA (PAINAD)
CONSOLABILITY: 0
NEGVOCALIZATION: 1
FACIALEXPRESSION: 0
NEGVOCALIZATION: 1
CONSOLABILITY: 0
TOTALSCORE: 2
TOTALSCORE: 2
CONSOLABILITY: 0
BREATHING: 1
BREATHING: 1
BODYLANGUAGE: 0
BODYLANGUAGE: 0
TOTALSCORE: 2
FACIALEXPRESSION: 0
BODYLANGUAGE: 0
FACIALEXPRESSION: 0
FACIALEXPRESSION: 0
BODYLANGUAGE: 0
FACIALEXPRESSION: 0
NEGVOCALIZATION: 1
FACIALEXPRESSION: 0
TOTALSCORE: 2
TOTALSCORE: 2
CONSOLABILITY: 0
FACIALEXPRESSION: 0
BREATHING: 1
CONSOLABILITY: 0
BREATHING: 1
BODYLANGUAGE: 0
CONSOLABILITY: 0
BODYLANGUAGE: 0
FACIALEXPRESSION: 0
BODYLANGUAGE: 0
CONSOLABILITY: 0
CONSOLABILITY: 0
BREATHING: 1
NEGVOCALIZATION: 1
NEGVOCALIZATION: 1
FACIALEXPRESSION: 0
FACIALEXPRESSION: 0
BODYLANGUAGE: 0
NEGVOCALIZATION: 1
BODYLANGUAGE: 0
BREATHING: 1
BREATHING: 1
CONSOLABILITY: 0
TOTALSCORE: 2
NEGVOCALIZATION: 1
TOTALSCORE: 2
NEGVOCALIZATION: 1
TOTALSCORE: 2
CONSOLABILITY: 0
BREATHING: 1
TOTALSCORE: 2
BREATHING: 1
NEGVOCALIZATION: 1
FACIALEXPRESSION: 0
BREATHING: 1
CONSOLABILITY: 0
TOTALSCORE: 2
BREATHING: 1
NEGVOCALIZATION: 1
NEGVOCALIZATION: 1
BODYLANGUAGE: 0
TOTALSCORE: 2
BODYLANGUAGE: 0

## 2020-08-19 NOTE — FLOWSHEET NOTE
08/18/20 1956   Vitals   Temp 97.3 °F (36.3 °C)   Temp Source Oral   Pulse 97   Heart Rate Source Monitor   Resp 20   /72   BP Location Right upper arm   BP Upper/Lower Upper   BP Method Automatic   Patient Position Semi fowlers   Level of Consciousness 0   MEWS Score 1   Oxygen Therapy   SpO2 96 %   O2 Device High flow nasal cannula   O2 Flow Rate (L/min) 8 L/min     Vital signs stable. Pt is alert and oriented to self per baseline at the moment. Nothing new noted on head to toe assessment. Lung sounds diminished through out. Lua remains secure in place and draining clear, yellow urine. Pt is NSR on the monitor. Evening medication administration completed. Pt denies any further assistance at the moment. Will continue to monitor.

## 2020-08-19 NOTE — PROGRESS NOTES
Sensitive  2  mcg/mL     Pseudomonas Merrem resistant cefepime sensitive    8/12/20 CTPA   Impression    No pulmonary embolism to the proximal segmental level.         Moderate right and small left pleural effusions, decreased on the right and    increased on the left since the prior study 2 weeks ago.         Evidence of mild interstitial edema.       8/17/2020 CXR small bilateral effusions  8/18/2020 bedside ultrasound showed a very small right pleural effusion, too small to tap    ASSESSMENT:  · Acute hypoxemic respiratory failure   · HFpEF with acute exacerbation  · Pleural effusions, too small for thoracentesis based upon bedside ultrasound  · Dementia of the Alzheimer's type   · Obesity  · Pseudomonas and Enterococcus in urine     PLAN:  · Supplemental oxygen to maintain SaO2 >92%; wean as tolerated    · Diuresis per cardiology  · Cefepime/Zyvox D#6/7  · Okay to resume Eliquis, procedure is not planned

## 2020-08-19 NOTE — PROGRESS NOTES
Comprehensive Nutrition Assessment    Type and Reason for Visit:  Initial, RD Nutrition Re-Screen/LOS(x 7 days)    Nutrition Recommendations/Plan:   1. Continue DYS minced and moist diet with 1800 ml FR    Nutrition Assessment:    Pt. nutritionally compromised AEB she has dementia and recently dx with dysphagia. At risk for further nutrition compromise r/t intakes are < 50% . Will continue to monitor. Malnutrition Assessment:  Malnutrition Status: At risk for malnutrition (Comment)    Context:  Acute Illness     Findings of the 6 clinical characteristics of malnutrition:  Energy Intake:  Unable to assess  Weight Loss:  No significant weight loss     Body Fat Loss:  No significant body fat loss Orbital   Muscle Mass Loss:  No significant muscle mass loss Temples (temporalis)  Fluid Accumulation:  Unable to assess     Strength:  Not Performed    Estimated Daily Nutrient Needs:  Energy (kcal):  9361-8946 based ~ 15-18 gr/kg cbw; Weight Used for Energy Requirements:  Current     Protein (g):  65-76 based on ~ 1.2-1.4 gr/kg cbw; Weight Used for Protein Requirements:  Ideal        Fluid (ml/day):  7234-8443; Weight Used for Fluid Requirements:  Current      Nutrition Related Findings:  obese dishevled female sitting up in bed feeding herself pudding; expresses a good appetite; O2 per NC ; labored breathing noted ; confused thinking she was in the nursing home      Wounds:  None       Current Nutrition Therapies:    DIET LOW SODIUM 2 GM; Dysphagia Minced and Moist; Mildly Thick (Nectar); 1800 ml    Anthropometric Measures:  · Height: 5' 4\" (162.6 cm)  · Current Body Weight: 203 lb (92.1 kg)   · Admission Body Weight: 203 lb (92.1 kg)    · Usual Body Weight: 204 lb (92.5 kg)     · Ideal Body Weight: 120 lbs; % Ideal Body Weight 169.2 %   · BMI: 34.8  · BMI Categories: Obese Class 1 (BMI 30.0-34. 9)       Nutrition Diagnosis:   · Swallowing difficulty related to swallowing difficulty as evidenced by swallow study results(BSE per SLP)      Nutrition Interventions:   Food and/or Nutrient Delivery:  Continue Current Diet  Nutrition Education/Counseling:  No recommendation at this time(CHF nutrition education not appropriate)   Coordination of Nutrition Care:  Continued Inpatient Monitoring, Coordination of Community Care    Goals:  pt will increase her intake of the DYS minced and moist diet to > 50% and weight will remain stable       Nutrition Monitoring and Evaluation:     Food/Nutrient Intake Outcomes:  Diet Advancement/Tolerance, Food and Nutrient Intake  Physical Signs/Symptoms Outcomes:  Chewing or Swallowing, Weight     Discharge Planning:     Too soon to determine     Electronically signed by Maurice Willingham RD, LD on 8/19/20 at 6:45 PM EDT    Contact: [phone]

## 2020-08-19 NOTE — PROGRESS NOTES
Bedside report and Pt care transferred to [facility]. Pt denies any assistance at this time.

## 2020-08-19 NOTE — PROGRESS NOTES
Patient's EF (Ejection Fraction) is greater than 40%    Patient's weights and intake/output reviewed:    Patient's Last Weight: 204 lbs obtained by bed scale. Difference of 3 lbs less than last documented weight. Intake/Output Summary (Last 24 hours) at 8/18/2020 2231  Last data filed at 8/18/2020 1337  Gross per 24 hour   Intake 450 ml   Output 300 ml   Net 150 ml         Pt is currently on 8 L O2. Pt with complaints of shortness of breath. Pt with nonpitting lower extremity edema. Patient and/or Family's stated Goal of Care this Admission: reduce shortness of breath, increase activity tolerance, better understand heart failure and disease management, be more comfortable, reduce lower extremity edema and unable to assess, will attempt again prior to discharge      Comorbidities Reviewed Yes  Patient has a past medical history of Alzheimer's dementia ([address] Asthma, Atrial fibrillation ([address] Blood clot in vein, CHF (congestive heart failure) ([address] Dementia ([address] GERD (gastroesophageal reflux disease), Hypertension, Kidney failure, MRSA (methicillin resistant staph aureus) culture positive, and Osteoporosis.          >>For CHF and Comorbidity documentation on Education Time and Topics, please see Education Tab

## 2020-08-19 NOTE — FLOWSHEET NOTE
08/19/20 1629   Encounter Summary   Services provided to: Patient not available   Referral/Consult From: [address] [place] [address] Humberto   (8/19 Tel.-no answer;silent prayer of healing light)   Length of Encounter 15 minutes

## 2020-08-19 NOTE — PROGRESS NOTES
Progress Note    Admit Date:  8/12/2020    Patient with progressive hypoxia. O2 requirement increased , she was on 15 L of O2-->  on Vapotherm FiO2 60% 25 L. She is getting diuresis for CHF  Seen by cardiology and pulmonology      Denies any chest pain. She is not a very good historian. Her COVID-19 test is negative. Subjective:  Ms. Wade Rios pt continues to complain of SOB. Oxygen was titrated down to 8L NC from 15L. Wt minimally changed - down from 204lb to 203. Diuresed ~ 5.25 L since admission. Objective:   /69   Pulse 103   Temp 98 °F (36.7 °C) (Oral)   Resp 22   Ht 5' 4\" (1.626 m)   Wt 203 lb 1.6 oz (92.1 kg)   SpO2 92%   BMI 34.86 kg/m²         Intake/Output Summary (Last 24 hours) at 8/19/2020 0840  Last data filed at 8/19/2020 4800  Gross per 24 hour   Intake 460 ml   Output 2250 ml   Net -1790 ml       Physical Exam:  General appearance: awake alert and oriented to person today . Cooperative. mild distress - w/ some increased WOB. Head: Normocephalic, without obvious abnormality, atraumatic  Eyes: conjunctivae/corneas clear. Neck: no adenopathy, supple, symmetrical, trachea midline, symmetric  Lungs: mild accessory muscle use, diminished breath sounds, no wheezes rales or rhonchi, on 8 L  Heart: irregular rate and rhythm, S1, S2 normal, no murmur  Abdomen: soft, non-tender  Extremities: extremities normal, atraumatic, no edema  Skin: Skin color, texture, turgor normal. No rashes or lesions  Neurologic: Grossly normal      I SELENA Maldonado M.D.   have reviewed the chart on [facility] and personally interviewed and examined patient, reviewed the data (labs and imaging) and after discussion with my PA formulated the plan. Agree with note with the following edits. s.     Physical exam:    /69   Pulse 103   Temp 98 °F (36.7 °C) (Oral)   Resp 22   Ht 5' 4\" (1.626 m)   Wt 203 lb 1.6 oz (92.1 kg)   SpO2 92%   BMI 34.86 kg/m²     Gen: No distress. alert   Eyes: PERRL. No sclera icterus. No conjunctival injection. ENT: No discharge. Pharynx clear. Neck: Trachea midline. Normal thyroid. Resp: No accessory muscle use. No crackles. No wheezes. No rhonchi. No dullness on percussion. CV: Regular rate. Regular rhythm. No murmur or rub. No edema. GI: Non-tender. Non-distended. No masses. No organomegaly. Normal bowel sounds. No hernia. Skin: Warm and dry. No nodule on exposed extremities. No rash on exposed extremities. Lymph: No cervical LAD. No supraclavicular LAD. M/S: No cyanosis. No joint deformity. No clubbing. Neuro: alert, disoriented           MYRIAM Evans      Scheduled Meds:   furosemide  40 mg Intravenous BID    spironolactone  25 mg Oral Daily    apixaban  2.5 mg Oral BID    ipratropium-albuterol  1 ampule Inhalation Q4H While awake    cefepime  2 g Intravenous Q8H    linezolid  600 mg Intravenous Q12H    amLODIPine  5 mg Oral Daily    levothyroxine  100 mcg Oral Daily    montelukast  10 mg Oral Daily    OXcarbazepine  150 mg Oral BID    simvastatin  10 mg Oral Nightly    sodium chloride flush  10 mL Intravenous 2 times per day     PRN Meds:  potassium chloride **OR** potassium alternative oral replacement **OR** potassium chloride, sodium chloride flush, acetaminophen **OR** acetaminophen, polyethylene glycol, promethazine **OR** ondansetron, ipratropium-albuterol    Data:  BMP:   Recent Labs     08/17/20  0506 08/18/20  0506 08/19/20  0552    140 139   K 3.0* 3.5 3.2*   CL 92* 94* 92*   CO2 34* 36* 37*   BUN 11 13 14   CREATININE 0.9 1.0 1.0     Cultures:     Results for Polo Galvan (MRN [id number]) as of 8/14/2020 10:18   Ref.  Range 8/13/2020 01:15   SARS-CoV-2, CARLOS Latest Ref Range: NOT DETECTED  NOT DETECTED     Urine cx: Enterococcus faecalis, Pseudomonas aeruginosa    Susceptibility     Enterococcus faecalis (2)     Antibiotic  Interpretation  ANDRES  Status     ampicillin  Sensitive  <=2  mcg/mL      nitrofurantoin Sensitive  <=32  mcg/mL      tetracycline  Resistant  >8  mcg/mL      vancomycin  Sensitive  2  mcg/mL      Pseudomonas aeruginosa (1)     Antibiotic  Interpretation  ANDRES  Status     cefepime  Sensitive  8  mcg/mL      ciprofloxacin  Resistant  >2  mcg/mL      gentamicin  Intermediate  8  mcg/mL      meropenem  Resistant  >8  mcg/mL      piperacillin-tazobactam  Intermediate  32  mcg/mL      tobramycin  Sensitive  <=4  mcg/mL          Radiology  XR CHEST PORTABLE   Final Result   Congestive failure with asymmetric left lower lobe airspace disease. Superimposed atelectasis or pneumonia is also therefore considered. Suspect bilateral pleural effusion. XR CHEST 1 VIEW   Final Result   No significant change diffuse bilateral pulmonary disease and bilateral   pleural effusions. CT CHEST PULMONARY EMBOLISM W CONTRAST   Final Result   No pulmonary embolism to the proximal segmental level. Moderate right and small left pleural effusions, decreased on the right and   increased on the left since the prior study 2 weeks ago. Evidence of mild interstitial edema. XR CHEST PORTABLE   Final Result   Bilateral pleural effusions and basilar pulmonary edema versus atelectasis. Pneumonia is a differential concern. [place] MODIFIED BARIUM SWALLOW W VIDEO    (Results Pending)        ECHO   6/30/2020   Normal LV systolic function with EF of 55-60%. D-shaped septum c/w RV pressure and volume overload. Mild-moderate concentric left ventricular hypertrophy. Left ventricular diastolic filling pressure is elevated. Mild mitral regurgitation. Moderate right-sided chamber enlargement. Right ventricular systolic function is moderately reduced. Moderate tricuspid regurgitation. Systolic pulmonic artery pressure (SPAP) is estimated at 94mmHg consistent   with severe pulmonary hypertension (RAP of 8mmHg).         Assessment/Plan:    Acute on chronic hypoxic RF  - normally uses 2L NC O2 continuous at home   - Suspect 2/2 acute on chronic dCHF, CXR with bilateral pleural effusions with pulmonary edema  - Was recently admitted, but no leukocytosis, PCT normal, lower concerns for HCAP as cause   - COVID 19 negative. - worsening hypoxia. 3L--> 15L --> high flow O2 per Vapotherm --> O2 sats initialy improved, down to 8 L  - worsening sats yesterday - on 15L HFNC  - weaned back down to 8 L today     Acute on chronic dCHF  Severe pulmonary HTN  - Last Echo with EF 55-60%, DD and severe pulm HTN  - Was recently admitted for the same, discharge weight was 205 lbs  - Admitted with weight 225 lbs-> 207 > 204 >203 lb  - consulted cardio. Sp IV lasix 40 mg BID-> PO lasix 40 mg daily on 8/16.    - changed to 40 mg IV Lasix BID, on aldactone    Hypokalemia  - 3.2  - replete and  Repeat BMP    Dysphagia  - with recommendations for MBS  - plan for MBS today    UTI  - UA with + Nitrite and LE   Urine cultures growing Pseudomonas and enterococcus,  antibiotics now changed to cefepime and Zyvox, continue day #6     Chronic Atrial Fibrillation   - Eliquis held on 8/14- will need to discuss with pulm about resumption   - Rate is controlled      HTN  - BP is controlled  - Continue Norvasc      Hypothyroidism   - Continue Synthroid      HLD  - Continue statin     Dementia with behavior disturbances  - Continue home medications  - Supportive Care      Morbid Obesity  - Body mass index is 38.62 kg/m². - Complicating assessment and treatment. Placing patient at risk for multiple co-morbidities as well as early death and contributing to the patient's presentation.   - Counseled on weight loss. DVT Prophylaxis: Eliquis ON HOLD   Diet: DIET LOW SODIUM 2 GM; Dysphagia Minced and Moist; Mildly Thick (Nectar); 1800 ml   Code Status: DNR-CCA     Dispo: cont jesus Torres PA-C 8:51 AM 8/19/2020     1 acute on chronic hypoxic respiratory failure. Acute on chronic diastolic congestive failure.   Severe pulmonary

## 2020-08-19 NOTE — PROGRESS NOTES
[address]  Cardiology  Progress Note    Admission date:  2020    Reason for follow up visit: dCHF, PAH, pleural effusion     HPI/CC: Tomy Jolley is a 82 y.o. female who presented to the hospital with complaints of shortness of breath. BNP 1,356. CXR showed bilateral pleural effusion dn pulmonary edema vs atelectasis. CT obtained and PE ruled out, moderate right and small left pleural effusions, decreased on the right and increased on the left since prior study 2 weeks ago. She is being treated for acute on chronic diastolic CHF, severe pulmonary hypertension, and chronic atrial fibrillation. Being diuresed with IV Lasix and aldactone added this admission. Subjective: Resting in bed with HOB elevated. Currently on 8 liters HF no increase in oxygen overnight. She stated breathing is better. Denies any other complains. Poor historian. Per nursing patient is doing better today than yesterday. Vitals:  Blood pressure 123/69, pulse 103, temperature 98 °F (36.7 °C), temperature source Oral, resp. rate 22, height 5' 4\" (1.626 m), weight 203 lb 1.6 oz (92.1 kg), SpO2 91 %.   Temp  Av.8 °F (36.6 °C)  Min: 97.3 °F (36.3 °C)  Max: 98.2 °F (36.8 °C)  Pulse  Av.8  Min: 97  Max: 103  BP  Min: 122/57  Max: 130/79  SpO2  Av.7 %  Min: 91 %  Max: 96 %    24 hour I/O    Intake/Output Summary (Last 24 hours) at 2020 1310  Last data filed at 2020 1300  Gross per 24 hour   Intake 1200 ml   Output 3150 ml   Net -1950 ml     Current Facility-Administered Medications   Medication Dose Route Frequency Provider Last Rate Last Dose    potassium chloride (KLOR-CON M) extended release tablet 40 mEq  40 mEq Oral PRN Humphrey Walton MD        Or    potassium bicarb-citric acid (EFFER-K) effervescent tablet 40 mEq  40 mEq Oral PRN Humphrey Walton MD   40 mEq at 20 0824    Or    potassium chloride 10 mEq/100 mL IVPB (Peripheral Line)  10 mEq Intravenous PRN Delfin Hernandez MD        atorvastatin (LIPITOR) tablet 10 mg  10 mg Oral Nightly Kareem Toussaint MD        furosemide (LASIX) injection 40 mg  40 mg Intravenous BID Lynette Aguilera, APRN - CNP   40 mg at 08/19/20 3237    spironolactone (ALDACTONE) tablet 25 mg  25 mg Oral Daily Lynette Aguilera, APRN - CNP   25 mg at 08/19/20 0238    apixaban (ELIQUIS) tablet 2.5 mg  2.5 mg Oral BID Lynette Aguilera, APRN - CNP   2.5 mg at 08/19/20 1157    ipratropium-albuterol (DUONEB) nebulizer solution 1 ampule  1 ampule Inhalation Q4H While awake Regi Lopez MD   1 ampule at 08/19/20 1155    cefepime (MAXIPIME) 2 g IVPB minibag  2 g Intravenous Q8H Regi Lopez MD   Stopped at 08/19/20 0853    linezolid (ZYVOX) IVPB 600 mg  600 mg Intravenous Q12H Regi Lopez MD   Stopped at 08/19/20 0558    amLODIPine (NORVASC) tablet 5 mg  5 mg Oral Daily Kareem Toussaint MD   5 mg at 08/19/20 0397    levothyroxine (SYNTHROID) tablet 100 mcg  100 mcg Oral Daily Kareem Toussaint MD   100 mcg at 08/19/20 0503    montelukast (SINGULAIR) tablet 10 mg  10 mg Oral Daily Kareem Toussaint MD   10 mg at 08/19/20 0824    OXcarbazepine (TRILEPTAL) tablet 150 mg  150 mg Oral BID Kareem Toussaint MD   150 mg at 08/19/20 3385    sodium chloride flush 0.9 % injection 10 mL  10 mL Intravenous 2 times per day Kareem Toussaint MD   10 mL at 08/19/20 0824    sodium chloride flush 0.9 % injection 10 mL  10 mL Intravenous PRN Kareem Toussaint MD        acetaminophen (TYLENOL) tablet 650 mg  650 mg Oral Q6H PRN Kareem Toussaint MD        Or    acetaminophen (TYLENOL) suppository 650 mg  650 mg Rectal Q6H PRN Kareem Toussaint MD        polyethylene glycol (GLYCOLAX) packet 17 g  17 g Oral Daily PRN Kareem Toussaint MD        promethazine (PHENERGAN) tablet 12.5 mg  12.5 mg Oral Q6H PRN Kareem Toussaint MD        Or    ondansetron (ZOFRAN) injection 4 mg  4 mg Intravenous Q6H PRN Kareem Toussaint MD        ipratropium-albuterol (DUONEB) nebulizer solution 1 ampule  1 ampule Inhalation Q4H PRN Bradford Porter MD   1 ampule at 08/18/20 0855         Objective:     Telemetry monitor: NSR/ST     Physical Exam:  Constitutional:  Comfortable and alert, NAD, appears stated age  Eyes: PERRL, sclera nonicteric  Neck:  Supple, no masses, no thyroidmegaly, JVD hard to assess due to body habitus and position in bed  Skin:  Warm and dry; no rash or lesions  Heart:  Regular, normal apex, S1 and S2 normal, no M/G/R  Lungs:  Normal respiratory effort; diminished bibasilar LLL fine crackles, no wheezes/rhonchi  Abdomen: obese, soft, non tender, + bowel sounds  Extremities:  No edema noted   Neuro: confused, moves legs and arms equally, normal mood and affect      Data Reviewed:  CXR 8/17/2020: Impression    Congestive failure with asymmetric left lower lobe airspace disease. Superimposed atelectasis or pneumonia is also therefore considered.         Suspect bilateral pleural effusion. CXR 8/14/2020: Impression No significant change diffuse bilateral pulmonary disease and bilateral pleural effusions. CT 8/12/2020: Impression No pulmonary embolism to the proximal segmental level. Moderate right and small left pleural effusions, decreased on the right and increased on the left since the prior study 2 weeks ago. Evidence of mild interstitial edema. Echo 6/30/2020:   Normal LV systolic function with EF of 55-60%. D-shaped septum c/w RV pressure and volume overload. Mild-moderate concentric left ventricular hypertrophy. Left ventricular diastolic filling pressure is elevated. Mild mitral regurgitation. Moderate right-sided chamber enlargement. Right ventricular systolic function is moderately reduced. Moderate tricuspid regurgitation. Systolic pulmonic artery pressure (SPAP) is estimated at 94mmHg consistent   with severe pulmonary hypertension (RAP of 8mmHg).     Lab Reviewed:   Renal Profile:  Lab Results   Component Value Date CREATININE 1.0 08/19/2020    BUN 14 08/19/2020     08/19/2020    K 3.2 08/19/2020    K 4.2 08/12/2020    CL 92 08/19/2020    CO2 37 08/19/2020     CBC:    Lab Results   Component Value Date    WBC 8.3 08/14/2020    RBC 3.69 08/14/2020    HGB 10.9 08/14/2020    HCT 34.5 08/14/2020    MCV 93.4 08/14/2020    RDW 16.0 08/14/2020     08/14/2020     BNP:    Lab Results   Component Value Date    PROBNP 1,503 08/19/2020    PROBNP 1,356 08/16/2020    PROBNP 1,811 08/12/2020    PROBNP 1,738 08/04/2020    PROBNP 1,972 06/29/2020     Fasting Lipid Panel:  No results found for: CHOL, HDL, TRIG  Cardiac Enzymes:  CK/MbTroponin  Lab Results   Component Value Date    CKTOTAL 345 08/05/2018    TROPONINI <0.01 08/12/2020     PT/ INR No results found for: INR, PROTIME  PTT No results found for: PTT   Lab Results   Component Value Date    MG 2.20 08/17/2020      Lab Results   Component Value Date    TSH 12.50 08/04/2020       All labs and imaging reviewed today    Assessment:  1. Acute on chronic hypoxic respiratory failure: ongoing continues on 8 liters HF  2. Acute on chronic diastolic heart failure: ongoing- continues to diuresis with IV Lasix and aldactone   -Weights have been 225-->223-->205-->206-->207-->204-->203   -net negative 5,284 since admission   -BNP1,811 on admission and 1,356 8/16-->1503 8/19   -Edema BLE resolved    -continue daily weights, low sodium diet, 1800 ml fluid restriction, and strict I/O-bilateral pleural effusions noted on chest X-ray 8/17  3. Cor pulmonale  4. Severe pulmonary hypertension:Dr. Minnie Bruce has seen patient in the past and does not recommend pulmonary HTN work up given age, demential, and debility  5. Persistent atrial fibrillation: stable- currently NSR/ST today    -hx of known asymptomatic pauses   -no PPM recommended at this time unless improved functional status and/or definitely symptomatic   6. Dementia  7. Obesity  8. UTI  9. Hypokalemia: 3.2 PRN potassium given today  10.

## 2020-08-19 NOTE — PROCEDURES
INSTRUMENTAL SWALLOW REPORT  MODIFIED BARIUM SWALLOW    Recommendations:  Solid consistency: Con't Dysphagia Minced and Moist (Dysphagia II)  Liquid consistency: Mildly Thick (Nectar)  Liquid administration via: Spoon;Cup;Straw  Assist with feeding  Slow rate, small bites/drinks  ST to follow    NAME: Gabe Killian   : 1935  MRN: [id number]       Date of Eval: 2020        Referring Diagnosis(es): Referring Diagnosis: Oropharyngeal dysphagia    Past Medical History:  has a past medical history of Alzheimer's dementia ([facility] [address] Asthma, Atrial fibrillation ([address] Blood clot in vein, CHF (congestive heart failure) ([facility] [address] Dementia ([address] GERD (gastroesophageal reflux disease), Hypertension, Kidney failure, MRSA (methicillin resistant staph aureus) culture positive, and Osteoporosis. Past Surgical History:  has a past surgical history that includes Cholecystectomy; eye surgery; Hysterectomy; Thyroidectomy; pr esophagogastroduodenoscopy transoral diagnostic (N/A, 2018); and pr [facility] w/brncl alveolar lavage (N/A, 2018). Current Diet Solid Consistency: Dysphagia Minced and Moist (Dysphagia II)  Current Diet Liquid Consistency: Mildly Thick (Nectar)    Date of Prior Study: 7/3/20  Type of Study: Repeat MBS  Results of Prior Study: Decreased bolus cohesion/AP propulsion and poor bolus control. Premature spillage resulting in deep/silent penetration with thin/NTL by straw. No pen with NTL by cup/spoon. No aspiration. Recent CXR/CT of Chest: Date: 20    Impression    Congestive failure with asymmetric left lower lobe airspace disease.     Superimposed atelectasis or pneumonia is also therefore considered.         Suspect bilateral pleural effusion.             Patient Complaints/Reason for Referral:  Gabe Killian was referred for a MBS to assess the efficiency of his/her swallow function, assess for aspiration, and to make recommendations regarding safe dietary consistencies, effective compensatory strategies, and safe eating environment. Patient complaints: RN reports some delayed coughing during/after PO intake    Onset of problem: 8/12/20     General Comment  Comments: Per MD H&P \"The patient is a 82 y.o. female with alzheimers dementia, atrial fibrillation, chronic diastolic CHF, GERD, HTN who presented to [facility] ED with complaint of SOB. When I saw the patient she was feeling somewhat lethargic and unable to give me much of a history. Patient is apparently been noncompliant with home oxygen therapy. She came to the ER for shortness of breath. Denies any chest pain. Work-up showed acute on chronic respiratory failure likely due to underlying CHF. Subjective  Subjective: Pt was pleasant but confused. Encouragement for PO needed. Pt often stating, \"you aren't going to kill me are you? \" and \"please don't give me that thick stuff. \"    Behavior/Cognition/Vision/Hearing:  Behavior/Cognition: Alert;Confused; Agitated; Uncooperative;Distractible;Requires cueing    Impressions:    Pt alert on 8 L HFNC. Pt accepted PO trials with lots of encouragement. Thin by spoon x 2, thin by cup x 1, thin by straw x 1, nectar-thick liquid x 1 by cup, nectar-thick liquid x 1 by straw, puree x 1, and dental soft x 1 presented. Appeared anxious and confused. Pt presents with a mild-moderate oral dysphagia characterized by slowed mastication, AP propulsion, and reduced bolus formation and control. Pt presents with mild-moderate pharyngeal dysphagia characterized by a delayed swallow, premature bolus loss to the valleculae with all consistencies, flash/silent penetration 1/1 thin by cup, and deep/silent penetration thin by straw 1/1. Adequate epiglottic inversion, hyolaryngeal elevation/excursion, pharyngeal peristalsis, and UES opening. No evidence of reflux. Pt had no cough/TC during the assessment. There was  No pharyngeal residue post swallow.  Of note: pt often required cues to swallow d/t dislike of the barium/behaviors/distractibility. This at times appeared to impact the timeliness/coordination of the full swallow. No penetration/aspiration with thin by spoon, nectar-thick liquids, puree, or dental soft. Recommend con't current diet level of minced and moist with nectar-thick liquids. Needs assist with feeding. Fully upright with PO. SLP to follow for tolerance and trials of thin by spoon and cup. Treatment Dx and ICD 10: Oropharyngeal dysphagia     Patient Position: Lateral     Consistencies Administered: Dysphagia Minced and Moist (Dysphagia II); Dysphagia Pureed (Dysphagia I); Nectar cup;Nectar straw; Thin cup; Thin teaspoon; Thin straw    Compensatory Swallowing Strategies Attempted: Upright as possible for all oral intake;Small bites/sips      Dysphagia Outcome Severity Scale: Level 4: Mild-Moderate dysphagia- intermittent supervision/cueing. One-two diet consistencies restricted     Recommended Diet:  Solid consistency: Dysphagia Minced and Moist (Dysphagia II)  Liquid consistency: Mildly Thick (Nectar)  Liquid administration via: Spoon;Cup;Straw  Medication administration: Whole/crushed in puree    Safe Swallow Protocol:  Supervision: Close  Compensatory Swallowing Strategies: Alternate solids and liquids;Eat/Feed slowly;Upright as possible for all oral intake;Small bites/sips;Assist feed    Recommendations/Treatment  Requires SLP Intervention: Yes        D/C Recommendations: To be determined     Recommended Exercises:    Therapeutic Interventions: Therapeutic PO trials with SLP; Diet tolerance monitoring;Patient/Family education         Education: Images and recommendations were reviewed with patient following this exam.   Patient Education: extensive education on MBS recs, role of SLP, rational for PO trials, diet recommendations if refusal continues and POC  Patient Education Response: No evidence of learning;Needs reinforcement    Prognosis  Prognosis for safe diet advancement: fair  Barriers to reach goals: age;cognitive deficits;fatigue;inconsistent alertness;motivation;behavior  Duration/Frequency of Treatment  Duration/Frequency of Treatment: 2-4x/wk for LOS      Goals:    Long Term:   Timeframe for Long-term Goals: 7 days (8/23)  Goal 1: Pt will tolerate LRD w/o s/s of penetration/aspiraiton 8/19, progressing     Short Term:  Timeframe for Short-Term Goals: 5 days (8/24/20)  Goal 1: The patient will tolerate recommended diet without observed clinical signs of aspiration 8/19, progressing, ongoing  Goal 2: The patient will tolerate repeat BSE when able. 8/19, GOAL MET  Goal 3: The patient will tolerate instrumental swallowing procedure 8/19, GOAL MET  Goal 4: The patient/caregiver will demonstrate understanding of compensatory strategies for improved swallowing safety. 8/19, addressed, ongoing  Goal 5: The patient will tolerate trials of thin liquids without signs and symptoms of aspiration 10/10 via cup. 8/19, new goal      Oral Preparation / Oral Phase  Oral Phase: Impaired  Oral Phase - Major Contributing Deficits  Poor Mastication: Soft solid  Reduced Posterior Propulsion: Soft solid;Puree  Holding Of Bolus: All  Reduced Bolus Control: All  Decreased Bolus Cohesion: All  Premature Bolus Loss to Pharynx: All  Reduced Tongue Base Retraction: All  Fatigue of Mechanism: Soft solid        Pharyngeal Phase  Pharyngeal Phase: Impaired  Pharyngeal Phase - Major Contributing Deficits  Delayed Swallow Initiation: All  Premature Spillage to Valleculae: All  Shallow Penetration During: Thin cup  Complete Self-clearing (shallow): Thin cup  Deep Penetration During: Thin straw  Complete Retrieval (deep): Thin straw  No Cough Reflex: Thin cup; Thin straw  Fatigue of Mechanism: Soft solid    Esophageal Phase  Esophageal Screen: WFL    Pain   Patient Currently in Pain: Other (comment)(see advanced dementia)  Pain Level: 0      Therapy Time:   Individual Concurrent Group Co-treatment   Time In 1500         Time Out 1520

## 2020-08-20 ENCOUNTER — APPOINTMENT (OUTPATIENT)
Dept: GENERAL RADIOLOGY | Age: 85
DRG: 291 | End: 2020-08-20
Payer: MEDICARE

## 2020-08-20 LAB
ANION GAP SERPL CALCULATED.3IONS-SCNC: 11 MMOL/L (ref 3–16)
BUN BLDV-MCNC: 15 MG/DL (ref 7–20)
CALCIUM SERPL-MCNC: 9.2 MG/DL (ref 8.3–10.6)
CHLORIDE BLD-SCNC: 89 MMOL/L (ref 99–110)
CO2: 36 MMOL/L (ref 21–32)
CREAT SERPL-MCNC: 1 MG/DL (ref 0.6–1.2)
GFR AFRICAN AMERICAN: >60
GFR NON-AFRICAN AMERICAN: 53
GLUCOSE BLD-MCNC: 103 MG/DL (ref 70–99)
GLUCOSE BLD-MCNC: 112 MG/DL (ref 70–99)
GLUCOSE BLD-MCNC: 125 MG/DL (ref 70–99)
GLUCOSE BLD-MCNC: 147 MG/DL (ref 70–99)
GLUCOSE BLD-MCNC: 149 MG/DL (ref 70–99)
PERFORMED ON: ABNORMAL
POTASSIUM SERPL-SCNC: 3.4 MMOL/L (ref 3.5–5.1)
SODIUM BLD-SCNC: 136 MMOL/L (ref 136–145)

## 2020-08-20 PROCEDURE — 71045 X-RAY EXAM CHEST 1 VIEW: CPT

## 2020-08-20 PROCEDURE — 36415 COLL VENOUS BLD VENIPUNCTURE: CPT

## 2020-08-20 PROCEDURE — 6370000000 HC RX 637 (ALT 250 FOR IP): Performed by: NURSE PRACTITIONER

## 2020-08-20 PROCEDURE — 2580000003 HC RX 258: Performed by: INTERNAL MEDICINE

## 2020-08-20 PROCEDURE — 6370000000 HC RX 637 (ALT 250 FOR IP): Performed by: INTERNAL MEDICINE

## 2020-08-20 PROCEDURE — 6360000002 HC RX W HCPCS: Performed by: INTERNAL MEDICINE

## 2020-08-20 PROCEDURE — 6360000002 HC RX W HCPCS: Performed by: NURSE PRACTITIONER

## 2020-08-20 PROCEDURE — 2060000000 HC ICU INTERMEDIATE R&B

## 2020-08-20 PROCEDURE — 99232 SBSQ HOSP IP/OBS MODERATE 35: CPT | Performed by: NURSE PRACTITIONER

## 2020-08-20 PROCEDURE — 2580000003 HC RX 258

## 2020-08-20 PROCEDURE — 94640 AIRWAY INHALATION TREATMENT: CPT

## 2020-08-20 PROCEDURE — 99232 SBSQ HOSP IP/OBS MODERATE 35: CPT | Performed by: INTERNAL MEDICINE

## 2020-08-20 PROCEDURE — 94761 N-INVAS EAR/PLS OXIMETRY MLT: CPT

## 2020-08-20 PROCEDURE — 80048 BASIC METABOLIC PNL TOTAL CA: CPT

## 2020-08-20 PROCEDURE — 2700000000 HC OXYGEN THERAPY PER DAY

## 2020-08-20 RX ORDER — POTASSIUM CHLORIDE 20 MEQ/1
40 TABLET, EXTENDED RELEASE ORAL
Status: DISCONTINUED | OUTPATIENT
Start: 2020-08-21 | End: 2020-08-24 | Stop reason: HOSPADM

## 2020-08-20 RX ORDER — FUROSEMIDE 10 MG/ML
40 INJECTION INTRAMUSCULAR; INTRAVENOUS ONCE
Status: COMPLETED | OUTPATIENT
Start: 2020-08-20 | End: 2020-08-20

## 2020-08-20 RX ORDER — SODIUM CHLORIDE 9 MG/ML
INJECTION, SOLUTION INTRAVENOUS
Status: COMPLETED
Start: 2020-08-20 | End: 2020-08-20

## 2020-08-20 RX ADMIN — MONTELUKAST SODIUM 10 MG: 10 TABLET, COATED ORAL at 10:44

## 2020-08-20 RX ADMIN — IPRATROPIUM BROMIDE AND ALBUTEROL SULFATE 1 AMPULE: .5; 3 SOLUTION RESPIRATORY (INHALATION) at 10:49

## 2020-08-20 RX ADMIN — IPRATROPIUM BROMIDE AND ALBUTEROL SULFATE 1 AMPULE: .5; 3 SOLUTION RESPIRATORY (INHALATION) at 20:51

## 2020-08-20 RX ADMIN — LINEZOLID 600 MG: 600 INJECTION, SOLUTION INTRAVENOUS at 17:04

## 2020-08-20 RX ADMIN — SPIRONOLACTONE 25 MG: 25 TABLET ORAL at 10:42

## 2020-08-20 RX ADMIN — FUROSEMIDE 40 MG: 10 INJECTION, SOLUTION INTRAMUSCULAR; INTRAVENOUS at 15:28

## 2020-08-20 RX ADMIN — OXCARBAZEPINE 150 MG: 300 TABLET, FILM COATED ORAL at 10:42

## 2020-08-20 RX ADMIN — CEFEPIME 2 G: 2 INJECTION, POWDER, FOR SOLUTION INTRAVENOUS at 17:04

## 2020-08-20 RX ADMIN — IPRATROPIUM BROMIDE AND ALBUTEROL SULFATE 1 AMPULE: .5; 3 SOLUTION RESPIRATORY (INHALATION) at 00:17

## 2020-08-20 RX ADMIN — CEFEPIME 2 G: 2 INJECTION, POWDER, FOR SOLUTION INTRAVENOUS at 00:35

## 2020-08-20 RX ADMIN — OXCARBAZEPINE 150 MG: 300 TABLET, FILM COATED ORAL at 20:32

## 2020-08-20 RX ADMIN — POTASSIUM CHLORIDE 40 MEQ: 1500 TABLET, EXTENDED RELEASE ORAL at 05:46

## 2020-08-20 RX ADMIN — APIXABAN 2.5 MG: 5 TABLET, FILM COATED ORAL at 20:32

## 2020-08-20 RX ADMIN — APIXABAN 2.5 MG: 5 TABLET, FILM COATED ORAL at 10:42

## 2020-08-20 RX ADMIN — ATORVASTATIN CALCIUM 10 MG: 10 TABLET, FILM COATED ORAL at 20:32

## 2020-08-20 RX ADMIN — CEFEPIME 2 G: 2 INJECTION, POWDER, FOR SOLUTION INTRAVENOUS at 10:42

## 2020-08-20 RX ADMIN — SODIUM CHLORIDE 250 ML: 9 INJECTION, SOLUTION INTRAVENOUS at 10:50

## 2020-08-20 RX ADMIN — FUROSEMIDE 40 MG: 10 INJECTION, SOLUTION INTRAMUSCULAR; INTRAVENOUS at 18:06

## 2020-08-20 RX ADMIN — IPRATROPIUM BROMIDE AND ALBUTEROL SULFATE 1 AMPULE: .5; 3 SOLUTION RESPIRATORY (INHALATION) at 06:50

## 2020-08-20 RX ADMIN — LEVOTHYROXINE SODIUM 100 MCG: 100 TABLET ORAL at 05:04

## 2020-08-20 RX ADMIN — Medication 10 ML: at 20:31

## 2020-08-20 RX ADMIN — IPRATROPIUM BROMIDE AND ALBUTEROL SULFATE 1 AMPULE: .5; 3 SOLUTION RESPIRATORY (INHALATION) at 23:44

## 2020-08-20 RX ADMIN — AMLODIPINE BESYLATE 5 MG: 5 TABLET ORAL at 10:42

## 2020-08-20 RX ADMIN — IPRATROPIUM BROMIDE AND ALBUTEROL SULFATE 1 AMPULE: .5; 3 SOLUTION RESPIRATORY (INHALATION) at 15:05

## 2020-08-20 RX ADMIN — FUROSEMIDE 40 MG: 10 INJECTION, SOLUTION INTRAMUSCULAR; INTRAVENOUS at 10:41

## 2020-08-20 RX ADMIN — LINEZOLID 600 MG: 600 INJECTION, SOLUTION INTRAVENOUS at 04:57

## 2020-08-20 RX ADMIN — Medication 10 ML: at 10:42

## 2020-08-20 ASSESSMENT — PAIN SCALES - WONG BAKER: WONGBAKER_NUMERICALRESPONSE: 0

## 2020-08-20 ASSESSMENT — PAIN SCALES - GENERAL: PAINLEVEL_OUTOF10: 0

## 2020-08-20 NOTE — PROGRESS NOTES
RESPIRATORY THERAPY ASSESSMENT    Name:  [address] [facility] [address] Record Number:  [id number]  Age: 82 y.o. Gender: female  : 1935  Today's Date:  2020  Room:  /0325-02    Assessment     Is the patient being admitted for a COPD or Asthma exacerbation? No   (If yes the patient will be seen every 4 hours for the first 24 hours and then reassessed)    Patient Admission Diagnosis      Allergies  Allergies   Allergen Reactions    Codeine Itching    Sulfa Antibiotics Other (See Comments)     Per Pt daughter Luis Beauchamp) \" Not know her reaction to medication\"       Minimum Predicted Vital Capacity:     n/a          Actual Vital Capacity:      n/a              Pulmonary History:Asthma and CHF/Pulmonary Edema  Home Oxygen Therapy:  room air  Home Respiratory Therapy:Albuterol   Current Respiratory Therapy:  Duoneb Q4H w/a  Treatment Type: HHN  Medications: Albuterol/Ipratropium    Respiratory Severity Index(RSI)   Patients with orders for inhalation medications, oxygen, or any therapeutic treatment modality will be placed on Respiratory Protocol. They will be assessed with the first treatment and at least every 72 hours thereafter. The following severity scale will be used to determine frequency of treatment intervention.     Smoking History: Pulmonary Disease or Smoking History, Greater than 15 pack year = 2    Social History  Social History     Tobacco Use    Smoking status: Never Smoker    Smokeless tobacco: Never Used   Substance Use Topics    Alcohol use: Not on file    Drug use: Not on file       Recent Surgical History: None = 0  Past Surgical History  Past Surgical History:   Procedure Laterality Date    CHOLECYSTECTOMY      EYE SURGERY      HYSTERECTOMY      OK [address] W/BRNCL ALVEOLAR LAVAGE N/A 2018    BRONCHOSCOPY ALVEOLAR LAVAGE performed by Jasmine Forte MD at [address] [facility] ESOPHAGOGASTRODUODENOSCOPY TRANSORAL DIAGNOSTIC N/A 2018    EGD ESOPHAGOGASTRODUODENOSCOPY performed by Elizabeth Bay MD at [address] [place] [address]         Level of Consciousness: Disoriented and Uncooperative = 2    Level of Activity: Non weight bearing- transfers bed to chair only = 3    Respiratory Pattern: Dyspnea with exertion;Irregular pattern;or RR less than 6 = 2    Breath Sounds: Diminshed bilaterally and/or crackles = 2    Sputum   ,  , Sputum How Obtained: Spontaneous cough  Cough: Strong, spontaneous, non-productive = 0    Vital Signs   /73   Pulse 77   Temp 98.2 °F (36.8 °C) (Oral)   Resp 24   Ht 5' 4\" (1.626 m)   Wt 212 lb 14.4 oz (96.6 kg)   SpO2 91%   BMI 36.54 kg/m²   SPO2 (COPD values may differ): Less than 86% on room air or greater than 92% on FiO2 greater than 50% = 4    Peak Flow (asthma only): not applicable = 0    RSI: 05-94 = Q4WA (every four hours while awake) and Q4hrs PRN        Plan       Goals: medication delivery and improve oxygenation    Patient/caregiver was educated on the proper method of use for Respiratory Care Devices:  No: pt disoriented      Level of patient/caregiver understanding able to:   ? Verbalize understanding   ? Demonstrate understanding       ? Teach back        ? Needs reinforcement       ? No available caregiver               ? Other:     Response to education:  n/a     Is patient being placed on Home Treatment Regimen? No     Does the patient have everything they need prior to discharge? Yes     Comments: pt assessed/chart reviewed    Plan of Care: Continue Q4H w/a    Electronically signed by Faye Levin RCP on 8/20/2020 at 11:39 AM    Respiratory Protocol Guidelines     1. Assessment and treatment by Respiratory Therapy will be initiated for medication and therapeutic interventions upon initiation of aerosolized medication. 2. Physician will be contacted for respiratory rate (RR) greater than 35 breaths per minute.  Therapy will be held for heart rate (HR) greater than 140 beats per minute, pending direction from physician. 3. Bronchodilators will be administered via Metered Dose Inhaler (MDI) with spacer when the following criteria are met:  a. Alert and cooperative     b. HR < 140 bpm  c. RR < 30 bpm                d. Can demonstrate a 2-3 second inspiratory hold  4. Bronchodilators will be administered via Hand Held Nebulizer [facility]) to patients when ANY of the following criteria are met  a. Incognizant or uncooperative          b. Patients treated with HHN at Home        c. Unable to demonstrate proper use of MDI with spacer     d. RR > 30 bpm   5. Bronchodilators will be delivered via Metered Dose Inhaler (MDI), HHN, Aerogen to intubated patients on mechanical ventilation. 6. Inhalation medication orders will be delivered and/or substituted as outlined below. Aerosolized Medications Ordering and Administration Guidelines:    1. All Medications will be ordered by a physician, and their frequency and/or modality will be adjusted as defined by the patients Respiratory Severity Index (RSI) score. 2. If the patient does not have documented COPD, consider discontinuing anticholinergics when RSI is less than 9.  3. If the bronchospasm worsens (increased RSI), then the bronchodilator frequency can be increased to a maximum of every 4 hours. If greater than every 4 hours is required, the physician will be contacted. 4. If the bronchospasm improves, the frequency of the bronchodilator can be decreased, based on the patient's RSI, but not less than home treatment regimen frequency. 5. Bronchodilator(s) will be discontinued if patient has a RSI less than 9 and has received no scheduled or as needed treatment for 72  Hrs. Patients Ordered on a Mucolytic Agent:    1. Must always be administered with a bronchodilator.     2. Discontinue if patient experiences worsened bronchospasm, or secretions have lessened to the point that the patient is able to clear them with a cough.    Anti-inflammatory and Combination Medications:    1. If the patient lacks prior history of lung disease, is not using inhaled anti-inflammatory medication at home, and lacks wheezing by examination or by history for at least 24 hours, contact physician for possible discontinuation.

## 2020-08-20 NOTE — PROGRESS NOTES
Patient is resting showing no s/s of distress. Patient is alert and oriented. Meds were given, see MAR. Patient is denying any needs. Bed is in lowest position and call light is within reach. Will continue to monitor. Shift assessment complete, see flowsheets.

## 2020-08-20 NOTE — PROGRESS NOTES
[facility]  Cardiology  Progress Note    Admission date:  2020    Reason for follow up visit: dCHF, PAH, pleural effusion     HPI/CC: Ernesto Villa is a 82 y.o. female who presented to the hospital with complaints of shortness of breath. BNP 1,356. CXR showed bilateral pleural effusion dn pulmonary edema vs atelectasis. CT obtained and PE ruled out, moderate right and small left pleural effusions, decreased on the right and increased on the left since prior study 2 weeks ago. She is being treated for acute on chronic diastolic CHF, severe pulmonary hypertension, and chronic atrial fibrillation. Being diuresed with IV Lasix and aldactone added this admission. Pt did have x1 3 second pause yesterday- asymptomatic, known pauses with recent admission. Subjective: Resting in bed- on 12 liters HF. Yelling \"help me, help me\". She stated her bowels have to move. Denies any shortness of breath at this time. Appears anxious. Vitals:  Blood pressure (!) 115/55, pulse 68, temperature 97 °F (36.1 °C), temperature source Oral, resp. rate 22, height 5' 4\" (1.626 m), weight 212 lb 14.4 oz (96.6 kg), SpO2 90 %.   Temp  Av.7 °F (36.5 °C)  Min: 97 °F (36.1 °C)  Max: 98 °F (36.7 °C)  Pulse  Av.7  Min: 68  Max: 100  BP  Min: 115/55  Max: 132/78  SpO2  Av.6 %  Min: 90 %  Max: 93 %    24 hour I/O    Intake/Output Summary (Last 24 hours) at 2020 0856  Last data filed at 2020 2223  Gross per 24 hour   Intake 420 ml   Output 2200 ml   Net -1780 ml     Current Facility-Administered Medications   Medication Dose Route Frequency Provider Last Rate Last Dose    potassium chloride (KLOR-CON M) extended release tablet 40 mEq  40 mEq Oral PRN Mikhail Roberts MD   40 mEq at 20 0546    Or    potassium bicarb-citric acid (EFFER-K) effervescent tablet 40 mEq  40 mEq Oral PRN Mikhail Roberts MD   40 mEq at 20 0824    Or    potassium chloride 10 mEq/100 mL 12.5 mg Oral Q6H PRN Otf Daugherty MD        Or    ondansetron (ZOFRAN) injection 4 mg  4 mg Intravenous Q6H PRN Otf Daugherty MD        ipratropium-albuterol (DUONEB) nebulizer solution 1 ampule  1 ampule Inhalation Q4H PRN Otf Daugherty MD   1 ampule at 08/18/20 0855         Objective:     Telemetry monitor: SB/NSR no more pauses since yesterday    Physical Exam:  Constitutional:  Comfortable and alert, NAD, appears stated age  Eyes: PERRL, sclera nonicteric  Neck:  Supple, no masses, no thyroidmegaly, JVD hard to assess due to body habitus and position in bed  Skin:  Warm and dry; no rash or lesions  Heart:  Regular, normal apex, S1 and S2 normal, no M/G/R  Lungs:  Normal respiratory effort; diminished with  fine crackles bibasilar, no wheezes/rhonchi  Abdomen: obese, soft, non tender, + bowel sounds  Extremities:  No edema noted , +wrinkling to BLE  Neuro: confused- yelling out at times, moves legs and arms equally, normal mood and affect       Data Reviewed:  CXR 8/17/2020: Impression    Congestive failure with asymmetric left lower lobe airspace disease. Superimposed atelectasis or pneumonia is also therefore considered.         Suspect bilateral pleural effusion. CXR 8/14/2020: Impression No significant change diffuse bilateral pulmonary disease and bilateral pleural effusions. CT 8/12/2020: Impression No pulmonary embolism to the proximal segmental level. Moderate right and small left pleural effusions, decreased on the right and increased on the left since the prior study 2 weeks ago. Evidence of mild interstitial edema. Echo 6/30/2020:   Normal LV systolic function with EF of 55-60%. D-shaped septum c/w RV pressure and volume overload. Mild-moderate concentric left ventricular hypertrophy. Left ventricular diastolic filling pressure is elevated. Mild mitral regurgitation. Moderate right-sided chamber enlargement.    Right ventricular systolic function is does not recommend pulmonary HTN work up given age, demential, and debility  5. Persistent atrial fibrillation: stable- currently SB/NSR today    -hx of known asymptomatic pauses x1 noted yesterday   -no PPM recommended at this time unless improved functional status and/or definitely symptomatic   6. Dementia  7. Obesity  8. UTI  9. Hypokalemia: 3.4 PRN potassium given today- will add daily potassium, can consider increasing aldactone if blood pressure allows  10. Hypothyroidism: per IM TSH elevated  14.58, free T4 0.9    Plan:   1. Patient received x1 extra dose IV lasix 40 mg yesterday, will add additional dose of 40 mg IV Lasix today at 1400, monitor kidney function closely   2. Continue aldactone 25 mg daily   3. Will add daily potassium 40 mEq daily- continue to monitor   4. Wean oxygen as tolerated  5. Consider palliative care, pt is currently DNR CCA  6. Continue daily weights, low sodium diet, 1800 fluid restriction, and strict I/O  7. Continue Norvasc and Lipitor   8.  Continue to monitor telemetry       Thor Cuello, APRN - [address] [facility]  [phone]

## 2020-08-20 NOTE — PROGRESS NOTES
Pulmonary Progress Note  CC: Hypoxemia and shortness of breath    Subjective: no shortness of breath, but oxygen requirement remains high    IV line peripheral    EXAM:   BP (!) 115/55   Pulse 68   Temp 97 °F (36.1 °C) (Oral)   Resp 22   Ht 5' 4\" (1.626 m)   Wt 212 lb 14.4 oz (96.6 kg)   SpO2 90%   BMI 36.54 kg/m²  on 8 L  Constitutional:  No acute distress   HEENT: no scleral icterus  Neck: No tracheal deviation present. Cardiovascular: Normal heart sounds. + Edema  Pulmonary/Chest: No wheezes. No rhonchi. few rales. No decreased breath sounds. Abdominal: Soft. Musculoskeletal: No cyanosis. No clubbing. Skin: Skin is warm and dry. Scheduled Meds:   atorvastatin  10 mg Oral Nightly    potassium chloride  40 mEq Oral Once    furosemide  40 mg Intravenous BID    spironolactone  25 mg Oral Daily    apixaban  2.5 mg Oral BID    ipratropium-albuterol  1 ampule Inhalation Q4H While awake    cefepime  2 g Intravenous Q8H    linezolid  600 mg Intravenous Q12H    amLODIPine  5 mg Oral Daily    levothyroxine  100 mcg Oral Daily    montelukast  10 mg Oral Daily    OXcarbazepine  150 mg Oral BID    sodium chloride flush  10 mL Intravenous 2 times per day     Continuous Infusions:    PRN Meds:  potassium chloride **OR** potassium alternative oral replacement **OR** potassium chloride, sodium chloride flush, acetaminophen **OR** acetaminophen, polyethylene glycol, promethazine **OR** ondansetron, ipratropium-albuterol    Labs:  CBC:   No results for input(s): WBC, HGB, HCT, MCV, PLT in the last 72 hours.   BMP:   Recent Labs     08/18/20  0506 08/19/20  0552 08/20/20  0456    139 136   K 3.5 3.2* 3.4*   CL 94* 92* 89*   CO2 36* 37* 36*   BUN 13 14 15   CREATININE 1.0 1.0 1.0     Cultures:  8/12/2020 urine enterococcus and Pseudomonas  Enterococcus faecalis (2)     Antibiotic  Interpretation  ANDRES  Status     ampicillin  Sensitive  <=2  mcg/mL      nitrofurantoin  Sensitive  <=32  mcg/mL

## 2020-08-20 NOTE — PROGRESS NOTES
Patient continues with confusion. Educating patient on wearing oxygen throughout the shift. No ss of pain or discomfort noted. Will continue to monitor patient.

## 2020-08-20 NOTE — PROGRESS NOTES
Shift assessment complete. See doc flow. Nightly medications given see MAR. Patient continues with confusion this shift. Patient continues pulling oxygen off and not remembering that she did. Oxygen reapplied each time. Patient with no complaints at this time. Call light and bedside table within easy reach.

## 2020-08-20 NOTE — PROGRESS NOTES
Progress Note    Admit Date:  8/12/2020    Patient with progressive hypoxia. O2 requirement increased , she was on 15 L of O2-->  on Vapotherm FiO2 60% 25 L. She is getting diuresis for CHF  Seen by cardiology and pulmonology      Denies any chest pain. She is not a very good historian. Her COVID-19 test is negative. Subjective:  Ms. Viraj Lomax pt continues to complain of SOB. Oxygen was titrated down to 12L NC from 15L. Pt's mattress was switched out and weights increased 2/2 change, currently at 212 lb; diuresed ~6.2 L    Objective:   BP (!) 115/55   Pulse 68   Temp 97 °F (36.1 °C) (Oral)   Resp 22   Ht 5' 4\" (1.626 m)   Wt 212 lb 14.4 oz (96.6 kg)   SpO2 90%   BMI 36.54 kg/m²         Intake/Output Summary (Last 24 hours) at 8/20/2020 1022  Last data filed at 8/19/2020 2223  Gross per 24 hour   Intake 300 ml   Output 2200 ml   Net -1900 ml       Physical Exam:  General appearance: resting; alert and oriented to person today . Cooperative. no distress  Head: Normocephalic, without obvious abnormality, atraumatic  Eyes: conjunctivae/corneas clear. Neck: no adenopathy, supple, symmetrical, trachea midline, symmetric  Lungs: no accessory muscle use, diminished breath sounds, no wheezes rales or rhonchi, on 15 L  Heart: regular rate and rhythm, S1, S2 normal, no murmur  Abdomen: soft, non-tender  Extremities: extremities normal, atraumatic, no edema  Skin: Skin color, texture, turgor decreased. No rashes or lesions  Neurologic: Grossly normal      STACI Maldonado M.D.   have reviewed the chart on Mozell Seip and personally interviewed and examined patient, reviewed the data (labs and imaging) and after discussion with my PA formulated the plan. Agree with note with the following edits. s.     Physical exam:    BP (!) 115/55   Pulse 68   Temp 97 °F (36.1 °C) (Oral)   Resp 22   Ht 5' 4\" (1.626 m)   Wt 212 lb 14.4 oz (96.6 kg)   SpO2 90%   BMI 36.54 kg/m²     Gen: No distress. alert Eyes: PERRL. No sclera icterus. No conjunctival injection. ENT: No discharge. Pharynx clear. Neck: Trachea midline. Normal thyroid. Resp: No accessory muscle use. No crackles. No wheezes. No rhonchi. No dullness on percussion. CV: Regular rate. Regular rhythm. No murmur or rub. No edema. GI: Non-tender. Non-distended. No masses. No organomegaly. Normal bowel sounds. No hernia. Skin: Warm and dry. No nodule on exposed extremities. No rash on exposed extremities. Lymph: No cervical LAD. No supraclavicular LAD. M/S: No cyanosis. No joint deformity. No clubbing. Neuro: alert, disoriented           MYRIAM Evans      Scheduled Meds:   furosemide  40 mg Intravenous Once    [START ON 8/21/2020] potassium chloride  40 mEq Oral Daily with breakfast    atorvastatin  10 mg Oral Nightly    potassium chloride  40 mEq Oral Once    furosemide  40 mg Intravenous BID    spironolactone  25 mg Oral Daily    apixaban  2.5 mg Oral BID    ipratropium-albuterol  1 ampule Inhalation Q4H While awake    cefepime  2 g Intravenous Q8H    linezolid  600 mg Intravenous Q12H    amLODIPine  5 mg Oral Daily    levothyroxine  100 mcg Oral Daily    montelukast  10 mg Oral Daily    OXcarbazepine  150 mg Oral BID    sodium chloride flush  10 mL Intravenous 2 times per day     PRN Meds:  potassium chloride **OR** potassium alternative oral replacement **OR** potassium chloride, sodium chloride flush, acetaminophen **OR** acetaminophen, polyethylene glycol, promethazine **OR** ondansetron, ipratropium-albuterol    Data:  BMP:   Recent Labs     08/18/20  0506 08/19/20  0552 08/20/20  0456    139 136   K 3.5 3.2* 3.4*   CL 94* 92* 89*   CO2 36* 37* 36*   BUN 13 14 15   CREATININE 1.0 1.0 1.0     Cultures:     Results for Kathy Kat (MRN [id number]) as of 8/14/2020 10:18   Ref.  Range 8/13/2020 01:15   SARS-CoV-2, CARLOS Latest Ref Range: NOT DETECTED  NOT DETECTED     Urine cx: Enterococcus faecalis, Pseudomonas aeruginosa    Susceptibility     Enterococcus faecalis (2)     Antibiotic  Interpretation  ANDRES  Status     ampicillin  Sensitive  <=2  mcg/mL      nitrofurantoin  Sensitive  <=32  mcg/mL      tetracycline  Resistant  >8  mcg/mL      vancomycin  Sensitive  2  mcg/mL      Pseudomonas aeruginosa (1)     Antibiotic  Interpretation  ANDRES  Status     cefepime  Sensitive  8  mcg/mL      ciprofloxacin  Resistant  >2  mcg/mL      gentamicin  Intermediate  8  mcg/mL      meropenem  Resistant  >8  mcg/mL      piperacillin-tazobactam  Intermediate  32  mcg/mL      tobramycin  Sensitive  <=4  mcg/mL          Radiology  FL MODIFIED BARIUM SWALLOW W VIDEO   Final Result   1. Moderate laryngeal penetration without tracheal aspiration when swallowing   thin liquids. 2. No evidence of laryngeal penetration or tracheal aspiration when   swallowing nectar thickened liquids, purees, and solids. Please see separate speech pathology report for full discussion of findings   and recommendations. XR CHEST PORTABLE   Final Result   Congestive failure with asymmetric left lower lobe airspace disease. Superimposed atelectasis or pneumonia is also therefore considered. Suspect bilateral pleural effusion. XR CHEST 1 VIEW   Final Result   No significant change diffuse bilateral pulmonary disease and bilateral   pleural effusions. CT CHEST PULMONARY EMBOLISM W CONTRAST   Final Result   No pulmonary embolism to the proximal segmental level. Moderate right and small left pleural effusions, decreased on the right and   increased on the left since the prior study 2 weeks ago. Evidence of mild interstitial edema. XR CHEST PORTABLE   Final Result   Bilateral pleural effusions and basilar pulmonary edema versus atelectasis. Pneumonia is a differential concern. ECHO   6/30/2020   Normal LV systolic function with EF of 55-60%.    D-shaped septum c/w RV pressure and volume overload. Mild-moderate concentric left ventricular hypertrophy. Left ventricular diastolic filling pressure is elevated. Mild mitral regurgitation. Moderate right-sided chamber enlargement. Right ventricular systolic function is moderately reduced. Moderate tricuspid regurgitation. Systolic pulmonic artery pressure (SPAP) is estimated at 94mmHg consistent   with severe pulmonary hypertension (RAP of 8mmHg). Assessment/Plan:    Acute on chronic hypoxic RF  - normally uses 2L NC O2 continuous at home   - Suspect 2/2 acute on chronic dCHF, CXR with bilateral pleural effusions with pulmonary edema  - Was recently admitted, but no leukocytosis, PCT normal, lower concerns for HCAP as cause   - COVID 19 negative. - worsening hypoxia. 3L--> 15L --> high flow O2 per Vapotherm --> O2 sats initialy improved, down to 8 L  - worsening sats today - went from 12 to 15L HFNC  - sats in mid 90s, RN to wean as able     Acute on chronic dCHF  Severe pulmonary HTN  - Last Echo with EF 55-60%, DD and severe pulm HTN  - Was recently admitted for the same, discharge weight was 205 lbs  - Admitted with weight 225 lbs-> 207 > 204 >203 lb  - consulted cardio.   Sp IV lasix 40 mg BID-> PO lasix 40 mg daily on 8/16.    - changed to 40 mg IV Lasix BID, on aldactone  - give 1x additional 40 mg Lasix IV    Hypokalemia  - 3.2 > 34  - replete and  Repeat BMP tomorrow    Dysphagia  - with recommendations for MBS  - plan for MBS - hold off d/t increasing O2 requirements    UTI  - UA with + Nitrite and LE   Urine cultures growing Pseudomonas and enterococcus,  antibiotics now changed to cefepime and Zyvox, continue day #7/7     Chronic Atrial Fibrillation   - Eliquis held on 8/14- restart   - Rate is controlled      HTN  - BP is controlled  - Continue Norvasc      Hypothyroidism   - Continue Synthroid      HLD  - Continue statin     Dementia with behavior disturbances  - Continue home medications  - Supportive Care      Morbid

## 2020-08-21 ENCOUNTER — APPOINTMENT (OUTPATIENT)
Dept: GENERAL RADIOLOGY | Age: 85
DRG: 291 | End: 2020-08-21
Payer: MEDICARE

## 2020-08-21 LAB
ANION GAP SERPL CALCULATED.3IONS-SCNC: 11 MMOL/L (ref 3–16)
BUN BLDV-MCNC: 17 MG/DL (ref 7–20)
CALCIUM SERPL-MCNC: 9.3 MG/DL (ref 8.3–10.6)
CHLORIDE BLD-SCNC: 92 MMOL/L (ref 99–110)
CO2: 36 MMOL/L (ref 21–32)
CREAT SERPL-MCNC: 1.1 MG/DL (ref 0.6–1.2)
GFR AFRICAN AMERICAN: 57
GFR NON-AFRICAN AMERICAN: 47
GLUCOSE BLD-MCNC: 108 MG/DL (ref 70–99)
GLUCOSE BLD-MCNC: 111 MG/DL (ref 70–99)
GLUCOSE BLD-MCNC: 113 MG/DL (ref 70–99)
GLUCOSE BLD-MCNC: 115 MG/DL (ref 70–99)
GLUCOSE BLD-MCNC: 128 MG/DL (ref 70–99)
PERFORMED ON: ABNORMAL
POTASSIUM SERPL-SCNC: 3.2 MMOL/L (ref 3.5–5.1)
SODIUM BLD-SCNC: 139 MMOL/L (ref 136–145)

## 2020-08-21 PROCEDURE — 80048 BASIC METABOLIC PNL TOTAL CA: CPT

## 2020-08-21 PROCEDURE — 94761 N-INVAS EAR/PLS OXIMETRY MLT: CPT

## 2020-08-21 PROCEDURE — 6370000000 HC RX 637 (ALT 250 FOR IP): Performed by: INTERNAL MEDICINE

## 2020-08-21 PROCEDURE — 6370000000 HC RX 637 (ALT 250 FOR IP): Performed by: NURSE PRACTITIONER

## 2020-08-21 PROCEDURE — 94640 AIRWAY INHALATION TREATMENT: CPT

## 2020-08-21 PROCEDURE — 99232 SBSQ HOSP IP/OBS MODERATE 35: CPT | Performed by: INTERNAL MEDICINE

## 2020-08-21 PROCEDURE — 2580000003 HC RX 258: Performed by: INTERNAL MEDICINE

## 2020-08-21 PROCEDURE — 6360000002 HC RX W HCPCS: Performed by: NURSE PRACTITIONER

## 2020-08-21 PROCEDURE — 2700000000 HC OXYGEN THERAPY PER DAY

## 2020-08-21 PROCEDURE — 92526 ORAL FUNCTION THERAPY: CPT

## 2020-08-21 PROCEDURE — 71045 X-RAY EXAM CHEST 1 VIEW: CPT

## 2020-08-21 PROCEDURE — 6360000002 HC RX W HCPCS: Performed by: INTERNAL MEDICINE

## 2020-08-21 PROCEDURE — 36415 COLL VENOUS BLD VENIPUNCTURE: CPT

## 2020-08-21 PROCEDURE — 2060000000 HC ICU INTERMEDIATE R&B

## 2020-08-21 RX ORDER — FUROSEMIDE 40 MG/1
80 TABLET ORAL 2 TIMES DAILY
Status: DISCONTINUED | OUTPATIENT
Start: 2020-08-21 | End: 2020-08-24 | Stop reason: HOSPADM

## 2020-08-21 RX ADMIN — POTASSIUM CHLORIDE 40 MEQ: 1500 TABLET, EXTENDED RELEASE ORAL at 07:08

## 2020-08-21 RX ADMIN — IPRATROPIUM BROMIDE AND ALBUTEROL SULFATE 1 AMPULE: .5; 3 SOLUTION RESPIRATORY (INHALATION) at 19:09

## 2020-08-21 RX ADMIN — FUROSEMIDE 80 MG: 40 TABLET ORAL at 18:02

## 2020-08-21 RX ADMIN — APIXABAN 2.5 MG: 5 TABLET, FILM COATED ORAL at 09:14

## 2020-08-21 RX ADMIN — LEVOTHYROXINE SODIUM 100 MCG: 100 TABLET ORAL at 05:35

## 2020-08-21 RX ADMIN — FUROSEMIDE 40 MG: 10 INJECTION, SOLUTION INTRAMUSCULAR; INTRAVENOUS at 09:13

## 2020-08-21 RX ADMIN — CEFEPIME 2 G: 2 INJECTION, POWDER, FOR SOLUTION INTRAVENOUS at 01:26

## 2020-08-21 RX ADMIN — IPRATROPIUM BROMIDE AND ALBUTEROL SULFATE 1 AMPULE: .5; 3 SOLUTION RESPIRATORY (INHALATION) at 15:23

## 2020-08-21 RX ADMIN — APIXABAN 2.5 MG: 5 TABLET, FILM COATED ORAL at 21:25

## 2020-08-21 RX ADMIN — OXCARBAZEPINE 150 MG: 300 TABLET, FILM COATED ORAL at 09:14

## 2020-08-21 RX ADMIN — ATORVASTATIN CALCIUM 10 MG: 10 TABLET, FILM COATED ORAL at 21:24

## 2020-08-21 RX ADMIN — Medication 10 ML: at 09:15

## 2020-08-21 RX ADMIN — SPIRONOLACTONE 25 MG: 25 TABLET ORAL at 09:14

## 2020-08-21 RX ADMIN — POTASSIUM CHLORIDE 40 MEQ: 1500 TABLET, EXTENDED RELEASE ORAL at 09:14

## 2020-08-21 RX ADMIN — IPRATROPIUM BROMIDE AND ALBUTEROL SULFATE 1 AMPULE: .5; 3 SOLUTION RESPIRATORY (INHALATION) at 23:06

## 2020-08-21 RX ADMIN — AMLODIPINE BESYLATE 5 MG: 5 TABLET ORAL at 09:14

## 2020-08-21 RX ADMIN — OXCARBAZEPINE 150 MG: 300 TABLET, FILM COATED ORAL at 21:24

## 2020-08-21 RX ADMIN — IPRATROPIUM BROMIDE AND ALBUTEROL SULFATE 1 AMPULE: .5; 3 SOLUTION RESPIRATORY (INHALATION) at 11:02

## 2020-08-21 RX ADMIN — IPRATROPIUM BROMIDE AND ALBUTEROL SULFATE 1 AMPULE: .5; 3 SOLUTION RESPIRATORY (INHALATION) at 06:49

## 2020-08-21 RX ADMIN — MONTELUKAST SODIUM 10 MG: 10 TABLET, COATED ORAL at 09:14

## 2020-08-21 NOTE — PROGRESS NOTES
Progress Note    Admit Date:  8/12/2020    Patient with progressive hypoxia. O2 requirement increased, she was on 15 L of O2-->  on Vapotherm FiO2 60% 25 L--> 15 L  Now weaned down to 3 L O2. Not as anxious today per RN. She is getting diuresis for CHF  Seen by cardiology and pulmonology      Denies any chest pain. She is not a very good historian. Her COVID-19 test is negative. Subjective:  Ms. Alyssa Vasquez pt continues to complain of SOB. Oxygen was titrated down to 3 L NC from 15L. Pt's mattress was switched out and weights increased 2/2 change, currently at 214 lb; diuresed ~7.5 L    Objective:   /68   Pulse 104   Temp 98.1 °F (36.7 °C) (Axillary)   Resp 18   Ht 5' 4\" (1.626 m)   Wt 214 lb 6.4 oz (97.3 kg)   SpO2 94%   BMI 36.80 kg/m²         Intake/Output Summary (Last 24 hours) at 8/21/2020 1055  Last data filed at 8/21/2020 0537  Gross per 24 hour   Intake 450 ml   Output 1750 ml   Net -1300 ml       Physical Exam:  General appearance: resting; alert and oriented to person today . Cooperative. no distress  Head: Normocephalic, without obvious abnormality, atraumatic  Eyes: conjunctivae/corneas clear. Neck: no adenopathy, supple, symmetrical, trachea midline, symmetric  Lungs: no accessory muscle use, diminished breath sounds, no wheezes rales or rhonchi, on 3 L  Heart: regular rate and rhythm, S1, S2 normal, no murmur  Abdomen: soft, non-tender  Extremities: extremities normal, atraumatic, no edema  Skin: Skin color, texture, turgor decreased. No rashes or lesions  Neurologic: Grossly normal      STACI Maldonado M.D.   have reviewed the chart on Stephen Mahmood and personally interviewed and examined patient, reviewed the data (labs and imaging) and after discussion with my PA formulated the plan. Agree with note with the following edits.        Physical exam:    /68   Pulse 104   Temp 98.1 °F (36.7 °C) (Axillary)   Resp 18   Ht 5' 4\" (1.626 m)   Wt 214 lb 6.4 oz (97.3 Antibiotic  Interpretation  ANDRES  Status     ampicillin  Sensitive  <=2  mcg/mL      nitrofurantoin  Sensitive  <=32  mcg/mL      tetracycline  Resistant  >8  mcg/mL      vancomycin  Sensitive  2  mcg/mL      Pseudomonas aeruginosa (1)     Antibiotic  Interpretation  ANDRES  Status     cefepime  Sensitive  8  mcg/mL      ciprofloxacin  Resistant  >2  mcg/mL      gentamicin  Intermediate  8  mcg/mL      meropenem  Resistant  >8  mcg/mL      piperacillin-tazobactam  Intermediate  32  mcg/mL      tobramycin  Sensitive  <=4  mcg/mL          Radiology  XR CHEST PORTABLE   Final Result   Ongoing features of heart failure, with bilateral effusions, central vascular   congestion and edema, mildly improved from 3 days prior. FL MODIFIED BARIUM SWALLOW W VIDEO   Final Result   1. Moderate laryngeal penetration without tracheal aspiration when swallowing   thin liquids. 2. No evidence of laryngeal penetration or tracheal aspiration when   swallowing nectar thickened liquids, purees, and solids. Please see separate speech pathology report for full discussion of findings   and recommendations. XR CHEST PORTABLE   Final Result   Congestive failure with asymmetric left lower lobe airspace disease. Superimposed atelectasis or pneumonia is also therefore considered. Suspect bilateral pleural effusion. XR CHEST 1 VIEW   Final Result   No significant change diffuse bilateral pulmonary disease and bilateral   pleural effusions. CT CHEST PULMONARY EMBOLISM W CONTRAST   Final Result   No pulmonary embolism to the proximal segmental level. Moderate right and small left pleural effusions, decreased on the right and   increased on the left since the prior study 2 weeks ago. Evidence of mild interstitial edema. XR CHEST PORTABLE   Final Result   Bilateral pleural effusions and basilar pulmonary edema versus atelectasis. Pneumonia is a differential concern.               ECHO 6/30/2020   Normal LV systolic function with EF of 55-60%. D-shaped septum c/w RV pressure and volume overload. Mild-moderate concentric left ventricular hypertrophy. Left ventricular diastolic filling pressure is elevated. Mild mitral regurgitation. Moderate right-sided chamber enlargement. Right ventricular systolic function is moderately reduced. Moderate tricuspid regurgitation. Systolic pulmonic artery pressure (SPAP) is estimated at 94mmHg consistent   with severe pulmonary hypertension (RAP of 8mmHg). Assessment/Plan:    Acute on chronic hypoxic RF  - normally uses 2L NC O2 continuous at home   - Suspect 2/2 acute on chronic dCHF, CXR with bilateral pleural effusions with pulmonary edema  - Was recently admitted, but no leukocytosis, PCT normal, lower concerns for HCAP as cause   - COVID 19 negative. - worsening hypoxia. 3L--> 15L --> high flow O2 per Vapotherm --> 15-->3 L now   - improving. Weaned to 5 L O2 today. - sats in mid 90s, RN to wean as able     Acute on chronic dCHF  Severe pulmonary HTN  - Last Echo with EF 55-60%, DD and severe pulm HTN  - Was recently admitted for the same, discharge weight was 205 lbs  - Admitted with weight 225 lbs-> 207 > 204 >203 lb  - consulted cardio.   Sp IV lasix 40 mg BID-> PO lasix 40 mg daily on 8/16.    - back on 40 mg IV Lasix BID, on aldactone  - negative 7.5 L     Hypokalemia  - 3.2   - replete and  Repeat BMP tomorrow    Dysphagia  - with recommendations for MBS  - plan for MBS - hold off d/t increasing O2 requirements    UTI  - UA with + Nitrite and LE   Urine cultures growing Pseudomonas and enterococcus,  antibiotics now changed to cefepime and Zyvox, completed day #7/7     Chronic Atrial Fibrillation   - Eliquis held on 8/14- restarted   - Rate is controlled      HTN  - BP is controlled  - Continue Norvasc      Hypothyroidism   - Continue Synthroid      HLD  - Continue statin     Dementia with behavior disturbances  - Continue home medications  - Supportive Care      Morbid Obesity  - Body mass index is 38.62 kg/m². - Complicating assessment and treatment. Placing patient at risk for multiple co-morbidities as well as early death and contributing to the patient's presentation.   - Counseled on weight loss. DVT Prophylaxis: Eliquis ON HOLD   Diet: DIET LOW SODIUM 2 GM; Dysphagia Minced and Moist; Mildly Thick (Nectar); 1800 ml; No Drinking Straw   Code Status: DNR-CCA     Dispo: [facility] FNP-C 10:55 AM 8/21/2020     1 acute on chronic hypoxic respiratory failure. Acute on chronic diastolic congestive failure. Severe pulmonary hypertension. Was on IV Lasix. Currently on p.o. Lasix. Switched back to IV Lasix. she is down to 8 L of oxygen now. Give additional dose of lasix yesterday. Diuresing well. Continue diuresis.  Now down to 5 L   Switch to oral lasix today      Possible d/c to [facility] tomorrow if O2 requirements continue to go down    HEATHER Evans.

## 2020-08-21 NOTE — PROGRESS NOTES
Patient is resting showing no s/s of distress. Patient is alert and oriented. Meds were given, see MAR. Patient is denying any needs. Bed is in lowest position and call light is within reach. Will continue to monitor. Shift assessment complete, see flowsheets.      Daily potassium given plus the PRN order from night shift, since giving IV lasix again this AM. Dr. Anyi Mohamud notified

## 2020-08-21 NOTE — PROGRESS NOTES
Pulmonary Progress Note  CC: Hypoxemia and shortness of breath    Subjective: No shortness of breath, oxygenation is better    IV line peripheral    EXAM:   /74   Pulse 92   Temp 98.7 °F (37.1 °C) (Oral)   Resp 18   Ht 5' 4\" (1.626 m)   Wt 214 lb 6.4 oz (97.3 kg)   SpO2 93%   BMI 36.80 kg/m²  on 3 L  Constitutional:  No acute distress   HEENT: no scleral icterus  Neck: No tracheal deviation present. Cardiovascular: Normal heart sounds. + Edema  Pulmonary/Chest: No wheezes. No rhonchi. few rales. No decreased breath sounds. Abdominal: Soft. Musculoskeletal: No cyanosis. No clubbing. Skin: Skin is warm and dry. Scheduled Meds:   potassium chloride  40 mEq Oral Daily with breakfast    atorvastatin  10 mg Oral Nightly    potassium chloride  40 mEq Oral Once    furosemide  40 mg Intravenous BID    spironolactone  25 mg Oral Daily    apixaban  2.5 mg Oral BID    ipratropium-albuterol  1 ampule Inhalation Q4H While awake    amLODIPine  5 mg Oral Daily    levothyroxine  100 mcg Oral Daily    montelukast  10 mg Oral Daily    OXcarbazepine  150 mg Oral BID    sodium chloride flush  10 mL Intravenous 2 times per day     Continuous Infusions:    PRN Meds:  potassium chloride **OR** potassium alternative oral replacement **OR** potassium chloride, sodium chloride flush, acetaminophen **OR** acetaminophen, polyethylene glycol, promethazine **OR** ondansetron, ipratropium-albuterol    Labs:  CBC:   No results for input(s): WBC, HGB, HCT, MCV, PLT in the last 72 hours.   BMP:   Recent Labs     08/19/20  0552 08/20/20  0456 08/21/20  0449    136 139   K 3.2* 3.4* 3.2*   CL 92* 89* 92*   CO2 37* 36* 36*   BUN 14 15 17   CREATININE 1.0 1.0 1.1     Cultures:  8/12/2020 urine enterococcus and Pseudomonas  Enterococcus faecalis (2)     Antibiotic  Interpretation  ANDRES  Status     ampicillin  Sensitive  <=2  mcg/mL      nitrofurantoin  Sensitive  <=32  mcg/mL      tetracycline  Resistant  >8 mcg/mL      vancomycin  Sensitive  2  mcg/mL     Pseudomonas Merrem resistant cefepime sensitive    8/12/20 CTPA   Impression    No pulmonary embolism to the proximal segmental level.         Moderate right and small left pleural effusions, decreased on the right and    increased on the left since the prior study 2 weeks ago.         Evidence of mild interstitial edema.       8/18/2020 bedside ultrasound showed a very small right pleural effusion, too small to tap    ASSESSMENT:  · Acute hypoxemic respiratory failure - improving with diuresis  · HFpEF with acute exacerbation  · Pleural effusions, too small for thoracentesis based upon bedside ultrasound  · Dementia of the Alzheimer's type   · Obesity  · Pseudomonas and Enterococcus in urine     PLAN:  · Supplemental oxygen to maintain SaO2 >92%; wean as tolerated    · Diuresis per Internal Medicine   · Completed 7 days cefepime/zyvox    · On Eliquis  · Call with questions

## 2020-08-21 NOTE — FLOWSHEET NOTE
Shift assessment complete. See doc flow. Nightly medications given see MAR. Patient continues with increase confusion. Patient with no complaints at this time. Call light and bedside table within easy reach.

## 2020-08-21 NOTE — PROGRESS NOTES
[address] Daily Progress Note      Admit Date:  8/12/2020    Subjective:  Ms. Grady Ramirez is seen for cardiology follow up for CHF  She c/o being cold. No chest pain. Extra warm blankets provided. She is restless but not in any resp distress. History of Present Illness: initial consult and history provided by my associate Dr Jorge Berrios as documented below: Now admitted for SOB. Note she has advanced dementia and cannot reliably give history. She had been R/O for Covid. Admit EKG afib 82 bpm (no change from 8/4/20 EKG); Jani <0.01. Pro-BNP 1,811. CXR Bilateral pleural effusions and basilar pulmonary edema versus atelectasis. Note CT chest showed no PE. Moderate right and small left pleural effusions, decreased on the right and increased on the left since the prior study 2 weeks ago.  Evidence of mild interstitial edema. Patient with no complaints of chest pain, SOB, palpitations, dizziness, edema, or orthopnea/PND. I have been asked to provide consultation regarding further management and testing. Yan Manley is a 82 y.o. patient who presented to [facility] 8/12/20 with c/o SOB. Note recently admitted to [facility] 6/29-7/7/20 with bradycardia and diastolic CHF. Readmission [facility] 8/4/20/8/12/20 due to hypoxia/CHF  Resides at [facility]. She has PMH of Alzheimer's dementia, asthma, chronic afib on eliquis, hx bradycardia and pauses, chronic diastolic CHF, HTN, and GERD. Reena Simpler Note EP Dr. Guzman Tamez and NP Sathya Alcala saw in 8/18 for bradycardia and pauses. Taken off long-acting cardizem and followed clinically without need for PM.               Admitted [facility] 6/29/20 SOB and hypoxia. Initially on 15L NC and NRB. EKG 6/29/20 showed slow afib 39bpm with possible U waves vs higher grade AV block. Tele showed junctional bradycardia in 40's vs slow afib. Most recent ECHO 6/30/20 EF=55-60%. D-shaped septum c/w RV pressure and volume overload. Mild-moderate cLVH. Mild MR. Moderate right-sided chamber enlargement.  Right ventricular systolic function is moderately reduced. Moderate TR (SPAP) is estimated at 94mmHg consistentwith severe pulm HTN. Regarding bradycardia note EP Dr. Suzy Chow did NOT recommend  unless has improved functional status and/or definitely symptomatic.          ROS:  12 point ROS negative in all areas as listed below except as in [place]  Constitutional, EENT, Cardiovascular, pulmonary, GI, , Musculoskeletal, skin, neurological, hematological, endocrine, Psychiatric    Past Medical History:   Diagnosis Date    [facility]'s dementia [facility]     Asthma     Atrial fibrillation ([facility]     Blood clot in vein     Removal of Blood clot in Left Groin    CHF (congestive heart failure) (HCC)     Dementia ([facility] [facility]     GERD (gastroesophageal reflux disease)     Hypertension     Kidney failure     MRSA (methicillin resistant staph aureus) culture positive 08/08/2018    + resp cx    Osteoporosis      Past Surgical History:   Procedure Laterality Date    CHOLECYSTECTOMY      EYE SURGERY      HYSTERECTOMY      PA [address] W/BRNCL ALVEOLAR LAVAGE N/A 8/8/2018    BRONCHOSCOPY ALVEOLAR LAVAGE performed by Dipak Harper MD at [address] [facility] ESOPHAGOGASTRODUODENOSCOPY TRANSORAL DIAGNOSTIC N/A 8/4/2018    EGD ESOPHAGOGASTRODUODENOSCOPY performed by Flor Huggins MD at [address] [facility]         Objective:   /74   Pulse 92   Temp 98.7 °F (37.1 °C) (Oral)   Resp 18   Ht 5' 4\" (1.626 m)   Wt 214 lb 6.4 oz (97.3 kg)   SpO2 93%   BMI 36.80 kg/m²       Intake/Output Summary (Last 24 hours) at 8/21/2020 0759  Last data filed at 8/21/2020 0537  Gross per 24 hour   Intake 510 ml   Output 1750 ml   Net -1240 ml       TELEMETRY: accelrated junctional   Physical Exam:  General: No Respiratory distress, appears well developed and well nourished.    Eyes:  Sclera nonicteric  Nose/Sinuses:  negative findings: nose shows no deformity, asymmetry, or inflammation, nasal mucosa normal, septum midline with no perforation or bleeding  Back:  no pain to palpation  Joint:  no active joint inflammation  Musculoskeletal:  negative  Skin:  Warm and dry  Neck:  Negative for JVD and Carotid Bruits. Chest:  Crackles bilat to auscultation, respiration easy  Cardiovascular:  RRR, S1S2 normal, no murmur, no rub or thrill. Abdomen:  Soft normal liver and spleen  Extremities:   bilat leg  edema, no clubbing, cyanosis,  Neuro: intact    Medications:    potassium chloride  40 mEq Oral Daily with breakfast    atorvastatin  10 mg Oral Nightly    potassium chloride  40 mEq Oral Once    furosemide  40 mg Intravenous BID    spironolactone  25 mg Oral Daily    apixaban  2.5 mg Oral BID    ipratropium-albuterol  1 ampule Inhalation Q4H While awake    amLODIPine  5 mg Oral Daily    levothyroxine  100 mcg Oral Daily    montelukast  10 mg Oral Daily    OXcarbazepine  150 mg Oral BID    sodium chloride flush  10 mL Intravenous 2 times per day       potassium chloride **OR** potassium alternative oral replacement **OR** potassium chloride, sodium chloride flush, acetaminophen **OR** acetaminophen, polyethylene glycol, promethazine **OR** ondansetron, ipratropium-albuterol    Lab Data:  CBC: No results for input(s): WBC, HGB, HCT, MCV, PLT in the last 72 hours. BMP:   Recent Labs     08/19/20  0552 08/20/20  0456 08/21/20  0449    136 139   K 3.2* 3.4* 3.2*   CL 92* 89* 92*   CO2 37* 36* 36*   BUN 14 15 17   CREATININE 1.0 1.0 1.1     LIVER PROFILE: No results for input(s): AST, ALT, LIPASE, BILIDIR, BILITOT, ALKPHOS in the last 72 hours. Invalid input(s): AMYLASE,  ALB  PT/INR: No results for input(s): PROTIME, INR in the last 72 hours. APTT: No results for input(s): APTT in the last 72 hours. BNP:  No results for input(s): BNP in the last 72 hours.   IMAGING:   CXR 8/20/20  Ongoing features of heart failure, with bilateral effusions, central vascular congestion and edema, mildly improved from 3 days prior.     EKG 8/12/20   Atrial fibrillationBaseline artifactRightward axisNonspecific ST and T wave abnormalityAbnormal ECGWhen compared with ECG of 04-AUG-2020 09:05,No significant change was foundConfirmed by MARIYA Parra MD ([id number]) on 8/13/2020 7:34:27 AM     ECHO 6/30/20  Summary   Normal LV systolic function with EF of 55-60%. D-shaped septum c/w RV pressure and volume overload. Mild-moderate concentric left ventricular hypertrophy. Left ventricular diastolic filling pressure is elevated. Mild mitral regurgitation. Moderate right-sided chamber enlargement. Right ventricular systolic function is moderately reduced. Moderate tricuspid regurgitation. Systolic pulmonic artery pressure (SPAP) is estimated at 94mmHg consistent   with severe pulmonary hypertension (RAP of 8mmHg). ECHO 8/6/18    Summary   Normal LV systolic function with EF of 55-60%. Asynchronous septal motion noted. D-shaped septum consistent with RV pressure and volume overload. Mild concentric left ventricular hypertrophy. Evidence for type III diastolic dysfunction. Mild mitral annular calcification. Mild biatrial enlargement. The right ventricle is mildly enlarged. Mild mitral, aortic, and tricuspid regurgitation. Systolic pulmonic artery pressure (SPAP) is normal estimated at 32mmHg   (Right atrial pressure of 3mmHg).       Assessment:  Acute on chronic diastolic CHF  Chronic corpulmonale  Patient Active Problem List    Diagnosis Date Noted    Persistent atrial fibrillation 08/06/2018     Priority: High    AV block 08/06/2018     Priority: High    Chronic a-fib     Urinary tract infection with hematuria     Acute on chronic respiratory failure with hypoxia and hypercapnia (HCC)     Acute on chronic congestive heart failure (HCC)     Dementia with behavioral disturbance (HCC)     Acute respiratory failure with hypoxia and hypercapnia (HCC) 08/04/2020    Bradycardia     Intermittent asthma without

## 2020-08-21 NOTE — PROGRESS NOTES
Speech Language Pathology  Facility/Department: [facility] PCU TELEMETRY  Dysphagia Daily Treatment Note    NAME: Omayra Cary  : 1935  MRN: [id number]     Recommendations:  Solid consistency: Con't Dysphagia Minced and Moist (Dysphagia II)  Liquid consistency: Mildly Thick (Nectar)  Liquid administration via: Spoon;Cup; NO STRAWS  Meds cruushed  Assist with feeding  Fully upright, Slow rate, small bites/drinks, cues to refrain from talking with PO in oral cavity, Cues for effective mastication  ST to follow    Patient Diagnosis(es):   Patient Active Problem List    Diagnosis Date Noted    Persistent atrial fibrillation 2018     Priority: High    AV block 2018     Priority: High    Chronic a-fib     Urinary tract infection with hematuria     Acute on chronic respiratory failure with hypoxia and hypercapnia (HCC)     Acute on chronic congestive heart failure (HCC)     Dementia with behavioral disturbance (HCC)     Acute respiratory failure with hypoxia and hypercapnia (HCC) 2020    Bradycardia     Intermittent asthma without complication     Late onset Alzheimer's disease without behavioral disturbance (HCC)     Pulmonary hypertension ([address]     Shortness of breath 2020    SOB (shortness of breath) 2020    Acute on chronic diastolic CHF (congestive heart failure) (HCC)     Acute on chronic respiratory failure with hypoxia ([address] 2018    Pulmonary infiltrates     Diastolic dysfunction     Pleural effusion 2018    Morbid obesity with BMI of 40.0-44.9, adult ([address] 2018    Myonecrosis 2018    Atelectasis 2018    Ineffective airway clearance 2018    Essential hypertension     Morbid obesity ([address] 2018    Hematemesis 2018     Allergies:    Allergies   Allergen Reactions    Codeine Itching    Sulfa Antibiotics Other (See Comments)     Per Pt daughter Lokesh Thompson) \" Not know her reaction to medication\"     Onset Date: 08/12/2020    Subjective:  Pt repeatedly states \"I can't breathe and I'm cold. \" Warm blanket given to pt and fan turned off. RN reports no concerns with diet level. Pt sleeping upon entry, easily arousable and agreeable to more bites of her breakfast. Pt oriented to place. Stated \"November\" as the month. Pain: No c/o pain    Current Diet: DIET LOW SODIUM 2 GM; Dysphagia Minced and Moist; Mildly Thick (Nectar); 1800 ml    Diet Tolerance:      P.O. Trials: Thin       Nectar / Mildly Thick   x X 1 spoon  X 5 by cup   Honey / Moderately Thick       Pudding / Extremely Thick       Puree   x X 1 applesauce   Solid   x x10 minced and moist eggs, peaches, hashbrowns     Dysphagia Treatment and Impressions:  ST okayed for entry by RN. Pt c/o not being able to breathe but O2 sats 96-98% for entire session with and without po intake. Pt is mouth breather with O2 reduced to 7L high flow NC. Pt assessed with nectar-thick liquids by spoon/cup, puree, and dental soft solids. Oral phase impacted by behaviors, talking, anxiety. Pt often needing cues for breath support, thoourough chewing, and no talking with bolus in oral cavity. Mild-no lingual residue in b/w bites. Liquid rinse beneficial. Immediate coughing event 1/5 nectar-thick by cup; this appeared related to larger bolus taken. Mod cues for sips. No overt s/s aspiration with 4/5 nectar-thick liquids by cup, nectar-thick liquids 1/1 by spoon, puree, or dental soft solids. Occ tongue protrusion during the swallow. Rec: con't current diet level. Rec: NO STRAWS. Dysphagia Goals:  Timeframe for Long-term Goals: 7 days (8/23)  Goal 1: Pt will tolerate LRD w/o s/s of penetration/aspiraiton 08/21 Ongoing. See above        Dysphagia Goals: 1. The patient will tolerate recommended diet without observed clinical signs of aspiration, 08/18 Ongoing. See above  2. The patient will tolerate repeat BSE when able. , 08/18 GOAL MET  3.  The patient will tolerate instrumental swallowing procedure, 08/19 Goal met   4. The patient/caregiver will demonstrate understanding of compensatory strategies for improved swallowing safety. 08/19 Ongoing. See above    Recommendations:  Solid Consistency: minced and moist  Liquid Consistency: mildly thick, NO STRAWS  Medication: crushed in puree  Assist with feeding  Slow rate, small bites/drinks  ST to follow    Patient/Family/Caregiver Education: Education completed re: signs and risks of aspiration, compensatory strategies, POC, and recent MBS results. No evidence of learning Pt needs reinforcement. Compensatory Strategies: Recommend direct supervision, upright position during and 30 minutes after meals, small bites/sips, slow rate of po intake, and encourage self-feeding. Plan:    Continued Dysphagia treatment with goals per plan of care. Discharge Recommendations:TBD    If pt discharges from hospital prior to Speech/Swallowing discharge, this note serves as tx and discharge summary.      Total Treatment Time / Charges     Time in Time out Total Time / units   Cognitive Tx         Speech Tx      Dysphagia Tx 1005 1030 25 min/1 unit     Signature:  Albaro Gillespie [id number]  Speech-Language Pathologist

## 2020-08-22 LAB
ANION GAP SERPL CALCULATED.3IONS-SCNC: 10 MMOL/L (ref 3–16)
ANION GAP SERPL CALCULATED.3IONS-SCNC: 9 MMOL/L (ref 3–16)
BUN BLDV-MCNC: 18 MG/DL (ref 7–20)
BUN BLDV-MCNC: 19 MG/DL (ref 7–20)
CALCIUM SERPL-MCNC: 9.3 MG/DL (ref 8.3–10.6)
CALCIUM SERPL-MCNC: 9.5 MG/DL (ref 8.3–10.6)
CHLORIDE BLD-SCNC: 94 MMOL/L (ref 99–110)
CHLORIDE BLD-SCNC: 95 MMOL/L (ref 99–110)
CO2: 34 MMOL/L (ref 21–32)
CO2: 36 MMOL/L (ref 21–32)
CREAT SERPL-MCNC: 1.1 MG/DL (ref 0.6–1.2)
CREAT SERPL-MCNC: 1.1 MG/DL (ref 0.6–1.2)
GFR AFRICAN AMERICAN: 57
GFR AFRICAN AMERICAN: 57
GFR NON-AFRICAN AMERICAN: 47
GFR NON-AFRICAN AMERICAN: 47
GLUCOSE BLD-MCNC: 116 MG/DL (ref 70–99)
GLUCOSE BLD-MCNC: 121 MG/DL (ref 70–99)
GLUCOSE BLD-MCNC: 128 MG/DL (ref 70–99)
GLUCOSE BLD-MCNC: 90 MG/DL (ref 70–99)
GLUCOSE BLD-MCNC: 96 MG/DL (ref 70–99)
GLUCOSE BLD-MCNC: 99 MG/DL (ref 70–99)
PERFORMED ON: ABNORMAL
PERFORMED ON: NORMAL
POTASSIUM REFLEX MAGNESIUM: 3.6 MMOL/L (ref 3.5–5.1)
POTASSIUM SERPL-SCNC: 3.4 MMOL/L (ref 3.5–5.1)
PRO-BNP: 3108 PG/ML (ref 0–449)
SODIUM BLD-SCNC: 139 MMOL/L (ref 136–145)
SODIUM BLD-SCNC: 139 MMOL/L (ref 136–145)

## 2020-08-22 PROCEDURE — 83880 ASSAY OF NATRIURETIC PEPTIDE: CPT

## 2020-08-22 PROCEDURE — 36415 COLL VENOUS BLD VENIPUNCTURE: CPT

## 2020-08-22 PROCEDURE — 6370000000 HC RX 637 (ALT 250 FOR IP): Performed by: INTERNAL MEDICINE

## 2020-08-22 PROCEDURE — 80048 BASIC METABOLIC PNL TOTAL CA: CPT

## 2020-08-22 PROCEDURE — 94761 N-INVAS EAR/PLS OXIMETRY MLT: CPT

## 2020-08-22 PROCEDURE — 6370000000 HC RX 637 (ALT 250 FOR IP): Performed by: HOSPITALIST

## 2020-08-22 PROCEDURE — 6370000000 HC RX 637 (ALT 250 FOR IP): Performed by: NURSE PRACTITIONER

## 2020-08-22 PROCEDURE — 94640 AIRWAY INHALATION TREATMENT: CPT

## 2020-08-22 PROCEDURE — 2060000000 HC ICU INTERMEDIATE R&B

## 2020-08-22 PROCEDURE — 99232 SBSQ HOSP IP/OBS MODERATE 35: CPT | Performed by: INTERNAL MEDICINE

## 2020-08-22 PROCEDURE — 2580000003 HC RX 258: Performed by: INTERNAL MEDICINE

## 2020-08-22 PROCEDURE — 6360000002 HC RX W HCPCS: Performed by: INTERNAL MEDICINE

## 2020-08-22 PROCEDURE — 2700000000 HC OXYGEN THERAPY PER DAY

## 2020-08-22 RX ORDER — METOLAZONE 2.5 MG/1
5 TABLET ORAL ONCE
Status: COMPLETED | OUTPATIENT
Start: 2020-08-22 | End: 2020-08-22

## 2020-08-22 RX ORDER — FUROSEMIDE 10 MG/ML
40 INJECTION INTRAMUSCULAR; INTRAVENOUS ONCE
Status: COMPLETED | OUTPATIENT
Start: 2020-08-22 | End: 2020-08-22

## 2020-08-22 RX ADMIN — FUROSEMIDE 80 MG: 40 TABLET ORAL at 17:32

## 2020-08-22 RX ADMIN — METOLAZONE 5 MG: 2.5 TABLET ORAL at 17:32

## 2020-08-22 RX ADMIN — OXCARBAZEPINE 150 MG: 300 TABLET, FILM COATED ORAL at 19:58

## 2020-08-22 RX ADMIN — FUROSEMIDE 40 MG: 10 INJECTION, SOLUTION INTRAMUSCULAR; INTRAVENOUS at 02:40

## 2020-08-22 RX ADMIN — APIXABAN 2.5 MG: 5 TABLET, FILM COATED ORAL at 19:58

## 2020-08-22 RX ADMIN — LEVOTHYROXINE SODIUM 100 MCG: 100 TABLET ORAL at 05:00

## 2020-08-22 RX ADMIN — IPRATROPIUM BROMIDE AND ALBUTEROL SULFATE 1 AMPULE: .5; 3 SOLUTION RESPIRATORY (INHALATION) at 11:11

## 2020-08-22 RX ADMIN — Medication 10 ML: at 19:58

## 2020-08-22 RX ADMIN — IPRATROPIUM BROMIDE AND ALBUTEROL SULFATE 1 AMPULE: .5; 3 SOLUTION RESPIRATORY (INHALATION) at 06:51

## 2020-08-22 RX ADMIN — IPRATROPIUM BROMIDE AND ALBUTEROL SULFATE 1 AMPULE: .5; 3 SOLUTION RESPIRATORY (INHALATION) at 21:03

## 2020-08-22 RX ADMIN — ATORVASTATIN CALCIUM 10 MG: 10 TABLET, FILM COATED ORAL at 19:59

## 2020-08-22 RX ADMIN — IPRATROPIUM BROMIDE AND ALBUTEROL SULFATE 1 AMPULE: .5; 3 SOLUTION RESPIRATORY (INHALATION) at 16:06

## 2020-08-22 NOTE — PROGRESS NOTES
Spoke with Dong Banuelos with Case Management she asked me to leave the consult in and not clear it out. 8-22-20 @ 4596.  Pedro Warren

## 2020-08-22 NOTE — FLOWSHEET NOTE
08/22/20 0215   Vital Signs   Temp 97.3 °F (36.3 °C)   Temp Source Axillary   Pulse 70   Heart Rate Source Monitor   Resp 16   /60   BP Location Left lower arm   Oxygen Therapy   SpO2 94 %   O2 Device High flow nasal cannula   O2 Flow Rate (L/min) 6 L/min   VS as above. Pt now on 6L HFNC, weaned from 8L. Provided warm blanket per request. Will continue to monitor.

## 2020-08-22 NOTE — PROGRESS NOTES
Right ventricular systolic function is moderately reduced. Moderate TR (SPAP) is estimated at 94mmHg consistentwith severe pulm HTN. Regarding bradycardia note EP Dr. Arun Preciado did NOT recommend  unless has improved functional status and/or definitely symptomatic.          ROS:  12 point ROS negative in all areas as listed below except as in [place]  Constitutional, EENT, Cardiovascular, pulmonary, GI, , Musculoskeletal, skin, neurological, hematological, endocrine, Psychiatric    Past Medical History:   Diagnosis Date    [facility]'s dementia ([facility]     Asthma     Atrial fibrillation ([facility]     Blood clot in vein     Removal of Blood clot in Left Groin    CHF (congestive heart failure) (HCC)     Dementia ([facility]     GERD (gastroesophageal reflux disease)     Hypertension     Kidney failure     MRSA (methicillin resistant staph aureus) culture positive 08/08/2018    + resp cx    Osteoporosis      Past Surgical History:   Procedure Laterality Date    CHOLECYSTECTOMY      EYE SURGERY      HYSTERECTOMY      [place] [address] W/BRNCL ALVEOLAR LAVAGE N/A 8/8/2018    BRONCHOSCOPY ALVEOLAR LAVAGE performed by Evans Santillan MD at [facility] ESOPHAGOGASTRODUODENOSCOPY TRANSORAL DIAGNOSTIC N/A 8/4/2018    EGD ESOPHAGOGASTRODUODENOSCOPY performed by Althea Wade MD at [facility]         Objective:   /64   Pulse 94   Temp 99.4 °F (37.4 °C) (Axillary)   Resp 18   Ht 5' 4\" (1.626 m)   Wt 216 lb 12.8 oz (98.3 kg)   SpO2 91%   BMI 37.21 kg/m²       Intake/Output Summary (Last 24 hours) at 8/22/2020 0951  Last data filed at 8/22/2020 5072  Gross per 24 hour   Intake 0 ml   Output 2325 ml   Net -2325 ml       TELEMETRY: accelrated junctional   Physical Exam:  General: No Respiratory distress, appears well developed and well nourished.    Eyes:  Sclera nonicteric  Nose/Sinuses:  negative findings: nose shows no deformity, asymmetry, or inflammation, nasal mucosa normal, septum midline with no perforation or bleeding  Back:  no pain to palpation  Joint:  no active joint inflammation  Musculoskeletal:  negative  Skin:  Warm and dry  Neck:  Negative for JVD and Carotid Bruits. Chest:   Refuses  auscultation, respiration easy  Cardiovascular:  RRR, S1S2 normal, no murmur, no rub or thrill. Abdomen:  Soft normal liver and spleen  Extremities:   No  leg  edema, no clubbing, cyanosis,  Neuro: intact    Medications:    furosemide  80 mg Oral BID    potassium chloride  40 mEq Oral Daily with breakfast    atorvastatin  10 mg Oral Nightly    potassium chloride  40 mEq Oral Once    spironolactone  25 mg Oral Daily    apixaban  2.5 mg Oral BID    ipratropium-albuterol  1 ampule Inhalation Q4H While awake    amLODIPine  5 mg Oral Daily    levothyroxine  100 mcg Oral Daily    montelukast  10 mg Oral Daily    OXcarbazepine  150 mg Oral BID    sodium chloride flush  10 mL Intravenous 2 times per day       potassium chloride **OR** potassium alternative oral replacement **OR** potassium chloride, sodium chloride flush, acetaminophen **OR** acetaminophen, polyethylene glycol, promethazine **OR** ondansetron, ipratropium-albuterol    Lab Data:  CBC: No results for input(s): WBC, HGB, HCT, MCV, PLT in the last 72 hours. BMP:   Recent Labs     08/21/20  0449 08/22/20  0021 08/22/20  0507    139 139   K 3.2* 3.6 3.4*   CL 92* 94* 95*   CO2 36* 36* 34*   BUN 17 18 19   CREATININE 1.1 1.1 1.1     LIVER PROFILE: No results for input(s): AST, ALT, LIPASE, BILIDIR, BILITOT, ALKPHOS in the last 72 hours. Invalid input(s): AMYLASE,  ALB  PT/INR: No results for input(s): PROTIME, INR in the last 72 hours. APTT: No results for input(s): APTT in the last 72 hours. BNP:  No results for input(s): BNP in the last 72 hours. IMAGING:   CXR 8/20/20  Ongoing features of heart failure, with bilateral effusions, central vascular congestion and edema, mildly improved from 3 days prior. EKG 8/12/20   Atrial fibrillationBaseline artifactRightward axisNonspecific ST and T wave abnormalityAbnormal ECGWhen compared with ECG of 04-AUG-2020 09:05,No significant change was foundConfirmed by Ever Bamberger MD, STEPHEN ([id number]) on 8/13/2020 7:34:27 AM     ECHO 6/30/20  Summary   Normal LV systolic function with EF of 55-60%. D-shaped septum c/w RV pressure and volume overload. Mild-moderate concentric left ventricular hypertrophy. Left ventricular diastolic filling pressure is elevated. Mild mitral regurgitation. Moderate right-sided chamber enlargement. Right ventricular systolic function is moderately reduced. Moderate tricuspid regurgitation. Systolic pulmonic artery pressure (SPAP) is estimated at 94mmHg consistent   with severe pulmonary hypertension (RAP of 8mmHg). ECHO 8/6/18    Summary   Normal LV systolic function with EF of 55-60%. Asynchronous septal motion noted. D-shaped septum consistent with RV pressure and volume overload. Mild concentric left ventricular hypertrophy. Evidence for type III diastolic dysfunction. Mild mitral annular calcification. Mild biatrial enlargement. The right ventricle is mildly enlarged. Mild mitral, aortic, and tricuspid regurgitation. Systolic pulmonic artery pressure (SPAP) is normal estimated at 32mmHg   (Right atrial pressure of 3mmHg).       Assessment:  Acute on chronic diastolic CHF  Chronic corpulmonale  Hypoxia with pauses likely related  Patient Active Problem List    Diagnosis Date Noted    Persistent atrial fibrillation 08/06/2018     Priority: High    AV block 08/06/2018     Priority: High    Acute respiratory failure with hypoxia (HCC)     Chronic a-fib     Urinary tract infection with hematuria     Acute on chronic respiratory failure with hypoxia and hypercapnia (HCC)     Acute on chronic congestive heart failure (HCC)     Dementia with behavioral disturbance (HCC)     Acute respiratory failure with hypoxia and hypercapnia ([facility] 08/04/2020    Bradycardia     Intermittent asthma without complication     Late onset Alzheimer's disease without behavioral disturbance (HCC)     Pulmonary hypertension (HCC)     Shortness of breath 06/29/2020    SOB (shortness of breath) 06/29/2020    Acute on chronic diastolic CHF (congestive heart failure) (HCC)     Acute on chronic respiratory failure with hypoxia ([facility] 08/07/2018    Pulmonary infiltrates 62/62/0119    Diastolic dysfunction 89/31/8962    Pleural effusion 08/07/2018    Morbid obesity with BMI of 40.0-44.9, adult ([facility] 08/07/2018    Myonecrosis 08/07/2018    Atelectasis 08/07/2018    Ineffective airway clearance 08/07/2018    Essential hypertension     Morbid obesity ([facility] 08/04/2018    Hematemesis 08/03/2018       Plan: Consider hospice referral  1. Continue  diruresis consider PO diuresis  2. Fluid and salt restriction  3. Start discharge planning  4. Should be able to switch to PO diuretics any time  I have spent 25  minutes of face to face time with the patient with more than 50% spent counseling and coordinating care for this patient  Cardiology will sign off please call if needed.     Core Measures:  · Discharge instructions:   · LVEF documented: 55  · ACEI for LV dysfunction: NA  · Smoking Cessation:NA    [address] [place], MD 8/22/2020 9:51 AM

## 2020-08-22 NOTE — FLOWSHEET NOTE
08/21/20 2119   Vital Signs   Temp 98.9 °F (37.2 °C)   Temp Source Oral   Pulse 105   Heart Rate Source Monitor   Resp 22   /65   BP Location Right lower arm   BP Upper/Lower Lower   Oxygen Therapy   SpO2 91 %   O2 Device High flow nasal cannula   O2 Flow Rate (L/min) 3 L/min   PM assessment complete. Pt repeatedly states \" I cant breathe, I cant breathe\". Oxygen sats 91% on 3L, pt becomes tachypneic with shallow breaths, distraction seems to alleviate this. Reminding patient to take deep breaths, but pt just repeatedly states she cannot breathe. Once distracted or talking about something else, patient breathing resumes regular depth, rate. Pt declined offer to turn. Pillow support in place. No needs at this time. Will continue to monitor.

## 2020-08-22 NOTE — PROGRESS NOTES
Attempted to administer morning medications and complete shift assessment. Patient awoke to light stimulation and became verbally aggressive and started to swing her arms at staff.

## 2020-08-22 NOTE — PROGRESS NOTES
Collette Vieira, daughter and POA updated on patient status and plan of care. Dr. Home Hodge spoke with daughter about the recommendation for palliative care/hospice. Bethany Medrano would like to discuss with family. Verbal order to place palliative care consult received and placed at this time.

## 2020-08-22 NOTE — PROGRESS NOTES
Progress Note    Admit Date:  8/12/2020    Patient with progressive hypoxia. she was on 15 L of O2-->  on Vapotherm FiO2 60% 25 L--> 15 L  Still on 6lit confused at times   Sinus pause         Subjective:  Ms. Ashkan Garcia  Agitated at times confused   Pt has chronic  afib sinus pause hypoxia related  Diuresed   pulm HTN corpulmonale   From F    Objective:   /64   Pulse 94   Temp 99.4 °F (37.4 °C) (Axillary)   Resp 18   Ht 5' 4\" (1.626 m)   Wt 216 lb 12.8 oz (98.3 kg)   SpO2 93%   BMI 37.21 kg/m²         Intake/Output Summary (Last 24 hours) at 8/22/2020 1417  Last data filed at 8/22/2020 1318  Gross per 24 hour   Intake 118 ml   Output 2325 ml   Net -2207 ml       Physical Exam:  General appearance: resting; alert and oriented to person today . Cooperative. no distress  Head: Normocephalic, without obvious abnormality, atraumatic  Eyes: conjunctivae/corneas clear. Neck: no adenopathy, supple, symmetrical, trachea midline, symmetric  Lungs: decreasedAE   Heart: regular rate and rhythm, S1, S2 normal, no murmur  Abdomen: soft, non-tender  Extremities: extremities normal, atraumatic, no edema  Skin: Skin color, texture, turgor decreased.  No rashes or lesions  Neurologic: Grossly normal  Repeat same q agai n    Scheduled Meds:   metOLazone  5 mg Oral Once    furosemide  80 mg Oral BID    potassium chloride  40 mEq Oral Daily with breakfast    atorvastatin  10 mg Oral Nightly    potassium chloride  40 mEq Oral Once    spironolactone  25 mg Oral Daily    apixaban  2.5 mg Oral BID    ipratropium-albuterol  1 ampule Inhalation Q4H While awake    amLODIPine  5 mg Oral Daily    levothyroxine  100 mcg Oral Daily    montelukast  10 mg Oral Daily    OXcarbazepine  150 mg Oral BID    sodium chloride flush  10 mL Intravenous 2 times per day     PRN Meds:  potassium chloride **OR** potassium alternative oral replacement **OR** potassium chloride, sodium chloride flush, acetaminophen **OR** acetaminophen, D-shaped septum c/w RV pressure and volume overload. Mild-moderate concentric left ventricular hypertrophy. Left ventricular diastolic filling pressure is elevated. Mild mitral regurgitation. Moderate right-sided chamber enlargement. Right ventricular systolic function is moderately reduced. Moderate tricuspid regurgitation. Systolic pulmonic artery pressure (SPAP) is estimated at 94mmHg consistent   with severe pulmonary hypertension (RAP of 8mmHg). Assessment/Plan:    Acute on chronic hypoxic RF due severe pul HTN  chf acute on chronic diastolic  Diuresed on o2   Due to  Dementia confsuion cards  recommneded hopsice   discussed with Kassi Jj  -. Dysphagia  - with recommendations for MBS  - plan for MBS - hold off d/t increasing O2 requirements    UTI  - Urine cultures growing Pseudomonas and enterococcus,    antibiotics now changed to cefepime and Zyvox, completed day #7/7     Chronic Atrial Fibrillation   - Eliquis       Dementia with behavior disturbances  - Continue home medications  - Supportive Care      Code [facility] consider transition to ECF with hospice     DVT Prophylaxis: Eliquis ON HOLD   Diet: DIET LOW SODIUM 2 GM; Dysphagia Minced and Moist; Mildly Thick (Nectar); 1800 ml;  No Drinking Straw   Code Status: DNR-CCA     Dispo: cont care    Louann Vitale  2:17 PM 8/22/2020

## 2020-08-22 NOTE — PROGRESS NOTES
Review role of palliative care and how patient will be followed when d/c'd back to [facility]. Cezar called in to [facility] 8-22-20 @ 1816.  Corrie Brasher

## 2020-08-22 NOTE — PROGRESS NOTES
Patient incontinent of XL gelatinous brown stool. Complete bed change/gown change. Lua care completed. Zinc cream applied liberally to sean area (excoriation from incontinence noted). Patient repositioned for dinner. PCA assisted patient with eating. Patient talking with food in her mouth, unable to redirect patient with safe chewing/swallowing at this time. Patient coughing with food in her mouth continuously. Unable to safely continue feeding patient at this time. Will re-evaluate patient for safe swallowing and contact speech therapy to re-evaluate patient.

## 2020-08-22 NOTE — PROGRESS NOTES
Telemetry notified RN of 3.55 sec pause, and now back to NSR. Pt sleeping at this time. Afterwards pt did desat to 84% on 3L while asleep, regular rhythm and depth to breathing. Increased o2 to 8L to achieve sat at 91%. Dr. Akash Sifuentes notified via [employer].

## 2020-08-23 LAB
ANION GAP SERPL CALCULATED.3IONS-SCNC: 8 MMOL/L (ref 3–16)
BUN BLDV-MCNC: 21 MG/DL (ref 7–20)
CALCIUM SERPL-MCNC: 9.5 MG/DL (ref 8.3–10.6)
CHLORIDE BLD-SCNC: 92 MMOL/L (ref 99–110)
CO2: 38 MMOL/L (ref 21–32)
CREAT SERPL-MCNC: 1 MG/DL (ref 0.6–1.2)
GFR AFRICAN AMERICAN: >60
GFR NON-AFRICAN AMERICAN: 53
GLUCOSE BLD-MCNC: 111 MG/DL (ref 70–99)
GLUCOSE BLD-MCNC: 123 MG/DL (ref 70–99)
GLUCOSE BLD-MCNC: 171 MG/DL (ref 70–99)
GLUCOSE BLD-MCNC: 86 MG/DL (ref 70–99)
GLUCOSE BLD-MCNC: 89 MG/DL (ref 70–99)
PERFORMED ON: ABNORMAL
PERFORMED ON: NORMAL
POTASSIUM SERPL-SCNC: 2.9 MMOL/L (ref 3.5–5.1)
SODIUM BLD-SCNC: 138 MMOL/L (ref 136–145)

## 2020-08-23 PROCEDURE — 6370000000 HC RX 637 (ALT 250 FOR IP): Performed by: NURSE PRACTITIONER

## 2020-08-23 PROCEDURE — 94761 N-INVAS EAR/PLS OXIMETRY MLT: CPT

## 2020-08-23 PROCEDURE — 94640 AIRWAY INHALATION TREATMENT: CPT

## 2020-08-23 PROCEDURE — 2700000000 HC OXYGEN THERAPY PER DAY

## 2020-08-23 PROCEDURE — 36415 COLL VENOUS BLD VENIPUNCTURE: CPT

## 2020-08-23 PROCEDURE — 6370000000 HC RX 637 (ALT 250 FOR IP): Performed by: HOSPITALIST

## 2020-08-23 PROCEDURE — 6370000000 HC RX 637 (ALT 250 FOR IP): Performed by: INTERNAL MEDICINE

## 2020-08-23 PROCEDURE — 2580000003 HC RX 258: Performed by: INTERNAL MEDICINE

## 2020-08-23 PROCEDURE — 80048 BASIC METABOLIC PNL TOTAL CA: CPT

## 2020-08-23 PROCEDURE — 2060000000 HC ICU INTERMEDIATE R&B

## 2020-08-23 RX ORDER — BUSPIRONE HYDROCHLORIDE 5 MG/1
5 TABLET ORAL 2 TIMES DAILY
Status: DISCONTINUED | OUTPATIENT
Start: 2020-08-23 | End: 2020-08-24 | Stop reason: HOSPADM

## 2020-08-23 RX ORDER — IPRATROPIUM BROMIDE AND ALBUTEROL SULFATE 2.5; .5 MG/3ML; MG/3ML
1 SOLUTION RESPIRATORY (INHALATION) 4 TIMES DAILY
Status: DISCONTINUED | OUTPATIENT
Start: 2020-08-23 | End: 2020-08-24 | Stop reason: HOSPADM

## 2020-08-23 RX ORDER — MEMANTINE HYDROCHLORIDE 5 MG/1
10 TABLET ORAL 2 TIMES DAILY
Status: DISCONTINUED | OUTPATIENT
Start: 2020-08-23 | End: 2020-08-24 | Stop reason: HOSPADM

## 2020-08-23 RX ADMIN — OXCARBAZEPINE 150 MG: 300 TABLET, FILM COATED ORAL at 08:40

## 2020-08-23 RX ADMIN — OXCARBAZEPINE 150 MG: 300 TABLET, FILM COATED ORAL at 21:21

## 2020-08-23 RX ADMIN — IPRATROPIUM BROMIDE AND ALBUTEROL SULFATE 1 AMPULE: .5; 3 SOLUTION RESPIRATORY (INHALATION) at 11:17

## 2020-08-23 RX ADMIN — POTASSIUM BICARBONATE 60 MEQ: 782 TABLET, EFFERVESCENT ORAL at 11:16

## 2020-08-23 RX ADMIN — BUSPIRONE HYDROCHLORIDE 5 MG: 5 TABLET ORAL at 21:21

## 2020-08-23 RX ADMIN — FUROSEMIDE 80 MG: 40 TABLET ORAL at 17:36

## 2020-08-23 RX ADMIN — MONTELUKAST SODIUM 10 MG: 10 TABLET, COATED ORAL at 08:41

## 2020-08-23 RX ADMIN — APIXABAN 2.5 MG: 5 TABLET, FILM COATED ORAL at 21:21

## 2020-08-23 RX ADMIN — MEMANTINE HYDROCHLORIDE 10 MG: 5 TABLET ORAL at 21:21

## 2020-08-23 RX ADMIN — APIXABAN 2.5 MG: 5 TABLET, FILM COATED ORAL at 08:40

## 2020-08-23 RX ADMIN — IPRATROPIUM BROMIDE AND ALBUTEROL SULFATE 1 AMPULE: .5; 3 SOLUTION RESPIRATORY (INHALATION) at 07:16

## 2020-08-23 RX ADMIN — IPRATROPIUM BROMIDE AND ALBUTEROL SULFATE 1 AMPULE: .5; 3 SOLUTION RESPIRATORY (INHALATION) at 20:20

## 2020-08-23 RX ADMIN — POTASSIUM CHLORIDE 40 MEQ: 1500 TABLET, EXTENDED RELEASE ORAL at 08:49

## 2020-08-23 RX ADMIN — SPIRONOLACTONE 25 MG: 25 TABLET ORAL at 08:41

## 2020-08-23 RX ADMIN — AMLODIPINE BESYLATE 5 MG: 5 TABLET ORAL at 08:40

## 2020-08-23 RX ADMIN — Medication 10 ML: at 08:41

## 2020-08-23 RX ADMIN — Medication 10 ML: at 21:20

## 2020-08-23 RX ADMIN — ATORVASTATIN CALCIUM 10 MG: 10 TABLET, FILM COATED ORAL at 21:21

## 2020-08-23 RX ADMIN — IPRATROPIUM BROMIDE AND ALBUTEROL SULFATE 1 AMPULE: .5; 3 SOLUTION RESPIRATORY (INHALATION) at 16:08

## 2020-08-23 RX ADMIN — FUROSEMIDE 80 MG: 40 TABLET ORAL at 11:16

## 2020-08-23 NOTE — PROGRESS NOTES
RESPIRATORY THERAPY ASSESSMENT    Name:  [facility] [address] Record Number:  [id number]  Age: 82 y.o. Gender: female  : 1935  Today's Date:  2020  Room:  /0325-02    Assessment     Is the patient being admitted for a COPD or Asthma exacerbation? No   (If yes the patient will be seen every 4 hours for the first 24 hours and then reassessed)    Patient Admission Diagnosis      Allergies  Allergies   Allergen Reactions    Codeine Itching    Sulfa Antibiotics Other (See Comments)     Per Pt daughter Barbara Wills) \" Not know her reaction to medication\"       Minimum Predicted Vital Capacity:               Actual Vital Capacity:                    Pulmonary History:Asthma and CHF/Pulmonary Edema  Home Oxygen Therapy:  room air  Home Respiratory Therapy:Albuterol prn  Current Respiratory Therapy:  duoneb Q4w/a  Treatment Type: HHN  Medications: Albuterol/Ipratropium    Respiratory Severity Index(RSI)   Patients with orders for inhalation medications, oxygen, or any therapeutic treatment modality will be placed on Respiratory Protocol. They will be assessed with the first treatment and at least every 72 hours thereafter. The following severity scale will be used to determine frequency of treatment intervention.     Smoking History: Pulmonary Disease or Smoking History, Greater than 15 pack year = 2    Social History  Social History     Tobacco Use    Smoking status: Never Smoker    Smokeless tobacco: Never Used   Substance Use Topics    Alcohol use: Not on file    Drug use: Not on file       Recent Surgical History: None = 0  Past Surgical History  Past Surgical History:   Procedure Laterality Date    CHOLECYSTECTOMY      EYE SURGERY      HYSTERECTOMY      [place] [address] W/BRNCL ALVEOLAR LAVAGE N/A 2018    BRONCHOSCOPY ALVEOLAR LAVAGE performed by Jorge Chow MD at [facility] ESOPHAGOGASTRODUODENOSCOPY TRANSORAL DIAGNOSTIC N/A 2018    EGD ESOPHAGOGASTRODUODENOSCOPY performed by Colin Carr MD at [address] [place] [address]         Level of Consciousness: Alert, Oriented, and Cooperative = 0    Level of Activity: Mostly sedentary, minimal walking = 2    Respiratory Pattern: Dyspnea with exertion;Irregular pattern;or RR less than 6 = 2    Breath Sounds: Diminshed bilaterally and/or crackles = 2    Sputum   ,  , Sputum How Obtained: None  Cough: Strong, spontaneous, non-productive = 0    Vital Signs   /71   Pulse 91   Temp 97.7 °F (36.5 °C) (Axillary)   Resp 18   Ht 5' 4\" (1.626 m)   Wt 211 lb 12.8 oz (96.1 kg)   SpO2 93%   BMI 36.36 kg/m²   SPO2 (COPD values may differ): 86-87% on room air or greater than 92% on FiO2 35- 50% = 3    Peak Flow (asthma only): not applicable = 0    RSI: 86-85 = Q6H or QID and Q4HPRN for dyspnea        Plan       Goals: medication delivery, mobilize retained secretions, volume expansion and improve oxygenation    Patient/caregiver was educated on the proper method of use for Respiratory Care Devices:  Yes      Level of patient/caregiver understanding able to:   ? Verbalize understanding   ? Demonstrate understanding       ? Teach back        ? Needs reinforcement       ? No available caregiver               ? Other:     Response to education:  Isis Hurley     Is patient being placed on Home Treatment Regimen? No     Does the patient have everything they need prior to discharge? NA     Comments: pt assessed and chart reviewed    Plan of Care: change duoneb to QID and prn    Electronically signed by Laura Saeed on 8/23/2020 at 11:14 AM    Respiratory Protocol Guidelines     1. Assessment and treatment by Respiratory Therapy will be initiated for medication and therapeutic interventions upon initiation of aerosolized medication. 2. Physician will be contacted for respiratory rate (RR) greater than 35 breaths per minute.  Therapy will be held for heart rate (HR) greater than 140 beats per minute, pending direction from physician. 3. Bronchodilators will be administered via Metered Dose Inhaler (MDI) with spacer when the following criteria are met:  a. Alert and cooperative     b. HR < 140 bpm  c. RR < 30 bpm                d. Can demonstrate a 2-3 second inspiratory hold  4. Bronchodilators will be administered via Hand Held Nebulizer [facility]) to patients when ANY of the following criteria are met  a. Incognizant or uncooperative          b. Patients treated with HHN at Home        c. Unable to demonstrate proper use of MDI with spacer     d. RR > 30 bpm   5. Bronchodilators will be delivered via Metered Dose Inhaler (MDI), HHN, Aerogen to intubated patients on mechanical ventilation. 6. Inhalation medication orders will be delivered and/or substituted as outlined below. Aerosolized Medications Ordering and Administration Guidelines:    1. All Medications will be ordered by a physician, and their frequency and/or modality will be adjusted as defined by the patients Respiratory Severity Index (RSI) score. 2. If the patient does not have documented COPD, consider discontinuing anticholinergics when RSI is less than 9.  3. If the bronchospasm worsens (increased RSI), then the bronchodilator frequency can be increased to a maximum of every 4 hours. If greater than every 4 hours is required, the physician will be contacted. 4. If the bronchospasm improves, the frequency of the bronchodilator can be decreased, based on the patient's RSI, but not less than home treatment regimen frequency. 5. Bronchodilator(s) will be discontinued if patient has a RSI less than 9 and has received no scheduled or as needed treatment for 72  Hrs. Patients Ordered on a Mucolytic Agent:    1. Must always be administered with a bronchodilator. 2. Discontinue if patient experiences worsened bronchospasm, or secretions have lessened to the point that the patient is able to clear them with a cough.     Anti-inflammatory and Combination

## 2020-08-23 NOTE — PROGRESS NOTES
End of shift report given to [facility]. Pt in stable condition, care transferred at this time. Critical results of 2.9 potassium passed along so IV potassium could be started.  Electronically signed by Mine Celis RN on 8/23/2020 at 7:28 AM

## 2020-08-23 NOTE — PROGRESS NOTES
Dr. Erika Fletcher notified of potassium of 2.9 today. Patient administered 40 meq po scheduled with morning medications. Order received for additional 60 meq po potassium for a total of 100 meq to be received today.

## 2020-08-23 NOTE — PROGRESS NOTES
Progress Note    Admit Date:  8/12/2020    Patient with progressive hypoxia. she was on 15 L of O2-->  on Vapotherm FiO2 60% 25 L--> 15 L  o2 getting  down  t o 4lit     Subjective:  Ms. Helena Sims  Agitated constantly yelling  confused  At times   Pt has chronic  afib sinus pause hypoxia related  Diuresing   pulm HTN corpulmonale   From ECF   low k   Aspirating ? Objective:   /71   Pulse 91   Temp 97.7 °F (36.5 °C) (Axillary)   Resp 18   Ht 5' 4\" (1.626 m)   Wt 211 lb 12.8 oz (96.1 kg)   SpO2 94%   BMI 36.36 kg/m²         Intake/Output Summary (Last 24 hours) at 8/23/2020 1338  Last data filed at 8/23/2020 1126  Gross per 24 hour   Intake 945 ml   Output 2100 ml   Net -1155 ml       Physical Exam:  General appearance: resting; alert and oriented to person  Obese   Head: Normocephalic,   Neck: no adenopathy, supple, symmetrical,   Lungs: decreasedAE   Heart: regular rate and rhythm, S1, S2 normal, no murmur  Abdomen: soft, non-tender  Extremities: extremities normal, atraumatic, no edema  Skin: Skin color, texture, turgor decreased.  No rashes or lesions  Neurologic: Grossly normal    Scheduled Meds:   ipratropium-albuterol  1 ampule Inhalation 4x daily    furosemide  80 mg Oral BID    potassium chloride  40 mEq Oral Daily with breakfast    atorvastatin  10 mg Oral Nightly    potassium chloride  40 mEq Oral Once    spironolactone  25 mg Oral Daily    apixaban  2.5 mg Oral BID    amLODIPine  5 mg Oral Daily    levothyroxine  100 mcg Oral Daily    montelukast  10 mg Oral Daily    OXcarbazepine  150 mg Oral BID    sodium chloride flush  10 mL Intravenous 2 times per day     PRN Meds:  potassium chloride **OR** potassium alternative oral replacement **OR** potassium chloride, sodium chloride flush, acetaminophen **OR** acetaminophen, polyethylene glycol, promethazine **OR** ondansetron, ipratropium-albuterol    Data:  BMP:   Recent Labs     08/22/20  0021 08/22/20  0507 08/23/20  0630    elevated. Mild mitral regurgitation. Moderate right-sided chamber enlargement. Right ventricular systolic function is moderately reduced. Moderate tricuspid regurgitation. Systolic pulmonic artery pressure (SPAP) is estimated at 94mmHg consistent   with severe pulmonary hypertension (RAP of 8mmHg). Assessment/Plan:    Acute on chronic hypoxic RF due severe pul HTN  chf acute on chronic diastolic  Diuresed on o2   Due to  Dementia confusion cards  recommneded hopsice   discussed with Anahi Comer over the phone   pallcare consulted   Would recommend  Diurese  more and dc back to [facility] with hospice care  She will get back with us   -. Dysphagia  - with recommendations for MBS  - plan for MBS - hold off d/t increasing O2 requirements    UTI  - Urine cultures growing Pseudomonas and enterococcus,    antibiotics now changed to cefepime and Zyvox, completed day #7/7     Chronic Atrial Fibrillation   - Eliquis       Dementia with behavior disturbances  - Continue home medications  - Supportive Care      Code [facility] consider transition to ECF with hospice     DVT Prophylaxis: Eliquis ON HOLD   Diet: DIET LOW SODIUM 2 GM; Dysphagia Minced and Moist; Mildly Thick (Nectar); 1800 ml;  No Drinking Straw   Code Status: DNR-CCA     Dispo: cont care  Back to ECF if o2 stable  Or hospice   Darvin Mendoza Erps  1:38 PM 8/23/2020

## 2020-08-23 NOTE — PROGRESS NOTES
Shift assessment complete. Call light and bedside table within reach. Pt had was only 85% on 4 LPM, pt increased to 6 LPM. Oxygen saturation up to 92%. Pt mouth breaths and educated pt to breath in through her nose. Pt not redirectable. Will continue to monitor.  Electronically signed by Javier Stone RN on 8/22/2020 at 8:16 PM

## 2020-08-24 VITALS
HEART RATE: 106 BPM | TEMPERATURE: 98.4 F | SYSTOLIC BLOOD PRESSURE: 116 MMHG | DIASTOLIC BLOOD PRESSURE: 62 MMHG | WEIGHT: 207.8 LBS | RESPIRATION RATE: 22 BRPM | OXYGEN SATURATION: 94 % | BODY MASS INDEX: 35.48 KG/M2 | HEIGHT: 64 IN

## 2020-08-24 LAB
ANION GAP SERPL CALCULATED.3IONS-SCNC: 11 MMOL/L (ref 3–16)
BUN BLDV-MCNC: 22 MG/DL (ref 7–20)
CALCIUM SERPL-MCNC: 9.5 MG/DL (ref 8.3–10.6)
CHLORIDE BLD-SCNC: 94 MMOL/L (ref 99–110)
CO2: 37 MMOL/L (ref 21–32)
CREAT SERPL-MCNC: 1.1 MG/DL (ref 0.6–1.2)
GFR AFRICAN AMERICAN: 57
GFR NON-AFRICAN AMERICAN: 47
GLUCOSE BLD-MCNC: 103 MG/DL (ref 70–99)
GLUCOSE BLD-MCNC: 133 MG/DL (ref 70–99)
GLUCOSE BLD-MCNC: 137 MG/DL (ref 70–99)
GLUCOSE BLD-MCNC: 187 MG/DL (ref 70–99)
PERFORMED ON: ABNORMAL
POTASSIUM SERPL-SCNC: 3.4 MMOL/L (ref 3.5–5.1)
SODIUM BLD-SCNC: 142 MMOL/L (ref 136–145)

## 2020-08-24 PROCEDURE — 2580000003 HC RX 258: Performed by: INTERNAL MEDICINE

## 2020-08-24 PROCEDURE — 94640 AIRWAY INHALATION TREATMENT: CPT

## 2020-08-24 PROCEDURE — 80048 BASIC METABOLIC PNL TOTAL CA: CPT

## 2020-08-24 PROCEDURE — 6370000000 HC RX 637 (ALT 250 FOR IP): Performed by: NURSE PRACTITIONER

## 2020-08-24 PROCEDURE — 6370000000 HC RX 637 (ALT 250 FOR IP): Performed by: INTERNAL MEDICINE

## 2020-08-24 PROCEDURE — 2700000000 HC OXYGEN THERAPY PER DAY

## 2020-08-24 PROCEDURE — 99239 HOSP IP/OBS DSCHRG MGMT >30: CPT | Performed by: INTERNAL MEDICINE

## 2020-08-24 PROCEDURE — 92526 ORAL FUNCTION THERAPY: CPT

## 2020-08-24 PROCEDURE — 94761 N-INVAS EAR/PLS OXIMETRY MLT: CPT

## 2020-08-24 PROCEDURE — 36415 COLL VENOUS BLD VENIPUNCTURE: CPT

## 2020-08-24 PROCEDURE — 6370000000 HC RX 637 (ALT 250 FOR IP): Performed by: HOSPITALIST

## 2020-08-24 RX ORDER — FUROSEMIDE 80 MG
80 TABLET ORAL 2 TIMES DAILY
Qty: 60 TABLET | Refills: 3 | Status: SHIPPED | OUTPATIENT
Start: 2020-08-24

## 2020-08-24 RX ORDER — POTASSIUM CHLORIDE 20 MEQ/1
40 TABLET, EXTENDED RELEASE ORAL
Qty: 60 TABLET | Refills: 3 | Status: SHIPPED | OUTPATIENT
Start: 2020-08-25

## 2020-08-24 RX ORDER — SPIRONOLACTONE 25 MG/1
25 TABLET ORAL DAILY
Qty: 30 TABLET | Refills: 3 | Status: SHIPPED | OUTPATIENT
Start: 2020-08-25

## 2020-08-24 RX ADMIN — SPIRONOLACTONE 25 MG: 25 TABLET ORAL at 09:38

## 2020-08-24 RX ADMIN — BUSPIRONE HYDROCHLORIDE 5 MG: 5 TABLET ORAL at 09:38

## 2020-08-24 RX ADMIN — OXCARBAZEPINE 150 MG: 300 TABLET, FILM COATED ORAL at 09:34

## 2020-08-24 RX ADMIN — MONTELUKAST SODIUM 10 MG: 10 TABLET, COATED ORAL at 09:34

## 2020-08-24 RX ADMIN — FUROSEMIDE 80 MG: 40 TABLET ORAL at 09:33

## 2020-08-24 RX ADMIN — AMLODIPINE BESYLATE 5 MG: 5 TABLET ORAL at 09:35

## 2020-08-24 RX ADMIN — IPRATROPIUM BROMIDE AND ALBUTEROL SULFATE 1 AMPULE: .5; 3 SOLUTION RESPIRATORY (INHALATION) at 07:16

## 2020-08-24 RX ADMIN — POTASSIUM CHLORIDE 40 MEQ: 1500 TABLET, EXTENDED RELEASE ORAL at 09:34

## 2020-08-24 RX ADMIN — Medication 10 ML: at 09:35

## 2020-08-24 RX ADMIN — MEMANTINE HYDROCHLORIDE 10 MG: 5 TABLET ORAL at 09:34

## 2020-08-24 RX ADMIN — IPRATROPIUM BROMIDE AND ALBUTEROL SULFATE 1 AMPULE: .5; 3 SOLUTION RESPIRATORY (INHALATION) at 16:01

## 2020-08-24 RX ADMIN — IPRATROPIUM BROMIDE AND ALBUTEROL SULFATE 1 AMPULE: .5; 3 SOLUTION RESPIRATORY (INHALATION) at 12:01

## 2020-08-24 RX ADMIN — APIXABAN 2.5 MG: 5 TABLET, FILM COATED ORAL at 09:34

## 2020-08-24 ASSESSMENT — PAIN SCALES - GENERAL: PAINLEVEL_OUTOF10: 0

## 2020-08-24 NOTE — PROGRESS NOTES
Shift assessment complete. Call light and bedside table within reach.  Electronically signed by Cassidy Webb RN on 8/23/2020 at 9:20 PM

## 2020-08-24 NOTE — CARE COORDINATION
INTERDISCIPLINARY PLAN OF CARE CONFERENCE    Date/Time: 8/14/2020 11:33 AM  Completed by: Ellen Lackey, Case Management      Patient Name:  Anita Schultz  YOB: 1935  Admitting Diagnosis: Shortness of breath [R06.02]  Shortness of breath [R06.02]     Admit Date/Time:  8/12/2020  7:35 PM    Chart reviewed. Interdisciplinary team met to discuss patient progress and discharge plans. Disciplines included Case Management, Nursing, and Dietitian. Current Status:stable    PT/OT recommendation:NA    Anticipated Discharge Date: tbd  Expected D/C Disposition:  Nursing Home  Confirmed plan with patient and/or family   Discharge Plan Comments: Chart reviewed. Pt is LTC @ [facility], plan continues for return. +bedhold    COVID 19 negative- 8/13  The Plan for Transition of Care is related to the following treatment goals: LTC @ [facility]    The Patient and/or patient representative  was provided with a choice of provider and agrees   with the discharge plan. [] Yes [] No    Freedom of choice list was provided with basic dialogue that supports the patient's individualized plan of care/goals, treatment preferences and shares the quality data associated with the providers.  [] Yes [] No    Home O2 in place on admit: ecf  Pt informed of need to bring portable home O2 tank on day of discharge for nursing to connect prior to leaving:  Not Indicated  Verbalized agreement/Understanding:  Not Indicated
INTERDISCIPLINARY PLAN OF CARE CONFERENCE    Date/Time: 8/17/2020 5:08 PM  Completed by: Gerardine Angelucci, Case Management      Patient Name:  Jo Aguirre  YOB: 1935  Admitting Diagnosis: Shortness of breath [R06.02]  Shortness of breath [R06.02]  Acute respiratory failure with hypoxia ([address] [J96.01]     Admit Date/Time:  8/12/2020  7:35 PM    Chart reviewed. Interdisciplinary team met to discuss patient progress and discharge plans. Disciplines included Case Management, Nursing, and Dietitian. Current Status:ongoing    PT/OT recommendation: n/a    Anticipated Discharge Date: tbd  Expected D/C Disposition:  Nursing Home  Confirmed plan with patient and/or family Yes  Discharge Plan Comments: Reviewed chart. Role of discharge planner explained and patient's daughter/Joshua CHAVEZ, verbalized understanding. Per Joshua Barakat, pt is from [address] [place] [address] and plans to return. +bed hold per Priscila Citizen wanted an update on pt and CM asked SIMRAN Morris, to call Joshua Barakat with an update with verbalized understanding. The Plan for Transition of Care is related to the following treatment goals: LTC     The Patient and/or patient representative pt's daughter/POAJoshua was provided with a choice of provider and agrees   with the discharge plan. [x] Yes [] No    Freedom of choice list was provided with basic dialogue that supports the patient's individualized plan of care/goals, treatment preferences and shares the quality data associated with the providers.  [x] Yes [] No    Home O2 in place on admit: No  Pt informed of need to bring portable home O2 tank on day of discharge for nursing to connect prior to leaving:  Not Indicated  Verbalized agreement/Understanding:  Not Indicated
INTERDISCIPLINARY PLAN OF CARE CONFERENCE    Date/Time: 8/19/2020 3:51 PM  Completed by: Nino Cornelius Management      Patient Name:  Monika Rogers  YOB: 1935  Admitting Diagnosis: Shortness of breath [R06.02]  Shortness of breath [R06.02]  Acute respiratory failure with hypoxia ([address] [J96.01]     Admit Date/Time:  8/12/2020  7:35 PM    Chart reviewed. Interdisciplinary team contacted or reviewed plan related to patient progress and discharge plans. Disciplines included Case Management, Nursing, and Dietitian. Current Status:stable    Expected D/C Disposition:  Nursing Home  Confirmed plan with patient and/or family Yes confirmed with: (name) per notes dtr/poa [facility]    Discharge Plan Comments: Chart reviewed. Plan continues for pt to return to [address] [place] [address]. +bedhold per Dane Lim.  FOllowing    Home O2 in place on admit: ecf  Pt informed of need to bring portable home O2 tank on day of discharge for nursing to connect prior to leaving:  Not Indicated  Verbalized agreement/Understanding:  Not Indicated
INTERDISCIPLINARY PLAN OF CARE CONFERENCE    Date/Time: 8/21/2020 12:07 PM  Completed by: Anai Kirkland, Case Management      Patient Name:  Faith Castrejon  YOB: 1935  Admitting Diagnosis: Shortness of breath [R06.02]  Shortness of breath [R06.02]  Acute respiratory failure with hypoxia ([facility] [J96.01]     Admit Date/Time:  8/12/2020  7:35 PM    Chart reviewed. Interdisciplinary team contacted or reviewed plan related to patient progress and discharge plans. Disciplines included Case Management, Nursing, and Dietitian. Current Status:stable  PT/OT recommendation for discharge plan of care: NA    Expected D/C Disposition:  Nursing Home  Confirmed plan with patient and/or family Yes confirmed with: (name) dtr [facility]    Discharge Plan Comments: Chart reviewed. Pl;an continues for pt to return to [address] [place] [address]. +bedhold per Mirna Tapia NO new COVID test needed to return.     COVID from 8/13 Not detected    Home O2 in place on admit: ecf  Pt informed of need to bring portable home O2 tank on day of discharge for nursing to connect prior to leaving:  Not Indicated  Verbalized agreement/Understanding:  Not Indicated
INTERDISCIPLINARY PLAN OF CARE CONFERENCE and DC Note    Date/Time: 2020 10:33 AM  Completed by: Nino Ackerman, Palliative Care      Patient Name:  Kathy Officer  YOB: 1935  Admitting Diagnosis: Shortness of breath [R06.02]  Shortness of breath [R06.02]  Acute respiratory failure with hypoxia ([address] [J96.01]     Admit Date/Time:  2020  7:35 PM    Chart reviewed. Interdisciplinary team contacted or reviewed plan related to patient progress and discharge plans. Disciplines included Case Management, Nursing, and Dietitian. Current Status:ongoing  PT/OT recommendation for discharge plan of care: tbd    Expected D/C Disposition:  Nursing Home  Confirmed plan with patient and/or family Yes confirmed with: (name) pt's daughterHalina POA    Discharge Plan Comments: Reviewed chart. Pt is LTC at [facility]. Palliative Care Consult noted. Writer spoke with pt's daughter Kiersten Gee r/t hospice and she chose [facility] referral. Referral called to Latha with [facility] this AM.     10:37 AM writer spoke with Maggi Ann, [facility] nurse, and she states spoke with pt's daughter, Kiersten Gee, and she would like [facility] to meet with her after pt returns to [facility]. FS and H & P, progress notes sent to Maggi Ann with [facility]. Bedside nurse aware of above POC. Writer spoke with Ivan Claire from [facility] to update her on POC. Following.      Home O2 in place on admit: to ECF  Pt informed of need to bring portable home O2 tank on day of discharge for nursing to connect prior to leaving:  Not Indicated  Verbalized agreement/Understanding:  Not Indicated                                                                                                          DISCHARGE ORDER  Date/Time 2020 2:00 PM  Completed by: Nino Oneal    Patient Name: Kathy Officer    : 1935    Admitting Diagnosis: Shortness of breath [R06.02]  Shortness of breath [R06.02]  Acute respiratory failure with hypoxia ([address]
· Name:  · Location  · Dialysis Schedule:  · Phone:   · Fax: Other services in the Community(ex:PT/OT,Mental Health,Wound Clinic, [facility])  Outpatient PT/OT: No    [facility]: No     CHF Clinic: No     Pulmonary Rehab: No  Pain Clinic: No  Community Mental Health: No    Wound Clinic: No     COVID SCREENING: Yes - results pending       Other: none    The Plan for Transition of Care is related to the following treatment goals: return to [address] [place] [address]    The Patient and/or patient representative  was provided with a choice of provider and agrees   with the discharge plan. [x] Yes [] No    DISCHARGE PLAN: return to [facility]. [address] [facility] aware that Covid test pending. Explained Case Management role/services.

## 2020-08-24 NOTE — PROGRESS NOTES
Speech Language Pathology  Facility/Department: [facility] PCU TELEMETRY  Dysphagia Daily Treatment Note    NAME: Tomy Jolley  : 1935  MRN: [id number]     Recommendations:  Solid consistency: Dysphagia Minced and Moist (Dysphagia II)  Liquid consistency: Mildly Thick (Nectar) / no straws  Mediation administration via: Crushable meds crushed in puree  · Assist feed  · Fully upright, Slow rate, small bites/drinks, cues to refrain from talking with PO in oral cavity, Cues for effective mastication      Patient Diagnosis(es):   Patient Active Problem List    Diagnosis Date Noted    Persistent atrial fibrillation 2018     Priority: High    AV block 2018     Priority: High    Acute respiratory failure with hypoxia (HCC)     Chronic a-fib     Urinary tract infection with hematuria     Acute on chronic respiratory failure with hypoxia and hypercapnia (HCC)     Acute on chronic congestive heart failure ([address]     Dementia with behavioral disturbance (HCC)     Acute respiratory failure with hypoxia and hypercapnia (HCC) 2020    Bradycardia     Intermittent asthma without complication     Late onset Alzheimer's disease without behavioral disturbance (HCC)     Pulmonary hypertension ([address]     Shortness of breath 2020    SOB (shortness of breath) 2020    Acute on chronic diastolic CHF (congestive heart failure) (HCC)     Acute on chronic respiratory failure with hypoxia ([address] 2018    Pulmonary infiltrates     Diastolic dysfunction 10/81/8556    Pleural effusion 2018    Morbid obesity with BMI of 40.0-44.9, adult ([address] 2018    Myonecrosis 2018    Atelectasis 2018    Ineffective airway clearance 2018    Essential hypertension     Morbid obesity ([address] 2018    Hematemesis 2018     Allergies:    Allergies   Allergen Reactions    Codeine Itching    Sulfa Antibiotics Other (See Comments)     Per Pt daughter ( Ruben Adams) \" Not know her reaction to medication\"     Onset Date: 08/12/2020    Subjective: Pt sleeping upon SLP entry, easily woke to SLP's voice. RN OK'd SLP entry and therapy. Per chart, pt with possible d/c later this date. Pain: No c/o pain    Current Diet: DIET LOW SODIUM 2 GM; Dysphagia Minced and Moist; Mildly Thick (Nectar); 1800 ml; No Drinking Straw    Diet Tolerance:  Pt tolerating current diet without s/s of aspiration per chart. *Voicemail left with ST from night shift on 8/22 regarding pt's confusion and frequent coughing with PO intake, requesting f/u. P.O. Trials: Thin       Nectar / Mildly Thick   x 4 oz via cup   Honey / Moderately Thick       Pudding / Extremely Thick       Puree       Solid         Dysphagia Treatment and Impressions:  Pt seen upright in bed, alert and cooperative. Pt currently on 3.5 L O2 NC, RR 20-24/min at baseline. Pt observed with 4 oz NTL via cup (pt able to feed self) with grossly timely swallow initiation, no overt s/s of aspiration/penetration noted throughout. Pt reported calling out earlier this AM, waxing / waning confusion per chart, which places pt at a higher risk of aspiration with PO intake. Recommend only feeding when pt is most alert and cooperative, adherence to strict aspiration precautions. Dysphagia Goals:  Timeframe for Long-term Goals: 7 days (8/23)  Goal 1: Pt will tolerate LRD w/o s/s of penetration/aspiraiton 08/24 Ongoing. See above  Dysphagia Goals: 1. The patient will tolerate recommended diet without observed clinical signs of aspiration, 08/24 Ongoing. See above   GOAL MET GOAL MET  4. The patient/caregiver will demonstrate understanding of compensatory strategies for improved swallowing safety. 08/24: ongoing, pt unable to demonstrate evidence of learning, needs ongoing reinforcement.     Recommendations:  Solid consistency: Dysphagia Minced and Moist (Dysphagia II)  Liquid consistency: Mildly Thick (Nectar) / no straws  Mediation administration via: Crushable meds crushed in puree  · Assist feed  · Fully upright, Slow rate, small bites/drinks, cues to refrain from talking with PO in oral cavity, Cues for effective mastication    Patient/Family/Caregiver Education:  SLP re: role of ST, rationale for current modified diet. Pt did not demonstrate evidence of learning, needs ongoing reinforcement. Compensatory Strategies: Recommend direct supervision, upright position during and 30 minutes after meals, small bites/sips, slow rate of po intake, and encourage self-feeding, *only feed when most alert    Plan:    Continued Dysphagia treatment with goals per plan of care. Discharge Recommendations: SNF    If pt discharges from hospital prior to Speech/Swallowing discharge, this note serves as tx and discharge summary.      Total Treatment Time / Charges     Time in Time out Total Time / units   Cognitive Tx         Speech Tx      Dysphagia Tx 1410 1420 10 min / 1 unit     Signature:  AMISHA Wynn  Speech-language pathologist  [id number]

## 2020-08-24 NOTE — DISCHARGE SUMMARY
Name:  Liliana Engel  Room:  /5390-88  MRN:    [id number]    Discharge Summary      This discharge summary is in conjunction with a complete physical exam done on the day of discharge. Discharging Physician: Sarika Toledo MD       Admit: 8/12/2020  Discharge:  8/24/2020    HPI taken from admission H&P:      The patient is a 82 y.o. female with alzheimers dementia, atrial fibrillation, chronic diastolic CHF, GERD, HTN who presented to [facility] ED with complaint of SOB. When I saw the patient she was feeling somewhat lethargic and unable to give me much of a history. Patient is apparently been noncompliant with home oxygen therapy. She came to the ER for shortness of breath. Denies any chest pain. Work-up showed acute on chronic respiratory failure likely due to underlying CHF.     Because the patient is somewhat lethargic and there is a change in mental status I ordered an arterial blood gas. She however refused but woke up after the attempted to do one.     She is admitted to the medical floor for COVID rule out. Recent COVID testing is negative. Diagnoses this Admission and Hospital Course     Acute on chronic hypoxic RF - improved  - normally uses 2L NC O2 continuous at home   - Suspect 2/2 acute on chronic dCHF, CXR with bilateral pleural effusions with pulmonary edema  - Was recently admitted, but no leukocytosis, PCT normal, lower concerns for HCAP as cause   - COVID 19 negative. - worsening hypoxia. 3L--> 15L --> high flow O2 per [employer]ap[employer] --> 15-->3.5 L    - improved. Weaned to 3.5 L O2 at d/c   - sats in mid 90s, RN to wean as able     Acute on chronic dCHF  Severe pulmonary HTN  - Last Echo with EF 55-60%, DD and severe pulm HTN  - Was recently admitted for the same, discharge weight was 205 lbs  - Admitted with weight 225 lbs-> 207 > 204 >203 lb  - consulted cardio.   Sp IV lasix 40 mg BID-> PO lasix 40 mg daily on 8/16.    - back on 40 mg IV Lasix BID, on aldactone  - negative 11 L, sent w/ Aldactone and 80 mg Lasix BID at d/c  - plan for pt to meed with [facility] after returning [facility]     Hypokalemia  - 3.2 > 2.9> 3.4   - repleted  - d/c with K supplement     Dysphagia  - with recommendations for MBS  - plan for MBS - held off d/t increasing O2 requirements  - recommended dysphagia minced and moist - mildly nectar thick/no straws, crushable meds in crushed puree     UTI  - UA with + Nitrite and LE   - Urine cultures growing Pseudomonas and enterococcus,  antibiotics -> changed to cefepime and Zyvox, completed day #7/7     Chronic Atrial Fibrillation   - Eliquis held on 8/14- restarted   - Rate controlled      HTN  - BP is controlled  - Continued Norvasc      Hypothyroidism   - Continued Synthroid      HLD  - Continued statin     Dementia with behavior disturbances  - Continued home medications  - Supportive Care      Morbid Obesity  - Body mass index is 38.62 kg/m². - Complicating assessment and treatment. Placing patient at risk for multiple co-morbidities as well as early death and contributing to the patient's presentation.   - Counseled on weight loss. Procedures (Please Review Full Report for Details)  Modified Barium Swallow    Consults    Pulmonology  Cardiology    Physical Exam at Discharge:    /70   Pulse 103   Temp 98.8 °F (37.1 °C) (Oral)   Resp 22   Ht 5' 4\" (1.626 m)   Wt 207 lb 12.8 oz (94.3 kg)   SpO2 98%   BMI 35.67 kg/m²     General appearance: resting; alert and oriented to person  Obese   Head: Normocephalic,   Neck: no adenopathy, supple, symmetrical,   Lungs: decreasedAE   Heart: regular rate and rhythm, S1, S2 normal, no murmur  Abdomen: soft, non-tender  Extremities: extremities normal, atraumatic, no edema  Skin: Skin color, texture, turgor decreased.  No rashes or lesions  Neurologic: Grossly normal    BMP:   Recent Labs     08/22/20  0507 08/23/20  0630 08/24/20  0618    138 142   K 3.4* 2.9* 3.4*   CL 95* 92* 94*   CO2 34* 38* 37*   BUN 19 21* 22* CREATININE 1.1 1.0 1.1     CULTURES    Urine cx: Enterococcus faecalis, Pseudomonas aeruginosa    Susceptibility     Enterococcus faecalis (2)     Antibiotic  Interpretation  ANDRES  Status     ampicillin  Sensitive  <=2  mcg/mL      nitrofurantoin  Sensitive  <=32  mcg/mL      tetracycline  Resistant  >8  mcg/mL      vancomycin  Sensitive  2  mcg/mL      Pseudomonas aeruginosa (1)     Antibiotic  Interpretation  ANDRES  Status     cefepime  Sensitive  8  mcg/mL      ciprofloxacin  Resistant  >2  mcg/mL      gentamicin  Intermediate  8  mcg/mL      meropenem  Resistant  >8  mcg/mL      piperacillin-tazobactam  Intermediate  32  mcg/mL      tobramycin  Sensitive  <=4  mcg/mL       Blood cx x2: NGTD     Urine cx: NGTD    RADIOLOGY    XR CHEST PORTABLE 8/21/2020   Final Result   Findings similar to the most recent prior study showing bilateral pleural   effusions, greater on the left, and perihilar lower lung airspace disease,   likely edema. XR CHEST PORTABLE 8/20/2020   Final Result   Ongoing features of heart failure, with bilateral effusions, central vascular   congestion and edema, mildly improved from 3 days prior. [facility] MODIFIED BARIUM Mayra Chock VIDEO 8/192020   Final Result   1. Moderate laryngeal penetration without tracheal aspiration when swallowing   thin liquids. 2. No evidence of laryngeal penetration or tracheal aspiration when   swallowing nectar thickened liquids, purees, and solids. Please see separate speech pathology report for full discussion of findings   and recommendations. XR CHEST PORTABLE 8/17/2020   Final Result   Congestive failure with asymmetric left lower lobe airspace disease. Superimposed atelectasis or pneumonia is also therefore considered. Suspect bilateral pleural effusion. XR CHEST 1 VIEW 8/14/2020   Final Result   No significant change diffuse bilateral pulmonary disease and bilateral   pleural effusions.          CT CHEST PULMONARY EMBOLISM W CONTRAST 8/13/2020   Final Result   No pulmonary embolism to the proximal segmental level. Moderate right and small left pleural effusions, decreased on the right and   increased on the left since the prior study 2 weeks ago. Evidence of mild interstitial edema. XR CHEST PORTABLE 8/12/2020   Final Result   Bilateral pleural effusions and basilar pulmonary edema versus atelectasis. Pneumonia is a differential concern.            Discharge Medications     Medication List      START taking these medications    potassium chloride 20 MEQ extended release tablet  Commonly known as:  KLOR-CON M  Take 2 tablets by mouth daily (with breakfast)  Start taking on:  August 25, 2020     spironolactone 25 MG tablet  Commonly known as:  ALDACTONE  Take 1 tablet by mouth daily  Start taking on:  August 25, 2020        CHANGE how you take these medications    furosemide 80 MG tablet  Commonly known as:  LASIX  Take 1 tablet by mouth 2 times daily  What changed:    · medication strength  · how much to take  · when to take this        CONTINUE taking these medications    acetaminophen 500 MG tablet  Commonly known as:  TYLENOL     albuterol (2.5 MG/3ML) 0.083% nebulizer solution  Commonly known as:  PROVENTIL     amLODIPine 5 MG tablet  Commonly known as:  NORVASC  Take 1 tablet by mouth daily     apixaban 2.5 MG Tabs tablet  Commonly known as:  ELIQUIS  Take 1 tablet by mouth 2 times daily     busPIRone 5 MG tablet  Commonly known as:  BUSPAR     famotidine 20 MG tablet  Commonly known as:  PEPCID     levothyroxine 100 MCG tablet  Commonly known as:  SYNTHROID     magnesium oxide 400 MG tablet  Commonly known as:  MAG-OX     montelukast 10 MG tablet  Commonly known as:  SINGULAIR     Namenda XR 28 MG Cp24 extended release capsule  Generic drug:  memantine ER     OXcarbazepine 150 MG tablet  Commonly known as:  TRILEPTAL     oxybutynin 5 MG extended release tablet  Commonly known as:  DITROPAN-XL     senna 8.6 MG tablet  Commonly known as:  SENOKOT     simvastatin 10 MG tablet  Commonly known as:  ZOCOR     therapeutic multivitamin-minerals tablet     Vitamin D3 50 MCG (2000 UT) Caps           Where to Get Your Medications      You can get these medications from any pharmacy    Bring a paper prescription for each of these medications  · furosemide 80 MG tablet  · potassium chloride 20 MEQ extended release tablet  · spironolactone 25 MG tablet         More than 30 mts spent      Discharged in stable condition to SNF at [facility]. Follow Up: Follow up with physician at SNF.         Justin Myles 8/25/2020 5:14 PM

## 2020-08-24 NOTE — PROGRESS NOTES
End of shift report [facility] Pt in stable condition, care transferred at this time.  Electronically signed by Indira Courtney RN on 8/24/2020 at 8:26 AM

## 2020-08-24 NOTE — FLOWSHEET NOTE
08/24/20 0930   Vital Signs   Temp 98.8 °F (37.1 °C)   Temp Source Oral   Pulse 103   Heart Rate Source Monitor   Resp 16   /70   BP Location Right upper arm   BP Upper/Lower Upper   Level of Consciousness 0   MEWS Score 2   Oxygen Therapy   SpO2 97 %   O2 Device High flow nasal cannula   O2 Flow Rate (L/min) 3.5 L/min   Shift assessment complete, see doc flow sheet. Pt states, \" I can't breath. If you don't help me then I'm going to die. \" Pt O2 stable on 3.5L, pt reassured that she is being monitored closely. Pt repositioned to promote comfort. Pt's potassium 3.4- scheduled 40 mg oral replacement administered with morning medications. Pt denies any needs at this time. Bed check engaged. Call light within reach.

## 2020-08-24 NOTE — PLAN OF CARE
HFpEF, Non compliance with home oxygen therapy  - IV lasix BID, duoneb therapy  - Resume home medications
Nutrition Problem #1: Swallowing difficulty  Intervention: Food and/or Nutrient Delivery: Continue Current Diet  Nutritional Goals: pt will increase her intake of the DYS minced and moist diet to > 50% and weight will remain stable
Patient's EF (Ejection Fraction) is greater than 40%    Patient's weights and intake/output reviewed:    Patient's Last Weight: 204 lbs obtained by bed scale. Difference of 3 lbs less than last documented weight. Intake/Output Summary (Last 24 hours) at 8/18/2020 1117  Last data filed at 8/18/2020 0848  Gross per 24 hour   Intake 130 ml   Output 2100 ml   Net -1970 ml         Pt is currently on 8 L O2. Pt with complaints of shortness of breath. Pt with nonpitting lower extremity edema. Patient and/or Family's stated Goal of Care this Admission: reduce shortness of breath, increase activity tolerance, better understand heart failure and disease management, be more comfortable, and reduce lower extremity edema prior to discharge      Comorbidities Reviewed Yes  Patient has a past medical history of Alzheimer's dementia ([address] Asthma, Atrial fibrillation ([address] Blood clot in vein, CHF (congestive heart failure) ([address] Dementia ([address] GERD (gastroesophageal reflux disease), Hypertension, Kidney failure, MRSA (methicillin resistant staph aureus) culture positive, and Osteoporosis.          >>For CHF and Comorbidity documentation on Education Time and Topics, please see Education Tab
Problem: Falls - Risk of:  Goal: Will remain free from falls  Description: Will remain free from falls  8/14/2020 0135 by Valery Moise RN  Outcome: Ongoing     Problem: Skin Integrity:  Goal: Will show no infection signs and symptoms  Description: Will show no infection signs and symptoms  8/14/2020 0135 by Valery Moise RN  Outcome: Ongoing     Problem: OXYGENATION/RESPIRATORY FUNCTION  Goal: Patient will maintain patent airway  8/14/2020 0135 by Valery Moise RN  Outcome: Ongoing     Problem: ACTIVITY INTOLERANCE/IMPAIRED MOBILITY  Goal: Mobility/activity is maintained at optimum level for patient  8/14/2020 0135 by Valery Moise RN  Outcome: Ongoing
Problem: Falls - Risk of:  Goal: Will remain free from falls  Description: Will remain free from falls  8/19/2020 2349 by Brooks Kemp RN  Outcome: Ongoing     Problem: Skin Integrity:  Goal: Will show no infection signs and symptoms  Description: Will show no infection signs and symptoms  8/19/2020 2349 by Brooks Kemp RN  Outcome: Ongoing     Problem: OXYGENATION/RESPIRATORY FUNCTION  Goal: Patient will maintain patent airway  8/19/2020 2349 by Brooks Kemp RN  Outcome: Ongoing     Problem: ACTIVITY INTOLERANCE/IMPAIRED MOBILITY  Goal: Mobility/activity is maintained at optimum level for patient  8/19/2020 2349 by Brooks Kemp RN  Outcome: Ongoing  8/19/2020 2300 by Brooks Kemp RN  Outcome: Ongoing
Problem: Falls - Risk of:  Goal: Will remain free from falls  Description: Will remain free from falls  Outcome: Ongoing     Problem: Skin Integrity:  Goal: Will show no infection signs and symptoms  Description: Will show no infection signs and symptoms  Outcome: Ongoing     Problem: OXYGENATION/RESPIRATORY FUNCTION  Goal: Patient will achieve/maintain normal respiratory rate/effort  Description: Respiratory rate and effort will be within normal limits for the patient  Outcome: Ongoing     Problem: FLUID AND ELECTROLYTE IMBALANCE  Goal: Fluid and electrolyte balance are achieved/maintained  Outcome: Ongoing     Problem: Airway Clearance - Ineffective:  Goal: Ability to maintain a clear airway will improve  Description: Ability to maintain a clear airway will improve  Outcome: Ongoing     Problem: Gas Exchange - Impaired:  Goal: Levels of oxygenation will improve  Description: Levels of oxygenation will improve  Outcome: Ongoing
Problem: Falls - Risk of:  Goal: Will remain free from falls  Description: Will remain free from falls  Outcome: Ongoing     Problem: Skin Integrity:  Goal: Will show no infection signs and symptoms  Description: Will show no infection signs and symptoms  Outcome: Ongoing     Problem: OXYGENATION/RESPIRATORY FUNCTION  Goal: Patient will maintain patent airway  Outcome: Ongoing     Problem: ACTIVITY INTOLERANCE/IMPAIRED MOBILITY  Goal: Mobility/activity is maintained at optimum level for patient  Outcome: Ongoing     Problem: Airway Clearance - Ineffective:  Goal: Clear lung sounds  Description: Clear lung sounds  Outcome: Ongoing     Problem: Gas Exchange - Impaired:  Goal: Levels of oxygenation will improve  Description: Levels of oxygenation will improve  Outcome: Ongoing
Problem: Falls - Risk of:  Goal: Will remain free from falls  Description: Will remain free from falls  Outcome: Ongoing     Problem: Skin Integrity:  Goal: Will show no infection signs and symptoms  Description: Will show no infection signs and symptoms  Outcome: Ongoing     Problem: OXYGENATION/RESPIRATORY FUNCTION  Goal: Patient will maintain patent airway  Outcome: Ongoing     Problem: HEMODYNAMIC STATUS  Goal: Patient has stable vital signs and fluid balance  Outcome: Ongoing     Problem: FLUID AND ELECTROLYTE IMBALANCE  Goal: Fluid and electrolyte balance are achieved/maintained  Outcome: Ongoing
Problem: Falls - Risk of:  Goal: Will remain free from falls  Description: Will remain free from falls  Outcome: Ongoing     Problem: Skin Integrity:  Goal: Will show no infection signs and symptoms  Description: Will show no infection signs and symptoms  Outcome: Ongoing  Goal: Absence of new skin breakdown  Description: Absence of new skin breakdown  Outcome: Ongoing     Problem: OXYGENATION/RESPIRATORY FUNCTION  Goal: Patient will maintain patent airway  Outcome: Ongoing  Goal: Patient will achieve/maintain normal respiratory rate/effort  Description: Respiratory rate and effort will be within normal limits for the patient  Outcome: Ongoing     Problem: Discharge Planning:  Goal: Discharged to appropriate level of care  Description: Discharged to appropriate level of care  Outcome: Ongoing  Goal: Participates in care planning  Description: Participates in care planning  Outcome: Ongoing     Problem: Airway Clearance - Ineffective:  Goal: Ability to maintain a clear airway will improve  Description: Ability to maintain a clear airway will improve  Outcome: Ongoing
Problem: Falls - Risk of:  Goal: Will remain free from falls  Description: Will remain free from falls  Outcome: Ongoing  Goal: Absence of physical injury  Description: Absence of physical injury  Outcome: Ongoing     Problem: Skin Integrity:  Goal: Will show no infection signs and symptoms  Description: Will show no infection signs and symptoms  Outcome: Ongoing  Goal: Absence of new skin breakdown  Description: Absence of new skin breakdown  Outcome: Ongoing     Problem: OXYGENATION/RESPIRATORY FUNCTION  Goal: Patient will maintain patent airway  Outcome: Ongoing  Goal: Patient will achieve/maintain normal respiratory rate/effort  Description: Respiratory rate and effort will be within normal limits for the patient  Outcome: Ongoing     Problem: HEMODYNAMIC STATUS  Goal: Patient has stable vital signs and fluid balance  Outcome: Ongoing     Problem: ACTIVITY INTOLERANCE/IMPAIRED MOBILITY  Goal: Mobility/activity is maintained at optimum level for patient  Outcome: Ongoing     Problem: Discharge Planning:  Goal: Discharged to appropriate level of care  Description: Discharged to appropriate level of care  Outcome: Ongoing  Goal: Participates in care planning  Description: Participates in care planning  Outcome: Ongoing     Problem: Sensory:  Goal: General experience of comfort will improve  Description: General experience of comfort will improve  Outcome: Ongoing     Problem: Tobacco Use:  Goal: Will participate in inpatient tobacco-use cessation counseling  Description: Will participate in inpatient tobacco-use cessation counseling  Outcome: Ongoing     Problem: Hyperthermia:  Goal: Ability to maintain a body temperature in the normal range will improve  Description: Ability to maintain a body temperature in the normal range will improve  Outcome: Ongoing
Problem: OXYGENATION/RESPIRATORY FUNCTION  Goal: Patient will maintain patent airway  Outcome: Ongoing  Goal: Patient will achieve/maintain normal respiratory rate/effort  Description: Respiratory rate and effort will be within normal limits for the patient  Outcome: Ongoing     Problem: HEMODYNAMIC STATUS  Goal: Patient has stable vital signs and fluid balance  Outcome: Ongoing   Patient's EF (Ejection Fraction) is greater than 40%    Patient's weights and intake/output reviewed:    Patient's Last Weight: 211 lb 12.8 oz  obtained by bed scale. Difference of 5 lbs less than last documented weight. Intake/Output Summary (Last 24 hours) at 8/23/2020 0458  Last data filed at 8/22/2020 1948  Gross per 24 hour   Intake 390 ml   Output 1575 ml   Net -1185 ml         Pt is currently on 4 L O2. Pt with complaints of shortness of breath. Pt with pitting lower extremity edema. Patient and/or Family's stated Goal of Care this Admission: unable to assess, will attempt again prior to discharge      Comorbidities Reviewed Yes  Patient has a past medical history of Alzheimer's dementia ([facility] [address] Asthma, Atrial fibrillation ([facility] [address] Blood clot in vein, CHF (congestive heart failure) ([facility] [address] Dementia ([facility] [address] GERD (gastroesophageal reflux disease), Hypertension, Kidney failure, MRSA (methicillin resistant staph aureus) culture positive, and Osteoporosis.          >>For CHF and Comorbidity documentation on Education Time and Topics, please see Education Tab    Problem: FLUID AND ELECTROLYTE IMBALANCE  Goal: Fluid and electrolyte balance are achieved/maintained  Outcome: Ongoing     Problem: ACTIVITY INTOLERANCE/IMPAIRED MOBILITY  Goal: Mobility/activity is maintained at optimum level for patient  Outcome: Ongoing
Problem: OXYGENATION/RESPIRATORY FUNCTION  Goal: Patient will maintain patent airway  Outcome: Ongoing  Goal: Patient will achieve/maintain normal respiratory rate/effort  Description: Respiratory rate and effort will be within normal limits for the patient  Outcome: Ongoing     Problem: HEMODYNAMIC STATUS  Goal: Patient has stable vital signs and fluid balance  Outcome: Ongoing  Patient's EF (Ejection Fraction) is greater than 40%    Patient's weights and intake/output reviewed:    Patient's Last Weight: 207 12.8 oz obtained by bed scale. Difference of 4 lbs less than last documented weight. Intake/Output Summary (Last 24 hours) at 8/24/2020 0412  Last data filed at 8/24/2020 0245  Gross per 24 hour   Intake 1184 ml   Output 2250 ml   Net -1066 ml         Pt is currently on 3.5 L O2. Pt with complaints of shortness of breath. Pt with pitting lower extremity edema. Patient and/or Family's stated Goal of Care this Admission: unable to assess, will attempt again prior to discharge      Comorbidities Reviewed Yes  Patient has a past medical history of Alzheimer's dementia ([facility] [address] Asthma, Atrial fibrillation ([address] Blood clot in vein, CHF (congestive heart failure) ([facility] [address] Dementia ([facility] [address] GERD (gastroesophageal reflux disease), Hypertension, Kidney failure, MRSA (methicillin resistant staph aureus) culture positive, and Osteoporosis.          >>For CHF and Comorbidity documentation on Education Time and Topics, please see Education Tab       Problem: FLUID AND ELECTROLYTE IMBALANCE  Goal: Fluid and electrolyte balance are achieved/maintained  Outcome: Ongoing     Problem: ACTIVITY INTOLERANCE/IMPAIRED MOBILITY  Goal: Mobility/activity is maintained at optimum level for patient  Outcome: Ongoing
([facility] [address] Asthma, Atrial fibrillation ([facility] [address] Blood clot in vein, CHF (congestive heart failure) ([facility] [address] Dementia ([facility] [address] GERD (gastroesophageal reflux disease), Hypertension, Kidney failure, MRSA (methicillin resistant staph aureus) culture positive, and Osteoporosis.          >>For CHF and Comorbidity documentation on Education Time and Topics, please see Education Tab
Ongoing     Problem: Airway Clearance - Ineffective:  Goal: Ability to maintain a clear airway will improve  Description: Ability to maintain a clear airway will improve  8/13/2020 0313 by Hi Gary RN  Outcome: Ongoing     Problem: Gas Exchange - Impaired:  Goal: Levels of oxygenation will improve  Description: Levels of oxygenation will improve  Outcome: Ongoing     Problem: Urinary Elimination:  Goal: Signs and symptoms of infection will decrease  Description: Signs and symptoms of infection will decrease  Outcome: Ongoing   Lua remains in place
prior to discharge      Comorbidities Reviewed Yes  Patient has a past medical history of Alzheimer's dementia ([facility] [address] Asthma, Atrial fibrillation ([facility] [address] Blood clot in vein, CHF (congestive heart failure) ([facility] [address] Dementia ([facility] [address] GERD (gastroesophageal reflux disease), Hypertension, Kidney failure, MRSA (methicillin resistant staph aureus) culture positive, and Osteoporosis.          >>For CHF and Comorbidity documentation on Education Time and Topics, please see Education Tab

## 2024-02-02 NOTE — PLAN OF CARE
Hob elevated./po fluids offered/repositioned Problem: OXYGENATION/RESPIRATORY FUNCTION  Goal: Patient will maintain patent airway  Outcome: Met This Shift

## 2024-02-15 NOTE — PROGRESS NOTES
Pt resting in bed, eyes closed. Respirations easy and even. Pt does not appear to be in any distress. Bed check engaged. Call light within reach. [FreeTextEntry1] : Patient is a 73-year-old F 2 weeks s/p excision of 3 cm chest cyst and 2 cm left groin subcutaneous cyst who presents today for a follow up.  [facility] [place]

## (undated) DEVICE — SYRINGE MED 50ML LUERSLIP TIP

## (undated) DEVICE — SINGLE USE SUCTION VALVE MAJ-209: Brand: SINGLE USE SUCTION VALVE (STERILE)

## (undated) DEVICE — NEBULIZER AEROSOL TBNG 7 FT FOR ACUTE CARE NEBUTECH

## (undated) DEVICE — SINGLE USE BIOPSY VALVE MAJ-210: Brand: SINGLE USE BIOPSY VALVE (STERILE)

## (undated) DEVICE — SYRINGE MED 10ML SLIP TIP BLNT FILL AND LUERLOCK DISP

## (undated) DEVICE — CONMED SCOPE SAVER BITE BLOCK, 20X27 MM: Brand: SCOPE SAVER

## (undated) DEVICE — SHEET,DRAPE,40X58,STERILE: Brand: MEDLINE

## (undated) DEVICE — AIRLIFE™ ADULT AEROSOL MASK VINYL, UNDER-THE-CHIN STYLE: Brand: AIRLIFE™

## (undated) DEVICE — ENDO CARRY-ON PROCEDURE KIT INCLUDES SUCTION TUBING, LUBRICANT, GAUZE, BIOHAZARD STICKER, TRANSPORT PAD AND INTERCEPT BEDSIDE KIT.: Brand: ENDO CARRY-ON PROCEDURE KIT